# Patient Record
Sex: MALE | Race: WHITE | NOT HISPANIC OR LATINO | Employment: OTHER | URBAN - METROPOLITAN AREA
[De-identification: names, ages, dates, MRNs, and addresses within clinical notes are randomized per-mention and may not be internally consistent; named-entity substitution may affect disease eponyms.]

---

## 2017-05-31 DIAGNOSIS — I10 HYPERTENSION GOAL BP (BLOOD PRESSURE) < 140/90: ICD-10-CM

## 2017-06-01 RX ORDER — LISINOPRIL 20 MG/1
TABLET ORAL
Qty: 90 TABLET | Refills: 0 | Status: SHIPPED | OUTPATIENT
Start: 2017-06-01 | End: 2017-09-05

## 2017-06-01 NOTE — TELEPHONE ENCOUNTER
Lisinopril      Last Written Prescription Date: 11/10/16  Last Fill Quantity: 90, # refills: 1  Last Office Visit with G, P or [facility] prescribing provider: 10/10/16       Potassium   Date Value Ref Range Status   08/29/2016 4.2 3.4 - 5.3 mmol/L Final     Creatinine   Date Value Ref Range Status   08/29/2016 0.83 0.66 - 1.25 mg/dL Final     BP Readings from Last 3 Encounters:   10/10/16 132/62   08/29/16 124/74   05/10/16 128/70     Radha Romero [facility]

## 2017-06-01 NOTE — TELEPHONE ENCOUNTER
Prescription approved per [facility], [facility] or [facility] refill protocol.  Loan Pinzon RN  [employer]

## 2017-08-22 ENCOUNTER — TRANSFERRED RECORDS (OUTPATIENT)
Dept: HEALTH INFORMATION MANAGEMENT | Facility: CLINIC | Age: 82
End: 2017-08-22

## 2017-09-05 ENCOUNTER — OFFICE VISIT (OUTPATIENT)
Dept: FAMILY MEDICINE | Facility: CLINIC | Age: 82
End: 2017-09-05
Payer: COMMERCIAL

## 2017-09-05 VITALS
DIASTOLIC BLOOD PRESSURE: 60 MMHG | BODY MASS INDEX: 24.85 KG/M2 | WEIGHT: 167.8 LBS | HEART RATE: 67 BPM | TEMPERATURE: 98.4 F | SYSTOLIC BLOOD PRESSURE: 126 MMHG | HEIGHT: 69 IN | OXYGEN SATURATION: 98 %

## 2017-09-05 DIAGNOSIS — E78.2 MIXED HYPERLIPIDEMIA: ICD-10-CM

## 2017-09-05 DIAGNOSIS — Z12.5 SCREENING FOR PROSTATE CANCER: ICD-10-CM

## 2017-09-05 DIAGNOSIS — H91.93 HEARING DIFFICULTY OF BOTH EARS: ICD-10-CM

## 2017-09-05 DIAGNOSIS — R32 URINARY INCONTINENCE, UNSPECIFIED TYPE: ICD-10-CM

## 2017-09-05 DIAGNOSIS — I10 HYPERTENSION GOAL BP (BLOOD PRESSURE) < 140/90: ICD-10-CM

## 2017-09-05 DIAGNOSIS — Z00.00 ROUTINE GENERAL MEDICAL EXAMINATION AT A HEALTH CARE FACILITY: Primary | ICD-10-CM

## 2017-09-05 DIAGNOSIS — R71.8 ELEVATED MCV: ICD-10-CM

## 2017-09-05 DIAGNOSIS — R26.2 DIFFICULTY WALKING: ICD-10-CM

## 2017-09-05 LAB
ALBUMIN UR-MCNC: NEGATIVE MG/DL
APPEARANCE UR: CLEAR
BILIRUB UR QL STRIP: NEGATIVE
COLOR UR AUTO: YELLOW
ERYTHROCYTE [DISTWIDTH] IN BLOOD BY AUTOMATED COUNT: 14 % (ref 10–15)
FOLATE SERPL-MCNC: 19.8 NG/ML
GLUCOSE UR STRIP-MCNC: NEGATIVE MG/DL
HCT VFR BLD AUTO: 44.1 % (ref 40–53)
HGB BLD-MCNC: 14.3 G/DL (ref 13.3–17.7)
HGB UR QL STRIP: NEGATIVE
KETONES UR STRIP-MCNC: NEGATIVE MG/DL
LEUKOCYTE ESTERASE UR QL STRIP: NEGATIVE
MCH RBC QN AUTO: 32.9 PG (ref 26.5–33)
MCHC RBC AUTO-ENTMCNC: 32.4 G/DL (ref 31.5–36.5)
MCV RBC AUTO: 101 FL (ref 78–100)
NITRATE UR QL: NEGATIVE
PH UR STRIP: 6 PH (ref 5–7)
PLATELET # BLD AUTO: 240 10E9/L (ref 150–450)
RBC # BLD AUTO: 4.35 10E12/L (ref 4.4–5.9)
SOURCE: NORMAL
SP GR UR STRIP: 1.01 (ref 1–1.03)
UROBILINOGEN UR STRIP-ACNC: 1 EU/DL (ref 0.2–1)
VIT B12 SERPL-MCNC: 426 PG/ML (ref 193–986)
WBC # BLD AUTO: 6.8 10E9/L (ref 4–11)

## 2017-09-05 PROCEDURE — 81003 URINALYSIS AUTO W/O SCOPE: CPT | Performed by: FAMILY MEDICINE

## 2017-09-05 PROCEDURE — 80061 LIPID PANEL: CPT | Performed by: FAMILY MEDICINE

## 2017-09-05 PROCEDURE — 99397 PER PM REEVAL EST PAT 65+ YR: CPT | Performed by: FAMILY MEDICINE

## 2017-09-05 PROCEDURE — 36415 COLL VENOUS BLD VENIPUNCTURE: CPT | Performed by: FAMILY MEDICINE

## 2017-09-05 PROCEDURE — 80053 COMPREHEN METABOLIC PANEL: CPT | Performed by: FAMILY MEDICINE

## 2017-09-05 PROCEDURE — 85027 COMPLETE CBC AUTOMATED: CPT | Performed by: FAMILY MEDICINE

## 2017-09-05 PROCEDURE — 82607 VITAMIN B-12: CPT | Performed by: FAMILY MEDICINE

## 2017-09-05 PROCEDURE — 82746 ASSAY OF FOLIC ACID SERUM: CPT | Performed by: FAMILY MEDICINE

## 2017-09-05 PROCEDURE — G0103 PSA SCREENING: HCPCS | Performed by: FAMILY MEDICINE

## 2017-09-05 RX ORDER — ATORVASTATIN CALCIUM 10 MG/1
10 TABLET, FILM COATED ORAL DAILY
Qty: 90 TABLET | Refills: 3 | Status: SHIPPED | OUTPATIENT
Start: 2017-09-05 | End: 2018-09-11

## 2017-09-05 RX ORDER — LISINOPRIL 20 MG/1
20 TABLET ORAL DAILY
Qty: 90 TABLET | Refills: 3 | Status: SHIPPED | OUTPATIENT
Start: 2017-09-05 | End: 2018-09-11

## 2017-09-05 NOTE — LETTER
September 6, 2017      Jose E Capps  [address] [address]        Dear ,    I have reviewed your recent labs. Here are the results:    -Liver and gallbladder tests are normal. (ALT,AST, Alk phos, bilirubin), kidney function is normal (Cr, GFR), Sodium is normal, Potassium is normal, Calcium is normal, Glucose is normal (diabetes screening test).   -Cholesterol levels (LDL,HDL, Triglycerides) are normal.  ADVISE: rechecking in 1 year.  -PSA (prostate specific antigen) test is normal.  This indicates a low likelihood of prostate cancer.  ADVISE: yearly recheck.   -Normal red blood cell (hgb) levels, normal white blood cell count and normal platelet levels.  - Urine test is normal  -Vitamin B12 and folic acid levels were normal as well      Please follow up with the urologist for management of urinary symptoms.        Resulted Orders   Lipid Profile   Result Value Ref Range    Cholesterol 191 <200 mg/dL    Triglycerides 96 <150 mg/dL    HDL Cholesterol 71 >39 mg/dL    LDL Cholesterol Calculated 101 (H) <100 mg/dL      Comment:      Above desirable:  100-129 mg/dl  Borderline High:  130-159 mg/dL  High:             160-189 mg/dL  Very high:       >189 mg/dl      Non HDL Cholesterol 120 <130 mg/dL   Prostate spec antigen screen   Result Value Ref Range    PSA 3.46 0 - 4 ug/L      Comment:      Assay Method:  Chemiluminescence using Siemens Vista analyzer   *UA reflex to Microscopic   Result Value Ref Range    Color Urine Yellow     Appearance Urine Clear     Glucose Urine Negative NEG^Negative mg/dL    Bilirubin Urine Negative NEG^Negative    Ketones Urine Negative NEG^Negative mg/dL    Specific Gravity Urine 1.010 1.003 - 1.035    Blood Urine Negative NEG^Negative    pH Urine 6.0 5.0 - 7.0 pH    Protein Albumin Urine Negative NEG^Negative mg/dL    Urobilinogen Urine 1.0 0.2 - 1.0 EU/dL    Nitrite Urine Negative NEG^Negative    Leukocyte Esterase Urine Negative NEG^Negative    Source  Midstream Urine    Comprehensive metabolic panel   Result Value Ref Range    Sodium 141 133 - 144 mmol/L    Potassium 3.8 3.4 - 5.3 mmol/L    Chloride 107 94 - 109 mmol/L    Carbon Dioxide 25 20 - 32 mmol/L    Anion Gap 9 3 - 14 mmol/L    Glucose 91 70 - 99 mg/dL    Urea Nitrogen 15 7 - 30 mg/dL    Creatinine 0.75 0.66 - 1.25 mg/dL    GFR Estimate >90 >60 mL/min/1.7m2      Comment:      Non  GFR Calc    GFR Estimate If Black >90 >60 mL/min/1.7m2      Comment:       GFR Calc    Calcium 8.8 8.5 - 10.1 mg/dL    Bilirubin Total 0.9 0.2 - 1.3 mg/dL    Albumin 3.3 (L) 3.4 - 5.0 g/dL    Protein Total 6.3 (L) 6.8 - 8.8 g/dL    Alkaline Phosphatase 64 40 - 150 U/L    ALT 19 0 - 70 U/L    AST 19 0 - 45 U/L   Vitamin B12   Result Value Ref Range    Vitamin B12 426 193 - 986 pg/mL   Folate   Result Value Ref Range    Folate 19.8 >5.4 ng/mL   CBC with platelets   Result Value Ref Range    WBC 6.8 4.0 - 11.0 10e9/L    RBC Count 4.35 (L) 4.4 - 5.9 10e12/L    Hemoglobin 14.3 13.3 - 17.7 g/dL    Hematocrit 44.1 40.0 - 53.0 %     (H) 78 - 100 fl    MCH 32.9 26.5 - 33.0 pg    MCHC 32.4 31.5 - 36.5 g/dL    RDW 14.0 10.0 - 15.0 %    Platelet Count 240 150 - 450 10e9/L       If you have any questions or concerns, please call the clinic at the number listed above.       Sincerely,        Nehemias Granados MD

## 2017-09-05 NOTE — MR AVS SNAPSHOT
After Visit Summary   9/5/2017    Jose E Capps    MRN: [id number]           Patient Information     Date Of Birth          9/28/1931        Visit Information        Provider Department      9/5/2017 10:40 AM Nehemias Granados MD [facility]        Today's Diagnoses     Routine general medical examination at a health care facility    -  1    Mixed hyperlipidemia        Hypertension goal BP (blood pressure) < 140/90        Urinary incontinence, unspecified type        Elevated MCV        Screening for prostate cancer        Difficulty walking          Care Instructions      Preventive Health Recommendations:       Male Ages 65 and over    Yearly exam:             See your health care provider every year in order to  o   Review health changes.   o   Discuss preventive care.    o   Review your medicines if your doctor has prescribed any.    Talk with your health care provider about whether you should have a test to screen for prostate cancer (PSA).    Every 3 years, have a diabetes test (fasting glucose). If you are at risk for diabetes, you should have this test more often.    Every 5 years, have a cholesterol test. Have this test more often if you are at risk for high cholesterol or heart disease.     Every 10 years, have a colonoscopy. Or, have a yearly FIT test (stool test). These exams will check for colon cancer.    Talk to with your health care provider about screening for Abdominal Aortic Aneurysm if you have a family history of AAA or have a history of smoking.  Shots:     Get a flu shot each year.     Get a tetanus shot every 10 years.     Talk to your doctor about your pneumonia vaccines. There are now two you should receive - Pneumovax (PPSV 23) and Prevnar (PCV 13).    Talk to your doctor about a shingles vaccine.     Talk to your doctor about the hepatitis B vaccine.  Nutrition:     Eat at least 5 servings of fruits and vegetables each day.     Eat whole-grain bread,  "whole-wheat pasta and brown rice instead of white grains and rice.     Talk to your doctor about Calcium and Vitamin D.   Lifestyle    Exercise for at least 150 minutes a week (30 minutes a day, 5 days a week). This will help you control your weight and prevent disease.     Limit alcohol to one drink per day.     No smoking.     Wear sunscreen to prevent skin cancer.     See your dentist every six months for an exam and cleaning.     See your eye doctor every 1 to 2 years to screen for conditions such as glaucoma, macular degeneration and cataracts.          Follow-ups after your visit        Additional Services     PHYSICAL THERAPY REFERRAL       Walking instability.    Treatment: Evaluation & Treatment  Special Instructions/Modalities:  May need OT  Special Programs:   Please be aware that coverage of these services is subject to the terms and limitations of your health insurance plan.  Call member services at your health plan with any benefit or coverage questions.      **Note to Provider:  If you are referring outside of [place] for the therapy appointment, please list the name of the location in the \"special instructions\" above, print the referral and give to the patient to schedule the appointment.            SKIN CARE REFERRAL       Your provider [place] [facility] [place] [facility] [place] [phone]   [email or web address]     Please be aware that coverage of these services is subject to the terms and limitations of your health insurance plan.  Please check with your insurance prior to the appointment to ensure appropriate coverage for any services considered cosmetic in nature or not medically necessary.    Please bring the following with you to your appointment:    (1) Any X-Rays, CTs or MRIs which have been performed.  Contact the facility where they were done to arrange for  prior to your scheduled appointment.  Any new CT, MRI or " other procedures ordered by your specialist must be performed at a [place] facility or coordinated by your clinic's referral office.  (2) List of current medications  (3) This referral request   (4) Any documents/labs given to you for this referral            UROLOGY ADULT REFERRAL       Your provider has referred you to: [facility]: [facility] [phone]   [email or web address]    Please be aware that coverage of these services is subject to the terms and limitations of your health insurance plan.  Call member services at your health plan with any benefit or coverage questions.      Please bring the following with you to your appointment:    (1) Any X-Rays, CTs or MRIs which have been performed.  Contact the facility where they were done to arrange for  prior to your scheduled appointment.    (2) List of current medications  (3) This referral request   (4) Any documents/labs given to you for this referral                  Follow-up notes from your care team     Return in about 1 year (around 9/5/2018) for Physical Exam.      Who to contact     If you have questions or need follow up information about today's clinic visit or your schedule please contact [facility] [place] directly at [phone].  Normal or non-critical lab and imaging results will be communicated to you by [employer], letter or phone within 4 business days after the clinic has received the results. If you do not hear from us within 7 days, please contact the clinic through [employer] or phone. If you have a critical or abnormal lab result, we will notify you by phone as soon as possible.  Submit refill requests through [employer] or call your pharmacy and they will forward the refill request to us. Please allow 3 business days for your refill to be completed.          Additional Information About Your Visit        Scytl Information     S[employer]tl lets you send messages to your doctor, view your test  "results, renew your prescriptions, schedule appointments and more. To sign up, go to [email or web address] . Click on \"Log in\" on the left side of the screen, which will take you to the Welcome page. Then click on \"Sign up Now\" on the right side of the page.     You will be asked to enter the access code listed below, as well as some personal information. Please follow the directions to create your username and password.     Your access code is: [email or web address]  Expires: 2017 11:34 AM     Your access code will  in 90 days. If you need help or a new code, please call your [place] clinic or [phone].        Care EveryWhere ID     This is your Care EveryWhere ID. This could be used by other organizations to access your [place] medical records  [id number]        Your Vitals Were     Pulse Temperature Height Pulse Oximetry BMI (Body Mass Index)       67 98.4  F (36.9  C) (Tympanic) 5' 9\" (1.753 m) 98% 24.78 kg/m2        Blood Pressure from Last 3 Encounters:   17 126/60   10/10/16 132/62   16 124/74    Weight from Last 3 Encounters:   17 167 lb 12.8 oz (76.1 kg)   10/10/16 167 lb (75.8 kg)   16 172 lb (78 kg)              We Performed the Following     *UA reflex to Microscopic     CBC with platelets     Comprehensive metabolic panel     Folate     Lipid Profile     PHYSICAL THERAPY REFERRAL     Prostate spec antigen screen     SKIN CARE REFERRAL     UROLOGY ADULT REFERRAL     Vitamin B12          Today's Medication Changes          These changes are accurate as of: 17 11:34 AM.  If you have any questions, ask your nurse or doctor.               These medicines have changed or have updated prescriptions.        Dose/Directions    lisinopril 20 MG tablet   Commonly known as:  PRINIVIL/ZESTRIL   This may have changed:  See the new instructions.   Used for:  Hypertension goal BP (blood pressure) < 140/90   Changed by:  Nehemias Granados MD        Dose:  20 mg   Take 1 tablet (20 mg) " by mouth daily   Quantity:  90 tablet   Refills:  3         Stop taking these medicines if you haven't already. Please contact your care team if you have questions.     predniSONE 10 MG tablet   Commonly known as:  DELTASONE   Stopped by:  Nehemias Granados MD                Where to get your medicines      These medications were sent to [id number] - [place], [place] - [address] [facility]  [address] [facility] [address]     Phone:  [phone]     atorvastatin 10 MG tablet    lisinopril 20 MG tablet                Primary Care Provider Office Phone # Fax #    Nehemias Granados [phone] [address]  [place] [address]        Equal Access to Services     [facility]: Hadii cecille enamorado hadasho Sosylviaali, waaxda luqadaha, qaybta kaalmada adeegyada, ian mary . So [facility] [phone].    ATENCIÓN: Si habla español, tiene a bradley disposición servicios gratuitos de asistencia lingüística. [facility] [phone].    We comply with applicable federal civil rights laws and [place] laws. We do not discriminate on the basis of race, color, national origin, age, disability sex, sexual orientation or gender identity.            Thank you!     Thank you for choosing [facility] [place]  for your care. Our goal is always to provide you with excellent care. Hearing back from our patients is one way we can continue to improve our services. Please take a few minutes to complete the written survey that you may receive in the mail after your visit with us. Thank you!             Your Updated Medication List - Protect others around you: Learn how to safely use, store and throw away your medicines at www.disposemymeds.org.          This list is accurate as of: 9/5/17 11:34 AM.  Always use your most recent med list.                   Brand Name Dispense Instructions for use Diagnosis    aspirin 81 MG tablet      Take 1 tablet by mouth daily.         atorvastatin 10 MG tablet    LIPITOR    90 tablet    Take 1 tablet (10 mg) by mouth daily    Mixed hyperlipidemia       calcium + D 600-200 MG-UNIT Tabs   Generic drug:  calcium carbonate-vitamin D      Take 1 tablet by mouth daily.        fish oil-omega-3 fatty acids 1000 MG capsule      Take 1 capsule by mouth daily.        leflunomide 10 MG tablet    ARAVA     Take 1 tablet by mouth daily.    Seronegative rheumatoid arthritis (H)       lisinopril 20 MG tablet    PRINIVIL/ZESTRIL    90 tablet    Take 1 tablet (20 mg) by mouth daily    Hypertension goal BP (blood pressure) < 140/90       MULTI COMPLETE PO      Take  by mouth.

## 2017-09-05 NOTE — PROGRESS NOTES
SUBJECTIVE:   Jose E Capps is a 85 year old male who presents for Preventive Visit.      Are you in the first 12 months of your Medicare Part B coverage?  No    Healthy Habits:    Do you get at least three servings of calcium containing foods daily (dairy, green leafy vegetables, etc.)? yes    Amount of exercise or daily activities, outside of work: 0 day(s) per week    Problems taking medications regularly No    Medication side effects: No    Have you had an eye exam in the past two years? no    Do you see a dentist twice per year? ONCE    Do you have sleep apnea, excessive snoring or daytime drowsiness?no    COGNITIVE SCREEN  1) Repeat 3 items (Banana, Sunrise, Chair)    2) Clock draw: ABNORMAL   3) 3 item recall: Recalls 1 object   Results: ABNORMAL clock, 1-2 items recalled: PROBABLE COGNITIVE IMPAIRMENT, **INFORM PROVIDER**    Mini-CogTM Copyright S Kiara. Licensed by the author for use in [facility]; reprinted with permission (isma@[facility]). All rights reserved.            Hyperlipidemia Follow-Up      Rate your low fat/cholesterol diet?: good    Taking statin?  Yes    Other lipid medications/supplements?:  none    Hypertension Follow-up      Outpatient blood pressures are not being checked.    Low Salt Diet: no added salt            Reviewed and updated as needed this visit by clinical staffTobacco  Allergies  Meds  Soc Hx        Reviewed and updated as needed this visit by Provider  Allergies        Social History   Substance Use Topics     Smoking status: Former Smoker     Packs/day: 2.00     Years: 30.00     Types: Cigarettes     Smokeless tobacco: Never Used     Alcohol use 2.4 oz/week     4 Standard drinks or equivalent per week       The patient does not drink >3 drinks per day nor >7 drinks per week.    Today's PHQ-2 Score:   PHQ-2 ( 1999 Pfizer) 9/5/2017 10/10/2016   Q1: Little interest or pleasure in doing things 0 0   Q2: Feeling down, depressed or hopeless 0 0   PHQ-2 Score  0 0       Do you feel safe in your environment - Yes    Do you have a Health Care Directive?: Yes: Advance Directive has been received and scanned.    Current providers sharing in care for this patient include:   Patient Care Team:  Nehemias Granados MD as PCP - General (Family Practice)      Hearing impairment: Yes, Difficulty following a conversation in a noisy restaurant or crowded room.    Ability to successfully perform activities of daily living: c/o difficulty walking.      Fall risk:  Fallen 2 or more times in the past year?: No  Any fall with injury in the past year?: No      Home safety:  none identified    C/o urinary incontinence. C/o weak urine stream. Feel incomplete bladder emptying. C/o urgency. No dysuria or hematuria.     The following health maintenance items are reviewed in Epic and correct as of today:Health Maintenance   Topic Date Due     PNEUMOCOCCAL (2 of 2 - PCV13) 09/01/2012     INFLUENZA VACCINE (SYSTEM ASSIGNED)  09/01/2017     FALL RISK ASSESSMENT  09/05/2018     WELLNESS VISIT Q1 YR  09/05/2018     ADVANCE DIRECTIVE PLANNING Q5 YRS  11/12/2018     TETANUS IMMUNIZATION (SYSTEM ASSIGNED)  04/17/2022     Labs reviewed in EPIC  BP Readings from Last 3 Encounters:   09/05/17 126/60   10/10/16 132/62   08/29/16 124/74    Wt Readings from Last 3 Encounters:   09/05/17 167 lb 12.8 oz (76.1 kg)   10/10/16 167 lb (75.8 kg)   08/29/16 172 lb (78 kg)                  Patient Active Problem List   Diagnosis     ACP (advance care planning)     CVA (cerebral vascular accident) (H)     Seronegative rheumatoid arthritis (H)     Hyperlipidemia LDL goal <100     Colon polyp     Hypertension goal BP (blood pressure) < 140/90     Hip pain, left     History of malignant melanoma of skin     History of basal cell carcinoma     Past Surgical History:   Procedure Laterality Date     JOINT REPLACEMENT, HIP RT/LT  2007    R     JOINT REPLACEMTN, KNEE RT/LT  2004     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     excised       Social History   Substance Use Topics     Smoking status: Former Smoker     Packs/day: 2.00     Years: 30.00     Types: Cigarettes     Smokeless tobacco: Never Used     Alcohol use 2.4 oz/week     4 Standard drinks or equivalent per week     Family History   Problem Relation Age of Onset     DIABETES Mother      Alzheimer Disease Mother      Obesity Mother      Cardiovascular Father      Obesity Father      CANCER Sister      CANCER Sister          Current Outpatient Prescriptions   Medication Sig Dispense Refill     atorvastatin (LIPITOR) 10 MG tablet Take 1 tablet (10 mg) by mouth daily 90 tablet 3     lisinopril (PRINIVIL/ZESTRIL) 20 MG tablet Take 1 tablet (20 mg) by mouth daily 90 tablet 3     Multiple Vitamins-Minerals (MULTI COMPLETE PO) Take  by mouth.       aspirin 81 MG tablet Take 1 tablet by mouth daily.       fish oil-omega-3 fatty acids (OMEGA 3) 1000 MG capsule Take 1 capsule by mouth daily.       calcium carbonate-vitamin D (CALCIUM + D) 600-200 MG-UNIT TABS Take 1 tablet by mouth daily.       leflunomide (ARAVA) 10 MG tablet Take 1 tablet by mouth daily.       Allergies   Allergen Reactions     No Known Drug Allergy              ROS:  C: NEGATIVE for fever, chills, change in weight  I: NEGATIVE for worrisome rashes, moles or lesions  E: NEGATIVE for vision changes or irritation  E/M: NEGATIVE for ear, mouth and throat problems  R: NEGATIVE for significant cough or SOB  B: NEGATIVE for masses, tenderness or discharge  CV: NEGATIVE for chest pain, palpitations or peripheral edema  GI: NEGATIVE for nausea, abdominal pain, heartburn, or change in bowel habits  : NEGATIVE for frequency, dysuria, or hematuria  M: NEGATIVE for significant arthralgias or myalgia  N: NEGATIVE for weakness, dizziness or paresthesias  E: NEGATIVE for temperature intolerance, skin/hair changes  H: NEGATIVE for bleeding problems  P: NEGATIVE for changes in mood or affect    OBJECTIVE:   /60  Pulse 67   "Temp 98.4  F (36.9  C) (Tympanic)  Ht 5' 9\" (1.753 m)  Wt 167 lb 12.8 oz (76.1 kg)  SpO2 98%  BMI 24.78 kg/m2 Estimated body mass index is 24.78 kg/(m^2) as calculated from the following:    Height as of this encounter: 5' 9\" (1.753 m).    Weight as of this encounter: 167 lb 12.8 oz (76.1 kg).  EXAM:   GENERAL: healthy, alert and no distress  EYES: Eyes grossly normal to inspection, PERRL and conjunctivae and sclerae normal  HENT: ear canals and TM's normal, nose and mouth without ulcers or lesions  NECK: no adenopathy, no asymmetry, masses, or scars and thyroid normal to palpation  RESP: lungs clear to auscultation - no rales, rhonchi or wheezes  CV: regular rate and rhythm, normal S1 S2, no S3 or S4, no murmur, click or rub, no peripheral edema and peripheral pulses strong  ABDOMEN: soft, nontender, no hepatosplenomegaly, no masses and bowel sounds normal  MS: no gross musculoskeletal defects noted, no edema  SKIN: no suspicious lesions or rashes  NEURO: Normal strength and tone, mentation intact and speech normal  PSYCH: mentation appears normal, affect normal/bright  Results for orders placed or performed in visit on 09/05/17   Lipid Profile   Result Value Ref Range    Cholesterol 191 <200 mg/dL    Triglycerides 96 <150 mg/dL    HDL Cholesterol 71 >39 mg/dL    LDL Cholesterol Calculated 101 (H) <100 mg/dL    Non HDL Cholesterol 120 <130 mg/dL   Prostate spec antigen screen   Result Value Ref Range    PSA 3.46 0 - 4 ug/L   *UA reflex to Microscopic   Result Value Ref Range    Color Urine Yellow     Appearance Urine Clear     Glucose Urine Negative NEG^Negative mg/dL    Bilirubin Urine Negative NEG^Negative    Ketones Urine Negative NEG^Negative mg/dL    Specific Gravity Urine 1.010 1.003 - 1.035    Blood Urine Negative NEG^Negative    pH Urine 6.0 5.0 - 7.0 pH    Protein Albumin Urine Negative NEG^Negative mg/dL    Urobilinogen Urine 1.0 0.2 - 1.0 EU/dL    Nitrite Urine Negative NEG^Negative    Leukocyte " Esterase Urine Negative NEG^Negative    Source Midstream Urine    Comprehensive metabolic panel   Result Value Ref Range    Sodium 141 133 - 144 mmol/L    Potassium 3.8 3.4 - 5.3 mmol/L    Chloride 107 94 - 109 mmol/L    Carbon Dioxide 25 20 - 32 mmol/L    Anion Gap 9 3 - 14 mmol/L    Glucose 91 70 - 99 mg/dL    Urea Nitrogen 15 7 - 30 mg/dL    Creatinine 0.75 0.66 - 1.25 mg/dL    GFR Estimate >90 >60 mL/min/1.7m2    GFR Estimate If Black >90 >60 mL/min/1.7m2    Calcium 8.8 8.5 - 10.1 mg/dL    Bilirubin Total 0.9 0.2 - 1.3 mg/dL    Albumin 3.3 (L) 3.4 - 5.0 g/dL    Protein Total 6.3 (L) 6.8 - 8.8 g/dL    Alkaline Phosphatase 64 40 - 150 U/L    ALT 19 0 - 70 U/L    AST 19 0 - 45 U/L   Vitamin B12   Result Value Ref Range    Vitamin B12 426 193 - 986 pg/mL   Folate   Result Value Ref Range    Folate 19.8 >5.4 ng/mL   CBC with platelets   Result Value Ref Range    WBC 6.8 4.0 - 11.0 10e9/L    RBC Count 4.35 (L) 4.4 - 5.9 10e12/L    Hemoglobin 14.3 13.3 - 17.7 g/dL    Hematocrit 44.1 40.0 - 53.0 %     (H) 78 - 100 fl    MCH 32.9 26.5 - 33.0 pg    MCHC 32.4 31.5 - 36.5 g/dL    RDW 14.0 10.0 - 15.0 %    Platelet Count 240 150 - 450 10e9/L       ASSESSMENT / PLAN:   1. Routine general medical examination at a health care facility    - SKIN CARE REFERRAL- recommended to see them annually.     Labs reviewed:  -Liver and gallbladder tests are normal. (ALT,AST, Alk phos, bilirubin), kidney function is normal (Cr, GFR), Sodium is normal, Potassium is normal, Calcium is normal, Glucose is normal (diabetes screening test).   -Cholesterol levels (LDL,HDL, Triglycerides) are normal.  ADVISE: rechecking in 1 year.  -PSA (prostate specific antigen) test is normal.  This indicates a low likelihood of prostate cancer.  ADVISE: yearly recheck.   -Normal red blood cell (hgb) levels, normal white blood cell count and normal platelet levels.  - Urine test is normal  -Vitamin B12 and folic acid levels were normal as well    2. Mixed  "hyperlipidemia  Well controlled.   - atorvastatin (LIPITOR) 10 MG tablet; Take 1 tablet (10 mg) by mouth daily  Dispense: 90 tablet; Refill: 3  - Lipid Profile  - Comprehensive metabolic panel    3. Hypertension goal BP (blood pressure) < 140/90  Well controlled.   - lisinopril (PRINIVIL/ZESTRIL) 20 MG tablet; Take 1 tablet (20 mg) by mouth daily  Dispense: 90 tablet; Refill: 3  - Comprehensive metabolic panel    4. Urinary incontinence, unspecified type    - *UA reflex to Microscopic  - UROLOGY ADULT REFERRAL- recommended to see them for management of urinary symptoms.     5. Elevated MCV - unsure etiology.     - Vitamin B12  - Folate  - CBC with platelets    6. Screening for prostate cancer    - Prostate spec antigen screen- negative.     7. Difficulty walking  Recommended PT evaluation and management.   - PHYSICAL THERAPY REFERRAL    8. Hearing difficulty of both ears  Hearing screening done by nurse today was abnormal. Referred to ENT.  - OTOLARYNGOLOGY REFERRAL    End of Life Planning:  Patient currently has an advanced directive: Yes.  Practitioner is supportive of decision.    COUNSELING:  Reviewed preventive health counseling, as reflected in patient instructions       Regular exercise       Healthy diet/nutrition      Estimated body mass index is 24.78 kg/(m^2) as calculated from the following:    Height as of this encounter: 5' 9\" (1.753 m).    Weight as of this encounter: 167 lb 12.8 oz (76.1 kg).     reports that he has quit smoking. His smoking use included Cigarettes. He has a 60.00 pack-year smoking history. He has never used smokeless tobacco.        Appropriate preventive services were discussed with this patient, including applicable screening as appropriate for cardiovascular disease, diabetes, osteopenia/osteoporosis, and glaucoma.  As appropriate for age/gender, discussed screening for colorectal cancer, prostate cancer, breast cancer, and cervical cancer. Checklist reviewing preventive services " available has been given to the patient.    Reviewed patients plan of care and provided an AVS. The Complex Care Plan (for patients with higher acuity and needing more deliberate coordination of services) for Jose E meets the Care Plan requirement. This Care Plan has been established and reviewed with the Patient.    Counseling Resources:  ATP IV Guidelines  Pooled Cohorts Equation Calculator  Breast Cancer Risk Calculator  FRAX Risk Assessment  ICSI Preventive Guidelines  Dietary Guidelines for Americans, 2010  USDA's MyPlate  ASA Prophylaxis  Lung CA Screening    Nehemias Granados MD  [facility]

## 2017-09-05 NOTE — NURSING NOTE
"Chief Complaint   Patient presents with     Medicare Visit     fasting        Initial /60  Pulse 67  Temp 98.4  F (36.9  C) (Tympanic)  Ht 5' 9\" (1.753 m)  Wt 167 lb 12.8 oz (76.1 kg)  SpO2 98%  BMI 24.78 kg/m2 Estimated body mass index is 24.78 kg/(m^2) as calculated from the following:    Height as of this encounter: 5' 9\" (1.753 m).    Weight as of this encounter: 167 lb 12.8 oz (76.1 kg).  Medication Reconciliation: complete  "

## 2017-09-06 ENCOUNTER — OFFICE VISIT (OUTPATIENT)
Dept: FAMILY MEDICINE | Facility: CLINIC | Age: 82
End: 2017-09-06
Attending: FAMILY MEDICINE
Payer: COMMERCIAL

## 2017-09-06 DIAGNOSIS — Q82.5 CONGENITAL NEVUS OF LEFT LOWER LEG: ICD-10-CM

## 2017-09-06 DIAGNOSIS — L82.1 SEBORRHEIC KERATOSES: ICD-10-CM

## 2017-09-06 DIAGNOSIS — L81.4 SOLAR LENTIGO: ICD-10-CM

## 2017-09-06 DIAGNOSIS — D22.9 MULTIPLE BENIGN MELANOCYTIC NEVI: ICD-10-CM

## 2017-09-06 DIAGNOSIS — Z85.828 HISTORY OF BASAL CELL CARCINOMA: ICD-10-CM

## 2017-09-06 DIAGNOSIS — Z85.820 HISTORY OF MALIGNANT MELANOMA OF SKIN: Primary | ICD-10-CM

## 2017-09-06 LAB
ALBUMIN SERPL-MCNC: 3.3 G/DL (ref 3.4–5)
ALP SERPL-CCNC: 64 U/L (ref 40–150)
ALT SERPL W P-5'-P-CCNC: 19 U/L (ref 0–70)
ANION GAP SERPL CALCULATED.3IONS-SCNC: 9 MMOL/L (ref 3–14)
AST SERPL W P-5'-P-CCNC: 19 U/L (ref 0–45)
BILIRUB SERPL-MCNC: 0.9 MG/DL (ref 0.2–1.3)
BUN SERPL-MCNC: 15 MG/DL (ref 7–30)
CALCIUM SERPL-MCNC: 8.8 MG/DL (ref 8.5–10.1)
CHLORIDE SERPL-SCNC: 107 MMOL/L (ref 94–109)
CHOLEST SERPL-MCNC: 191 MG/DL
CO2 SERPL-SCNC: 25 MMOL/L (ref 20–32)
CREAT SERPL-MCNC: 0.75 MG/DL (ref 0.66–1.25)
GFR SERPL CREATININE-BSD FRML MDRD: >90 ML/MIN/1.7M2
GLUCOSE SERPL-MCNC: 91 MG/DL (ref 70–99)
HDLC SERPL-MCNC: 71 MG/DL
LDLC SERPL CALC-MCNC: 101 MG/DL
NONHDLC SERPL-MCNC: 120 MG/DL
POTASSIUM SERPL-SCNC: 3.8 MMOL/L (ref 3.4–5.3)
PROT SERPL-MCNC: 6.3 G/DL (ref 6.8–8.8)
PSA SERPL-ACNC: 3.46 UG/L (ref 0–4)
SODIUM SERPL-SCNC: 141 MMOL/L (ref 133–144)
TRIGL SERPL-MCNC: 96 MG/DL

## 2017-09-06 PROCEDURE — 99213 OFFICE O/P EST LOW 20 MIN: CPT | Performed by: FAMILY MEDICINE

## 2017-09-06 NOTE — MR AVS SNAPSHOT
"              After Visit Summary   9/6/2017    Jose E Capps    MRN: [id number]           Patient Information     Date Of Birth          9/28/1931        Visit Information        Provider Department      9/6/2017 11:40 AM Marita Neville MD [facility] - Primary Care Skin        Today's Diagnoses     History of malignant melanoma of skin    -  1    History of basal cell carcinoma        Solar lentigo        Multiple benign melanocytic nevi        Seborrheic keratoses        Congenital nevus of left lower leg          Care Instructions    FUTURE APPOINTMENTS  Follow up every 1 year(s) for a full-body skin cancer screening.    SUN PROTECTION INSTRUCTIONS  Sun damage can lead to skin cancer and premature aging of the skin.      The best way to protect from sun damage to your skin is to avoid the sun during peak hours (10 am - 2 pm) even on overcast days.      Use UPF sun-protective clothing, which while more expensive initially provides longer lasting coverage without having to worry about remembering to re-apply.  1. Wear a wide-brimmed hat and sunglasses.   2. Wear sun-protective clothing.  K[employer]uz and other [employer] make sun protective clothing that are stylish, comfortable and cool. VOICE[employer]ATE.[employer] and other [employer] make UV arm sleeves suitable for golfing, gardening and other activities.      Sunscreen instructions:  1. Use sunscreens with Zinc Oxide, Titanium Dioxide or Avobenzone to protect from UVA rays.  2. Use SPF 30-50+ to protect from UVB rays.  3. Re-apply every 2 hours even if water resistant.  4. Apply on your face every day even when cloudy and even in the winter. UVA \"aging rays\" penetrate window glass and are just as strong in the winter as in the summer.    Product Recommendations:    Good examples include: Blue Lizard, EltaMD, Solbar    Good daily moisturizers with SPF: Vanicream, CeraVe.    For sensitive skin, consider : SkinMedica Essential Defense Mineral Shield " "Broad Spectrum SPF 35      Never use tanning beds. Tanning beds are associated with much higher risks of skin cancer.    All tanning damages the skin. Aim for ivory skin year round and you will have less trouble with your skin in years to come. There is no merit in getting \"a base tan\" before a warm weather vacation, as any tanning indicates your body's response to sun damage.    Stop smoking. Smokers have higher rates of skin cancer and also have premature skin wrinkling.    FYI  You should use about 3 tablespoons of sunscreen to protect your whole body. Thus a typical eight ounce bottle of sunscreen should last 4 applications. Remember, that the SPF rating is compromised if you don t apply enough. Most people only apply 1/2 - 1/3 of the amount they need. Also don t forget areas such as your ears, feet, upper back and harder to reach places. Keep in mind that these amounts should be increased for larger body sizes.    Sunscreens with titanium dioxide and/or zinc oxide in the active ingredients are physical blockers as opposed to chemical blockers. Chemical-free sunscreens should not irritate the skin.    Spray-on sunscreens may be used for touch-up application only, not as a base layer. Also, use with caution around small children due to inhalation risk.    Avoid retinyl palmitate products.    Avoid combination products that include both sunscreen and insect repellant, as sunscreen should be applied every 2 hours, but insect repellant should not be applied as frequently.    SPF means sun protection factor, which is just the degree to which the sunscreen can protect against UVB rays. There is no rating system for UVA rays. SPF is calculated as the time skin will burn when sunscreen is applied vs. skin without sunscreen.    Water resistant sunscreens should be re-applied every 1-2 hours.    For more information:  http://www.skincancer.org/prevention/sun-protection/sunscreen/sunscreens-safe-and-effective    SKIN " CANCER SELF-EXAM INSTRUCTIONS  Check every month in the mirror or with a household member. Be aware of any changes, especially bleeding or tenderness. Also, make sure to check your nails for color changes after removal of nail polish.    For melanoma, check for:  A - Asymmetry. One half unlike the other half.  B - Border. Irregular, scalloped, ragged, notched, blurred or poorly defined borders.  C - Color. Color variations from one area to another, with shades of tan, brown and/or black present. Sometimes white, red or blue.  D - Diameter. Greater than 6 mm (about the size of a pencil eraser). Any new growth of a mole should be concerning and be evaluated.  E - Evolving. A mole or skin lesion that looks different from the rest or is changing in size, shape or color.    For basal cell carcinoma and squamous cell carcinoma, check for:    Sores, shiny bumps, nodules, scaly lesions, or wart-like growths that are itchy, tender, crusting, scabbing, eroding, oozing or bleeding.    Open sores/wounds or reddish/irritated areas that do not heal within 2-3 weeks.    Scar-like areas that are white, yellow or waxy in color.    Also, check your lymph nodes for tenderness or swelling every month. Check front of neck, back of neck, over the collarbone, and in the armpits. (Note that if you have a cold or other illness, they may be swollen)          Follow-ups after your visit        Your next 10 appointments already scheduled     Oct 02, 2017 11:00 AM CDT   New Patient Visit with Srinivasa Whipple MD   [facility] ([facility])    [address]  [address]  [facility] [id number] [phone]              Who to contact     If you have questions or need follow up information about today's clinic visit or your schedule please contact [facility] - PRIMARY CARE SKIN directly at [phone].  Normal or non-critical lab and imaging results will be communicated to you by  "MyChart, letter or phone within 4 business days after the clinic has received the results. If you do not hear from us within 7 days, please contact the clinic through [employer] or phone. If you have a critical or abnormal lab result, we will notify you by phone as soon as possible.  Submit refill requests through Ev[employer] or call your pharmacy and they will forward the refill request to us. Please allow 3 business days for your refill to be completed.          Additional Information About Your Visit        [employer] Information     Evino lets you send messages to your doctor, view your test results, renew your prescriptions, schedule appointments and more. To sign up, go to [email or web address] . Click on \"Log in\" on the left side of the screen, which will take you to the Welcome page. Then click on \"Sign up Now\" on the right side of the page.     You will be asked to enter the access code listed below, as well as some personal information. Please follow the directions to create your username and password.     Your access code is: [email or web address]  Expires: 2017 11:34 AM     Your access code will  in 90 days. If you need help or a new code, please call your [facility] clinic or [phone].        Care EveryWhere ID     This is your Care EveryWhere ID. This could be used by other organizations to access your [facility] medical records  [id number]         Blood Pressure from Last 3 Encounters:   17 126/60   10/10/16 132/62   16 124/74    Weight from Last 3 Encounters:   17 167 lb 12.8 oz (76.1 kg)   10/10/16 167 lb (75.8 kg)   16 172 lb (78 kg)              Today, you had the following     No orders found for display       Primary Care Provider Office Phone # Fax #    Nehemias Granados [phone]       [address]  [place] [address]        Equal Access to Services     [facility] CenterLOYDA AH: Hadii cecille Perales, faye san, [place] cárdenas, " ian cheung jordan carrera'aan ah. So [facility] [phone].    ATENCIÓN: Si rogerla bailey, tiene a bradley disposición servicios gratuitos de asistencia lingüística. Joselin garcia [phone].    We comply with applicable federal civil rights laws and [place] laws. We do not discriminate on the basis of race, color, national origin, age, disability sex, sexual orientation or gender identity.            Thank you!     Thank you for choosing [facility] - PRIMARY CARE [facility]  for your care. Our goal is always to provide you with excellent care. Hearing back from our patients is one way we can continue to improve our services. Please take a few minutes to complete the written survey that you may receive in the mail after your visit with us. Thank you!             Your Updated Medication List - Protect others around you: Learn how to safely use, store and throw away your medicines at www.disposemymeds.org.          This list is accurate as of: 9/6/17 11:49 AM.  Always use your most recent med list.                   Brand Name Dispense Instructions for use Diagnosis    aspirin 81 MG tablet      Take 1 tablet by mouth daily.        atorvastatin 10 MG tablet    LIPITOR    90 tablet    Take 1 tablet (10 mg) by mouth daily    Mixed hyperlipidemia       calcium + D 600-200 MG-UNIT Tabs   Generic drug:  calcium carbonate-vitamin D      Take 1 tablet by mouth daily.        fish oil-omega-3 fatty acids 1000 MG capsule      Take 1 capsule by mouth daily.        leflunomide 10 MG tablet    ARAVA     Take 1 tablet by mouth daily.    Seronegative rheumatoid arthritis (H)       lisinopril 20 MG tablet    PRINIVIL/ZESTRIL    90 tablet    Take 1 tablet (20 mg) by mouth daily    Hypertension goal BP (blood pressure) < 140/90       MULTI COMPLETE PO      Take  by mouth.

## 2017-09-06 NOTE — PROGRESS NOTES
ENT referral faxed to [facility] [place]   [phone]  Vianca YEE

## 2017-09-06 NOTE — PATIENT INSTRUCTIONS
"FUTURE APPOINTMENTS  Follow up every 1 year(s) for a full-body skin cancer screening.    SUN PROTECTION INSTRUCTIONS  Sun damage can lead to skin cancer and premature aging of the skin.      The best way to protect from sun damage to your skin is to avoid the sun during peak hours (10 am - 2 pm) even on overcast days.      Use UPF sun-protective clothing, which while more expensive initially provides longer lasting coverage without having to worry about remembering to re-apply.  1. Wear a wide-brimmed hat and sunglasses.   2. Wear sun-protective clothing.  [employer] and other [employer] make sun protective clothing that are stylish, comfortable and cool. [employer] and other [employer] make UV arm sleeves suitable for golfing, gardening and other activities.      Sunscreen instructions:  1. Use sunscreens with Zinc Oxide, Titanium Dioxide or Avobenzone to protect from UVA rays.  2. Use SPF 30-50+ to protect from UVB rays.  3. Re-apply every 2 hours even if water resistant.  4. Apply on your face every day even when cloudy and even in the winter. UVA \"aging rays\" penetrate window glass and are just as strong in the winter as in the summer.    Product Recommendations:    Good examples include: Blue Lizard, EltaMD, Solbar    Good daily moisturizers with SPF: Vanicream, CeraVe.    For sensitive skin, consider : SkinMedica Essential Defense Mineral Shield Broad Spectrum SPF 35      Never use tanning beds. Tanning beds are associated with much higher risks of skin cancer.    All tanning damages the skin. Aim for ivory skin year round and you will have less trouble with your skin in years to come. There is no merit in getting \"a base tan\" before a warm weather vacation, as any tanning indicates your body's response to sun damage.    Stop smoking. Smokers have higher rates of skin cancer and also have premature skin wrinkling.    FYI  You should use about 3 tablespoons of sunscreen to protect your whole body. " Thus a typical eight ounce bottle of sunscreen should last 4 applications. Remember, that the SPF rating is compromised if you don t apply enough. Most people only apply 1/2 - 1/3 of the amount they need. Also don t forget areas such as your ears, feet, upper back and harder to reach places. Keep in mind that these amounts should be increased for larger body sizes.    Sunscreens with titanium dioxide and/or zinc oxide in the active ingredients are physical blockers as opposed to chemical blockers. Chemical-free sunscreens should not irritate the skin.    Spray-on sunscreens may be used for touch-up application only, not as a base layer. Also, use with caution around small children due to inhalation risk.    Avoid retinyl palmitate products.    Avoid combination products that include both sunscreen and insect repellant, as sunscreen should be applied every 2 hours, but insect repellant should not be applied as frequently.    SPF means sun protection factor, which is just the degree to which the sunscreen can protect against UVB rays. There is no rating system for UVA rays. SPF is calculated as the time skin will burn when sunscreen is applied vs. skin without sunscreen.    Water resistant sunscreens should be re-applied every 1-2 hours.    For more information:  http://www.skincancer.org/prevention/sun-protection/sunscreen/sunscreens-safe-and-effective    SKIN CANCER SELF-EXAM INSTRUCTIONS  Check every month in the mirror or with a household member. Be aware of any changes, especially bleeding or tenderness. Also, make sure to check your nails for color changes after removal of nail polish.    For melanoma, check for:  A - Asymmetry. One half unlike the other half.  B - Border. Irregular, scalloped, ragged, notched, blurred or poorly defined borders.  C - Color. Color variations from one area to another, with shades of tan, brown and/or black present. Sometimes white, red or blue.  D - Diameter. Greater than 6 mm  (about the size of a pencil eraser). Any new growth of a mole should be concerning and be evaluated.  E - Evolving. A mole or skin lesion that looks different from the rest or is changing in size, shape or color.    For basal cell carcinoma and squamous cell carcinoma, check for:    Sores, shiny bumps, nodules, scaly lesions, or wart-like growths that are itchy, tender, crusting, scabbing, eroding, oozing or bleeding.    Open sores/wounds or reddish/irritated areas that do not heal within 2-3 weeks.    Scar-like areas that are white, yellow or waxy in color.    Also, check your lymph nodes for tenderness or swelling every month. Check front of neck, back of neck, over the collarbone, and in the armpits. (Note that if you have a cold or other illness, they may be swollen)

## 2017-09-06 NOTE — PROGRESS NOTES
[facility] - PRIMARY CARE SKIN    CC : skin cancer screening (full-body)  SUBJECTIVE:                                                    Jose E Capps is a 85 year old male who presents to clinic today with wife for a full-body skin exam because of his history of melanoma. No bothersome lesions noticed by the patient or other skin concerns, but he reports numerous moles.    History of malignant melanoma  Type : unknown type.  Location : left upper arm.  Date of diagnosis : approximately 45 years ago.    Personal history of other skin cancer : YES - basal cell carcinoma on right lower leg (s/p punch excision 12/2012).  Personal history of abnormal (dysplastic) moles : NO.    Family history of melanoma : NO.  Family history of skin cancer : NO.  Family history of abnormal (dysplastic) moles : NO.    Sun Exposure History  Previous history of significant sun exposure: YES  Blistering sunburns : NO  Tanning beds : NO.  Sunscreen Use : YES, SPF : 30.  UV-protective clothing use : NO  Wide-brimmed hats : NO  UV-protective sunglasses : YES  Avoids mid-day sun : NO    Occupation : retired.    Refer to electronic medical record (EMR) for past medical history and medications.    INTEGUMENTARY/SKIN: NEGATIVE for worrisome rashes, moles or lesions  ROS : 14 point review of systems was negative except the symptoms listed above in the HPI.    This document serves as a record of the services and decisions personally performed and made by Skylar Neville MD. It was created on her behalf by Giovanny Gilliam, a trained medical scribe.  The creation of this document is based on the scribe's personal observations and the provider's statements to the medical scribe.  Giovanny Gilliam, September 6, 2017 11:41 AM      OBJECTIVE:                                                    GENERAL: alert, no distress, frail and elderly  ABDOMEN : soft, non-tender, no masses or organomegaly  LYMPH : Cervical, supraclavicular and axillary nodes are  "normal  SKIN: Villela Skin Type - III.  This patient was examined from the top of the head to the bottom of the feet  including scalp, face, neck, back, chest, breasts, buttocks, both arms, both legs, both hands, both feet, all 10 fingers and all 10 toes. The dermatoscope was used to help evaluate pigmented lesions.  Skin Pertinent Findings:  Arms : Scattered, brown, macule(s) most consistent with benign solar lentigo. Scattered, brown macules most consistent with benign nevi (melanocytic nevi).    Chest, Abdomen, Back : Multiple, scattered, \"stuck on\" appearing papules, raised, brown, coarse-textured, round lesion(s) most consistent with seborrheic keratoses..    Significant Findings:  Left upper arm : Healed skin graft.    Left lower leg : 5 cm x 3 cm in size congenital nevus.    Diagnostic Test Results:  none           ASSESSMENT:                                                      Encounter Diagnoses   Name Primary?     History of malignant melanoma of skin Yes     History of basal cell carcinoma      Solar lentigo      Multiple benign melanocytic nevi      Seborrheic keratoses      Congenital nevus of left lower leg          PLAN:                                                    Patient Instructions   FUTURE APPOINTMENTS  Follow up every 1 year(s) for a full-body skin cancer screening.    SUN PROTECTION INSTRUCTIONS  Sun damage can lead to skin cancer and premature aging of the skin.      The best way to protect from sun damage to your skin is to avoid the sun during peak hours (10 am - 2 pm) even on overcast days.      Use UPF sun-protective clothing, which while more expensive initially provides longer lasting coverage without having to worry about remembering to re-apply.  1. Wear a wide-brimmed hat and sunglasses.   2. Wear sun-protective clothing.  [employer] and other [employer] make sun protective clothing that are stylish, comfortable and cool. [employer] and other [employer] make UV " "arm sleeves suitable for golfing, gardening and other activities.      Sunscreen instructions:  1. Use sunscreens with Zinc Oxide, Titanium Dioxide or Avobenzone to protect from UVA rays.  2. Use SPF 30-50+ to protect from UVB rays.  3. Re-apply every 2 hours even if water resistant.  4. Apply on your face every day even when cloudy and even in the winter. UVA \"aging rays\" penetrate window glass and are just as strong in the winter as in the summer.    Product Recommendations:    Good examples include: Blue Lizard, EltaMD, Solbar    Good daily moisturizers with SPF: Vanicream, CeraVe.    For sensitive skin, consider : SkinMedica Essential Defense Mineral Shield Broad Spectrum SPF 35      Never use tanning beds. Tanning beds are associated with much higher risks of skin cancer.    All tanning damages the skin. Aim for ivory skin year round and you will have less trouble with your skin in years to come. There is no merit in getting \"a base tan\" before a warm weather vacation, as any tanning indicates your body's response to sun damage.    Stop smoking. Smokers have higher rates of skin cancer and also have premature skin wrinkling.    FYI  You should use about 3 tablespoons of sunscreen to protect your whole body. Thus a typical eight ounce bottle of sunscreen should last 4 applications. Remember, that the SPF rating is compromised if you don t apply enough. Most people only apply 1/2 - 1/3 of the amount they need. Also don t forget areas such as your ears, feet, upper back and harder to reach places. Keep in mind that these amounts should be increased for larger body sizes.    Sunscreens with titanium dioxide and/or zinc oxide in the active ingredients are physical blockers as opposed to chemical blockers. Chemical-free sunscreens should not irritate the skin.    Spray-on sunscreens may be used for touch-up application only, not as a base layer. Also, use with caution around small children due to inhalation " risk.    Avoid retinyl palmitate products.    Avoid combination products that include both sunscreen and insect repellant, as sunscreen should be applied every 2 hours, but insect repellant should not be applied as frequently.    SPF means sun protection factor, which is just the degree to which the sunscreen can protect against UVB rays. There is no rating system for UVA rays. SPF is calculated as the time skin will burn when sunscreen is applied vs. skin without sunscreen.    Water resistant sunscreens should be re-applied every 1-2 hours.    For more information:  http://www.skincancer.org/prevention/sun-protection/sunscreen/sunscreens-safe-and-effective    SKIN CANCER SELF-EXAM INSTRUCTIONS  Check every month in the mirror or with a household member. Be aware of any changes, especially bleeding or tenderness. Also, make sure to check your nails for color changes after removal of nail polish.    For melanoma, check for:  A - Asymmetry. One half unlike the other half.  B - Border. Irregular, scalloped, ragged, notched, blurred or poorly defined borders.  C - Color. Color variations from one area to another, with shades of tan, brown and/or black present. Sometimes white, red or blue.  D - Diameter. Greater than 6 mm (about the size of a pencil eraser). Any new growth of a mole should be concerning and be evaluated.  E - Evolving. A mole or skin lesion that looks different from the rest or is changing in size, shape or color.    For basal cell carcinoma and squamous cell carcinoma, check for:    Sores, shiny bumps, nodules, scaly lesions, or wart-like growths that are itchy, tender, crusting, scabbing, eroding, oozing or bleeding.    Open sores/wounds or reddish/irritated areas that do not heal within 2-3 weeks.    Scar-like areas that are white, yellow or waxy in color.    Also, check your lymph nodes for tenderness or swelling every month. Check front of neck, back of neck, over the collarbone, and in the  armpits. (Note that if you have a cold or other illness, they may be swollen)    The patient was counseled about sunscreens and sun avoidance. The patient was counseled to check the skin regularly and report any lesion that is new, changing, itching, scabbing, bleeding or otherwise bothersome. The patient was discharged ambulatory and in stable condition.    Discussed ABCDE's of melanomas and the importance of sun protection and monthly self skin and lymph node examinations. Discussed use of sunscreen with titanium oxide and/or zinc oxide.    Recommendations : q1 year skin exams.        PROCEDURES:                                                    None.    TT : 25 minutes.  CT : 15 minutes.      The information in this document, created by the medical scribe for me, accurately reflects the services I personally performed and the decisions made by me. I have reviewed and approved this document for accuracy prior to leaving the patient care area.  Skylar Neville MD September 6, 2017 11:41 AM  [facility] - PRIMARY CARE SKIN

## 2017-10-02 ENCOUNTER — OFFICE VISIT (OUTPATIENT)
Dept: UROLOGY | Facility: CLINIC | Age: 82
End: 2017-10-02
Payer: COMMERCIAL

## 2017-10-02 VITALS
HEIGHT: 70 IN | HEART RATE: 70 BPM | DIASTOLIC BLOOD PRESSURE: 62 MMHG | SYSTOLIC BLOOD PRESSURE: 134 MMHG | WEIGHT: 155 LBS | BODY MASS INDEX: 22.19 KG/M2

## 2017-10-02 DIAGNOSIS — N39.41 URGE INCONTINENCE OF URINE: Primary | ICD-10-CM

## 2017-10-02 LAB
ALBUMIN UR-MCNC: NEGATIVE MG/DL
APPEARANCE UR: CLEAR
BILIRUB UR QL STRIP: NEGATIVE
COLOR UR AUTO: YELLOW
GLUCOSE UR STRIP-MCNC: NEGATIVE MG/DL
HGB UR QL STRIP: NEGATIVE
KETONES UR STRIP-MCNC: NEGATIVE MG/DL
LEUKOCYTE ESTERASE UR QL STRIP: NEGATIVE
NITRATE UR QL: NEGATIVE
PH UR STRIP: 6.5 PH (ref 5–7)
RESIDUAL VOLUME (RV) (EXTERNAL): 102
SOURCE: NORMAL
SP GR UR STRIP: 1.02 (ref 1–1.03)
UROBILINOGEN UR STRIP-ACNC: 0.2 EU/DL (ref 0.2–1)

## 2017-10-02 PROCEDURE — 99203 OFFICE O/P NEW LOW 30 MIN: CPT | Mod: 25 | Performed by: UROLOGY

## 2017-10-02 PROCEDURE — 51798 US URINE CAPACITY MEASURE: CPT | Performed by: UROLOGY

## 2017-10-02 PROCEDURE — 81003 URINALYSIS AUTO W/O SCOPE: CPT | Performed by: UROLOGY

## 2017-10-02 ASSESSMENT — PAIN SCALES - GENERAL: PAINLEVEL: EXTREME PAIN (8)

## 2017-10-02 NOTE — NURSING NOTE
Chief Complaint   Patient presents with     Incontinence     Patient is here for incontinence.     Elevated PSA     Patient is here to discuss elevated PSA done through [facility].     Polo Coronel LPN 11:33 AM October 2, 2017

## 2017-10-02 NOTE — PROGRESS NOTES
History: The patient is a pleasure to see this very pleasant 86-year-old gentleman in initial consultation today.  He has been troubled by frequency and nocturia with occasional episodes of urge incontinence although is not a history of gross hematuria and there is no history of urinary tract infection.  No history of present prosthetic surgery.  There is a history of a previous cerebrovascular episode.        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    excised       Family History   Problem Relation Age of Onset     DIABETES Mother      Alzheimer Disease Mother      Obesity Mother      Cardiovascular Father      Obesity Father      CANCER Sister      CANCER Sister        Social History     Social History     Marital status:      Spouse name: wife - 2nd Silva     Number of children: 5     Years of education: N/A     Occupational History     retired      Social History Main Topics     Smoking status: Former Smoker     Packs/day: 2.00     Years: 30.00     Types: Cigarettes     Smokeless tobacco: Never Used     Alcohol use 2.4 oz/week     4 Standard drinks or equivalent per week     Drug use: No     Sexual activity: Yes     Other Topics Concern     Exercise Yes     Social History Narrative       Current Outpatient Prescriptions   Medication Sig Dispense Refill     atorvastatin (LIPITOR) 10 MG tablet Take 1 tablet (10 mg) by mouth daily 90 tablet 3     lisinopril (PRINIVIL/ZESTRIL) 20 MG tablet Take 1 tablet (20 mg) by mouth daily 90 tablet 3      "Multiple Vitamins-Minerals (MULTI COMPLETE PO) Take  by mouth.       aspirin 81 MG tablet Take 1 tablet by mouth daily.       fish oil-omega-3 fatty acids (OMEGA 3) 1000 MG capsule Take 1 capsule by mouth daily.       calcium carbonate-vitamin D (CALCIUM + D) 600-200 MG-UNIT TABS Take 1 tablet by mouth daily.       leflunomide (ARAVA) 10 MG tablet Take 1 tablet by mouth daily.         Review Of Systems:  Skin: negative  Eyes: negative  Ears/Nose/Throat: negative  Respiratory: No shortness of breath, dyspnea on exertion, cough, or hemoptysis  Cardiovascular: Hypertension.  History of CVA  Gastrointestinal: negative  Genitourinary: nocturia, frequency and urgency  Musculoskeletal: negative  Neurologic: stroke  Psychiatric: negative  Hematologic/Lymphatic/Immunologic: negative  Endocrine: negative    Exam:  /62  Pulse 70  Ht 1.778 m (5' 10\")  Wt 70.3 kg (155 lb)  BMI 22.24 kg/m2    General Impression: Very pleasant gentleman in no acute distress, well-oriented in time place and person  He does walk with the aid of a cane.    Mental status.  Normal    HEENT: There is no evidence of jaundice.  Mucous membranes are normal.  There is no apparent facial asymmetry    Skin: Skin is otherwise normal to examination    Lymph Nodes: Not examined    Respiratory System: The respiratory cycle is normal    Cardiovascular: Not examined    Abdominal: Not examined    Extremities: There is no significant peripheral edema    Back and Flank: There is no flank tenderness    Genital: Not examined    Rectal: Good sphincter tone, normal perianal sensation.  Smooth rectal mucosa without hemorrhoids or fissures.  Smooth soft mildly enlarged prostate but without evidence of tenderness, bogginess or nodules.  Seminal vesicles.  Not palpable    Neurologic:  I can identify no acute abnormal clinical neurological findings and central, or peripheral nervous systems    Impression: The patient has had frequency, some nocturia and some " "occasional episodes of urge incontinence.  Urinalysis is negative.  Postvoid residual is 101 cc.  Clinical examination of the prostate is unremarkable.  There is however a history of some type of cerebrovascular episode although there are few residual sequelae at this time.  However a cerebrovascular attack can result in overactive bladder as a result of damage to the inhibitory fibers to the bladder.  I believe however that simple measures may address his symptoms.  He is drinking 2-3 cups of regular coffee every day.  I would recommend therefore we proceeded as follows.  1.  I'd like him to eliminate all caffeine.  2.  He should avoid cranberry juice.  3.  He should drink water only when thirsty.  4.  He should reduce fluids 6 PM.  5.  I would like, if possible for him to take a warm on a regular basis to relax the pelvic floor muscles.    I do not think we should embark upon treatment with anticholinergic medication at the present time as the measures identified above may be perfectly adequate in addressing his symptoms.  If this does not successfully address the problem then we will need to consider urodynamic studies and a cystoscopy for further evaluation.  I discussed the entire situation with the patient in detail.  Both he and his family will advise me should the simple measures were recommending not be successful in which case they will return to see me.  I answered all questions    Plan: I will see him on a p.r.n. basis as outlined above    \"This dictation was performed with voice recognition software and may contain errors,  omissions and inadvertent word substitution.\"    "

## 2017-10-02 NOTE — MR AVS SNAPSHOT
"              After Visit Summary   10/2/2017    Jose E Capps    MRN: [id number]           Patient Information     Date Of Birth          9/28/1931        Visit Information        Provider Department      10/2/2017 11:00 AM Srinivasa Whipple MD [facility]        Today's Diagnoses     Urge incontinence of urine    -  1       Follow-ups after your visit        Who to contact     If you have questions or need follow up information about today's clinic visit or your schedule please contact [facility] directly at [phone].  Normal or non-critical lab and imaging results will be communicated to you by [employer], letter or phone within 4 business days after the clinic has received the results. If you do not hear from us within 7 days, please contact the clinic through [employer] or phone. If you have a critical or abnormal lab result, we will notify you by phone as soon as possible.  Submit refill requests through [employer] or call your pharmacy and they will forward the refill request to us. Please allow 3 business days for your refill to be completed.          Additional Information About Your Visit        MyChart Information     [employer] gives you secure access to your electronic health record. If you see a primary care provider, you can also send messages to your care team and make appointments. If you have questions, please call your primary care clinic.  If you do not have a primary care provider, please call [phone] and they will assist you.        Care EveryWhere ID     This is your Care EveryWhere ID. This could be used by other organizations to access your [facility] medical records  [id number]        Your Vitals Were     Pulse Height BMI (Body Mass Index)             70 1.778 m (5' 10\") 22.24 kg/m2          Blood Pressure from Last 3 Encounters:   10/02/17 134/62   09/05/17 126/60   10/10/16 132/62    Weight from Last 3 " Encounters:   10/02/17 70.3 kg (155 lb)   09/05/17 76.1 kg (167 lb 12.8 oz)   10/10/16 75.8 kg (167 lb)              We Performed the Following     MEASURE POST-VOID RESIDUAL URINE/BLADDER CAPACITY, US NON-IMAGING     UA without Microscopic        Primary Care Provider Office Phone # Fax #    Nehemias Granados [phone]       [address]  [place] [address]        Equal Access to Services     MARYANNE BREWER : Hadii aad ku hadasho Soomaali, waaxda luqadaha, qaybta kaalmada adeegyada, waxay idiin hayaan adeeg kharaching lagurdeep . So [facility] [phone].    ATENCIÓN: Si habla español, tiene a bradley disposición servicios gratuitos de asistencia lingüística. Llame al [phone].    We comply with applicable federal civil rights laws and [place] laws. We do not discriminate on the basis of race, color, national origin, age, disability, sex, sexual orientation, or gender identity.            Thank you!     Thank you for choosing [facility] [place]  for your care. Our goal is always to provide you with excellent care. Hearing back from our patients is one way we can continue to improve our services. Please take a few minutes to complete the written survey that you may receive in the mail after your visit with us. Thank you!             Your Updated Medication List - Protect others around you: Learn how to safely use, store and throw away your medicines at www.disposemymeds.org.          This list is accurate as of: 10/2/17 12:04 PM.  Always use your most recent med list.                   Brand Name Dispense Instructions for use Diagnosis    aspirin 81 MG tablet      Take 1 tablet by mouth daily.        atorvastatin 10 MG tablet    LIPITOR    90 tablet    Take 1 tablet (10 mg) by mouth daily    Mixed hyperlipidemia       calcium + D 600-200 MG-UNIT Tabs   Generic drug:  calcium carbonate-vitamin D      Take 1 tablet by mouth daily.        fish oil-omega-3 fatty acids  1000 MG capsule      Take 1 capsule by mouth daily.        leflunomide 10 MG tablet    ARAVA     Take 1 tablet by mouth daily.    Seronegative rheumatoid arthritis (H)       lisinopril 20 MG tablet    PRINIVIL/ZESTRIL    90 tablet    Take 1 tablet (20 mg) by mouth daily    Hypertension goal BP (blood pressure) < 140/90       MULTI COMPLETE PO      Take  by mouth.

## 2017-10-02 NOTE — LETTER
10/2/2017       RE: Jose E Capps  [address] [facility] [address]     Dear Colleague,    Thank you for referring your patient, Jose E Capps, to the [facility] UROLOGY [facility] [place] [facility]. Please see a copy of my visit note below.    History: The patient is a pleasure to see this very pleasant 86-year-old gentleman in initial consultation today.  He has been troubled by frequency and nocturia with occasional episodes of urge incontinence although is not a history of gross hematuria and there is no history of urinary tract infection.  No history of present prosthetic surgery.  There is a history of a previous cerebrovascular episode.        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    excised       Family History   Problem Relation Age of Onset     DIABETES Mother      Alzheimer Disease Mother      Obesity Mother      Cardiovascular Father      Obesity Father      CANCER Sister      CANCER Sister        Social History     Social History     Marital status:      Spouse name: wife - 2nd Silva     Number of children: 5     Years of education: N/A     Occupational History     retired      Social History Main Topics     Smoking status: Former Smoker     Packs/day: 2.00     Years: 30.00     Types: Cigarettes     Smokeless tobacco: Never Used     Alcohol use 2.4 oz/week     4 Standard drinks or equivalent per week     Drug use: No     Sexual activity: Yes     Other  "Topics Concern     Exercise Yes     Social History Narrative       Current Outpatient Prescriptions   Medication Sig Dispense Refill     atorvastatin (LIPITOR) 10 MG tablet Take 1 tablet (10 mg) by mouth daily 90 tablet 3     lisinopril (PRINIVIL/ZESTRIL) 20 MG tablet Take 1 tablet (20 mg) by mouth daily 90 tablet 3     Multiple Vitamins-Minerals (MULTI COMPLETE PO) Take  by mouth.       aspirin 81 MG tablet Take 1 tablet by mouth daily.       fish oil-omega-3 fatty acids (OMEGA 3) 1000 MG capsule Take 1 capsule by mouth daily.       calcium carbonate-vitamin D (CALCIUM + D) 600-200 MG-UNIT TABS Take 1 tablet by mouth daily.       leflunomide (ARAVA) 10 MG tablet Take 1 tablet by mouth daily.         Review Of Systems:  Skin: negative  Eyes: negative  Ears/Nose/Throat: negative  Respiratory: No shortness of breath, dyspnea on exertion, cough, or hemoptysis  Cardiovascular: Hypertension.  History of CVA  Gastrointestinal: negative  Genitourinary: nocturia, frequency and urgency  Musculoskeletal: negative  Neurologic: stroke  Psychiatric: negative  Hematologic/Lymphatic/Immunologic: negative  Endocrine: negative    Exam:  /62  Pulse 70  Ht 1.778 m (5' 10\")  Wt 70.3 kg (155 lb)  BMI 22.24 kg/m2    General Impression: Very pleasant gentleman in no acute distress, well-oriented in time place and person  He does walk with the aid of a cane.    Mental status.  Normal    HEENT: There is no evidence of jaundice.  Mucous membranes are normal.  There is no apparent facial asymmetry    Skin: Skin is otherwise normal to examination    Lymph Nodes: Not examined    Respiratory System: The respiratory cycle is normal    Cardiovascular: Not examined    Abdominal: Not examined    Extremities: There is no significant peripheral edema    Back and Flank: There is no flank tenderness    Genital: Not examined    Rectal: Good sphincter tone, normal perianal sensation.  Smooth rectal mucosa without hemorrhoids or fissures.  Smooth " "soft mildly enlarged prostate but without evidence of tenderness, bogginess or nodules.  Seminal vesicles.  Not palpable    Neurologic:  I can identify no acute abnormal clinical neurological findings and central, or peripheral nervous systems    Impression: The patient has had frequency, some nocturia and some occasional episodes of urge incontinence.  Urinalysis is negative.  Postvoid residual is 101 cc.  Clinical examination of the prostate is unremarkable.  There is however a history of some type of cerebrovascular episode although there are few residual sequelae at this time.  However a cerebrovascular attack can result in overactive bladder as a result of damage to the inhibitory fibers to the bladder.  I believe however that simple measures may address his symptoms.  He is drinking 2-3 cups of regular coffee every day.  I would recommend therefore we proceeded as follows.  1.  I'd like him to eliminate all caffeine.  2.  He should avoid cranberry juice.  3.  He should drink water only when thirsty.  4.  He should reduce fluids 6 PM.  5.  I would like, if possible for him to take a warm on a regular basis to relax the pelvic floor muscles.    I do not think we should embark upon treatment with anticholinergic medication at the present time as the measures identified above may be perfectly adequate in addressing his symptoms.  If this does not successfully address the problem then we will need to consider urodynamic studies and a cystoscopy for further evaluation.  I discussed the entire situation with the patient in detail.  Both he and his family will advise me should the simple measures were recommending not be successful in which case they will return to see me.  I answered all questions    Plan: I will see him on a p.r.n. basis as outlined above    \"This dictation was performed with voice recognition software and may contain errors,  omissions and inadvertent word substitution.\"      Again, thank you for " allowing me to participate in the care of your patient.      Sincerely,    Srinivasa Whipple MD

## 2017-10-16 ENCOUNTER — TRANSFERRED RECORDS (OUTPATIENT)
Dept: HEALTH INFORMATION MANAGEMENT | Facility: CLINIC | Age: 82
End: 2017-10-16

## 2017-11-06 ENCOUNTER — TRANSFERRED RECORDS (OUTPATIENT)
Dept: HEALTH INFORMATION MANAGEMENT | Facility: CLINIC | Age: 82
End: 2017-11-06

## 2017-11-22 ENCOUNTER — TRANSFERRED RECORDS (OUTPATIENT)
Dept: HEALTH INFORMATION MANAGEMENT | Facility: CLINIC | Age: 82
End: 2017-11-22

## 2017-11-27 ENCOUNTER — TELEPHONE (OUTPATIENT)
Dept: FAMILY MEDICINE | Facility: CLINIC | Age: 82
End: 2017-11-27

## 2017-11-27 NOTE — TELEPHONE ENCOUNTER
Please see message below and advise.   Sharda Reynaga RN   [facility] - Triage

## 2017-11-27 NOTE — TELEPHONE ENCOUNTER
Reason for Call:  Other prescription    Detailed comments: The patient's daughter says Jose E's surgeon is asking if Dr. Granados can prescribe him some gabapentin to help with his nerve pain before his surgery. His surgery won't be until January as they are booked out that far. They haven't scheduled it yet.    Phone Number Patient can be reached at: Call Marita at [phone]. There is a consent to communicate on file.    Best Time: Anytime    Can we leave a detailed message on this number? YES    Call taken on 11/27/2017 at 12:54 PM by Miguelina Pool

## 2017-11-28 NOTE — TELEPHONE ENCOUNTER
Patient will need to follow up to discuss this further.     Nehemias Granados MD  [facility], [facility] [place]

## 2017-11-28 NOTE — TELEPHONE ENCOUNTER
Detailed message left with information noted below from provider along with contact number to clinic for additional questions.  Loan Pinzon RN - Triage  [facility]

## 2018-01-09 ENCOUNTER — TELEPHONE (OUTPATIENT)
Dept: FAMILY MEDICINE | Facility: CLINIC | Age: 83
End: 2018-01-09

## 2018-01-09 ENCOUNTER — OFFICE VISIT (OUTPATIENT)
Dept: FAMILY MEDICINE | Facility: CLINIC | Age: 83
End: 2018-01-09
Payer: COMMERCIAL

## 2018-01-09 VITALS
DIASTOLIC BLOOD PRESSURE: 60 MMHG | TEMPERATURE: 97.6 F | BODY MASS INDEX: 23.62 KG/M2 | WEIGHT: 165 LBS | SYSTOLIC BLOOD PRESSURE: 140 MMHG | HEIGHT: 70 IN | OXYGEN SATURATION: 97 % | HEART RATE: 74 BPM

## 2018-01-09 DIAGNOSIS — Z23 NEED FOR VACCINATION: ICD-10-CM

## 2018-01-09 DIAGNOSIS — Z01.818 PREOP GENERAL PHYSICAL EXAM: Primary | ICD-10-CM

## 2018-01-09 DIAGNOSIS — E78.5 HYPERLIPIDEMIA LDL GOAL <100: ICD-10-CM

## 2018-01-09 DIAGNOSIS — I45.10 RBBB (RIGHT BUNDLE BRANCH BLOCK): ICD-10-CM

## 2018-01-09 DIAGNOSIS — Z86.73 HISTORY OF TIA (TRANSIENT ISCHEMIC ATTACK) AND STROKE: ICD-10-CM

## 2018-01-09 DIAGNOSIS — M54.42 MIDLINE LOW BACK PAIN WITH LEFT-SIDED SCIATICA, UNSPECIFIED CHRONICITY: ICD-10-CM

## 2018-01-09 DIAGNOSIS — I10 HYPERTENSION GOAL BP (BLOOD PRESSURE) < 140/90: ICD-10-CM

## 2018-01-09 DIAGNOSIS — Z23 NEED FOR PROPHYLACTIC VACCINATION AND INOCULATION AGAINST INFLUENZA: ICD-10-CM

## 2018-01-09 DIAGNOSIS — M06.00 SERONEGATIVE RHEUMATOID ARTHRITIS (H): ICD-10-CM

## 2018-01-09 LAB
ALBUMIN UR-MCNC: ABNORMAL MG/DL
APPEARANCE UR: CLEAR
BACTERIA #/AREA URNS HPF: ABNORMAL /HPF
BILIRUB UR QL STRIP: NEGATIVE
COLOR UR AUTO: YELLOW
GLUCOSE UR STRIP-MCNC: NEGATIVE MG/DL
HGB BLD-MCNC: 15.2 G/DL (ref 13.3–17.7)
HGB UR QL STRIP: ABNORMAL
KETONES UR STRIP-MCNC: NEGATIVE MG/DL
LEUKOCYTE ESTERASE UR QL STRIP: NEGATIVE
MUCOUS THREADS #/AREA URNS LPF: PRESENT /LPF
NITRATE UR QL: NEGATIVE
NON-SQ EPI CELLS #/AREA URNS LPF: ABNORMAL /LPF
PH UR STRIP: 6 PH (ref 5–7)
RBC #/AREA URNS AUTO: ABNORMAL /HPF
SOURCE: ABNORMAL
SP GR UR STRIP: 1.02 (ref 1–1.03)
UROBILINOGEN UR STRIP-ACNC: 1 EU/DL (ref 0.2–1)
WBC #/AREA URNS AUTO: ABNORMAL /HPF

## 2018-01-09 PROCEDURE — 90471 IMMUNIZATION ADMIN: CPT | Performed by: FAMILY MEDICINE

## 2018-01-09 PROCEDURE — 85018 HEMOGLOBIN: CPT | Performed by: FAMILY MEDICINE

## 2018-01-09 PROCEDURE — 90670 PCV13 VACCINE IM: CPT | Performed by: FAMILY MEDICINE

## 2018-01-09 PROCEDURE — 93000 ELECTROCARDIOGRAM COMPLETE: CPT | Performed by: FAMILY MEDICINE

## 2018-01-09 PROCEDURE — 99215 OFFICE O/P EST HI 40 MIN: CPT | Mod: 25 | Performed by: FAMILY MEDICINE

## 2018-01-09 PROCEDURE — G0008 ADMIN INFLUENZA VIRUS VAC: HCPCS | Mod: 59 | Performed by: FAMILY MEDICINE

## 2018-01-09 PROCEDURE — 36415 COLL VENOUS BLD VENIPUNCTURE: CPT | Performed by: FAMILY MEDICINE

## 2018-01-09 PROCEDURE — 90715 TDAP VACCINE 7 YRS/> IM: CPT | Performed by: FAMILY MEDICINE

## 2018-01-09 PROCEDURE — G0009 ADMIN PNEUMOCOCCAL VACCINE: HCPCS | Mod: 59 | Performed by: FAMILY MEDICINE

## 2018-01-09 PROCEDURE — 81001 URINALYSIS AUTO W/SCOPE: CPT | Performed by: FAMILY MEDICINE

## 2018-01-09 PROCEDURE — 80048 BASIC METABOLIC PNL TOTAL CA: CPT | Performed by: FAMILY MEDICINE

## 2018-01-09 PROCEDURE — 90662 IIV NO PRSV INCREASED AG IM: CPT | Performed by: FAMILY MEDICINE

## 2018-01-09 NOTE — PROGRESS NOTES

## 2018-01-09 NOTE — MR AVS SNAPSHOT
After Visit Summary   1/9/2018    Jose E Capps    MRN: [id number]           Patient Information     Date Of Birth          9/28/1931        Visit Information        Provider Department      1/9/2018 11:20 AM Nehemias Granados MD [facility]        Today's Diagnoses     Preop general physical exam    -  1    Midline low back pain with left-sided sciatica, unspecified chronicity        Seronegative rheumatoid arthritis (H)        Hyperlipidemia LDL goal <100        Hypertension goal BP (blood pressure) < 140/90        History of TIA (transient ischemic attack) and stroke        Need for vaccination        Need for prophylactic vaccination and inoculation against influenza        RBBB (right bundle branch block)          Care Instructions      Before Your Surgery      Call your surgeon if there is any change in your health. This includes signs of a cold or flu (such as a sore throat, runny nose, cough, rash or fever).    Do not smoke, drink alcohol or take over the counter medicine (unless your surgeon or primary care doctor tells you to) for the 24 hours before and after surgery.    If you take prescribed drugs: Follow your doctor s orders about which medicines to take and which to stop until after surgery.    Eating and drinking prior to surgery: follow the instructions from your surgeon    Take a shower or bath the night before surgery. Use the soap your surgeon gave you to gently clean your skin. If you do not have soap from your surgeon, use your regular soap. Do not shave or scrub the surgery site.  Wear clean pajamas and have clean sheets on your bed.           Follow-ups after your visit        Additional Services     CARDIOLOGY EVAL ADULT REFERRAL       Your provider has referred you to:  [facility]: [facility] [place] [phone]   [email or web address]    Please be aware that coverage of these services is subject  to the terms and limitations of your health insurance plan.  Call member services at your health plan with any benefit or coverage questions.      Type of Referral:  New Cardiology Consult for preop clearance    Timeframe requested:  1-3 Days    Please bring the following to your appointment:  >>   Any x-rays, CTs or MRIs which have been performed.  Contact the facility where they were done to arrange for  prior to your scheduled appointment.    >>   List of current medications  >>   This referral request   >>   Any documents/labs given to you for this referral                  Follow-up notes from your care team     Return in about 5 days (around 1/14/2018), or if symptoms worsen or fail to improve.      Your next 10 appointments already scheduled     Jan 11, 2018  8:30 AM CST   NM SH CV MPI WITH ULIS ENRIQUE 1 DAY with SCINM1   [facility] CV Nuclear Medicine (Cardiovascular Imaging at [facility])    [address] [place] [address]  [address] [id number] [phone]           For a ONE day exam: Allow 3-4 hours for test. For a TWO day exam: Allow 2 hours PER day for test.  You may need to stop some medicines before the test. Follow your doctor s orders. - If you take a beta blocker: Follow your doctor s specific instructions on taking it prior to and on the day of your exam. - If you take Aggrenox or dipyridamole (Persantine, Permole), stop taking it 48 hours before your test. - If you take Viagra, Cialis or Levitra, stop taking it 48 hours before your test. - If you take theophylline or aminophylline, stop taking it 12 hours before your test.  For patients with diabetes: - If you take insulin, call your diabetes care team. Ask if you should take a 1/2 dose the morning of your test. - If you take diabetes medicine by mouth, don t take it on the morning of your test. Bring it with you to take after the test. (If you have questions, call your diabetes care team.)  Do not take nitrates on the  day of your test. Do not wear your Nitro-Patch.  Stop all caffeine 12 hours before the test. This includes coffee, tea, soda pop, chocolate and certain medicines (such as Anacin, Excedrin and NoDoz). Also avoid decaf coffee and tea, as these contain small amounts of caffeine.  No alcohol, smoking or other tobacco for 12 hours before the test.  Stop eating 3 hours before the test. You may drink water.  Please wear a loose two-piece outfit. If you will have an exercise test, bring rubber-soled walking shoes.  When you arrive, please tell us if you: - Have diabetes - Are breastfeeding - May be pregnant - Have a pacemaker of ICD (implantable defibrillator).  Please call your Imaging Department at your exam site with any questions.            Jan 11, 2018  1:00 PM CST   Ech Complete with [id number]   [facility] CV Echocardiography (Cardiovascular Imaging at [facility])    [address] [place] [id number] [phone]           1. Please bring or wear a comfortable two-piece outfit. 2. You may eat, drink and take your normal medicines. 3. For any questions that cannot be answered, please contact the ordering physician            Manuelito 15, 2018  9:00 AM CST   New Visit with Reid Crowe MD   [facility] ([facility])    [address] [place] [address] [address]  [facility] [id number] [phone]              Future tests that were ordered for you today     Open Future Orders        Priority Expected Expires Ordered    Echocardiogram Complete ASAP  1/9/2019 1/9/2018    NM Lexiscan stress test STAT  1/9/2019 1/9/2018            Who to contact     If you have questions or need follow up information about today's clinic visit or your schedule please contact [facility] [place] directly at [phone].  Normal or non-critical lab and imaging results will be communicated to you by MyChart, letter or phone within 4 business days after  "the clinic has received the results. If you do not hear from us within 7 days, please contact the clinic through [employer] or phone. If you have a critical or abnormal lab result, we will notify you by phone as soon as possible.  Submit refill requests through [employer] or call your pharmacy and they will forward the refill request to us. Please allow 3 business days for your refill to be completed.          Additional Information About Your Visit        [address] Information     [employer] gives you secure access to your electronic health record. If you see a primary care provider, you can also send messages to your care team and make appointments. If you have questions, please call your primary care clinic.  If you do not have a primary care provider, please call [phone] and they will assist you.        Care EveryWhere ID     This is your Care EveryWhere ID. This could be used by other organizations to access your [place] medical records  [id number]        Your Vitals Were     Pulse Temperature Height Pulse Oximetry BMI (Body Mass Index)       74 97.6  F (36.4  C) (Tympanic) 5' 10\" (1.778 m) 97% 23.68 kg/m2        Blood Pressure from Last 3 Encounters:   01/09/18 140/60   10/02/17 134/62   09/05/17 126/60    Weight from Last 3 Encounters:   01/09/18 165 lb (74.8 kg)   10/02/17 155 lb (70.3 kg)   09/05/17 167 lb 12.8 oz (76.1 kg)              We Performed the Following     1st  Administration  [39143]     Basic metabolic panel     CARDIOLOGY EVAL ADULT REFERRAL     EKG 12-lead complete w/read - Clinics     FLU VACCINE, INCREASED ANTIGEN, PRESV FREE, AGE 65+ [47000]     Hemoglobin     Pneumococcal vaccine 13 valent PCV13 IM (Prevnar) [71648]     TDAP VACCINE (ADACEL) [58221.002]     UA reflex to Microscopic     Urine Microscopic     Vaccine Administration, Each Additional [72266]          Today's Medication Changes          These changes are accurate as of: 1/9/18  3:00 PM.  If you have any questions, ask your nurse " or doctor.               Stop taking these medicines if you haven't already. Please contact your care team if you have questions.     aspirin 81 MG tablet   Stopped by:  Nehemias Granados MD                    Primary Care Provider Office Phone # Fax #    Nehemias Granados [phone] [address]  [place] [address]        Equal Access to Services     [facility]: Hadii aad ku hadasho Soomaali, waaxda luqadaha, qaybta kaalmada adeegyada, waxay idiin hayaan adeeg kharash la'aan . So [facility] [phone].    ATENCIÓN: Si habla español, tiene a bradley disposición servicios gratuitos de asistencia lingüística. Llame al [phone].    We comply with applicable federal civil rights laws and [place] laws. We do not discriminate on the basis of race, color, national origin, age, disability, sex, sexual orientation, or gender identity.            Thank you!     Thank you for choosing [facility] [place]  for your care. Our goal is always to provide you with excellent care. Hearing back from our patients is one way we can continue to improve our services. Please take a few minutes to complete the written survey that you may receive in the mail after your visit with us. Thank you!             Your Updated Medication List - Protect others around you: Learn how to safely use, store and throw away your medicines at www.disposemymeds.org.          This list is accurate as of: 1/9/18  3:00 PM.  Always use your most recent med list.                   Brand Name Dispense Instructions for use Diagnosis    atorvastatin 10 MG tablet    LIPITOR    90 tablet    Take 1 tablet (10 mg) by mouth daily    Mixed hyperlipidemia       calcium + D 600-200 MG-UNIT Tabs   Generic drug:  calcium carbonate-vitamin D      Take 1 tablet by mouth daily.        fish oil-omega-3 fatty acids 1000 MG capsule      Take 1 capsule by mouth daily.        leflunomide 10 MG tablet    ARAVA     Take 1 tablet by mouth daily.     Seronegative rheumatoid arthritis (H)       lisinopril 20 MG tablet    PRINIVIL/ZESTRIL    90 tablet    Take 1 tablet (20 mg) by mouth daily    Hypertension goal BP (blood pressure) < 140/90       MULTI COMPLETE PO      Take  by mouth.

## 2018-01-09 NOTE — NURSING NOTE
"Chief Complaint   Patient presents with     Pre-Op Exam       Initial /68  Pulse 74  Temp 97.6  F (36.4  C) (Tympanic)  Ht 5' 10\" (1.778 m)  Wt 165 lb (74.8 kg)  SpO2 97%  BMI 23.68 kg/m2 Estimated body mass index is 23.68 kg/(m^2) as calculated from the following:    Height as of this encounter: 5' 10\" (1.778 m).    Weight as of this encounter: 165 lb (74.8 kg).  Medication Reconciliation: complete  "

## 2018-01-09 NOTE — TELEPHONE ENCOUNTER
Patient had appointment today  Needs Cardiac Clearance  TC scheduled patient for NM Lexiscan stress test   and an Echo plus visit with Cardiologist    The NM Lexiscan stress test we are having trouble getting the CPT code to be covered for the patient's insurances (he is double covered)  NAKIA is working with Marta at [facility] on this  Had Provider enter a new order, called and left Marta a message to see if that worked  If not then Dr Granados said to cancel the NM Lexiscan stress test  And let the Cardiologist determine if it is needed. No sooner appointments avail for Cardiology and patient's surgery is scheduled for 1/16/17  TC will call Marta tomorrow in the AM if she doesn't call back [place] [phone]    Vianca YEE

## 2018-01-09 NOTE — TELEPHONE ENCOUNTER
ALBINI: Marta called TC back and that still didn't work  She is going to speak with her Supervisor re: the pop up tomorrow in the AM and call me back.  We are going to keep the patient scheduled and order as is until we can find out more  Vianca YEE

## 2018-01-09 NOTE — PROGRESS NOTES
[facility] [place]  [address] [address]  [phone]  Dept: [phone]    PRE-OP EVALUATION:  Today's date: 2018    Jose E Capps (: 1931) presents for pre-operative evaluation assessment as requested by Dr. OLIVIA Montelongo.  He requires evaluation and anesthesia risk assessment prior to undergoing surgery/procedure for treatment of disc herniation and nerve compression.  Proposed procedure: Lumbar spine fusion    Date of Surgery/ Procedure: 18  Time of Surgery/ Procedure: [facility]/Surgical Facility: [facility]   Fax number for surgical facility: [phone] & [phone]  Primary Physician: Nehemias Granados  Type of Anesthesia Anticipated: General    Patient has a Health Care Directive or Living Will:  NO    1. YES - Do you have a history of heart attack, stroke, stent, bypass or surgery on an artery in the head, neck, heart or legs? - TIA   2. NO - Do you ever have any pain or discomfort in your chest?  3. NO - Do you have a history of  Heart Failure?  4. NO - Are you troubled by shortness of breath when: walking on the level, up a slight hill or at night?  5. NO - Do you currently have a cold, bronchitis or other respiratory infection?  6. NO - Do you have a cough, shortness of breath or wheezing?  7. YES= compressed nerves - Do you sometimes get pains in the calves of your legs when you walk?  8. NO - Do you or anyone in your family have previous history of blood clots?  9. NO - Do you or does anyone in your family have a serious bleeding problem such as prolonged bleeding following surgeries or cuts?  10. NO - Have you ever had problems with anemia or been told to take iron pills?  11. NO - Have you had any abnormal blood loss such as black, tarry or bloody stools, or abnormal vaginal bleeding?  12. NO - Have you ever had a blood transfusion?  13. NO - Have you or any of your relatives ever had problems with anesthesia?  14. NO - Do you have  sleep apnea, excessive snoring or daytime drowsiness?  15. NO - Do you have any prosthetic heart valves?  16. YES left knee & right hip - Do you have prosthetic joints?  17. NO - Is there any chance that you may be pregnant?        HPI:                                                      Patient has a history of one episode of TIA in the past.  Tells that he has not been taking aspirin for the past several months now.    Patient has history of hypertension, which is well controlled.. Patient takes lisinopril 20 mg daily.    Patient is taking Lipitor for dyslipidemia, she is well controlled.    Patient sees a rheumatologist for management of rheumatoid arthritis.  Currently he is taking Leflunomide 10 mg daily for that.  Symptoms are well managed.  No recent flareups.    Patient uses a walker to ambulate as needed.     MEDICAL HISTORY:                                                    Patient Active Problem List    Diagnosis Date Noted     RBBB (right bundle branch block) 01/09/2018     Priority: Medium     History of TIA (transient ischemic attack)       Priority: Medium     History of malignant melanoma of skin 09/06/2017     Priority: Medium     History of basal cell carcinoma 09/06/2017     Priority: Medium     Hip pain, left 09/01/2016     Priority: Medium     Hypertension goal BP (blood pressure) < 140/90 05/07/2012     Priority: Medium     ACP (advance care planning) 04/03/2012     Priority: Medium     Advance Care Planning:   Receipt of ACP document:  Received: Health Care Directive which was witnessed or notarized on 2/22/2011..  Document previously scanned on 4/17/2012..  Validation form completed and sent to be scanned.  Document validated by [employer] Volunteer Facilitator.  Code Status reflects choices in most recent ACP document.  Confirmed/documented designated decision maker(s). See permanent comments section of demographics in clinical tab. View document(s) and details by clicking on code status.    Added by Simona Messina on 11/12/2013.    Copy sent to medical records to be scanned  4/17/12 patient brought in copy of health care directive       Seronegative rheumatoid arthritis (H)      Priority: Medium     hands, neck,, shoulders       Hyperlipidemia LDL goal <100      Priority: Medium     reports being intolerant to        Colon polyp      Priority: Medium     recurrent  (Problem list name updated by automated process. 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       calcium carbonate-vitamin D (CALCIUM + D) 600-200 MG-UNIT TABS Take 1 tablet by mouth daily.       leflunomide (ARAVA) 10 MG tablet Take 1 tablet by mouth daily.       fish oil-omega-3 fatty acids (OMEGA 3) 1000 MG capsule Take 1 capsule by mouth daily.       OTC products: None, except as noted above    Allergies   Allergen Reactions     No Known Drug Allergy       Latex Allergy: NO    Social History   Substance Use Topics     Smoking status: Former Smoker     Packs/day: 2.00     Years: 30.00     Types: Cigarettes     Smokeless tobacco: Never Used     Alcohol use 2.4 oz/week     4 Standard drinks or equivalent per  "week     History   Drug Use No       REVIEW OF SYSTEMS:                                                    C: NEGATIVE for fever, chills, change in weight  I: NEGATIVE for worrisome rashes, moles or lesions  E: NEGATIVE for vision changes or irritation  E/M: NEGATIVE for ear, mouth and throat problems  R: NEGATIVE for significant cough or SOB  B: NEGATIVE for masses, tenderness or discharge  CV: NEGATIVE for chest pain, palpitations or peripheral edema  GI: NEGATIVE for nausea, abdominal pain, heartburn, or change in bowel habits  : NEGATIVE for frequency, dysuria, or hematuria  M: NEGATIVE for significant arthralgias or myalgia  N: NEGATIVE for weakness, dizziness or paresthesias  E: NEGATIVE for temperature intolerance, skin/hair changes  H: NEGATIVE for bleeding problems  P: NEGATIVE for changes in mood or affect    EXAM:                                                    /60  Pulse 74  Temp 97.6  F (36.4  C) (Tympanic)  Ht 5' 10\" (1.778 m)  Wt 165 lb (74.8 kg)  SpO2 97%  BMI 23.68 kg/m2    GENERAL APPEARANCE: healthy, alert and no distress     EYES: EOMI,  PERRL     HENT: ear canals and TM's normal and nose and mouth without ulcers or lesions     NECK: no adenopathy, no asymmetry, masses, or scars and thyroid normal to palpation     RESP: lungs clear to auscultation - no rales, rhonchi or wheezes     CV: regular rates and rhythm, normal S1 S2, no S3 or S4 and no murmur, click or rub     ABDOMEN:  soft, nontender, no HSM or masses and bowel sounds normal     MS: extremities normal- no gross deformities noted, no evidence of inflammation in joints, FROM in all extremities.     SKIN: no suspicious lesions or rashes     NEURO: Normal strength and tone, sensory exam grossly normal, mentation intact and speech normal     PSYCH: mentation appears normal. and affect normal/bright     LYMPHATICS: No axillary, cervical, or supraclavicular nodes    DIAGNOSTICS:                                                  "   EKG done today and reviewed by myself with the patient and his daughter.  EKG done shows normal sinus rhythm with a new right bundle branch block.  Previous EKGs from 2012 which does not show the right bundle branch block.  No other EKG available for comparison.      Results for orders placed or performed in visit on 01/09/18   Hemoglobin   Result Value Ref Range    Hemoglobin 15.2 13.3 - 17.7 g/dL   UA reflex to Microscopic   Result Value Ref Range    Color Urine Yellow     Appearance Urine Clear     Glucose Urine Negative NEG^Negative mg/dL    Bilirubin Urine Negative NEG^Negative    Ketones Urine Negative NEG^Negative mg/dL    Specific Gravity Urine 1.020 1.003 - 1.035    Blood Urine Trace (A) NEG^Negative    pH Urine 6.0 5.0 - 7.0 pH    Protein Albumin Urine Trace (A) NEG^Negative mg/dL    Urobilinogen Urine 1.0 0.2 - 1.0 EU/dL    Nitrite Urine Negative NEG^Negative    Leukocyte Esterase Urine Negative NEG^Negative    Source Midstream Urine    Urine Microscopic   Result Value Ref Range    WBC Urine O - 2 OTO2^O - 2 /HPF    RBC Urine O - 2 OTO2^O - 2 /HPF    Squamous Epithelial /LPF Urine Few FEW^Few /LPF    Bacteria Urine Few (A) NEG^Negative /HPF    Mucous Urine Present (A) NEG^Negative /LPF       Recent Labs   Lab Test  09/05/17   1140  08/29/16   1054  07/01/15   0710   10/31/12   0754   HGB  14.3  14.6  15.3   < >   --    PLT  240  302  309   < >   --    NA  141  141  142   < >   --    POTASSIUM  3.8  4.2  3.9   < >   --    CR  0.75  0.83  0.84   < >   --    A1C   --    --   6.0   --   6.1*    < > = values in this interval not displayed.        IMPRESSION:                                                    Reason for surgery/procedure: Lumbar spine surgery  Diagnosis/reason for consult: Preop exam    The proposed surgical procedure is considered INTERMEDIATE risk.  Right bundle branch block is new for the patient.  I would recommend cardiac clearance before the surgery.  Before seeing the cardiologist on  1/15/18, patient will undergo echocardiogram and Lexiscan stress test on 1/11/18.         ICD-10-CM    1. Preop general physical exam Z01.818 Hemoglobin     Basic metabolic panel     EKG 12-lead complete w/read - Clinics     Urine Microscopic     CARDIOLOGY EVAL ADULT REFERRAL     NM Lexiscan stress test     Echocardiogram Complete     NM Lexiscan stress test   2. Midline low back pain with left-sided sciatica, unspecified chronicity M54.42 UA reflex to Microscopic   3. Seronegative rheumatoid arthritis (H) M06.00    4. Hyperlipidemia LDL goal <100 E78.5    5. Hypertension goal BP (blood pressure) < 140/90 I10    6. History of TIA (transient ischemic attack) and stroke Z86.73    7. Need for vaccination Z23 TDAP VACCINE (ADACEL) [60771.002]     1st  Administration  [33624]   8. Need for prophylactic vaccination and inoculation against influenza Z23 FLU VACCINE, INCREASED ANTIGEN, PRESV FREE, AGE 65+ [39019]     Pneumococcal vaccine 13 valent PCV13 IM (Prevnar) [00809]     Vaccine Administration, Each Additional [63169]   9. RBBB (right bundle branch block) I45.10 CARDIOLOGY EVAL ADULT REFERRAL     NM Lexiscan stress test     Echocardiogram Complete       RECOMMENDATIONS:                                                      Patient may continue antirheumatic disease modifying agent as the studies do not show a significant difference in chance of getting infection postoperatively with stopping the medication versus continuing the medication.  This was discussed with the patient and his daughter.  Discussed the importance of being on aspirin.  He has history of TIA in the past.  Patient plans to start that.  Vaccination updated.    Await the cardiology clearance to proceed with the surgery.  BMP -pending.    Signed Electronically by: Nehemias Granados MD    Copy of this evaluation report is provided to requesting physician.    [facility] Preop Guidelines

## 2018-01-10 ENCOUNTER — TELEPHONE (OUTPATIENT)
Dept: FAMILY MEDICINE | Facility: CLINIC | Age: 83
End: 2018-01-10

## 2018-01-10 LAB
ANION GAP SERPL CALCULATED.3IONS-SCNC: 10 MMOL/L (ref 3–14)
BUN SERPL-MCNC: 21 MG/DL (ref 7–30)
CALCIUM SERPL-MCNC: 8.9 MG/DL (ref 8.5–10.1)
CHLORIDE SERPL-SCNC: 104 MMOL/L (ref 94–109)
CO2 SERPL-SCNC: 24 MMOL/L (ref 20–32)
CREAT SERPL-MCNC: 0.83 MG/DL (ref 0.66–1.25)
GFR SERPL CREATININE-BSD FRML MDRD: 87 ML/MIN/1.7M2
GLUCOSE SERPL-MCNC: 132 MG/DL (ref 70–99)
POTASSIUM SERPL-SCNC: 4.2 MMOL/L (ref 3.4–5.3)
SODIUM SERPL-SCNC: 138 MMOL/L (ref 133–144)

## 2018-01-10 NOTE — TELEPHONE ENCOUNTER
Notes are done.  Patient needs a cardiac clearance before proceeding with the surgery.  Surgery is on 1/16/18.  Patient will get echocardiogram and stress test on 1/11/15 and see the cardiologist on 1/15/18 to get cardiac clearance.  Please notify them.    Nehemias Granados MD  [facility], [place]

## 2018-01-10 NOTE — TELEPHONE ENCOUNTER
FYI: Marta called back and asked if PCP was getting the ABN pop up when she puts the order in  TC explained that nothing popped up for her when she entered the order every time  Marta said her Supervisor said it's probably a glitch in the system since they are getting it on their end  The Supervisor will take care of issue; we will leave everything as is and patient is scheduled as planned  No changes made  Vianca YEE

## 2018-01-10 NOTE — PROGRESS NOTES
Faxed preop report & EKG to [facility]  Fax number for surgical facility: [phone] & [phone] on January 10, 2018.  Vianca YEE

## 2018-01-10 NOTE — TELEPHONE ENCOUNTER
Received call from [facility] stating that they need signed preop today for this patient. Please fax preop visit documentation to Fax: [phone].   Jing Jeronimo RN

## 2018-01-10 NOTE — TELEPHONE ENCOUNTER
Left detailed message from Dr. Granados on VM for Júnior,at [facility] pre-anesthesia screening. Advised to call back if further questions. Jing Jeronimo RN

## 2018-01-11 ENCOUNTER — HOSPITAL ENCOUNTER (OUTPATIENT)
Dept: CARDIOLOGY | Facility: CLINIC | Age: 83
Discharge: HOME | End: 2018-01-11
Attending: FAMILY MEDICINE | Admitting: FAMILY MEDICINE
Payer: MEDICARE

## 2018-01-11 DIAGNOSIS — Z01.818 PREOP GENERAL PHYSICAL EXAM: ICD-10-CM

## 2018-01-11 DIAGNOSIS — I45.10 RBBB (RIGHT BUNDLE BRANCH BLOCK): ICD-10-CM

## 2018-01-11 PROCEDURE — 78452 HT MUSCLE IMAGE SPECT MULT: CPT | Mod: 26 | Performed by: INTERNAL MEDICINE

## 2018-01-11 PROCEDURE — 93016 CV STRESS TEST SUPVJ ONLY: CPT | Performed by: INTERNAL MEDICINE

## 2018-01-11 PROCEDURE — 93306 TTE W/DOPPLER COMPLETE: CPT | Mod: 26 | Performed by: INTERNAL MEDICINE

## 2018-01-11 PROCEDURE — 25500064 ZZH RX 255 OP 636: Performed by: FAMILY MEDICINE

## 2018-01-11 PROCEDURE — 93018 CV STRESS TEST I&R ONLY: CPT | Performed by: INTERNAL MEDICINE

## 2018-01-11 PROCEDURE — 40000264 ECHO COMPLETE WITH OPTISON

## 2018-01-11 RX ORDER — AMINOPHYLLINE 25 MG/ML
50-100 INJECTION, SOLUTION INTRAVENOUS
Status: DISCONTINUED | OUTPATIENT
Start: 2018-01-11 | End: 2018-01-12 | Stop reason: HOSPADM

## 2018-01-11 RX ORDER — ACYCLOVIR 200 MG/1
0-1 CAPSULE ORAL
Status: DISCONTINUED | OUTPATIENT
Start: 2018-01-11 | End: 2018-01-12 | Stop reason: HOSPADM

## 2018-01-11 RX ORDER — REGADENOSON 0.08 MG/ML
0.4 INJECTION, SOLUTION INTRAVENOUS ONCE
Status: COMPLETED | OUTPATIENT
Start: 2018-01-11 | End: 2018-01-11

## 2018-01-11 RX ORDER — ALBUTEROL SULFATE 90 UG/1
2 AEROSOL, METERED RESPIRATORY (INHALATION) EVERY 5 MIN PRN
Status: DISCONTINUED | OUTPATIENT
Start: 2018-01-11 | End: 2018-01-12 | Stop reason: HOSPADM

## 2018-01-11 RX ADMIN — TETROFOSMIN 9.61 MCI.: 1.38 INJECTION, POWDER, LYOPHILIZED, FOR SOLUTION INTRAVENOUS at 10:05

## 2018-01-11 RX ADMIN — TETROFOSMIN 3.4 MCI.: 1.38 INJECTION, POWDER, LYOPHILIZED, FOR SOLUTION INTRAVENOUS at 08:58

## 2018-01-11 RX ADMIN — REGADENOSON 0.4 MG: 0.08 INJECTION, SOLUTION INTRAVENOUS at 10:12

## 2018-01-11 RX ADMIN — HUMAN ALBUMIN MICROSPHERES AND PERFLUTREN 9 ML: 10; .22 INJECTION, SOLUTION INTRAVENOUS at 13:45

## 2018-01-11 NOTE — PROGRESS NOTES
Patient has abnormal Stress test and is going to see cardiology on 1/15/18 for reviewing this and for preop clearance.    Nehemias Granados MD

## 2018-01-12 NOTE — PROGRESS NOTES
Patient to review Echo on his upcoming cardiology appointment for preop clearance.    Nehemias Granados MD

## 2018-01-15 ENCOUNTER — OFFICE VISIT (OUTPATIENT)
Dept: CARDIOLOGY | Facility: CLINIC | Age: 83
End: 2018-01-15
Attending: FAMILY MEDICINE
Payer: COMMERCIAL

## 2018-01-15 VITALS
HEIGHT: 70 IN | HEART RATE: 76 BPM | DIASTOLIC BLOOD PRESSURE: 72 MMHG | SYSTOLIC BLOOD PRESSURE: 138 MMHG | BODY MASS INDEX: 23.48 KG/M2 | WEIGHT: 164 LBS

## 2018-01-15 DIAGNOSIS — I25.10 ATHEROSCLEROSIS OF NATIVE CORONARY ARTERY OF NATIVE HEART WITHOUT ANGINA PECTORIS: Primary | ICD-10-CM

## 2018-01-15 PROCEDURE — 99204 OFFICE O/P NEW MOD 45 MIN: CPT | Performed by: INTERNAL MEDICINE

## 2018-01-15 RX ORDER — METOPROLOL TARTRATE 25 MG/1
25 TABLET, FILM COATED ORAL 2 TIMES DAILY
Qty: 60 TABLET | Refills: 11 | Status: SHIPPED | OUTPATIENT
Start: 2018-01-15 | End: 2018-09-11

## 2018-01-15 NOTE — PROGRESS NOTES
HISTORY OF PRESENT ILLNESS:  I met Jose E Capps today because of concerns that tomorrow he will be having rather extensive low back surgery for severe degenerative disease, virtually L1 to L5.  Decompression and stabilization is anticipated with at least 4 hours of surgery.      Mr. Capps is 86.  I have to admit he actually looks younger than his years.  He gets around slowly, but his speed is mainly related to rather severe and chronic low back pain.      He has never had known heart disease.  He denies any history of angina, myocardial infarction or heart failure.  He has had mild hypertension.      Because of his anticipated surgery, a resting EKG was done dated 01/09/2018.  This study showed sinus rhythm, NY interval of 214 milliseconds--first-degree AV block, a  milliseconds with classic right bundle branch block pattern and no significant ST-T changes otherwise, other than is typical for a right bundle.      With this, a very reasonable cardiac evaluation was done including a stress nuclear study showing an EF that was called 49% with a small area of inferior scar and trivial yobany-infarction ischemia suggesting coronary arteries disease.  The inferior wall hypokinetic according to the nuclear study.      A resting echo was done.  This study showed an EF of 55%-60%.  No obvious wall motion abnormalities.  Right ventricle was normal.  There was mild aortic insufficiency, trivial aortic root dilation at 3.8 cm.  Mitral, aortic and tricuspid valves were normal.  Right heart pressures could not be approximated.      With this a review of systems was done.      REVIEW OF SYSTEMS:  Review of systems was negative for a 10-point review except for his low back pain.  Cardiopulmonary was negative for chest pain, palpitations, shortness of breath, orthopnea or lower leg edema.      He has been a very healthy man despite his years.      CURRENT MEDICATIONS:   1.  Atorvastatin 10 mg at bedtime.   2.  Lisinopril 20 mg a  day.     3.  Arava 10 mg a day.   4.  Fish oil omega 3 fatty acids.   5.  Calcium and vitamin D.   6.  Vitamins.      SOCIAL HISTORY:  He is .  His wife is not with him.  He comes with his daughter.  He was born and raised and worked in [place].  He lives in [place] for 7-8 years in intermediate and now has returned to [place], living at [facility].  He stopped smoking 40 years ago.  He has 2 or 3 alcoholic beverages at night.      FAMILY HISTORY:  Noncontributory for premature coronary disease.      PHYSICAL EXAMINATION:   GENERAL:  Well-developed, well-nourished male.  I thought he looked younger than his years.  Despite his back pain, he was able to get up on the exam table without help, he did a little slowly carefully, however.   VITAL SIGNS:  Blood pressure was 138/70, heart rate 76 beats a minute.  He weighed 164 pounds with his clothes on.   HEAD AND NECK:  Normal.  Carotids were equal, no bruits heard.  No neck vein distention noted.  No goiter felt.  No adenopathy appreciated.   HEART:  Regular without gallop, murmur, or rub.   LUNGS:  Clear to auscultation.   ABDOMEN:  Soft without organomegaly, mass, pain or guarding.   EXTREMITIES:  Show +2 dorsalis pedis pulses, no edema.      PAST MEDICAL HISTORY:   1.  Hypertension.   2.  Treated hyperlipidemia.   3.  Basal cell skin cancer and malignant melanoma of the skin in the past.   4.  Previous TIA.      DISCUSSION:  This very nice gentleman anticipates needing extensive surgery to his low back.  He has signs by nuclear study of asymptomatic coronary artery disease with a small inferior scar and trace yobany-infarction ischemia suggesting distal vessel RCA disease.  In addition, he has no anginal symptoms.  No signs of heart failure.  His heart is structurally and functionally normal otherwise.  EF of 49% would be considered borderline by a nuclear study and normal by echo at 55%-60%.      I think it is reasonable for him to proceed with  surgery and general anesthesia as needed to provide an excellent and safe surgical results.  I reviewed all these issues including the fact that occasionally problems can occur with surgery and anesthesia interventions, but I think in view of the profound limitation of his low back pain, he can proceed with surgery with reasonable safety.        With the idea that he might have coronary artery disease, asymptomatic and not appreciated, I added metoprolol tartrate 25 mg b.i.d. to his ongoing medication program as listed above including lisinopril 20 mg a day and atorvastatin 10 mg at bedtime.  His beta blocker can be adjusted before and after surgery as needed, but I believe it is reasonable during the operative and perioperative phase to have a little bit of beta blocker on board.      IMPRESSION:   1.  I feel he can be cleared for surgery and anesthesia as noted above.   2.  Possible asymptomatic coronary artery disease with a small inferior scar.  LVEF essentially normal.   3.  No significant valvular disease.   4.  Healthy adult otherwise.   5.  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     6.  Metoprolol 25 mg b.i.d. added to be adjusted before, during and after surgery as need be to control blood pressure and discourage any possible ischemic cardiac events.        I spent over an hour doing this consult, reviewing multiple records, medications, medication side effects, indications and medication adjustments.        I reviewed at length, right bundle branch block and its relatively benign nature, although it is clear now only more in retrospect that he does not have evidence of significant heart disease overall.  He may have a distal vessel coronary artery disease, but it is asymptomatic associated with the possibility of a small inferior scar.  Long-term of hypertension, hyperlipidemia and possible coronary disease is indicated, which could done through Dr. Granados.        cc:      Janet  MD Darwin    [address] [address]       Stepan Kwong MD   Orthopedic Surgery            ANIKA BETTENCOURT MD, [facility]             D: 01/15/2018 10:09   T: 01/15/2018 11:55   MT: ANNA      Name:     GAURI WU   MRN:      [id number]        Account:      [id number]   :      [id number]           Service Date: 01/15/2018      Document: [id number]

## 2018-01-15 NOTE — PROGRESS NOTES
HPI and Plan:   See dictation            No orders of the defined types were placed in this encounter.    No orders of the defined types were placed in this encounter.    There are no discontinued medications.      No diagnosis found.    CURRENT MEDICATIONS:  Current Outpatient Prescriptions   Medication Sig Dispense Refill     atorvastatin (LIPITOR) 10 MG tablet Take 1 tablet (10 mg) by mouth daily 90 tablet 3     lisinopril (PRINIVIL/ZESTRIL) 20 MG tablet Take 1 tablet (20 mg) by mouth daily 90 tablet 3     Multiple Vitamins-Minerals (MULTI COMPLETE PO) Take  by mouth.       calcium carbonate-vitamin D (CALCIUM + D) 600-200 MG-UNIT TABS Take 1 tablet by mouth daily.       leflunomide (ARAVA) 10 MG tablet Take 1 tablet by mouth daily.       fish oil-omega-3 fatty acids (OMEGA 3) 1000 MG capsule Take 1 capsule by mouth daily.         ALLERGIES     Allergies   Allergen Reactions     No Known Drug Allergy        PAST MEDICAL HISTORY:  Past Medical History:   Diagnosis Date     BPH (benign prostatic hyperplasia)      Colonic polyps     recurrent     CVA (cerebral vascular accident) (H) 2009    posterior circulation     Hyperlipidemia LDL goal <100     reports being intolerant to      Seronegative rheumatoid arthritis (H) 2011    hands, neck,, shoulders       PAST SURGICAL HISTORY:  Past Surgical History:   Procedure Laterality Date     JOINT REPLACEMENT, HIP RT/LT  2007    R     JOINT REPLACEMTN, KNEE RT/LT  2004     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    excised       FAMILY HISTORY:  Family History   Problem Relation Age of Onset     DIABETES Mother      Alzheimer Disease Mother      Obesity Mother      Cardiovascular Father      Obesity Father      CANCER Sister      CANCER Sister        SOCIAL HISTORY:  Social History     Social History     Marital status:      Spouse name: wife - 2nd Shira     Number of children: 5     Years of education: N/A     Occupational History     retired      Social  "History Main Topics     Smoking status: Former Smoker     Packs/day: 2.00     Years: 30.00     Types: Cigarettes     Smokeless tobacco: Never Used     Alcohol use 2.4 oz/week     4 Standard drinks or equivalent per week     Drug use: No     Sexual activity: Yes     Other Topics Concern     Exercise Yes     Social History Narrative         Review of Systems:  Skin:          Eyes:         ENT:         Respiratory:  Negative       Cardiovascular:         Gastroenterology: Negative      Genitourinary:  Negative      Musculoskeletal:    back pain;neck pain;joint pain    Neurologic:  Negative      Psychiatric:  Negative      Heme/Lymph/Imm:  Negative      Endocrine:  Negative        Physical Exam:  Vitals: /72  Pulse 76  Ht 1.778 m (5' 10\")  Wt 74.4 kg (164 lb)  BMI 23.53 kg/m2    Constitutional:  cooperative, alert and oriented, well developed, well nourished, in no acute distress appears younger than stated age      Skin:             Head:  normocephalic        Eyes:  pupils equal and round;sclera white        Lymph:No Cervical lymphadenopathy present     ENT:           Neck:  carotid pulses are full and equal bilaterally;JVP normal;no carotid bruit        Respiratory:  clear to auscultation         Cardiac: regular rhythm;no murmurs, gallops or rubs detected                              2+;right dorsalis pedis artery             2+;left dorsalis pedis artery            GI:  abdomen soft;no masses;non-tender        Extremities and Muscular Skeletal:  no edema              Neurological:  affect appropriate        Psych:  oriented to time, person and place        Recent Lab Results:  LIPID RESULTS:  Lab Results   Component Value Date    CHOL 191 09/05/2017    HDL 71 09/05/2017     (H) 09/05/2017    TRIG 96 09/05/2017    CHOLHDLRATIO 2.6 07/01/2015       LIVER ENZYME RESULTS:  Lab Results   Component Value Date    AST 19 09/05/2017    ALT 19 09/05/2017       CBC RESULTS:  Lab Results   Component Value Date "    WBC 6.8 09/05/2017    RBC 4.35 (L) 09/05/2017    HGB 15.2 01/09/2018    HCT 44.1 09/05/2017     (H) 09/05/2017    MCH 32.9 09/05/2017    MCHC 32.4 09/05/2017    RDW 14.0 09/05/2017     09/05/2017       BMP RESULTS:  Lab Results   Component Value Date     01/09/2018    POTASSIUM 4.2 01/09/2018    CHLORIDE 104 01/09/2018    CO2 24 01/09/2018    ANIONGAP 10 01/09/2018     (H) 01/09/2018    BUN 21 01/09/2018    CR 0.83 01/09/2018    GFRESTIMATED 87 01/09/2018    GFRESTBLACK >90 01/09/2018    GISELA 8.9 01/09/2018        A1C RESULTS:  Lab Results   Component Value Date    A1C 6.0 07/01/2015       INR RESULTS:  No results found for: INR        CC  Nehemias Granados MD  [address] [place] [address]

## 2018-01-15 NOTE — MR AVS SNAPSHOT
"              After Visit Summary   1/15/2018    Jose E Capps    MRN: [id number]           Patient Information     Date Of Birth          9/28/1931        Visit Information        Provider Department      1/15/2018 9:00 AM Reid Crowe MD [facility]        Today's Diagnoses     Atherosclerosis of native coronary artery of native heart without angina pectoris    -  1       Follow-ups after your visit        Who to contact     If you have questions or need follow up information about today's clinic visit or your schedule please contact [facility] directly at [phone].  Normal or non-critical lab and imaging results will be communicated to you by MyChart, letter or phone within 4 business days after the clinic has received the results. If you do not hear from us within 7 days, please contact the clinic through [employer] or phone. If you have a critical or abnormal lab result, we will notify you by phone as soon as possible.  Submit refill requests through [employer] or call your pharmacy and they will forward the refill request to us. Please allow 3 business days for your refill to be completed.          Additional Information About Your Visit        MyChart Information     [employer] gives you secure access to your electronic health record. If you see a primary care provider, you can also send messages to your care team and make appointments. If you have questions, please call your primary care clinic.  If you do not have a primary care provider, please call [phone] and they will assist you.        Care EveryWhere ID     This is your Care EveryWhere ID. This could be used by other organizations to access your [facility] medical records  [id number]        Your Vitals Were     Pulse Height BMI (Body Mass Index)             76 1.778 m (5' 10\") 23.53 kg/m2          Blood Pressure from Last 3 Encounters:   01/15/18 138/72   01/09/18 " 140/60   10/02/17 134/62    Weight from Last 3 Encounters:   01/15/18 74.4 kg (164 lb)   01/09/18 74.8 kg (165 lb)   10/02/17 70.3 kg (155 lb)              Today, you had the following     No orders found for display         Today's Medication Changes          These changes are accurate as of: 1/15/18 10:09 AM.  If you have any questions, ask your nurse or doctor.               Start taking these medicines.        Dose/Directions    metoprolol tartrate 25 MG tablet   Commonly known as:  LOPRESSOR   Used for:  Atherosclerosis of native coronary artery of native heart without angina pectoris   Started by:  Reid Crowe MD        Dose:  25 mg   Take 1 tablet (25 mg) by mouth 2 times daily   Quantity:  60 tablet   Refills:  11            Where to get your medicines      These medications were sent to [id number] - [place], [place] - [address] [facility]  [address] [facility] [address]     Phone:  [phone]     metoprolol tartrate 25 MG tablet                Primary Care Provider Office Phone # Fax #    Nehemias Granados [phone]       [address]  [place] [address]        Equal Access to Services     JORGE L BREWER AH: Hadii cecille enamorado hadasho Soomaali, waaxda luqadaha, qaybta kaalmada adeegyada, ian tolliver. So [facility] [phone].    ATENCIÓN: Si habla español, tiene a bradlye disposición servicios gratuitos de asistencia lingüística. Llame al [phone].    We comply with applicable federal civil rights laws and [place] laws. We do not discriminate on the basis of race, color, national origin, age, disability, sex, sexual orientation, or gender identity.            Thank you!     Thank you for choosing [facility]  for your care. Our goal is always to provide you with excellent care. Hearing back from our patients is one way we can continue to improve our services. Please take a few minutes to complete  the written survey that you may receive in the mail after your visit with us. Thank you!             Your Updated Medication List - Protect others around you: Learn how to safely use, store and throw away your medicines at www.disposemymeds.org.          This list is accurate as of: 1/15/18 10:09 AM.  Always use your most recent med list.                   Brand Name Dispense Instructions for use Diagnosis    atorvastatin 10 MG tablet    LIPITOR    90 tablet    Take 1 tablet (10 mg) by mouth daily    Mixed hyperlipidemia       calcium + D 600-200 MG-UNIT Tabs   Generic drug:  calcium carbonate-vitamin D      Take 1 tablet by mouth daily.        fish oil-omega-3 fatty acids 1000 MG capsule      Take 1 capsule by mouth daily.        leflunomide 10 MG tablet    ARAVA     Take 1 tablet by mouth daily.    Seronegative rheumatoid arthritis (H)       lisinopril 20 MG tablet    PRINIVIL/ZESTRIL    90 tablet    Take 1 tablet (20 mg) by mouth daily    Hypertension goal BP (blood pressure) < 140/90       metoprolol tartrate 25 MG tablet    LOPRESSOR    60 tablet    Take 1 tablet (25 mg) by mouth 2 times daily    Atherosclerosis of native coronary artery of native heart without angina pectoris       MULTI COMPLETE PO      Take  by mouth.

## 2018-01-16 ENCOUNTER — TRANSFERRED RECORDS (OUTPATIENT)
Dept: HEALTH INFORMATION MANAGEMENT | Facility: CLINIC | Age: 83
End: 2018-01-16

## 2018-01-21 VITALS
DIASTOLIC BLOOD PRESSURE: 53 MMHG | OXYGEN SATURATION: 96 % | HEART RATE: 60 BPM | WEIGHT: 165 LBS | TEMPERATURE: 97.6 F | RESPIRATION RATE: 18 BRPM | BODY MASS INDEX: 23.68 KG/M2 | SYSTOLIC BLOOD PRESSURE: 123 MMHG

## 2018-01-21 RX ORDER — SENNOSIDES 8.6 MG
4 TABLET ORAL 2 TIMES DAILY PRN
COMMUNITY
End: 2019-06-26

## 2018-01-22 ENCOUNTER — NURSING HOME VISIT (OUTPATIENT)
Dept: GERIATRICS | Facility: CLINIC | Age: 83
End: 2018-01-22
Payer: COMMERCIAL

## 2018-01-22 DIAGNOSIS — K59.03 DRUG-INDUCED CONSTIPATION: ICD-10-CM

## 2018-01-22 DIAGNOSIS — I10 HYPERTENSION GOAL BP (BLOOD PRESSURE) < 140/90: ICD-10-CM

## 2018-01-22 DIAGNOSIS — R53.81 PHYSICAL DECONDITIONING: ICD-10-CM

## 2018-01-22 DIAGNOSIS — Z86.73 HISTORY OF TIA (TRANSIENT ISCHEMIC ATTACK) AND STROKE: ICD-10-CM

## 2018-01-22 DIAGNOSIS — M48.061 SPINAL STENOSIS OF LUMBAR REGION WITHOUT NEUROGENIC CLAUDICATION: Primary | ICD-10-CM

## 2018-01-22 PROCEDURE — 99309 SBSQ NF CARE MODERATE MDM 30: CPT | Performed by: NURSE PRACTITIONER

## 2018-01-22 NOTE — PROGRESS NOTES
"[facility]  PRIMARY CARE PROVIDER AND CLINIC:  Nehemias Granados [address]  / [place] [address]  Chief Complaint   Patient presents with     Hospital F/U       HPI:    Jose E Capps is a 86 year old  (9/28/1931), PMH of TIA (has not taken ASA for several months), HTN, HLD, presented for elective for low back surgery admitted to the [facility] from [facility] .  Hospital stay 1/16/18 through 1/19/18.    Lumbar spinal stenosis s/p L1-2 and 2-3 decompression with complete laminectomy and L4-5 hemilaminectomy. Dr Montelongo  There were no post op complications noted from hospital progress notes that were available to me  Admitted to this facility for  rehab, medical management and nursing care.  HPI information obtained from: facility chart records, facility staff, patient report and [facility] chart review.  Current issues are: On exam today patient is alert, sitting up in WC, pleasant, states pain in low back is rated 3-4/10, states he has some pain down left leg when ambulating and weight bearing, states the radicular pain was there prior to surgery and he feels the radicular pain is \"a little better\" now post op. Patient states his right leg is stronger than his left leg, denies fever, chills, cough, congestion, SOB, N/V/D states he did have a BM yesterday, passing urine fine, slept fair last night and appetite seems to be fine.      Last 3 BPs: 123/53, 136/62, 146/54  HR Ranges: 57-68 bpm  Admission Weight: 165 lbs    CODE STATUS/ADVANCE DIRECTIVES DISCUSSION:   CPR/Full code   Patient's living condition: lives with spouse    ALLERGIES:No known drug allergy  PAST MEDICAL HISTORY:  has a past medical history of BPH (benign prostatic hyperplasia); Colonic polyps; CVA (cerebral vascular accident) (H) (2009); Hyperlipidemia LDL goal <100; and Seronegative rheumatoid arthritis (H) (2011). He also has no past medical history of Asymptomatic human " immunodeficiency virus (HIV) infection status (H); Blood in semen; Cerebral palsy (H); Complication of anesthesia; Congenital renal agenesis and dysgenesis; Epididymitis, bilateral; Epididymitis, left; Epididymitis, right; Goiter; Gout; Hernia, abdominal; History of spinal cord injury; History of thrombophlebitis; Horseshoe kidney; Hydrocephalus; Malignant hyperthermia; Mumps; Orchitis, epididymitis, and epididymo-orchitis, with abscess; Palpitations; Parkinsons disease (H); Penile discharge; Prostate infection; Spider veins; Spina bifida (H); STD (sexually transmitted disease); Swelling of testicle; Tethered cord (H); or Tuberculosis.  PAST SURGICAL HISTORY:  has a past surgical history that includes joint replacement, hip rt/lt (2007); joint replacemtn, knee rt/lt (2004); and melanoma greater than ajcc stage 0  or 1a (1978).  FAMILY HISTORY: family history includes Alzheimer Disease in his mother; CANCER in his sister and sister; Cardiovascular in his father; DIABETES in his mother; Obesity in his father and mother.  SOCIAL HISTORY:  reports that he has quit smoking. His smoking use included Cigarettes. He has a 60.00 pack-year smoking history. He has never used smokeless tobacco. He reports that he drinks about 2.4 oz of alcohol per week  He reports that he does not use illicit drugs.    Post Discharge Medication Reconciliation Status: discharge medications reconciled, continue medications without change.  Current Outpatient Prescriptions   Medication Sig Dispense Refill     OXYCODONE HCL PO Take 2.5 mg by mouth every 4 hours as needed       ASPIRIN EC PO Take 81 mg by mouth daily       Acetaminophen (TYLENOL PO) Take 650 mg by mouth every 4 hours While Awake       sennosides (SENOKOT) 8.6 MG tablet Take 4 tablets by mouth 2 times daily       metoprolol tartrate (LOPRESSOR) 25 MG tablet Take 1 tablet (25 mg) by mouth 2 times daily 60 tablet 11     atorvastatin (LIPITOR) 10 MG tablet Take 1 tablet (10 mg) by mouth  daily 90 tablet 3     lisinopril (PRINIVIL/ZESTRIL) 20 MG tablet Take 1 tablet (20 mg) by mouth daily 90 tablet 3     Multiple Vitamins-Minerals (MULTI COMPLETE PO) Take  by mouth.       fish oil-omega-3 fatty acids (OMEGA 3) 1000 MG capsule Take 1 capsule by mouth daily.       calcium carbonate-vitamin D (CALCIUM + D) 600-200 MG-UNIT TABS Take 1 tablet by mouth daily.       leflunomide (ARAVA) 10 MG tablet Take 1 tablet by mouth daily.         ROS:  10 point ROS of systems including Constitutional, Eyes, Respiratory, Cardiovascular, Gastroenterology, Genitourinary, Integumentary, Muscularskeletal, Psychiatric were all negative except for pertinent positives noted in my HPI.    Exam:  /53  Pulse 60  Temp 97.6  F (36.4  C)  Resp 18  Wt 165 lb (74.8 kg)  SpO2 96%  BMI 23.68 kg/m2  GENERAL APPEARANCE:  Alert, in no distress  ENT:  Mouth and posterior oropharynx normal, moist mucous membranes, [place]  EYES:  EOM, conjunctivae, lids, pupils and irises normal, PERRL  RESP:  respiratory effort and palpation of chest normal, lungs clear to auscultation , no respiratory distress  CV:  Palpation and auscultation of heart done , regular rate and rhythm, no murmur, rub, or gallop, no edema  ABDOMEN:  normal bowel sounds, soft, nontender, no hepatosplenomegaly or other masses  M/S:   Examination of:   right upper extremity, left upper extremity, right lower extremity and left lower extremity  Inspection, ROM, stability and muscle strength normal  SKIN:  Inspection of skin and subcutaneous tissue baseline, did not visualize low back incision  NEURO:   Cranial nerves 2-12 are normal tested and grossly at patient's baseline, speech WNL  PSYCH:  affect and mood normal    Lab/Diagnostic data:  CBC RESULTS:   Recent Labs   Lab Test  01/09/18   1319  09/05/17   1140  08/29/16   1054   WBC   --   6.8  8.5   RBC   --   4.35*  4.30*   HGB  15.2  14.3  14.6   HCT   --   44.1  44.2   MCV   --   101*  103*   MCH   --   32.9  34.0*    MCHC   --   32.4  33.0   RDW   --   14.0  13.5   PLT   --   240  302       Last Basic Metabolic Panel:  Recent Labs   Lab Test  01/09/18   1319  09/05/17   1140   NA  138  141   POTASSIUM  4.2  3.8   CHLORIDE  104  107   GISELA  8.9  8.8   CO2  24  25   BUN  21  15   CR  0.83  0.75   GLC  132*  91       Liver Function Studies -   Recent Labs   Lab Test  09/05/17   1140  08/29/16   1054   PROTTOTAL  6.3*  6.5*   ALBUMIN  3.3*  3.7   BILITOTAL  0.9  0.6   ALKPHOS  64  52   AST  19  22   ALT  19  38         Lab Results   Component Value Date    A1C 6.0 07/01/2015    A1C 6.1 10/31/2012       ASSESSMENT/PLAN:  Spinal stenosis of lumbar region without neurogenic claudication  Physical deconditioning  Acute/ongoing s/p L1-2 and 2-3 spinal fusion and laminectomy Dr. Montelongo  PT and OT for strengthening, WBAT with spinal precautions  Tylenol 650mg q 4 hours prn, oxycodone 2.5mg q 4 hours prn for pain,   F/u with ortho as directed     Hypertension goal BP (blood pressure) < 140/90  Acute/ongiong: vitals daily and prn, BMP in AM, lisinopril 20mg QD, metoprolol 25mg BID, did have cardiology workup pre op for RBBB and cleared by cards    History of TIA (transient ischemic attack)   Ongoing: per patient was not on ASA for several months prior to surgery  continue ASA 81mg QD    Drug-induced constipation  Acute: senna s 4 tabs BID scheduled, miralax 17gm QD prn       Orders:  BMP and Hgb in AM  miralax 17gm QD prn      Electronically signed by:  Tonya Lynn Haase, SHIRA CNP

## 2018-01-23 ENCOUNTER — TRANSFERRED RECORDS (OUTPATIENT)
Dept: HEALTH INFORMATION MANAGEMENT | Facility: CLINIC | Age: 83
End: 2018-01-23

## 2018-01-23 LAB
BUN SERPL-MCNC: 19 MG/DL (ref 9–26)
CALCIUM SERPL-MCNC: 8.8 MG/DL (ref 8.4–10.2)
CHLORIDE SERPLBLD-SCNC: 106 MMOL/L (ref 98–109)
CO2 SERPL-SCNC: 27 MMOL/L (ref 22–31)
CREAT SERPL-MCNC: 0.77 MG/DL (ref 0.73–1.18)
GFR SERPL CREATININE-BSD FRML MDRD: >60 ML/MIN/1.73M2
GLUCOSE SERPL-MCNC: 96 MG/DL (ref 70–100)
HEMOGLOBIN: 11.4 G/DL (ref 13.4–17.5)
POTASSIUM SERPL-SCNC: 4 MMOL/L (ref 3.5–5.2)
SODIUM SERPL-SCNC: 139 MMOL/L (ref 136–145)

## 2018-01-28 ENCOUNTER — NURSING HOME VISIT (OUTPATIENT)
Dept: GERIATRICS | Facility: CLINIC | Age: 83
End: 2018-01-28
Payer: COMMERCIAL

## 2018-01-28 DIAGNOSIS — Z86.73 HISTORY OF TIA (TRANSIENT ISCHEMIC ATTACK) AND STROKE: ICD-10-CM

## 2018-01-28 DIAGNOSIS — I10 HYPERTENSION GOAL BP (BLOOD PRESSURE) < 140/90: ICD-10-CM

## 2018-01-28 DIAGNOSIS — M48.061 SPINAL STENOSIS OF LUMBAR REGION WITHOUT NEUROGENIC CLAUDICATION: Primary | ICD-10-CM

## 2018-01-28 PROCEDURE — 99308 SBSQ NF CARE LOW MDM 20: CPT | Performed by: INTERNAL MEDICINE

## 2018-01-30 NOTE — PROGRESS NOTES
[facility]  PRIMARY CARE PROVIDER AND CLINIC:  Nehemias Granados [address] [facility] [address] [facility]  / [place] [address]      Pt was seen by Dr Gastelum on 1/28/18    HPI:    Jose E Capps is a 86 year old  (9/28/1931) who underwent L spine decompression by Dr Montelongo on 1/16/18    Hospital course was medically uncomplicated    Pt has done well since admission to the TCU, anticipates d.c to home in a few days  He notes moderate LBP  He has mild L LE pain, which was also present pre op  He has been having regular stools, has been voiding without difficulties, has been eating well.      CODE STATUS/ADVANCE DIRECTIVES DISCUSSION:   CPR/Full code   Patient's living condition: lives with spouse    ALLERGIES:No known drug allergy  PAST MEDICAL HISTORY:  has a past medical history of BPH (benign prostatic hyperplasia); Colonic polyps; CVA (cerebral vascular accident) (H) (2009); Hyperlipidemia LDL goal <100; and Seronegative rheumatoid arthritis (H) (2011). He also has no past medical history of Asymptomatic human immunodeficiency virus (HIV) infection status (H); Blood in semen; Cerebral palsy (H); Complication of anesthesia; Congenital renal agenesis and dysgenesis; Epididymitis, bilateral; Epididymitis, left; Epididymitis, right; Goiter; Gout; Hernia, abdominal; History of spinal cord injury; History of thrombophlebitis; Horseshoe kidney; Hydrocephalus; Malignant hyperthermia; Mumps; Orchitis, epididymitis, and epididymo-orchitis, with abscess; Palpitations; Parkinsons disease (H); Penile discharge; Prostate infection; Spider veins; Spina bifida (H); STD (sexually transmitted disease); Swelling of testicle; Tethered cord (H); or Tuberculosis.  PAST SURGICAL HISTORY:  has a past surgical history that includes joint replacement, hip rt/lt (2007); joint replacemtn, knee rt/lt (2004); and melanoma greater than ajcc stage 0  or 1a (1978).  FAMILY HISTORY: family history includes Alzheimer Disease in his mother; CANCER in  his sister and sister; Cardiovascular in his father; DIABETES in his mother; Obesity in his father and mother.  SOCIAL HISTORY:  reports that he has quit smoking. His smoking use included Cigarettes. He has a 60.00 pack-year smoking history. He has never used smokeless tobacco. He reports that he drinks about 2.4 oz of alcohol per week  He reports that he does not use illicit drugs.    Post Discharge Medication Reconciliation Status: .  Current Outpatient Prescriptions   Medication Sig Dispense Refill     OXYCODONE HCL PO Take 2.5 mg by mouth every 4 hours as needed       ASPIRIN EC PO Take 81 mg by mouth daily       Acetaminophen (TYLENOL PO) Take 650 mg by mouth every 4 hours While Awake       sennosides (SENOKOT) 8.6 MG tablet Take 4 tablets by mouth 2 times daily       metoprolol tartrate (LOPRESSOR) 25 MG tablet Take 1 tablet (25 mg) by mouth 2 times daily 60 tablet 11     atorvastatin (LIPITOR) 10 MG tablet Take 1 tablet (10 mg) by mouth daily 90 tablet 3     lisinopril (PRINIVIL/ZESTRIL) 20 MG tablet Take 1 tablet (20 mg) by mouth daily 90 tablet 3     Multiple Vitamins-Minerals (MULTI COMPLETE PO) Take  by mouth.       fish oil-omega-3 fatty acids (OMEGA 3) 1000 MG capsule Take 1 capsule by mouth daily.       calcium carbonate-vitamin D (CALCIUM + D) 600-200 MG-UNIT TABS Take 1 tablet by mouth daily.       leflunomide (ARAVA) 10 MG tablet Take 1 tablet by mouth daily.         ROS:  10 point ROS neg except as noted above    Exam:    Thin, well appearing male, sitting in chair, appears comfortable  HEENT no oral lesions  Neck supple  Lungs clear   CV rrr  Abd soft  L spine incision was not examined  No gross LE weakness  Gait was not assessed            ASSESSMENT/PLAN:  Spinal stenosis of lumbar region without neurogenic claudication  Physical deconditioning  Acute/ongoing s/p L1-2 and 2-3 spinal fusion and laminectomy Dr. Montelongo  PT and OT for strengthening, WBAT with spinal precautions  Tylenol 650mg q 4  hours prn, oxycodone 2.5mg q 4 hours prn for pain,   F/u with ortho as directed   Bowel regimen    Hypertension goal BP (blood pressure) < 140/90  Stable  Plan continue current medications, monitor BP, BMP    History of TIA (transient ischemic attack)   Plan continue ASA 81mg QD      Jose Gastelum MD

## 2018-01-31 VITALS
BODY MASS INDEX: 23.53 KG/M2 | WEIGHT: 164 LBS | TEMPERATURE: 98.5 F | RESPIRATION RATE: 16 BRPM | OXYGEN SATURATION: 98 % | HEART RATE: 70 BPM | SYSTOLIC BLOOD PRESSURE: 125 MMHG | DIASTOLIC BLOOD PRESSURE: 57 MMHG

## 2018-02-01 ENCOUNTER — NURSING HOME VISIT (OUTPATIENT)
Dept: GERIATRICS | Facility: CLINIC | Age: 83
End: 2018-02-01
Payer: COMMERCIAL

## 2018-02-01 DIAGNOSIS — R53.81 PHYSICAL DECONDITIONING: ICD-10-CM

## 2018-02-01 DIAGNOSIS — I10 HYPERTENSION GOAL BP (BLOOD PRESSURE) < 140/90: ICD-10-CM

## 2018-02-01 DIAGNOSIS — M48.061 SPINAL STENOSIS OF LUMBAR REGION WITHOUT NEUROGENIC CLAUDICATION: Primary | ICD-10-CM

## 2018-02-01 DIAGNOSIS — K59.03 DRUG-INDUCED CONSTIPATION: ICD-10-CM

## 2018-02-01 DIAGNOSIS — Z86.73 HISTORY OF TIA (TRANSIENT ISCHEMIC ATTACK) AND STROKE: ICD-10-CM

## 2018-02-01 PROCEDURE — 99309 SBSQ NF CARE MODERATE MDM 30: CPT | Performed by: NURSE PRACTITIONER

## 2018-02-01 RX ORDER — POLYETHYLENE GLYCOL 3350 17 G/17G
17 POWDER, FOR SOLUTION ORAL DAILY PRN
COMMUNITY
End: 2019-08-20

## 2018-02-01 NOTE — PROGRESS NOTES
[facility]    Chief Complaint   Patient presents with     RECHECK       HPI:    Jose E Capps is a 86 year old  (9/28/1931), who is being seen today for an episodic care visit at [facility].  HPI information obtained from: facility chart records, facility staff, patient report and [facility] chart review.Today's concern is:  Spinal stenosis: s/p laminectomy L4-5: on exam today patient is alert, sitting up in WC, states pain in low back is rated 1/10 and when he is doing activity or walking he is rating pain 8/10, nursing states patient is confused with use of oxycodone, patient states he feels he is confused with use of pain meds.   Physical deconditioning: patient is walking up to 150 feet using a RW with CTG assist, SBT of 12/12 and CPT of 4.6/5.6  HTN: Last 3 BPs: 125/57, 107/50, 122/71 denies CP, palpitations, SOB  Constipation: states he is having a BM daily, denies N/V/D   HR Ranges: 61-70 bpm  Admission Weight: 165 lbs  Current Weight: 164 lbs     ALLERGIES: No known drug allergy  Past Medical, Surgical, Family and Social History reviewed and updated in [facility].    Current Outpatient Prescriptions   Medication Sig Dispense Refill     polyethylene glycol (MIRALAX/GLYCOLAX) Packet Take 1 packet by mouth daily as needed for constipation       OXYCODONE HCL PO Take 2.5 mg by mouth every 4 hours as needed       ASPIRIN EC PO Take 81 mg by mouth daily       Acetaminophen (TYLENOL PO) Take 650 mg by mouth every 4 hours While Awake       sennosides (SENOKOT) 8.6 MG tablet Take 4 tablets by mouth 2 times daily       metoprolol tartrate (LOPRESSOR) 25 MG tablet Take 1 tablet (25 mg) by mouth 2 times daily 60 tablet 11     atorvastatin (LIPITOR) 10 MG tablet Take 1 tablet (10 mg) by mouth daily 90 tablet 3     lisinopril (PRINIVIL/ZESTRIL) 20 MG tablet Take 1 tablet (20 mg) by mouth daily 90 tablet 3     Multiple Vitamins-Minerals (MULTI COMPLETE PO) Take by mouth daily        fish oil-omega-3  fatty acids (OMEGA 3) 1000 MG capsule Take 1 capsule by mouth daily.       calcium carbonate-vitamin D (CALCIUM + D) 600-200 MG-UNIT TABS Take 1 tablet by mouth daily.       leflunomide (ARAVA) 10 MG tablet Take 1 tablet by mouth daily.       Medications reviewed:  Medications reconciled to facility chart and changes were made to reflect current medications as identified as above med list. Below are the changes that were made:   Medications stopped since last EPIC medication reconciliation:   There are no discontinued medications.    Medications started since last [facility] medication reconciliation:  Orders Placed This Encounter   Medications     polyethylene glycol (MIRALAX/GLYCOLAX) Packet     Sig: Take 1 packet by mouth daily as needed for constipation         REVIEW OF SYSTEMS:  10 point ROS of systems including Constitutional, Eyes, Respiratory, Cardiovascular, Gastroenterology, Genitourinary, Integumentary, Muscularskeletal, Psychiatric were all negative except for pertinent positives noted in my HPI.    Physical Exam:  /57  Pulse 70  Temp 98.5  F (36.9  C)  Resp 16  Wt 164 lb (74.4 kg)  SpO2 98%  BMI 23.53 kg/m2  GENERAL APPEARANCE:  Alert, in no distress  ENT:  Mouth and posterior oropharynx normal, moist mucous membranes, [place]  EYES:  EOM, conjunctivae, lids, pupils and irises normal, PERRL  RESP:  respiratory effort and palpation of chest normal, lungs clear to auscultation , no respiratory distress  CV:  Palpation and auscultation of heart done , regular rate and rhythm, no murmur, rub, or gallop, no edema  ABDOMEN:  normal bowel sounds, soft, nontender, no hepatosplenomegaly or other masses  M/S:   Examination of:   right upper extremity, left upper extremity, right lower extremity and left lower extremity  Inspection, ROM, stability and muscle strength normal  SKIN:  Inspection of skin and subcutaneous tissue baseline, did not visualize low back incision  NEURO:   Cranial nerves 2-12 are normal  tested and grossly at patient's baseline, speech WNL  PSYCH:  affect and mood normal       Recent Labs:  CBC RESULTS:   Recent Labs   Lab Test 01/23/18 01/09/18   1319  09/05/17   1140  08/29/16   1054   WBC   --    --   6.8  8.5   RBC   --    --   4.35*  4.30*   HGB  11.4*  15.2  14.3  14.6   HCT   --    --   44.1  44.2   MCV   --    --   101*  103*   MCH   --    --   32.9  34.0*   MCHC   --    --   32.4  33.0   RDW   --    --   14.0  13.5   PLT   --    --   240  302       Last Basic Metabolic Panel:  Recent Labs   Lab Test 01/23/18 01/09/18   1319   NA  139  138   POTASSIUM  4.0  4.2   CHLORIDE  106  104   GISELA  8.8  8.9   CO2  27  24   BUN  19  21   CR  0.77  0.83   GLC  96  132*       Liver Function Studies -   Recent Labs   Lab Test  09/05/17   1140  08/29/16   1054   PROTTOTAL  6.3*  6.5*   ALBUMIN  3.3*  3.7   BILITOTAL  0.9  0.6   ALKPHOS  64  52   AST  19  22   ALT  19  38       Lab Results   Component Value Date    A1C 6.0 07/01/2015    A1C 6.1 10/31/2012         Assessment/Plan:  Spinal stenosis of lumbar region without neurogenic claudication  Physical deconditioning  Acute/ongoing s/p L1-2 and 2-3 spinal fusion and laminectomy Dr. Montelongo  PT and OT for strengthening, WBAT with spinal precautions  DC oxycodone, Norco 5/325mg q 4 hours prn for pain   Tylenol 500mg TID scheduled   F/u with ortho as directed      Hypertension goal BP (blood pressure) < 140/90  Acute/ongiong: vitals daily and prn, BMP in AM, lisinopril 20mg QD, metoprolol 25mg BID, did have cardiology workup pre op for RBBB and cleared by cards     History of TIA (transient ischemic attack)   Ongoing: per patient was not on ASA for several months prior to surgery  continue ASA 81mg QD     Drug-induced constipation  Acute: senna s 4 tabs BID scheduled, miralax 17gm QD prn         Orders:  DC oxycodone  Tylenol 500mg TID scheduled  Norco 5/325mg q 4 hours prn for pain  Do not exceed 4gm acetaminophen in 24 hours        Electronically signed  by  Tonya Lynn Haase, APRN CNP

## 2018-02-07 VITALS
OXYGEN SATURATION: 99 % | HEART RATE: 70 BPM | BODY MASS INDEX: 23.53 KG/M2 | DIASTOLIC BLOOD PRESSURE: 86 MMHG | WEIGHT: 164 LBS | TEMPERATURE: 97.8 F | RESPIRATION RATE: 16 BRPM | SYSTOLIC BLOOD PRESSURE: 144 MMHG

## 2018-02-07 RX ORDER — HYDROCODONE BITARTRATE AND ACETAMINOPHEN 5; 325 MG/1; MG/1
1 TABLET ORAL EVERY 4 HOURS PRN
COMMUNITY
End: 2018-08-23

## 2018-02-08 ENCOUNTER — DISCHARGE SUMMARY NURSING HOME (OUTPATIENT)
Dept: GERIATRICS | Facility: CLINIC | Age: 83
End: 2018-02-08
Payer: COMMERCIAL

## 2018-02-08 DIAGNOSIS — Z86.73 HISTORY OF TIA (TRANSIENT ISCHEMIC ATTACK) AND STROKE: ICD-10-CM

## 2018-02-08 DIAGNOSIS — K59.03 DRUG-INDUCED CONSTIPATION: ICD-10-CM

## 2018-02-08 DIAGNOSIS — I10 HYPERTENSION GOAL BP (BLOOD PRESSURE) < 140/90: ICD-10-CM

## 2018-02-08 DIAGNOSIS — R53.81 PHYSICAL DECONDITIONING: ICD-10-CM

## 2018-02-08 DIAGNOSIS — M48.061 SPINAL STENOSIS OF LUMBAR REGION WITHOUT NEUROGENIC CLAUDICATION: Primary | ICD-10-CM

## 2018-02-08 PROCEDURE — 99316 NF DSCHRG MGMT 30 MIN+: CPT | Performed by: NURSE PRACTITIONER

## 2018-02-08 NOTE — PROGRESS NOTES
[facility] DISCHARGE SUMMARY    PATIENT'S NAME: Jose E Capps  YOB: 1931  MEDICAL RECORD NUMBER:  [id number]    PRIMARY CARE PROVIDER AND CLINIC RESPONSIBLE AFTER TRANSFER: Nehemias Granados [address] [place] [address]  / SHIRLEY MANDUJANO [id number]     CODE STATUS/ADVANCE DIRECTIVES DISCUSSION:   CPR/Full code        Allergies   Allergen Reactions     No Known Drug Allergy        TRANSFERRING PROVIDERS: Tonya Lynn Haase, APRN CNP, Dr. Oriana MD  DATE OF SNF ADMISSION:  January / 19 / 2018  DATE OF SNF (anticipated) DISCHARGE: February / 11 / 2018  DISCHARGE DISPOSITION: [facility] Provider   Nursing Facility: [facility]  stay 1/16/18 to 1/19/18.     Condition on Discharge:  Stable.  Function:  Walking up to 250 feet using a RW, indep with ADL's  Cognitive Scores: BIMS: 12/15, SBT: 12/28, CPT: 4.6/5.6    Equipment: walker    DISCHARGE DIAGNOSIS:   1. Spinal stenosis of lumbar region without neurogenic claudication    2. Physical deconditioning    3. Hypertension goal BP (blood pressure) < 140/90    4. History of TIA (transient ischemic attack)     5. Drug-induced constipation            PAST MEDICAL HISTORY:  has a past medical history of BPH (benign prostatic hyperplasia); Colonic polyps; CVA (cerebral vascular accident) (H) (2009); Hyperlipidemia LDL goal <100; and Seronegative rheumatoid arthritis (H) (2011). He also has no past medical history of Asymptomatic human immunodeficiency virus (HIV) infection status (H); Blood in semen; Cerebral palsy (H); Complication of anesthesia; Congenital renal agenesis and dysgenesis; Epididymitis, bilateral; Epididymitis, left; Epididymitis, right; Goiter; Gout; Hernia, abdominal; History of spinal cord injury; History of thrombophlebitis; Horseshoe kidney; Hydrocephalus; Malignant hyperthermia; Mumps; Orchitis, epididymitis, and epididymo-orchitis, with abscess; Palpitations; Parkinsons disease (H); Penile discharge;  Prostate infection; Spider veins; Spina bifida (H); STD (sexually transmitted disease); Swelling of testicle; Tethered cord (H); or Tuberculosis.    DISCHARGE MEDICATIONS:  Current Outpatient Prescriptions   Medication Sig Dispense Refill     HYDROcodone-acetaminophen (NORCO) 5-325 MG per tablet Take 1 tablet by mouth every 4 hours as needed for moderate to severe pain       polyethylene glycol (MIRALAX/GLYCOLAX) Packet Take 1 packet by mouth daily as needed for constipation       ASPIRIN EC PO Take 81 mg by mouth daily       Acetaminophen (TYLENOL PO) Take 500 mg by mouth 3 times daily While Awake        sennosides (SENOKOT) 8.6 MG tablet Take 4 tablets by mouth 2 times daily as needed        metoprolol tartrate (LOPRESSOR) 25 MG tablet Take 1 tablet (25 mg) by mouth 2 times daily 60 tablet 11     atorvastatin (LIPITOR) 10 MG tablet Take 1 tablet (10 mg) by mouth daily 90 tablet 3     lisinopril (PRINIVIL/ZESTRIL) 20 MG tablet Take 1 tablet (20 mg) by mouth daily 90 tablet 3     Multiple Vitamins-Minerals (MULTI COMPLETE PO) Take by mouth daily        fish oil-omega-3 fatty acids (OMEGA 3) 1000 MG capsule Take 1 capsule by mouth daily.       calcium carbonate-vitamin D (CALCIUM + D) 600-200 MG-UNIT TABS Take 1 tablet by mouth daily.       leflunomide (ARAVA) 10 MG tablet Take 1 tablet by mouth daily.         MEDICATION CHANGES/RATIONALE:   DC oxycodone due to confusion and lethargy  Started on norco with improvement of the above.   Last 3 BPs: 144/86, 119/58, 137/60 mmHg  HR Ranges: 64-81 bpm  Admission Weight: 165 lbs  Current Weight: 164 lbs  Controlled medications sent with patient: Script for norco 5/325mg 1 PO q 6 hours prn medication for 20 tabs and 0 refills given to patient at dischage to have them fill at their out patient pharmacy     ROS:    10 point ROS of systems including Constitutional, Eyes, Respiratory, Cardiovascular, Gastroenterology, Genitourinary, Integumentary, Muscularskeletal, Psychiatric  were all negative except for pertinent positives noted in my HPI.    Physical Exam:   Vitals: /86  Pulse 70  Temp 97.8  F (36.6  C)  Resp 16  Wt 164 lb (74.4 kg)  SpO2 99%  BMI 23.53 kg/m2  BMI= Body mass index is 23.53 kg/(m^2).  GENERAL APPEARANCE:  Alert, in no distress  ENT:  Mouth and posterior oropharynx normal, moist mucous membranes, [place]  EYES:  EOM, conjunctivae, lids, pupils and irises normal, PERRL  RESP:  respiratory effort and palpation of chest normal, lungs clear to auscultation , no respiratory distress  CV:  Palpation and auscultation of heart done , regular rate and rhythm, no murmur, rub, or gallop, no edema  ABDOMEN:  normal bowel sounds, soft, nontender, no hepatosplenomegaly or other masses  M/S:   Examination of:   right upper extremity, left upper extremity, right lower extremity and left lower extremity  Inspection, ROM, stability and muscle strength normal  SKIN:  Inspection of skin and subcutaneous tissue baseline, did not visualize low back incision  NEURO:   Cranial nerves 2-12 are normal tested and grossly at patient's baseline, speech WNL  PSYCH:  affect and mood normal    HPI Nursing Facility Course: Once pain controlled patient progressed in therapy to walking up to 250 feet using a RW indep, INdep with ADL's, cognitive scores BIMS 12/15 nd SBT of 12/28 indicating moderate cognitive impairement, patient lives at home with wife, will DC home with home PT and HHA   HPI information obtained from: facility chart records, facility staff, patient report and [facility] chart review.      Spinal stenosis of lumbar region without neurogenic claudication  Physical deconditioning  Acute/ongoing s/p L1-2 and 2-3 spinal fusion and laminectomy Dr. Montelogno  DC home with home pT and HHA, WBAT with spinal precautions  DC oxycodone, Norco 5/325mg q 6 hours prn for pain   Tylenol 500mg TID scheduled   F/u with ortho as directed , appt is on 2/28/18      Hypertension goal BP (blood  pressure) < 140/90  Acute/ongiong: continue lisinopril 20mg QD, metoprolol 25mg BID, did have cardiology workup pre op for RBBB and cleared by cards      History of TIA (transient ischemic attack)   Ongoing: per patient was not on ASA for several months prior to surgery  continue ASA 81mg QD      Drug-induced constipation  Acute: senna s 4 tabs BID scheduled, miralax 17gm QD prn       DISCHARGE PLAN:  Physical Therapy, Home Health Aide and From:  Home Care  Patient instructed to follow-up with:  PCP in 5 days       [facility] scheduled appointments:  Future Appointments  Date Time Provider Department Center   2/20/2018 11:40 AM Nehemias Granados MD [facility]       MTM referral needed and placed by this provider: No    Pending labs: none  SNF labs   CBC RESULTS:   Recent Labs   Lab Test 01/23/18 01/09/18   1319  09/05/17   1140  08/29/16   1054   WBC   --    --   6.8  8.5   RBC   --    --   4.35*  4.30*   HGB  11.4*  15.2  14.3  14.6   HCT   --    --   44.1  44.2   MCV   --    --   101*  103*   MCH   --    --   32.9  34.0*   MCHC   --    --   32.4  33.0   RDW   --    --   14.0  13.5   PLT   --    --   240  302       Last Basic Metabolic Panel:  Recent Labs   Lab Test 01/23/18 01/09/18   1319   NA  139  138   POTASSIUM  4.0  4.2   CHLORIDE  106  104   GISELA  8.8  8.9   CO2  27  24   BUN  19  21   CR  0.77  0.83   GLC  96  132*       Liver Function Studies -   Recent Labs   Lab Test  09/05/17   1140  08/29/16   1054   PROTTOTAL  6.3*  6.5*   ALBUMIN  3.3*  3.7   BILITOTAL  0.9  0.6   ALKPHOS  64  52   AST  19  22   ALT  19  38       Lab Results   Component Value Date    A1C 6.0 07/01/2015    A1C 6.1 10/31/2012           Discharge Treatments:none    TOTAL DISCHARGE TIME:   Greater than 30 minutes  Electronically signed by:  Tonya Lynn Haase, APRN CNP

## 2018-02-12 ENCOUNTER — TELEPHONE (OUTPATIENT)
Dept: FAMILY MEDICINE | Facility: CLINIC | Age: 83
End: 2018-02-12

## 2018-02-12 NOTE — TELEPHONE ENCOUNTER
"  ED for acute condition Discharge Protocol    \"Hi, my name is Loan MEKHI Way, a registered nurse, and I am calling from [facility].  I am calling to follow up and see how things are going for you after your recent emergency visit.\"    Tell me how you are doing now that you are home?\" Patient states he is doing the same at home.  Denies fevers or increased pain, numbness, tingling in extremities, incision concerns.      Discharge Instructions    \"Let's review your discharge instructions.  What is/are the follow-up recommendations?  Pt. Response: Did not know of appointment scheduled and was given informaiton    \"Has an appointment with your primary care provider been scheduled?\"  Yes. (confirm and remind to bring meds)    Medications    \"Tell me what changed about your medicines when you discharged?\"    Pain and constipation    \"What questions do you have about your medications?\"   None        Call Summary    \"What questions or concerns do you have about your recent visit and your follow-up care?\"     none    \"If you have questions or things don't continue to improve, we encourage you contact us through the main clinic number (give number).  Even if the clinic is not open, triage nurses are available 24/7 to help you.     We would like you to know that our clinic has extended hours (provide information).  We also have urgent care (provide details on closest location and hours/contact info)\"    \"Thank you for your time and take care!\"                "

## 2018-02-12 NOTE — TELEPHONE ENCOUNTER
Pt was seen at [facility] on Sun 2/11/18.  Reason for visit: Spinal Stenosis of lumbar region w/out Neurogenic Claudication  Appointment 2/20/18      Thank you!    Vianca Almendarez TC

## 2018-02-14 DIAGNOSIS — I10 HYPERTENSION GOAL BP (BLOOD PRESSURE) < 140/90: ICD-10-CM

## 2018-02-14 DIAGNOSIS — I25.10 ATHEROSCLEROSIS OF NATIVE CORONARY ARTERY OF NATIVE HEART WITHOUT ANGINA PECTORIS: ICD-10-CM

## 2018-02-14 DIAGNOSIS — M06.00 SERONEGATIVE RHEUMATOID ARTHRITIS (H): ICD-10-CM

## 2018-02-14 DIAGNOSIS — E78.2 MIXED HYPERLIPIDEMIA: ICD-10-CM

## 2018-02-14 RX ORDER — LISINOPRIL 20 MG/1
20 TABLET ORAL DAILY
Qty: 90 TABLET | Refills: 3 | Status: CANCELLED | OUTPATIENT
Start: 2018-02-14

## 2018-02-14 RX ORDER — METOPROLOL TARTRATE 25 MG/1
25 TABLET, FILM COATED ORAL 2 TIMES DAILY
Qty: 60 TABLET | Refills: 11 | Status: CANCELLED | OUTPATIENT
Start: 2018-02-14

## 2018-02-14 RX ORDER — ATORVASTATIN CALCIUM 10 MG/1
10 TABLET, FILM COATED ORAL DAILY
Qty: 90 TABLET | Refills: 3 | Status: CANCELLED | OUTPATIENT
Start: 2018-02-14

## 2018-02-14 NOTE — TELEPHONE ENCOUNTER
Reason for Call:  Medication or medication refill:    Do you use a [facility]?  Name of the pharmacy and phone number for the current request:  [facility] in [place]     Name of the medication requested: metoprolol tartrate (LOPRESSOR) 25 MG tablet, lisinopril (PRINIVIL/ZESTRIL) 20 MG tablet, atorvastatin (LIPITOR) 10 MG tablet, leflunomide (ARAVA) 10 MG tablet  Other request: Patients daughter wants to be able to  his medication. Patient had back surgery and not able to drive.    Can we leave a detailed message on this number? YES    Phone number patient can be reached at: [phone] - Daughters number     Best Time: Anytime     Call taken on 2/14/2018 at 11:02 AM by Mary Granados

## 2018-02-15 NOTE — TELEPHONE ENCOUNTER
"Requested Prescriptions   Pending Prescriptions Disp Refills     atorvastatin (LIPITOR) 10 MG tablet  Last Written Prescription Date:  9/5/17  Last Fill Quantity: 90,  # refills: 3   Last office visit: 1/9/2018 with prescribing provider:  Darwin   Future Office Visit:     90 tablet 3     Sig: Take 1 tablet (10 mg) by mouth daily    Statins Protocol Passed    2/14/2018 11:11 AM       Passed - LDL on file in past 12 months    Recent Labs   Lab Test  09/05/17   1140   LDL  101*            Passed - No abnormal creatine kinase in past 12 months    No lab results found.         Passed - Recent or future visit with authorizing provider    Patient had office visit in the last year or has a visit in the next 30 days with authorizing provider.  See \"Patient Info\" tab in inbasket, or \"Choose Columns\" in Meds & Orders section of the refill encounter.            Passed - Patient is age 18 or older        leflunomide (ARAVA) 10 MG tablet  Last Written Prescription Date:  4/3/12 historical  Last Fill Quantity: ,  # refills:    Last office visit: 1/9/2018 with prescribing provider:  aDrwin   Future Office Visit:           Sig: Take 1 tablet (10 mg) by mouth daily    There is no refill protocol information for this order        lisinopril (PRINIVIL/ZESTRIL) 20 MG tablet  Last Written Prescription Date:  9/5/17  Last Fill Quantity: 90,  # refills: 3   Last office visit: 1/9/2018 with prescribing provider:  Darwin     Future Office Visit:     90 tablet 3     Sig: Take 1 tablet (20 mg) by mouth daily    ACE Inhibitors (Including Combos) Protocol Failed    2/14/2018 11:11 AM       Failed - Blood pressure under 140/90 in past 12 months    BP Readings from Last 3 Encounters:   02/07/18 144/86   01/31/18 125/57   01/21/18 123/53                Passed - Recent or future visit with authorizing provider's specialty    Patient had office visit in the last year or has a visit in the next 30 days with authorizing provider.  See \"Patient Info\" tab in " "inbasket, or \"Choose Columns\" in Meds & Orders section of the refill encounter.            Passed - Patient is age 18 or older       Passed - Normal serum creatinine on file in past 12 months    Recent Labs   Lab Test 01/23/18   CR  0.77            Passed - Normal serum potassium on file in past 12 months    Recent Labs   Lab Test 01/23/18   POTASSIUM  4.0             metoprolol tartrate (LOPRESSOR) 25 MG tablet  Last Written Prescription Date:  1/15/18  Last Fill Quantity: 60,  # refills: 11   Last office visit: 1/9/2018 with prescribing provider:  Darwin   Future Office Visit:     60 tablet 11     Sig: Take 1 tablet (25 mg) by mouth 2 times daily    Beta-Blockers Protocol Failed    2/14/2018 11:11 AM       Failed - Blood pressure under 140/90 in past 12 months    BP Readings from Last 3 Encounters:   02/07/18 144/86   01/31/18 125/57   01/21/18 123/53                Passed - Patient is age 6 or older       Passed - Recent or future visit with authorizing provider's specialty    Patient had office visit in the last year or has a visit in the next 30 days with authorizing provider.  See \"Patient Info\" tab in inbasket, or \"Choose Columns\" in Meds & Orders section of the refill encounter.               "

## 2018-02-16 NOTE — TELEPHONE ENCOUNTER
Atorvastatin, metoprolol and lisinopril removed from request. Patient still has refills remaining at [facility].    Routing refill request to provider for review/approval because:  leflunomide   Drug not on the [facility] refill protocol. Not ordered since 2012.  Jing Jeronimo RN          .

## 2018-02-17 RX ORDER — LEFLUNOMIDE 10 MG/1
10 TABLET ORAL DAILY
OUTPATIENT
Start: 2018-02-17

## 2018-02-19 NOTE — TELEPHONE ENCOUNTER
Patient notified with providers response below and agrees with plan.  Radha Ulrich RN  [facility]

## 2018-08-13 ENCOUNTER — MEDICAL CORRESPONDENCE (OUTPATIENT)
Dept: HEALTH INFORMATION MANAGEMENT | Facility: CLINIC | Age: 83
End: 2018-08-13

## 2018-08-14 ENCOUNTER — TELEPHONE (OUTPATIENT)
Dept: FAMILY MEDICINE | Facility: CLINIC | Age: 83
End: 2018-08-14

## 2018-08-14 NOTE — TELEPHONE ENCOUNTER
Left non detailed message for patient to return call.  Sharda Reynaga RN   [facility] - Triage

## 2018-08-14 NOTE — TELEPHONE ENCOUNTER
Left message for Judith to return call. Need more information on what is needed. Do they need verbal orders and med list?   Sharda Reynaga RN   [facility] - Triage

## 2018-08-14 NOTE — TELEPHONE ENCOUNTER
Reason for Call: Request for an order or referral:    Order or referral being requested: Judith with [facility] called requesting orders to authorize [facility] staff to administer medications to ptn. Per Judith, ptn's daughter is uncertain if the ptn can do this himself.    Date needed: as soon as possible    Has the patient been seen by the PCP for this problem? YES    Additional comments: Requests same orders for his wife Shira Capps    Phone number [facility] can be reached at:  Judith with [facility] [phone]    Best Time:  any    Can we leave a detailed message on this number?  NO    Call taken on 8/14/2018 at 8:18 AM by Olivia Rowe

## 2018-08-14 NOTE — TELEPHONE ENCOUNTER
Form received, printed diagnosis codes, med list (for Provider to sign) and last office note (H & P) not done within [facility]  Placed in Provider bin  Vianca YEE

## 2018-08-14 NOTE — TELEPHONE ENCOUNTER
Spoke with Judith regarding below. She states the family is requesting the facility administer medications. She needs a med list signed by the MD for this. She also states she faxed admit orders yesterday, she is requesting those be filled out and faxed back as well. Routing to  to review and send. Any questions, Judith can be reached at [phone].     Fax to [phone] attn: Judith.     Sharda Reynaga RN   [facility] - Triage

## 2018-08-22 ENCOUNTER — TELEPHONE (OUTPATIENT)
Dept: FAMILY MEDICINE | Facility: CLINIC | Age: 83
End: 2018-08-22

## 2018-08-22 NOTE — TELEPHONE ENCOUNTER
Erica from [facility] calling as when we sent patient's med list, Hydrocodone was on the list as reported by patient.  Even tho we do not prescribe this, sinec the med list was signed it appears we do give this to the patient.  Per Erica at [facility], pt took this for a brief time S/P back surgery about 9 months ago.  He is no longer taking this so they will need a DC order for this medication.  They asked that we remove this from the med list in [facility], print that list and have MD sign off on it.  They also need a letter DCing that medication.  Letter was pended for provider review.  Letter and signed med list can be faxed to Erica at [facility] on [address] at fax number [phone].

## 2018-08-22 NOTE — LETTER
[facility]  [address] [address]  [phone]        August 22, 2018  Jose E Capps  [address] [address]    To Whom It May Concern:    The above named patient was taking Hydrocodone as prescribed by an outside provider for pain status post back surgery about 9 months ago.  He is no longer taking this medication so please discontinue this medication in his chart at [facility] [address].  Should you have any questions or concerns please feel free to contact my office at [phone].        Thank you for choosing [facility],             Nehemias Granados MD

## 2018-08-23 ENCOUNTER — TELEPHONE (OUTPATIENT)
Dept: FAMILY MEDICINE | Facility: CLINIC | Age: 83
End: 2018-08-23

## 2018-08-23 NOTE — TELEPHONE ENCOUNTER
POLST received from [facility]. Patient wishes to be DNR. POLST placed in PCP box to sign and we can fax back. DNR order pended for PCP to sign.   Sharda Reynaga RN   [facility] - Triage

## 2018-09-11 ENCOUNTER — OFFICE VISIT (OUTPATIENT)
Dept: FAMILY MEDICINE | Facility: CLINIC | Age: 83
End: 2018-09-11
Payer: COMMERCIAL

## 2018-09-11 VITALS
HEART RATE: 54 BPM | TEMPERATURE: 98.2 F | BODY MASS INDEX: 24.34 KG/M2 | OXYGEN SATURATION: 96 % | HEIGHT: 70 IN | DIASTOLIC BLOOD PRESSURE: 62 MMHG | WEIGHT: 170 LBS | SYSTOLIC BLOOD PRESSURE: 120 MMHG

## 2018-09-11 DIAGNOSIS — E78.2 MIXED HYPERLIPIDEMIA: ICD-10-CM

## 2018-09-11 DIAGNOSIS — Z02.9 ADMINISTRATIVE ENCOUNTER: ICD-10-CM

## 2018-09-11 DIAGNOSIS — I25.10 ATHEROSCLEROSIS OF NATIVE CORONARY ARTERY OF NATIVE HEART WITHOUT ANGINA PECTORIS: ICD-10-CM

## 2018-09-11 DIAGNOSIS — Z00.00 ROUTINE GENERAL MEDICAL EXAMINATION AT A HEALTH CARE FACILITY: Primary | ICD-10-CM

## 2018-09-11 DIAGNOSIS — I10 HYPERTENSION GOAL BP (BLOOD PRESSURE) < 140/90: ICD-10-CM

## 2018-09-11 DIAGNOSIS — M06.00 SERONEGATIVE RHEUMATOID ARTHRITIS (H): ICD-10-CM

## 2018-09-11 DIAGNOSIS — Z12.5 SCREENING FOR PROSTATE CANCER: ICD-10-CM

## 2018-09-11 DIAGNOSIS — Z23 NEED FOR PROPHYLACTIC VACCINATION AND INOCULATION AGAINST INFLUENZA: ICD-10-CM

## 2018-09-11 LAB
ERYTHROCYTE [DISTWIDTH] IN BLOOD BY AUTOMATED COUNT: 13.5 % (ref 10–15)
HCT VFR BLD AUTO: 44.2 % (ref 40–53)
HGB BLD-MCNC: 14.2 G/DL (ref 13.3–17.7)
MCH RBC QN AUTO: 32.3 PG (ref 26.5–33)
MCHC RBC AUTO-ENTMCNC: 32.1 G/DL (ref 31.5–36.5)
MCV RBC AUTO: 101 FL (ref 78–100)
PLATELET # BLD AUTO: 258 10E9/L (ref 150–450)
RBC # BLD AUTO: 4.39 10E12/L (ref 4.4–5.9)
WBC # BLD AUTO: 6.9 10E9/L (ref 4–11)

## 2018-09-11 PROCEDURE — G0008 ADMIN INFLUENZA VIRUS VAC: HCPCS | Performed by: FAMILY MEDICINE

## 2018-09-11 PROCEDURE — 80053 COMPREHEN METABOLIC PANEL: CPT | Performed by: FAMILY MEDICINE

## 2018-09-11 PROCEDURE — 90662 IIV NO PRSV INCREASED AG IM: CPT | Performed by: FAMILY MEDICINE

## 2018-09-11 PROCEDURE — 99214 OFFICE O/P EST MOD 30 MIN: CPT | Mod: 25 | Performed by: FAMILY MEDICINE

## 2018-09-11 PROCEDURE — 84443 ASSAY THYROID STIM HORMONE: CPT | Performed by: FAMILY MEDICINE

## 2018-09-11 PROCEDURE — 85027 COMPLETE CBC AUTOMATED: CPT | Performed by: FAMILY MEDICINE

## 2018-09-11 PROCEDURE — 80061 LIPID PANEL: CPT | Performed by: FAMILY MEDICINE

## 2018-09-11 PROCEDURE — 99397 PER PM REEVAL EST PAT 65+ YR: CPT | Mod: 25 | Performed by: FAMILY MEDICINE

## 2018-09-11 PROCEDURE — G0103 PSA SCREENING: HCPCS | Performed by: FAMILY MEDICINE

## 2018-09-11 PROCEDURE — 36415 COLL VENOUS BLD VENIPUNCTURE: CPT | Performed by: FAMILY MEDICINE

## 2018-09-11 RX ORDER — ATORVASTATIN CALCIUM 10 MG/1
10 TABLET, FILM COATED ORAL DAILY
Qty: 90 TABLET | Refills: 3 | Status: SHIPPED | OUTPATIENT
Start: 2018-09-11 | End: 2018-09-27

## 2018-09-11 RX ORDER — METOPROLOL TARTRATE 25 MG/1
25 TABLET, FILM COATED ORAL 2 TIMES DAILY
Qty: 180 TABLET | Refills: 3 | Status: SHIPPED | OUTPATIENT
Start: 2018-09-11 | End: 2018-09-27

## 2018-09-11 RX ORDER — LISINOPRIL 20 MG/1
20 TABLET ORAL DAILY
Qty: 90 TABLET | Refills: 3 | Status: SHIPPED | OUTPATIENT
Start: 2018-09-11 | End: 2018-09-27

## 2018-09-11 NOTE — PROGRESS NOTES
SUBJECTIVE:   Jsoe E Capps is a 86 year old male who presents for Preventive Visit.      Are you in the first 12 months of your Medicare Part B coverage?  No    Healthy Habits:    Do you get at least three servings of calcium containing foods daily (dairy, green leafy vegetables, etc.)? yes    Amount of exercise or daily activities, outside of work: none    Problems taking medications regularly No    Medication side effects: No    Have you had an eye exam in the past two years? no    Do you see a dentist twice per year? no    Do you have sleep apnea, excessive snoring or daytime drowsiness?no      Ability to successfully perform activities of daily living: No, needs assistance with: preparing meals and medications    Home safety:  none identified     Hearing impairment: No    Fall risk:         COGNITIVE SCREEN  1) Repeat 3 items (Leader, Season, Table)    2) Clock draw: NORMAL  3) 3 item recall: Recalls 1 object   Results: NORMAL clock, 1-2 items recalled: COGNITIVE IMPAIRMENT LESS LIKELY    Mini-CogTM Copyright S Kiara. Licensed by the author for use in [facility]; reprinted with permission (isma@[facility]). All rights reserved.      Patient comes in with his daughter today.  Patient has had a memory declined in the recent past.  He is now living in an assisted care facility which is [facility].  It is located in [place] on [address] [facility] [address] next to the hospital.  He needs help with medications dispensing twice a day and meals twice a day.  He has some forms with him that needs to be filled out.  A few months ago he  agreed to having of power of  which is his daughter.    Also here to follow-up on his chronic medical conditions which includes hypertension, dyslipidemia, history of coronary artery disease which is asymptomatic and history of TIAs.  Patient also has rheumatoid arthritis for which he sees a rheumatologist.  The last visit note from rheumatology is 1 year ago   He also needs an annual  skin check up as he has history of malignant melanoma and basal cell carcinoma.  Last visit is from 1 year ago.  History of colon polyp noted on the active problem list.  Patient reports of no bowel symptoms.  No previous records of the colonoscopy noted.  Daughter has records available at home.  She would look into the recommendations for repeat colonoscopy.      Reviewed and updated as needed this visit by clinical staff  Tobacco  Allergies  Meds  Med Hx  Surg Hx  Fam Hx  Soc Hx        Reviewed and updated as needed this visit by Provider  Allergies  Surg Hx        Social History   Substance Use Topics     Smoking status: Former Smoker     Packs/day: 2.00     Years: 30.00     Types: Cigarettes     Smokeless tobacco: Never Used     Alcohol use 2.4 oz/week     4 Standard drinks or equivalent per week       If you drink alcohol do you typically have >3 drinks per day or >7 drinks per week? No                        Today's PHQ-2 Score:   PHQ-2 ( 1999 Pfizer) 1/9/2018 9/5/2017   Q1: Little interest or pleasure in doing things 0 0   Q2: Feeling down, depressed or hopeless 0 0   PHQ-2 Score 0 0       Do you feel safe in your environment - No    Do you have a Health Care Directive?: Yes: Advance Directive has been received and scanned.    Current providers sharing in care for this patient include:   Patient Care Team:  Nehemias Granados MD as PCP - General (Family Practice)    The following health maintenance items are reviewed in Epic and correct as of today:  Health Maintenance   Topic Date Due     INFLUENZA VACCINE (1) 09/01/2018     WELLNESS VISIT Q1 YR  09/05/2018     ADVANCE DIRECTIVE PLANNING Q5 YRS  11/12/2018     FALL RISK ASSESSMENT  01/09/2019     PHQ-2 Q1 YR  01/09/2019     TETANUS IMMUNIZATION (SYSTEM ASSIGNED)  01/09/2028     PNEUMOCOCCAL  Completed     Labs reviewed in EPIC  BP Readings from Last 3 Encounters:   09/11/18 120/62   02/07/18 144/86   01/31/18 125/57    Wt Readings from Last 3 Encounters:    09/11/18 170 lb (77.1 kg)   02/07/18 164 lb (74.4 kg)   01/31/18 164 lb (74.4 kg)                  Patient Active Problem List   Diagnosis     ACP (advance care planning)     Seronegative rheumatoid arthritis (H)     Hyperlipidemia LDL goal <100     Colon polyp     Hypertension goal BP (blood pressure) < 140/90     Hip pain, left     History of malignant melanoma of skin     History of basal cell carcinoma     History of TIA (transient ischemic attack)      RBBB (right bundle branch block)     Atherosclerosis of native coronary artery of native heart without angina pectoris     Past Surgical History:   Procedure Laterality Date     C LAT LUMBAR SPINE FUSION       JOINT REPLACEMENT, HIP RT/LT  2007    R     JOINT REPLACEMTN, KNEE RT/LT  2004     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    excised       Social History   Substance Use Topics     Smoking status: Former Smoker     Packs/day: 2.00     Years: 30.00     Types: Cigarettes     Smokeless tobacco: Never Used     Alcohol use 2.4 oz/week     4 Standard drinks or equivalent per week     Family History   Problem Relation Age of Onset     Diabetes Mother      Alzheimer Disease Mother      Obesity Mother      Cardiovascular Father      Obesity Father      Cancer Sister      Cancer Sister          Current Outpatient Prescriptions   Medication Sig Dispense Refill     Acetaminophen (TYLENOL PO) Take 500 mg by mouth 3 times daily While Awake        ASPIRIN EC PO Take 81 mg by mouth daily       atorvastatin (LIPITOR) 10 MG tablet Take 1 tablet (10 mg) by mouth daily 90 tablet 3     calcium carbonate-vitamin D (CALCIUM + D) 600-200 MG-UNIT TABS Take 1 tablet by mouth daily.       fish oil-omega-3 fatty acids (OMEGA 3) 1000 MG capsule Take 1 capsule by mouth daily.       leflunomide (ARAVA) 10 MG tablet Take 1 tablet by mouth daily.       lisinopril (PRINIVIL/ZESTRIL) 20 MG tablet Take 1 tablet (20 mg) by mouth daily 90 tablet 3     metoprolol tartrate (LOPRESSOR)  "25 MG tablet Take 1 tablet (25 mg) by mouth 2 times daily 180 tablet 3     Multiple Vitamins-Minerals (MULTI COMPLETE PO) Take by mouth daily        polyethylene glycol (MIRALAX/GLYCOLAX) Packet Take 1 packet by mouth daily as needed for constipation       sennosides (SENOKOT) 8.6 MG tablet Take 4 tablets by mouth 2 times daily as needed        [DISCONTINUED] atorvastatin (LIPITOR) 10 MG tablet Take 1 tablet (10 mg) by mouth daily 90 tablet 3     [DISCONTINUED] lisinopril (PRINIVIL/ZESTRIL) 20 MG tablet Take 1 tablet (20 mg) by mouth daily 90 tablet 3     [DISCONTINUED] metoprolol tartrate (LOPRESSOR) 25 MG tablet Take 1 tablet (25 mg) by mouth 2 times daily 60 tablet 11     Allergies   Allergen Reactions     No Known Drug Allergy            ROS:  Constitutional, HEENT, cardiovascular, pulmonary, GI, , musculoskeletal, neuro, skin, endocrine and psych systems are negative, except as otherwise noted.    OBJECTIVE:   /62  Pulse 54  Temp 98.2  F (36.8  C) (Tympanic)  Ht 5' 10\" (1.778 m)  Wt 170 lb (77.1 kg)  SpO2 96%  BMI 24.39 kg/m2 Estimated body mass index is 24.39 kg/(m^2) as calculated from the following:    Height as of this encounter: 5' 10\" (1.778 m).    Weight as of this encounter: 170 lb (77.1 kg).  EXAM:   GENERAL: healthy, alert and no distress  EYES: Eyes grossly normal to inspection, PERRL and conjunctivae and sclerae normal  HENT: ear canals and TM's normal, nose and mouth without ulcers or lesions  NECK: no adenopathy, no asymmetry, masses, or scars and thyroid normal to palpation  RESP: lungs clear to auscultation - no rales, rhonchi or wheezes  CV: regular rate and rhythm, normal S1 S2, no S3 or S4, no murmur, click or rub, no peripheral edema and peripheral pulses strong  ABDOMEN: soft, nontender, no hepatosplenomegaly, no masses and bowel sounds normal  MS: no gross musculoskeletal defects noted, no edema  SKIN: no suspicious lesions or rashes  NEURO: Normal strength and tone, " mentation intact and speech normal  PSYCH: mentation appears normal, affect normal/bright        ASSESSMENT / PLAN:   1. Routine general medical examination at a health care facility  Screening labs ordered.  Patient is not fasting today  - TSH with free T4 reflex  - CBC with platelets  - Comprehensive metabolic panel    2. Hypertension goal BP (blood pressure) < 140/90  Hypertension is well managed.  - lisinopril (PRINIVIL/ZESTRIL) 20 MG tablet; Take 1 tablet (20 mg) by mouth daily  Dispense: 90 tablet; Refill: 3    3. Atherosclerosis of native coronary artery of native heart without angina pectoris  Patient is asymptomatic.  Resume aspirin  - metoprolol tartrate (LOPRESSOR) 25 MG tablet; Take 1 tablet (25 mg) by mouth 2 times daily  Dispense: 180 tablet; Refill: 3    4. Mixed hyperlipidemia  Blood work ordered today.  Resume Lipitor for now  - atorvastatin (LIPITOR) 10 MG tablet; Take 1 tablet (10 mg) by mouth daily  Dispense: 90 tablet; Refill: 3  - Lipid panel reflex to direct LDL Non-fasting    5. Screening for prostate cancer  Screening for prostate cancer order.  - Prostate spec antigen screen    6. Need for prophylactic vaccination and inoculation against influenza    - FLU VACCINE, INCREASED ANTIGEN, PRESV FREE, AGE 65+ [09975]  - Vaccine Administration, Initial [94816]    7. Administrative encounter      Forms brought in by the patient for assisted care facility filled out.  Mini-Mental screening form filled out as well.  See scanned documents.  He needs nursing care twice a day for dispensing medications to him and also meals prepared twice a day for him.  Other than that he is pretty independent with his daily needs.    End of Life Planning:  Patient currently has an advanced directive: Yes.  Practitioner is supportive of decision.    COUNSELING:  Reviewed preventive health counseling, as reflected in patient instructions       Regular exercise       Healthy diet/nutrition    BP Readings from Last 1  "Encounters:   09/11/18 120/62     Estimated body mass index is 24.39 kg/(m^2) as calculated from the following:    Height as of this encounter: 5' 10\" (1.778 m).    Weight as of this encounter: 170 lb (77.1 kg).           reports that he has quit smoking. His smoking use included Cigarettes. He has a 60.00 pack-year smoking history. He has never used smokeless tobacco.      Appropriate preventive services were discussed with this patient, including applicable screening as appropriate for cardiovascular disease, diabetes, osteopenia/osteoporosis, and glaucoma.  As appropriate for age/gender, discussed screening for colorectal cancer, prostate cancer, breast cancer, and cervical cancer. Checklist reviewing preventive services available has been given to the patient.    Reviewed patients plan of care and provided an AVS. The Intermediate Care Plan ( asthma action plan, low back pain action plan, and migraine action plan) for Jose E meets the Care Plan requirement. This Care Plan has been established and reviewed with the Patient.    Counseling Resources:  ATP IV Guidelines  Pooled Cohorts Equation Calculator  Breast Cancer Risk Calculator  FRAX Risk Assessment  ICSI Preventive Guidelines  Dietary Guidelines for Americans, 2010  [employer]'s MyPlate  ASA Prophylaxis  Lung CA Screening    Nehemias Granados MD  [facility]    Injectable Influenza Immunization Documentation    1.  Is the person to be vaccinated sick today?   No    2. Does the person to be vaccinated have an allergy to a component   of the vaccine?   No  Egg Allergy Algorithm Link    3. Has the person to be vaccinated ever had a serious reaction   to influenza vaccine in the past?   No    4. Has the person to be vaccinated ever had Guillain-Barré syndrome?   No    Form completed by Radha Romero CMA           "

## 2018-09-11 NOTE — MR AVS SNAPSHOT
After Visit Summary   9/11/2018    Jose E Capps    MRN: [id number]           Patient Information     Date Of Birth          9/28/1931        Visit Information        Provider Department      9/11/2018 4:40 PM Nehemias Granados MD [facility]        Today's Diagnoses     Routine general medical examination at a health care facility    -  1    Hypertension goal BP (blood pressure) < 140/90        Atherosclerosis of native coronary artery of native heart without angina pectoris        Mixed hyperlipidemia        Screening for prostate cancer          Care Instructions      Preventive Health Recommendations:   Male Ages 65 and over    Yearly exam:             See your health care provider every year in order to  o   Review health changes.   o   Discuss preventive care.    o   Review your medicines if your doctor has prescribed any.    Talk with your health care provider about whether you should have a test to screen for prostate cancer (PSA).    Every 3 years, have a diabetes test (fasting glucose). If you are at risk for diabetes, you should have this test more often.    Every 5 years, have a cholesterol test. Have this test more often if you are at risk for high cholesterol or heart disease.     Every 10 years, have a colonoscopy. Or, have a yearly FIT test (stool test). These exams will check for colon cancer.    Talk to with your health care provider about screening for Abdominal Aortic Aneurysm if you have a family history of AAA or have a history of smoking.    Shots:     Get a flu shot each year.     Get a tetanus shot every 10 years.     Talk to your doctor about your pneumonia vaccines. There are now two you should receive - Pneumovax (PPSV 23) and Prevnar (PCV 13).     Talk to your pharmacist about a shingles vaccine.     Talk to your doctor about the hepatitis B vaccine.  Nutrition:     Eat at least 5 servings of fruits and vegetables each day.     Eat whole-grain bread,  whole-wheat pasta and brown rice instead of white grains and rice.     Get adequate Calcium and Vitamin D.   Lifestyle    Exercise for at least 150 minutes a week (30 minutes a day, 5 days a week). This will help you control your weight and prevent disease.     Limit alcohol to one drink per day.     No smoking.     Wear sunscreen to prevent skin cancer.     See your dentist every six months for an exam and cleaning.     See your eye doctor every 1 to 2 years to screen for conditions such as glaucoma, macular degeneration, cataracts, etc           Follow-ups after your visit        Follow-up notes from your care team     Return in about 6 months (around 3/11/2019) for recheck on medications.      Who to contact     If you have questions or need follow up information about today's clinic visit or your schedule please contact [facility] [place] directly at [phone].  Normal or non-critical lab and imaging results will be communicated to you by Alt[employer], letter or phone within 4 business days after the clinic has received the results. If you do not hear from us within 7 days, please contact the clinic through [employer] or phone. If you have a critical or abnormal lab result, we will notify you by phone as soon as possible.  Submit refill requests through [employer] or call your pharmacy and they will forward the refill request to us. Please allow 3 business days for your refill to be completed.          Additional Information About Your Visit        MyChart Information     [employer] gives you secure access to your electronic health record. If you see a primary care provider, you can also send messages to your care team and make appointments. If you have questions, please call your primary care clinic.  If you do not have a primary care provider, please call [phone] and they will assist you.        Care EveryWhere ID     This is your Care EveryWhere ID. This could be used by other organizations to access  "your [place] medical records  [id number]        Your Vitals Were     Pulse Temperature Height Pulse Oximetry BMI (Body Mass Index)       54 98.2  F (36.8  C) (Tympanic) 5' 10\" (1.778 m) 96% 24.39 kg/m2        Blood Pressure from Last 3 Encounters:   09/11/18 120/62   02/07/18 144/86   01/31/18 125/57    Weight from Last 3 Encounters:   09/11/18 170 lb (77.1 kg)   02/07/18 164 lb (74.4 kg)   01/31/18 164 lb (74.4 kg)              We Performed the Following     CBC with platelets     Comprehensive metabolic panel     Lipid panel reflex to direct LDL Non-fasting     Prostate spec antigen screen     TSH with free T4 reflex          Where to get your medicines      These medications were sent to [id number] - [place], [place] [facility] [address] [address]  [address] [facility] [address]     Phone:  [phone]     atorvastatin 10 MG tablet    lisinopril 20 MG tablet    metoprolol tartrate 25 MG tablet          Primary Care Provider Office Phone # Fax #    Nehemias Granados [phone]       [address]  [place] [address]        Equal Access to Services     JORGE L BREWER AH: Hadii aad ku hadasho Soomaali, waaxda luqadaha, qaybta kaalmada adeegyada, waxay idiin hayaan mary fallon lagurdeep tolliver. So [facility] [phone].    ATENCIÓN: Si habla español, tiene a bradley disposición servicios gratuitos de asistencia lingüística. Llame al [phone].    We comply with applicable federal civil rights laws and [place] laws. We do not discriminate on the basis of race, color, national origin, age, disability, sex, sexual orientation, or gender identity.            Thank you!     Thank you for choosing [facility] [place]  for your care. Our goal is always to provide you with excellent care. Hearing back from our patients is one way we can continue to improve our services. Please take a few minutes to complete the written survey that you may receive in the mail after your visit with us. " Thank you!             Your Updated Medication List - Protect others around you: Learn how to safely use, store and throw away your medicines at www.disposemymeds.org.          This list is accurate as of 9/11/18  5:05 PM.  Always use your most recent med list.                   Brand Name Dispense Instructions for use Diagnosis    ASPIRIN EC PO      Take 81 mg by mouth daily        atorvastatin 10 MG tablet    LIPITOR    90 tablet    Take 1 tablet (10 mg) by mouth daily    Mixed hyperlipidemia       calcium + D 600-200 MG-UNIT Tabs   Generic drug:  calcium carbonate-vitamin D      Take 1 tablet by mouth daily.        fish oil-omega-3 fatty acids 1000 MG capsule      Take 1 capsule by mouth daily.        leflunomide 10 MG tablet    ARAVA     Take 1 tablet by mouth daily.    Seronegative rheumatoid arthritis (H)       lisinopril 20 MG tablet    PRINIVIL/ZESTRIL    90 tablet    Take 1 tablet (20 mg) by mouth daily    Hypertension goal BP (blood pressure) < 140/90       metoprolol tartrate 25 MG tablet    LOPRESSOR    180 tablet    Take 1 tablet (25 mg) by mouth 2 times daily    Atherosclerosis of native coronary artery of native heart without angina pectoris       MULTI COMPLETE PO      Take by mouth daily        polyethylene glycol Packet    MIRALAX/GLYCOLAX     Take 1 packet by mouth daily as needed for constipation        sennosides 8.6 MG tablet    SENOKOT     Take 4 tablets by mouth 2 times daily as needed        TYLENOL PO      Take 500 mg by mouth 3 times daily While Awake

## 2018-09-12 LAB
ALBUMIN SERPL-MCNC: 3.8 G/DL (ref 3.4–5)
ALP SERPL-CCNC: 94 U/L (ref 40–150)
ALT SERPL W P-5'-P-CCNC: 33 U/L (ref 0–70)
ANION GAP SERPL CALCULATED.3IONS-SCNC: 8 MMOL/L (ref 3–14)
AST SERPL W P-5'-P-CCNC: 20 U/L (ref 0–45)
BILIRUB SERPL-MCNC: 0.6 MG/DL (ref 0.2–1.3)
BUN SERPL-MCNC: 24 MG/DL (ref 7–30)
CALCIUM SERPL-MCNC: 8.4 MG/DL (ref 8.5–10.1)
CHLORIDE SERPL-SCNC: 108 MMOL/L (ref 94–109)
CHOLEST SERPL-MCNC: 145 MG/DL
CO2 SERPL-SCNC: 29 MMOL/L (ref 20–32)
CREAT SERPL-MCNC: 1.12 MG/DL (ref 0.66–1.25)
GFR SERPL CREATININE-BSD FRML MDRD: 62 ML/MIN/1.7M2
GLUCOSE SERPL-MCNC: 95 MG/DL (ref 70–99)
HDLC SERPL-MCNC: 54 MG/DL
LDLC SERPL CALC-MCNC: 58 MG/DL
NONHDLC SERPL-MCNC: 91 MG/DL
POTASSIUM SERPL-SCNC: 5.1 MMOL/L (ref 3.4–5.3)
PROT SERPL-MCNC: 7 G/DL (ref 6.8–8.8)
PSA SERPL-ACNC: 3 UG/L (ref 0–4)
SODIUM SERPL-SCNC: 145 MMOL/L (ref 133–144)
TRIGL SERPL-MCNC: 167 MG/DL
TSH SERPL DL<=0.005 MIU/L-ACNC: 1.81 MU/L (ref 0.4–4)

## 2018-09-12 RX ORDER — LISINOPRIL 20 MG/1
TABLET ORAL
Refills: 11 | OUTPATIENT
Start: 2018-09-12

## 2018-09-12 RX ORDER — CHLORAL HYDRATE 500 MG
CAPSULE ORAL
Qty: 31 CAPSULE | Refills: 12 | Status: SHIPPED | OUTPATIENT
Start: 2018-09-12 | End: 2018-11-13

## 2018-09-12 RX ORDER — MULTIVIT-MIN/FA/LYCOPEN/LUTEIN .4-300-25
TABLET ORAL
Qty: 93 TABLET | Refills: 3 | Status: SHIPPED | OUTPATIENT
Start: 2018-09-12 | End: 2018-11-13

## 2018-09-12 RX ORDER — METOPROLOL TARTRATE 25 MG/1
TABLET, FILM COATED ORAL
Refills: 11 | OUTPATIENT
Start: 2018-09-12

## 2018-09-12 RX ORDER — LEFLUNOMIDE 10 MG/1
TABLET ORAL
Refills: 11 | OUTPATIENT
Start: 2018-09-12

## 2018-09-12 RX ORDER — ACETAMINOPHEN 500 MG
500 TABLET ORAL EVERY 8 HOURS PRN
Qty: 93 TABLET | Refills: 12 | Status: SHIPPED | OUTPATIENT
Start: 2018-09-12 | End: 2019-06-26

## 2018-09-12 RX ORDER — ATORVASTATIN CALCIUM 10 MG/1
TABLET, FILM COATED ORAL
Refills: 11 | OUTPATIENT
Start: 2018-09-12

## 2018-09-12 RX ORDER — ASPIRIN 81 MG/1
TABLET, DELAYED RELEASE ORAL
Qty: 31 TABLET | Refills: 12 | Status: ON HOLD | OUTPATIENT
Start: 2018-09-12 | End: 2018-10-01

## 2018-09-12 NOTE — TELEPHONE ENCOUNTER
"Requested Prescriptions   Pending Prescriptions Disp Refills     atorvastatin (LIPITOR) 10 MG tablet [Pharmacy Med Name: ATORVASTATIN TAB 10MG]  11     Sig: TAKE 1 TABLET BY MOUTH ONCE DAILY  Last Written Prescription Date:  9/11/18  Last Fill Quantity: 90,  # refills: 3   Last office visit: 9/11/2018 with prescribing provider:  Darwin   Future Office Visit:        Statins Protocol Failed    9/11/2018  7:52 PM       Failed - LDL on file in past 12 months    Recent Labs   Lab Test  09/05/17   1140   LDL  101*            Passed - No abnormal creatine kinase in past 12 months    No lab results found.            Passed - Recent (12 mo) or future (30 days) visit within the authorizing provider's specialty    Patient had office visit in the last 12 months or has a visit in the next 30 days with authorizing provider or within the authorizing provider's specialty.  See \"Patient Info\" tab in inbasket, or \"Choose Columns\" in Meds & Orders section of the refill encounter.           Passed - Patient is age 18 or older        Multiple Vitamins-Minerals (CEROVITE SENIOR) TABS [Pharmacy Med Name: CEROVITE TAB SENIOR]  11     Sig: TAKE 1 TABLET BY MOUTH ONCE DAILY  Last Written Prescription Date:  na  Last Fill Quantity: na,  # refills: na   Last office visit: 9/11/2018 with prescribing provider:  Darwin   Future Office Visit:        Vitamin Supplements (Adult) Protocol Passed    9/11/2018  7:52 PM       Passed - High dose Vitamin D not ordered       Passed - Recent (12 mo) or future (30 days) visit within the authorizing provider's specialty    Patient had office visit in the last 12 months or has a visit in the next 30 days with authorizing provider or within the authorizing provider's specialty.  See \"Patient Info\" tab in inbasket, or \"Choose Columns\" in Meds & Orders section of the refill encounter.            OMEGA-3 FISH OIL 1000 MG capsule [Pharmacy Med Name: FISH OIL CAP 1000MG]  11     Sig: TAKE 1 CAPSULE BY MOUTH ONCE " "DAILY  Last Written Prescription Date:  4/3/12  Last Fill Quantity: na,  # refills: na   Last office visit: 9/11/2018 with prescribing provider:  Darwin   Future Office Visit:        Vitamin Supplements (Adult) Protocol Passed    9/11/2018  7:52 PM       Passed - High dose Vitamin D not ordered       Passed - Recent (12 mo) or future (30 days) visit within the authorizing provider's specialty    Patient had office visit in the last 12 months or has a visit in the next 30 days with authorizing provider or within the authorizing provider's specialty.  See \"Patient Info\" tab in inbasket, or \"Choose Columns\" in Meds & Orders section of the refill encounter.            leflunomide (ARAVA) 10 MG tablet [Pharmacy Med Name: LEFLUNOMIDE TAB 10MG]  11     Sig: TAKE 1 TABLET BY MOUTH ONCE DAILY  Last Written Prescription Date:  4/2/2012  Last Fill Quantity: na,  # refills: na   Last office visit: 9/11/2018 with prescribing provider:  Darwin   Future Office Visit:        There is no refill protocol information for this order        lisinopril (PRINIVIL/ZESTRIL) 20 MG tablet [Pharmacy Med Name: LISINOPRIL TAB 20MG]  11     Sig: TAKE 1 TABLET BY MOUTH ONCE DAILY  Last Written Prescription Date:  9/11/18  Last Fill Quantity: 90,  # refills: 3   Last office visit: 9/11/2018 with prescribing provider:  Darwin   Future Office Visit:        ACE Inhibitors (Including Combos) Protocol Passed    9/11/2018  7:52 PM       Passed - Blood pressure under 140/90 in past 12 months    BP Readings from Last 3 Encounters:   09/11/18 120/62   02/07/18 144/86   01/31/18 125/57                Passed - Recent (12 mo) or future (30 days) visit within the authorizing provider's specialty    Patient had office visit in the last 12 months or has a visit in the next 30 days with authorizing provider or within the authorizing provider's specialty.  See \"Patient Info\" tab in inbasket, or \"Choose Columns\" in Meds & Orders section of the refill encounter.           " "Passed - Patient is age 18 or older       Passed - Normal serum creatinine on file in past 12 months    Recent Labs   Lab Test 01/23/18   CR  0.77            Passed - Normal serum potassium on file in past 12 months    Recent Labs   Lab Test 01/23/18   POTASSIUM  4.0             MAPAP 500 MG tablet [Pharmacy Med Name: MAPAP TAB 500MG]  11     Sig: TAKE 1 TABLET BY MOUTH THREE TIMES DAILY WHILE AWAKE    Analgesics (Non-Narcotic Tylenol and ASA Only) Passed    9/11/2018  7:52 PM       Passed - Recent (12 mo) or future (30 days) visit within the authorizing provider's specialty    Patient had office visit in the last 12 months or has a visit in the next 30 days with authorizing provider or within the authorizing provider's specialty.  See \"Patient Info\" tab in inbasket, or \"Choose Columns\" in Meds & Orders section of the refill encounter.           Passed - Patient is 7 months old or older    If patient is a peds patient of the age 7 mos -12 years, ok to refill using weight-based dosing.     If >3g daily and/or sig is not \"prn\", check for liver enzymes. If normal in the last year, ok to refill.  If not, refer to the provider.         Passed - Patient is age 20 years or older    If ASA is flagged for ages under 20 years old. Forward to provider for confirmation Ryes Syndrome is not a concern.              metoprolol tartrate (LOPRESSOR) 25 MG tablet [Pharmacy Med Name: METOPROL TAR TAB 25MG]  11     Sig: TAKE 1 TABLET BY MOUTH TWICE DAILY  Last Written Prescription Date:  9/11/18  Last Fill Quantity: 180,  # refills: 3   Last office visit: 9/11/2018 with prescribing provider:  Darwin   Future Office Visit:        Beta-Blockers Protocol Passed    9/11/2018  7:52 PM       Passed - Blood pressure under 140/90 in past 12 months    BP Readings from Last 3 Encounters:   09/11/18 120/62   02/07/18 144/86   01/31/18 125/57                Passed - Patient is age 6 or older       Passed - Recent (12 mo) or future (30 days) visit " "within the authorizing provider's specialty    Patient had office visit in the last 12 months or has a visit in the next 30 days with authorizing provider or within the authorizing provider's specialty.  See \"Patient Info\" tab in inbasket, or \"Choose Columns\" in Meds & Orders section of the refill encounter.            SM ASPIRIN ADULT LOW STRENGTH 81 MG EC tablet [Pharmacy Med Name: SM ASPIRIN TAB 81MG EC]  11     Sig: TAKE 1 TABLET BY MOUTH ONCE DAILY  Last Written Prescription Date:  na  Last Fill Quantity: na,  # refills: na   Last office visit: 9/11/2018 with prescribing provider:  Darwin   Future Office Visit:        Analgesics (Non-Narcotic Tylenol and ASA Only) Passed    9/11/2018  7:52 PM       Passed - Recent (12 mo) or future (30 days) visit within the authorizing provider's specialty    Patient had office visit in the last 12 months or has a visit in the next 30 days with authorizing provider or within the authorizing provider's specialty.  See \"Patient Info\" tab in inbasket, or \"Choose Columns\" in Meds & Orders section of the refill encounter.           Passed - Patient is 7 months old or older    If patient is a peds patient of the age 7 mos -12 years, ok to refill using weight-based dosing.     If >3g daily and/or sig is not \"prn\", check for liver enzymes. If normal in the last year, ok to refill.  If not, refer to the provider.         Passed - Patient is age 20 years or older    If ASA is flagged for ages under 20 years old. Forward to provider for confirmation Ryes Syndrome is not a concern.              Sending to Dr. Granados for ASA, multivitamin, MAPAP, and fish oil.  Listed as historical.  Rxs for atorvastin, lisinopril, and metoprolol denied since sent to pharmacy on 9/11/18 by md. Denise Flaherty RN  EP Triage      "

## 2018-09-14 DIAGNOSIS — E63.8 IMBALANCED NUTRITION: Primary | ICD-10-CM

## 2018-09-26 ENCOUNTER — MEDICAL CORRESPONDENCE (OUTPATIENT)
Dept: HEALTH INFORMATION MANAGEMENT | Facility: CLINIC | Age: 83
End: 2018-09-26

## 2018-09-27 DIAGNOSIS — E78.2 MIXED HYPERLIPIDEMIA: ICD-10-CM

## 2018-09-27 DIAGNOSIS — M06.00 SERONEGATIVE RHEUMATOID ARTHRITIS (H): ICD-10-CM

## 2018-09-27 DIAGNOSIS — I25.10 ATHEROSCLEROSIS OF NATIVE CORONARY ARTERY OF NATIVE HEART WITHOUT ANGINA PECTORIS: ICD-10-CM

## 2018-09-27 DIAGNOSIS — I10 HYPERTENSION GOAL BP (BLOOD PRESSURE) < 140/90: ICD-10-CM

## 2018-09-27 RX ORDER — METOPROLOL TARTRATE 25 MG/1
TABLET, FILM COATED ORAL
Refills: 11 | OUTPATIENT
Start: 2018-09-27

## 2018-09-27 RX ORDER — LISINOPRIL 20 MG/1
20 TABLET ORAL DAILY
Qty: 90 TABLET | Refills: 3 | Status: SHIPPED | OUTPATIENT
Start: 2018-09-27 | End: 2018-11-13

## 2018-09-27 RX ORDER — ATORVASTATIN CALCIUM 10 MG/1
TABLET, FILM COATED ORAL
Refills: 11 | OUTPATIENT
Start: 2018-09-27

## 2018-09-27 RX ORDER — LISINOPRIL 20 MG/1
TABLET ORAL
Refills: 11 | OUTPATIENT
Start: 2018-09-27

## 2018-09-27 RX ORDER — ATORVASTATIN CALCIUM 10 MG/1
10 TABLET, FILM COATED ORAL DAILY
Qty: 90 TABLET | Refills: 3 | Status: SHIPPED | OUTPATIENT
Start: 2018-09-27 | End: 2018-11-13

## 2018-09-27 RX ORDER — METOPROLOL TARTRATE 25 MG/1
25 TABLET, FILM COATED ORAL 2 TIMES DAILY
Qty: 180 TABLET | Refills: 3 | Status: ON HOLD | OUTPATIENT
Start: 2018-09-27 | End: 2018-10-01

## 2018-09-27 NOTE — TELEPHONE ENCOUNTER
Rxs for lisinopril, metoprolol, and atorvastatin resent to  [facility].    The fish oil, multivitamin, mapap, and aspirin were sent on 9/12/18 to the  long term pharmacy.    Denise Flaherty RN  EP Triage

## 2018-09-27 NOTE — TELEPHONE ENCOUNTER
[facility] called and never received the refills for the meds below. They were sent to [facility]. Pt requests  [facility] pharmacy because pt is in a facility and his meds need to be specially packed. Please send rx's to  [facility] pharmacy. Any questions, [facility] can be reached [phone]. Thank you.  Blanca Kearney,

## 2018-09-27 NOTE — TELEPHONE ENCOUNTER
"Requested Prescriptions   Pending Prescriptions Disp Refills     atorvastatin (LIPITOR) 10 MG tablet [Pharmacy Med Name: ATORVASTATIN TAB 10MG]  11     Sig: TAKE 1 TABLET BY MOUTH ONCE DAILY  Last Written Prescription Date:  9/11/8  Last Fill Quantity: 9,  # refills: 3   Last office visit: 9/11/2018 with prescribing provider:  Darwin   Future Office Visit:        Statins Protocol Passed    9/27/2018 11:34 AM       Passed - LDL on file in past 12 months    Recent Labs   Lab Test  09/11/18   1711   LDL  58            Passed - No abnormal creatine kinase in past 12 months    No lab results found.            Passed - Recent (12 mo) or future (30 days) visit within the authorizing provider's specialty    Patient had office visit in the last 12 months or has a visit in the next 30 days with authorizing provider or within the authorizing provider's specialty.  See \"Patient Info\" tab in inbasket, or \"Choose Columns\" in Meds & Orders section of the refill encounter.           Passed - Patient is age 18 or older        leflunomide (ARAVA) 10 MG tablet [Pharmacy Med Name: LEFLUNOMIDE TAB 10MG]  11     Sig: TAKE 1 TABLET BY MOUTH ONCE DAILY  Last Written Prescription Date:  4/3/12  Last Fill Quantity: na,  # refills: na   Last office visit: 9/11/2018 with prescribing provider:  Darwin   Future Office Visit:        There is no refill protocol information for this order        lisinopril (PRINIVIL/ZESTRIL) 20 MG tablet [Pharmacy Med Name: LISINOPRIL TAB 20MG]  11     Sig: TAKE 1 TABLET BY MOUTH ONCE DAILY  Last Written Prescription Date:  9/11/18  Last Fill Quantity: 90,  # refills: 3   Last office visit: 9/11/2018 with prescribing provider:  Darwin   Future Office Visit:        ACE Inhibitors (Including Combos) Protocol Passed    9/27/2018 11:34 AM       Passed - Blood pressure under 140/90 in past 12 months    BP Readings from Last 3 Encounters:   09/11/18 120/62   02/07/18 144/86   01/31/18 125/57                Passed - Recent (12 " "mo) or future (30 days) visit within the authorizing provider's specialty    Patient had office visit in the last 12 months or has a visit in the next 30 days with authorizing provider or within the authorizing provider's specialty.  See \"Patient Info\" tab in inbasket, or \"Choose Columns\" in Meds & Orders section of the refill encounter.           Passed - Patient is age 18 or older       Passed - Normal serum creatinine on file in past 12 months    Recent Labs   Lab Test  09/11/18   1711   CR  1.12            Passed - Normal serum potassium on file in past 12 months    Recent Labs   Lab Test  09/11/18   1711   POTASSIUM  5.1             MAPAP 500 MG tablet [Pharmacy Med Name: MAPAP TAB 500MG]  11     Sig: TAKE 1 TABLET BY MOUTH THREE TIMES DAILY WHILE AWAKE  Last Written Prescription Date:  9/12/18  Last Fill Quantity: 93,  # refills: 12   Last office visit: 9/11/2018 with prescribing provider:  Darwin   Future Office Visit:        Analgesics (Non-Narcotic Tylenol and ASA Only) Passed    9/27/2018 11:34 AM       Passed - Recent (12 mo) or future (30 days) visit within the authorizing provider's specialty    Patient had office visit in the last 12 months or has a visit in the next 30 days with authorizing provider or within the authorizing provider's specialty.  See \"Patient Info\" tab in inbasket, or \"Choose Columns\" in Meds & Orders section of the refill encounter.           Passed - Patient is 7 months old or older    If patient is a peds patient of the age 7 mos -12 years, ok to refill using weight-based dosing.     If >3g daily and/or sig is not \"prn\", check for liver enzymes. If normal in the last year, ok to refill.  If not, refer to the provider.          metoprolol tartrate (LOPRESSOR) 25 MG tablet [Pharmacy Med Name: METOPROL TAR TAB 25MG]  11     Sig: TAKE 1 TABLET BY MOUTH TWICE DAILY  Last Written Prescription Date:  9/11/18  Last Fill Quantity: 180,  # refills: 3   Last office visit: 9/11/2018 with " "prescribing provider:  Darwin   Future Office Visit:        Beta-Blockers Protocol Passed    9/27/2018 11:34 AM       Passed - Blood pressure under 140/90 in past 12 months    BP Readings from Last 3 Encounters:   09/11/18 120/62   02/07/18 144/86   01/31/18 125/57                Passed - Patient is age 6 or older       Passed - Recent (12 mo) or future (30 days) visit within the authorizing provider's specialty    Patient had office visit in the last 12 months or has a visit in the next 30 days with authorizing provider or within the authorizing provider's specialty.  See \"Patient Info\" tab in inbasket, or \"Choose Columns\" in Meds & Orders section of the refill encounter.            Rxs for lisinopril, metoprolol, mapap, and lipitor denied to pharmacy due to refills on 9/11/18 and 9/12/18.    Routing refill request to provider for review/approval because:  Medication is reported/historical for Louise Flaherty RN  EP Triage          "

## 2018-09-28 ENCOUNTER — APPOINTMENT (OUTPATIENT)
Dept: GENERAL RADIOLOGY | Facility: CLINIC | Age: 83
DRG: 064 | End: 2018-09-28
Attending: NURSE PRACTITIONER
Payer: MEDICARE

## 2018-09-28 ENCOUNTER — APPOINTMENT (OUTPATIENT)
Dept: GENERAL RADIOLOGY | Facility: CLINIC | Age: 83
DRG: 064 | End: 2018-09-28
Attending: HOSPITALIST
Payer: MEDICARE

## 2018-09-28 ENCOUNTER — APPOINTMENT (OUTPATIENT)
Dept: CT IMAGING | Facility: CLINIC | Age: 83
DRG: 064 | End: 2018-09-28
Attending: NURSE PRACTITIONER
Payer: MEDICARE

## 2018-09-28 ENCOUNTER — TELEPHONE (OUTPATIENT)
Dept: FAMILY MEDICINE | Facility: CLINIC | Age: 83
End: 2018-09-28

## 2018-09-28 ENCOUNTER — HOSPITAL ENCOUNTER (INPATIENT)
Facility: CLINIC | Age: 83
LOS: 3 days | Discharge: NURSING, REHAB OR LONG-TERM CARE | DRG: 064 | End: 2018-10-01
Attending: NURSE PRACTITIONER | Admitting: HOSPITALIST
Payer: MEDICARE

## 2018-09-28 ENCOUNTER — MEDICAL CORRESPONDENCE (OUTPATIENT)
Dept: HEALTH INFORMATION MANAGEMENT | Facility: CLINIC | Age: 83
End: 2018-09-28

## 2018-09-28 DIAGNOSIS — S00.03XA CONTUSION OF SCALP, INITIAL ENCOUNTER: ICD-10-CM
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
## 2018-09-28 DIAGNOSIS — I48.91 ATRIAL FIBRILLATION, UNSPECIFIED TYPE (H): ICD-10-CM

## 2018-09-28 DIAGNOSIS — M06.00 SERONEGATIVE RHEUMATOID ARTHRITIS (H): ICD-10-CM

## 2018-09-28 DIAGNOSIS — R79.89 ELEVATED TROPONIN: ICD-10-CM

## 2018-09-28 LAB
ABO + RH BLD: NORMAL
ABO + RH BLD: NORMAL
ALBUMIN SERPL-MCNC: 3.4 G/DL (ref 3.4–5)
ALBUMIN UR-MCNC: NEGATIVE MG/DL
ALP SERPL-CCNC: 97 U/L (ref 40–150)
ALT SERPL W P-5'-P-CCNC: 27 U/L (ref 0–70)
ANION GAP SERPL CALCULATED.3IONS-SCNC: 7 MMOL/L (ref 3–14)
APPEARANCE UR: CLEAR
AST SERPL W P-5'-P-CCNC: 26 U/L (ref 0–45)
BACTERIA #/AREA URNS HPF: ABNORMAL /HPF
BASOPHILS # BLD AUTO: 0 10E9/L (ref 0–0.2)
BASOPHILS NFR BLD AUTO: 0.4 %
BILIRUB SERPL-MCNC: 1 MG/DL (ref 0.2–1.3)
BILIRUB UR QL STRIP: NEGATIVE
BLD GP AB SCN SERPL QL: NORMAL
BLOOD BANK CMNT PATIENT-IMP: NORMAL
BUN SERPL-MCNC: 21 MG/DL (ref 7–30)
CALCIUM SERPL-MCNC: 8.7 MG/DL (ref 8.5–10.1)
CHLORIDE SERPL-SCNC: 106 MMOL/L (ref 94–109)
CK SERPL-CCNC: 322 U/L (ref 30–300)
CO2 SERPL-SCNC: 27 MMOL/L (ref 20–32)
COLOR UR AUTO: ABNORMAL
CREAT SERPL-MCNC: 0.74 MG/DL (ref 0.66–1.25)
DIFFERENTIAL METHOD BLD: NORMAL
EOSINOPHIL # BLD AUTO: 0.1 10E9/L (ref 0–0.7)
EOSINOPHIL NFR BLD AUTO: 1.6 %
ERYTHROCYTE [DISTWIDTH] IN BLOOD BY AUTOMATED COUNT: 13.4 % (ref 10–15)
GFR SERPL CREATININE-BSD FRML MDRD: >90 ML/MIN/1.7M2
GLUCOSE BLDC GLUCOMTR-MCNC: 145 MG/DL (ref 70–99)
GLUCOSE SERPL-MCNC: 101 MG/DL (ref 70–99)
GLUCOSE UR STRIP-MCNC: NEGATIVE MG/DL
HBA1C MFR BLD: 5.6 % (ref 0–5.6)
HCT VFR BLD AUTO: 44.9 % (ref 40–53)
HGB BLD-MCNC: 15.1 G/DL (ref 13.3–17.7)
HGB UR QL STRIP: NEGATIVE
IMM GRANULOCYTES # BLD: 0 10E9/L (ref 0–0.4)
IMM GRANULOCYTES NFR BLD: 0.1 %
INR PPP: 1.19 (ref 0.86–1.14)
INTERPRETATION ECG - MUSE: NORMAL
KETONES UR STRIP-MCNC: 10 MG/DL
LEUKOCYTE ESTERASE UR QL STRIP: NEGATIVE
LIPASE SERPL-CCNC: 103 U/L (ref 73–393)
LYMPHOCYTES # BLD AUTO: 0.9 10E9/L (ref 0.8–5.3)
LYMPHOCYTES NFR BLD AUTO: 13 %
MAGNESIUM SERPL-MCNC: 2.1 MG/DL (ref 1.6–2.3)
MAGNESIUM SERPL-MCNC: 2.1 MG/DL (ref 1.6–2.3)
MCH RBC QN AUTO: 32.4 PG (ref 26.5–33)
MCHC RBC AUTO-ENTMCNC: 33.6 G/DL (ref 31.5–36.5)
MCV RBC AUTO: 96 FL (ref 78–100)
MONOCYTES # BLD AUTO: 0.9 10E9/L (ref 0–1.3)
MONOCYTES NFR BLD AUTO: 13.3 %
NEUTROPHILS # BLD AUTO: 5 10E9/L (ref 1.6–8.3)
NEUTROPHILS NFR BLD AUTO: 71.6 %
NITRATE UR QL: NEGATIVE
NRBC # BLD AUTO: 0 10*3/UL
NRBC BLD AUTO-RTO: 0 /100
PH UR STRIP: 6 PH (ref 5–7)
PLATELET # BLD AUTO: 216 10E9/L (ref 150–450)
POTASSIUM SERPL-SCNC: 4.5 MMOL/L (ref 3.4–5.3)
PROT SERPL-MCNC: 6.8 G/DL (ref 6.8–8.8)
RBC # BLD AUTO: 4.66 10E12/L (ref 4.4–5.9)
RBC #/AREA URNS AUTO: <1 /HPF (ref 0–2)
SODIUM SERPL-SCNC: 140 MMOL/L (ref 133–144)
SOURCE: ABNORMAL
SP GR UR STRIP: 1.01 (ref 1–1.03)
SPECIMEN EXP DATE BLD: NORMAL
TROPONIN I SERPL-MCNC: 0.05 UG/L (ref 0–0.04)
TROPONIN I SERPL-MCNC: 0.05 UG/L (ref 0–0.04)
TROPONIN I SERPL-MCNC: 0.06 UG/L (ref 0–0.04)
UROBILINOGEN UR STRIP-MCNC: NORMAL MG/DL (ref 0–2)
VIT B12 SERPL-MCNC: 358 PG/ML (ref 193–986)
WBC # BLD AUTO: 7 10E9/L (ref 4–11)
WBC #/AREA URNS AUTO: <1 /HPF (ref 0–5)

## 2018-09-28 PROCEDURE — 93005 ELECTROCARDIOGRAM TRACING: CPT

## 2018-09-28 PROCEDURE — 99285 EMERGENCY DEPT VISIT HI MDM: CPT | Mod: 25

## 2018-09-28 PROCEDURE — 85025 COMPLETE CBC W/AUTO DIFF WBC: CPT | Performed by: NURSE PRACTITIONER

## 2018-09-28 PROCEDURE — 86850 RBC ANTIBODY SCREEN: CPT | Performed by: NURSE PRACTITIONER

## 2018-09-28 PROCEDURE — 99207 ZZC CDG-CODE CATEGORY CHANGED: CPT | Performed by: HOSPITALIST

## 2018-09-28 PROCEDURE — 83735 ASSAY OF MAGNESIUM: CPT | Performed by: NURSE PRACTITIONER

## 2018-09-28 PROCEDURE — 73130 X-RAY EXAM OF HAND: CPT | Mod: RT

## 2018-09-28 PROCEDURE — 25000128 H RX IP 250 OP 636: Performed by: NURSE PRACTITIONER

## 2018-09-28 PROCEDURE — 73502 X-RAY EXAM HIP UNI 2-3 VIEWS: CPT

## 2018-09-28 PROCEDURE — 96360 HYDRATION IV INFUSION INIT: CPT

## 2018-09-28 PROCEDURE — 82550 ASSAY OF CK (CPK): CPT | Performed by: HOSPITALIST

## 2018-09-28 PROCEDURE — 25000132 ZZH RX MED GY IP 250 OP 250 PS 637: Mod: GY | Performed by: HOSPITALIST

## 2018-09-28 PROCEDURE — 83735 ASSAY OF MAGNESIUM: CPT | Performed by: HOSPITALIST

## 2018-09-28 PROCEDURE — 25000132 ZZH RX MED GY IP 250 OP 250 PS 637: Mod: GY | Performed by: NURSE PRACTITIONER

## 2018-09-28 PROCEDURE — 73562 X-RAY EXAM OF KNEE 3: CPT | Mod: RT

## 2018-09-28 PROCEDURE — 99207 ZZC NON-BILLABLE SERV PER CHARTING: CPT | Performed by: NURSE PRACTITIONER

## 2018-09-28 PROCEDURE — 80053 COMPREHEN METABOLIC PANEL: CPT | Performed by: NURSE PRACTITIONER

## 2018-09-28 PROCEDURE — 12000000 ZZH R&B MED SURG/OB

## 2018-09-28 PROCEDURE — 25000132 ZZH RX MED GY IP 250 OP 250 PS 637: Mod: GY | Performed by: PSYCHIATRY & NEUROLOGY

## 2018-09-28 PROCEDURE — 82607 VITAMIN B-12: CPT | Performed by: HOSPITALIST

## 2018-09-28 PROCEDURE — 84484 ASSAY OF TROPONIN QUANT: CPT | Performed by: HOSPITALIST

## 2018-09-28 PROCEDURE — A9270 NON-COVERED ITEM OR SERVICE: HCPCS | Mod: GY | Performed by: PSYCHIATRY & NEUROLOGY

## 2018-09-28 PROCEDURE — 85610 PROTHROMBIN TIME: CPT | Performed by: NURSE PRACTITIONER

## 2018-09-28 PROCEDURE — 25000125 ZZHC RX 250: Performed by: NURSE PRACTITIONER

## 2018-09-28 PROCEDURE — 70498 CT ANGIOGRAPHY NECK: CPT

## 2018-09-28 PROCEDURE — 70496 CT ANGIOGRAPHY HEAD: CPT | Mod: XS

## 2018-09-28 PROCEDURE — 36415 COLL VENOUS BLD VENIPUNCTURE: CPT | Performed by: HOSPITALIST

## 2018-09-28 PROCEDURE — A9270 NON-COVERED ITEM OR SERVICE: HCPCS | Mod: GY | Performed by: HOSPITALIST

## 2018-09-28 PROCEDURE — 81001 URINALYSIS AUTO W/SCOPE: CPT | Performed by: NURSE PRACTITIONER

## 2018-09-28 PROCEDURE — A9270 NON-COVERED ITEM OR SERVICE: HCPCS | Mod: GY | Performed by: NURSE PRACTITIONER

## 2018-09-28 PROCEDURE — 86900 BLOOD TYPING SEROLOGIC ABO: CPT | Performed by: NURSE PRACTITIONER

## 2018-09-28 PROCEDURE — 99223 1ST HOSP IP/OBS HIGH 75: CPT | Mod: AI | Performed by: HOSPITALIST

## 2018-09-28 PROCEDURE — 70450 CT HEAD/BRAIN W/O DYE: CPT | Mod: 77

## 2018-09-28 PROCEDURE — 83036 HEMOGLOBIN GLYCOSYLATED A1C: CPT | Performed by: HOSPITALIST

## 2018-09-28 PROCEDURE — 25000128 H RX IP 250 OP 636: Performed by: PSYCHIATRY & NEUROLOGY

## 2018-09-28 PROCEDURE — 00000146 ZZHCL STATISTIC GLUCOSE BY METER IP

## 2018-09-28 PROCEDURE — 96361 HYDRATE IV INFUSION ADD-ON: CPT

## 2018-09-28 PROCEDURE — 84484 ASSAY OF TROPONIN QUANT: CPT | Performed by: NURSE PRACTITIONER

## 2018-09-28 PROCEDURE — 86901 BLOOD TYPING SEROLOGIC RH(D): CPT | Performed by: NURSE PRACTITIONER

## 2018-09-28 PROCEDURE — 83690 ASSAY OF LIPASE: CPT | Performed by: NURSE PRACTITIONER

## 2018-09-28 PROCEDURE — 70450 CT HEAD/BRAIN W/O DYE: CPT

## 2018-09-28 RX ORDER — IOPAMIDOL 755 MG/ML
120 INJECTION, SOLUTION INTRAVASCULAR ONCE
Status: COMPLETED | OUTPATIENT
Start: 2018-09-28 | End: 2018-09-28

## 2018-09-28 RX ORDER — MAGNESIUM SULFATE HEPTAHYDRATE 40 MG/ML
4 INJECTION, SOLUTION INTRAVENOUS EVERY 4 HOURS PRN
Status: DISCONTINUED | OUTPATIENT
Start: 2018-09-28 | End: 2018-10-01 | Stop reason: HOSPADM

## 2018-09-28 RX ORDER — NALOXONE HYDROCHLORIDE 0.4 MG/ML
.1-.4 INJECTION, SOLUTION INTRAMUSCULAR; INTRAVENOUS; SUBCUTANEOUS
Status: DISCONTINUED | OUTPATIENT
Start: 2018-09-28 | End: 2018-10-01 | Stop reason: HOSPADM

## 2018-09-28 RX ORDER — LIDOCAINE 40 MG/G
CREAM TOPICAL
Status: DISCONTINUED | OUTPATIENT
Start: 2018-09-28 | End: 2018-10-01 | Stop reason: HOSPADM

## 2018-09-28 RX ORDER — ACETAMINOPHEN 500 MG
1000 TABLET ORAL ONCE
Status: COMPLETED | OUTPATIENT
Start: 2018-09-28 | End: 2018-09-28

## 2018-09-28 RX ORDER — AMOXICILLIN 250 MG
1 CAPSULE ORAL 2 TIMES DAILY PRN
Status: DISCONTINUED | OUTPATIENT
Start: 2018-09-28 | End: 2018-10-01 | Stop reason: HOSPADM

## 2018-09-28 RX ORDER — POTASSIUM CHLORIDE 1500 MG/1
20-40 TABLET, EXTENDED RELEASE ORAL
Status: DISCONTINUED | OUTPATIENT
Start: 2018-09-28 | End: 2018-10-01 | Stop reason: HOSPADM

## 2018-09-28 RX ORDER — SODIUM CHLORIDE 9 MG/ML
1000 INJECTION, SOLUTION INTRAVENOUS CONTINUOUS
Status: DISCONTINUED | OUTPATIENT
Start: 2018-09-28 | End: 2018-09-28

## 2018-09-28 RX ORDER — CLOPIDOGREL BISULFATE 75 MG/1
300 TABLET ORAL ONCE
Status: COMPLETED | OUTPATIENT
Start: 2018-09-28 | End: 2018-09-28

## 2018-09-28 RX ORDER — AMOXICILLIN 250 MG
2 CAPSULE ORAL 2 TIMES DAILY PRN
Status: DISCONTINUED | OUTPATIENT
Start: 2018-09-28 | End: 2018-10-01 | Stop reason: HOSPADM

## 2018-09-28 RX ORDER — PROCHLORPERAZINE 25 MG
12.5 SUPPOSITORY, RECTAL RECTAL EVERY 12 HOURS PRN
Status: DISCONTINUED | OUTPATIENT
Start: 2018-09-28 | End: 2018-10-01 | Stop reason: HOSPADM

## 2018-09-28 RX ORDER — LEFLUNOMIDE 10 MG/1
TABLET ORAL
Refills: 11 | OUTPATIENT
Start: 2018-09-28

## 2018-09-28 RX ORDER — POLYETHYLENE GLYCOL 3350 17 G/17G
17 POWDER, FOR SOLUTION ORAL DAILY PRN
Status: DISCONTINUED | OUTPATIENT
Start: 2018-09-28 | End: 2018-10-01 | Stop reason: HOSPADM

## 2018-09-28 RX ORDER — ASPIRIN 81 MG/1
81 TABLET ORAL DAILY
Status: DISCONTINUED | OUTPATIENT
Start: 2018-09-29 | End: 2018-09-28

## 2018-09-28 RX ORDER — ACETAMINOPHEN 325 MG/1
650 TABLET ORAL EVERY 4 HOURS PRN
Status: DISCONTINUED | OUTPATIENT
Start: 2018-09-28 | End: 2018-10-01 | Stop reason: HOSPADM

## 2018-09-28 RX ORDER — ASPIRIN 325 MG
325 TABLET ORAL ONCE
Status: COMPLETED | OUTPATIENT
Start: 2018-09-28 | End: 2018-09-28

## 2018-09-28 RX ORDER — CLOPIDOGREL BISULFATE 75 MG/1
75 TABLET ORAL DAILY
Status: DISCONTINUED | OUTPATIENT
Start: 2018-09-29 | End: 2018-09-30

## 2018-09-28 RX ORDER — LISINOPRIL 20 MG/1
20 TABLET ORAL DAILY
Status: DISCONTINUED | OUTPATIENT
Start: 2018-09-29 | End: 2018-09-29

## 2018-09-28 RX ORDER — POTASSIUM CHLORIDE 29.8 MG/ML
20 INJECTION INTRAVENOUS
Status: DISCONTINUED | OUTPATIENT
Start: 2018-09-28 | End: 2018-10-01 | Stop reason: HOSPADM

## 2018-09-28 RX ORDER — SODIUM CHLORIDE 9 MG/ML
INJECTION, SOLUTION INTRAVENOUS CONTINUOUS
Status: DISCONTINUED | OUTPATIENT
Start: 2018-09-28 | End: 2018-10-01

## 2018-09-28 RX ORDER — ONDANSETRON 2 MG/ML
4 INJECTION INTRAMUSCULAR; INTRAVENOUS EVERY 6 HOURS PRN
Status: DISCONTINUED | OUTPATIENT
Start: 2018-09-28 | End: 2018-10-01 | Stop reason: HOSPADM

## 2018-09-28 RX ORDER — MAGNESIUM SULFATE HEPTAHYDRATE 40 MG/ML
2 INJECTION, SOLUTION INTRAVENOUS DAILY PRN
Status: DISCONTINUED | OUTPATIENT
Start: 2018-09-28 | End: 2018-10-01 | Stop reason: HOSPADM

## 2018-09-28 RX ORDER — POTASSIUM CHLORIDE 7.45 MG/ML
10 INJECTION INTRAVENOUS
Status: DISCONTINUED | OUTPATIENT
Start: 2018-09-28 | End: 2018-10-01 | Stop reason: HOSPADM

## 2018-09-28 RX ORDER — POTASSIUM CL/LIDO/0.9 % NACL 10MEQ/0.1L
10 INTRAVENOUS SOLUTION, PIGGYBACK (ML) INTRAVENOUS
Status: DISCONTINUED | OUTPATIENT
Start: 2018-09-28 | End: 2018-10-01 | Stop reason: HOSPADM

## 2018-09-28 RX ORDER — PROCHLORPERAZINE MALEATE 5 MG
5 TABLET ORAL EVERY 6 HOURS PRN
Status: DISCONTINUED | OUTPATIENT
Start: 2018-09-28 | End: 2018-10-01 | Stop reason: HOSPADM

## 2018-09-28 RX ORDER — ACETAMINOPHEN 650 MG/1
650 SUPPOSITORY RECTAL EVERY 4 HOURS PRN
Status: DISCONTINUED | OUTPATIENT
Start: 2018-09-28 | End: 2018-10-01 | Stop reason: HOSPADM

## 2018-09-28 RX ORDER — POTASSIUM CHLORIDE 1.5 G/1.58G
20-40 POWDER, FOR SOLUTION ORAL
Status: DISCONTINUED | OUTPATIENT
Start: 2018-09-28 | End: 2018-10-01 | Stop reason: HOSPADM

## 2018-09-28 RX ORDER — ONDANSETRON 4 MG/1
4 TABLET, ORALLY DISINTEGRATING ORAL EVERY 6 HOURS PRN
Status: DISCONTINUED | OUTPATIENT
Start: 2018-09-28 | End: 2018-10-01 | Stop reason: HOSPADM

## 2018-09-28 RX ADMIN — IOPAMIDOL 120 ML: 755 INJECTION, SOLUTION INTRAVENOUS at 20:33

## 2018-09-28 RX ADMIN — ASPIRIN 325 MG ORAL TABLET 325 MG: 325 PILL ORAL at 15:22

## 2018-09-28 RX ADMIN — CLOPIDOGREL 300 MG: 75 TABLET, FILM COATED ORAL at 22:11

## 2018-09-28 RX ADMIN — ACETAMINOPHEN 1000 MG: 500 TABLET ORAL at 13:50

## 2018-09-28 RX ADMIN — SODIUM CHLORIDE 1000 ML: 9 INJECTION, SOLUTION INTRAVENOUS at 13:49

## 2018-09-28 RX ADMIN — SODIUM CHLORIDE, PRESERVATIVE FREE 120 ML: 5 INJECTION INTRAVENOUS at 20:33

## 2018-09-28 RX ADMIN — ACETAMINOPHEN 650 MG: 325 TABLET, FILM COATED ORAL at 22:34

## 2018-09-28 RX ADMIN — SODIUM CHLORIDE: 9 INJECTION, SOLUTION INTRAVENOUS at 22:11

## 2018-09-28 ASSESSMENT — ENCOUNTER SYMPTOMS
NAUSEA: 0
WOUND: 1
APPETITE CHANGE: 0
HEADACHES: 1
ARTHRALGIAS: 1
DIARRHEA: 0
CONSTIPATION: 0

## 2018-09-28 ASSESSMENT — ACTIVITIES OF DAILY LIVING (ADL): ADLS_ACUITY_SCORE: 9

## 2018-09-28 NOTE — TELEPHONE ENCOUNTER
S/w a nurse who states pt fell this morning and dtr reported the fall to the .  Pt soiled pants with urine and feces, told staff was not strong enough, and had a laceration on the back of head with pt stating he thinks he hit his head.  Ambulance was called and taken to ER for evaluation.  Pt has not returned from er yet.      Asked what orders are for and the nurse states there are no orders.  Advised to have Srinivasa RUSH call clinic back if has additional information to add.      Routing to Dr. Darwin Flaherty RN  EP Triage

## 2018-09-28 NOTE — IP AVS SNAPSHOT
` `     [facility]: [phone]                 INTERAGENCY TRANSFER FORM - NOTES (H&P, Discharge Summary, Consults, Procedures, Therapies)   2018                    Hospital Admission Date: 2018  GAURI WU   : 1931  Sex: Male        Patient PCP Information     Provider PCP Type    Nehemias Granados MD General         History & Physicals      H&P by Balta Alcantar MD at 2018  4:15 PM     Author:  Balta Alcantar MD Service:  Hospitalist Author Type:  Physician    Filed:  2018  8:29 PM Date of Service:  2018  4:15 PM Creation Time:  2018  4:15 PM    Status:  Signed :  Balta Alcantar MD (Physician)         [facility]    History and Physical  Hospitalist       Date of Admission:  2018    Assessment & Plan   Gauri Wu is a 87 year old male with past history TIA, HTN, RA, hyperlipidemia and CAD who presents with 2 falls in past 2 days, found to be in A-fib with controlled rate.    Recurrent falls:  Patient has no recollection of events, both falls unwitnessed.  Question if due to A-fib with slow ventricular response as pulse currently in 50's.  May also consider orthostasis.  CT head negative for acute pathology, however, he has difficulty with coordinated movements on exam with an otherwise non-focal neuro exam.  In setting of prior TIA and new-onset A-fib, feel CVA is worth ruling out.  - MRI brain  - telemetry  - orthostatic blood pressure  - neuro checks q4h  - PT  - continue daily aspirin  - check Vit B12[PB1.1]  - would recommend complete abstinence from alcohol[PB1.2]    New onset A-fib with controlled rate/slow rate: No known history per review of prior EKG.  Trop minimally elevated at 0.049 with no complaints of cardiorespiratory symptoms.  - continue ASA  - serial trop (very low suspicion for ACS)  - obtain TTE  - did not discuss anticoagulation with daughter, but with 2 falls in past 2 days, risk likely  outweighs benefit  - decrease metoprolol to 12.5 mg bid    HTN:  Decrease metoprolol as above.  Continue lisinopril with hold parameters.    CAD:  Based on prior abnormal stress test.  Medically managed.  - aspirin, BB and ACE-I as aobve, continue PTA statin    RA:  He is not on immunosuppressives.    Cognitive impairment:  Daughter reports significant impairment in short term memory in past 3 months,[PB1.1] see H&P for details[PB1.2].  - monitor for delirium      DVT Prophylaxis: Pneumatic Compression Devices  Code Status: DNR / DNI    Disposition: Expected discharge in 1-2 days pending negative work-up.    Balta Alcantar    Primary Care Physician   Nehemias Granados    Chief Complaint   falls    History is obtained from the patient, discussion with daughter, ED provider and chart review.  Patient cannot provide any history due to his underlying forgetfullness.    History of Present Illness   Jose E Capps is a 87 year old male who presents with[PB1.1] 2 falls in the past 2 days.  He resides independently in assisted living at [place].  He had unwitnessed falls yesterday and one again this morning.  The patient has no recollection of the falls or any surrounding circumstances.  At approximately 11 AM today, his daughter called him on the phone.  He answered, but stated that he was on the floor and in pain.  She immediately hung up and called the  who found him on the floor.  It is unclear how long he was there.  Currently, he complains of some right shoulder pain due to his positioning in bed.  He reportedly had a single loose stool today.  He currently denies any chest pain or pressure, palpitations, shortness of breath, infectious symptoms or neurologic symptoms including dysarthria, dysphasia, word finding difficulty, headache, visual disturbance, focal paresthesias or weakness.  His daughter reports he is in his baseline state of confusion.  Supposedly he has had significant worsening of his memory over  the past 3 months, though his daughter reports that he has experienced significant changes in his life.  He moved in to [place] in August with his wife, but she was subsequently hospitalized and discharged to TCU and subsequently went to memory care as opposed to returning to the assisted living.  Patient reports he has had good appetite and has been eating well.  There is no known weight loss.  His remainder of review of systems is otherwise negative.[PB1.2]    Past Medical History[PB1.1]    Cognitive impairment  History of TIA  Hypertension  Rheumatoid arthritis  Hyperlipidemia  Coronary artery disease  History of melanoma and basal cell carcinoma[PB1.2]    Past Surgical History[PB1.1]   Resection of melanoma and basal cell carcinomas  Right total hip arthroplasty  Left total knee arthroplasty  Fusion of L4-L5[PB1.2]    Prior to Admission Medications   Prior to Admission Medications   Prescriptions Last Dose Informant Patient Reported? Taking?   Acetaminophen (TYLENOL PO) 9/28/2018 at 0800  Yes Yes   Sig: Take 500 mg by mouth 3 times daily While Awake    Calcium Carb-Cholecalciferol (CALCIUM + D3) 600-200 MG-UNIT TABS 9/28/2018 at 0800  No Yes   Sig: TAKE 1 TABLET BY MOUTH ONCE DAILY   Multiple Vitamins-Minerals (CEROVITE SENIOR) TABS 9/28/2018 at 0800  No Yes   Sig: TAKE 1 TABLET BY MOUTH ONCE DAILY   OMEGA-3 FISH OIL 1000 MG capsule 9/28/2018 at 0800  No Yes   Sig: TAKE 1 CAPSULE BY MOUTH ONCE DAILY   SM ASPIRIN ADULT LOW STRENGTH 81 MG EC tablet 9/28/2018 at 0800  No Yes   Sig: TAKE 1 TABLET BY MOUTH ONCE DAILY   acetaminophen (MAPAP) 500 MG tablet prn  No Yes   Sig: Take 1 tablet (500 mg) by mouth every 8 hours as needed for fever or pain   atorvastatin (LIPITOR) 10 MG tablet 9/27/2018 at 2000  No Yes   Sig: Take 1 tablet (10 mg) by mouth daily   leflunomide (ARAVA) 10 MG tablet 9/28/2018 at 0800  Yes Yes   Sig: Take 1 tablet by mouth daily.   lisinopril (PRINIVIL/ZESTRIL) 20 MG tablet 9/28/2018 at 0800  No Yes    Sig: Take 1 tablet (20 mg) by mouth daily   metoprolol tartrate (LOPRESSOR) 25 MG tablet 2018 at 0800  No Yes   Sig: Take 1 tablet (25 mg) by mouth 2 times daily   polyethylene glycol (MIRALAX/GLYCOLAX) Packet prn  Yes Yes   Sig: Take 1 packet by mouth daily as needed for constipation   sennosides (SENOKOT) 8.6 MG tablet prn  Yes Yes   Sig: Take 4 tablets by mouth 2 times daily as needed       Facility-Administered Medications: None     Allergies   Allergies   Allergen Reactions     No Known Drug Allergy        Social History   I have personally reviewed the social history with the patient showing[PB1.1] 60-pack-year history of tobacco abuse, having quit 30 years ago.  He consumes 2-3 alcoholic beverages per night.  He resides in assisted living at [place].[PB1.2]      Family History[PB1.1]   Mother with Alzheimer's.  Father  of a PE.[PB1.2]    Review of Systems   The 10 point Review of Systems is negative other than noted in the HPI or here.     Physical Exam   Temp: 97.9  F (36.6  C)   BP: 153/71 Pulse: 72 Heart Rate: 53 Resp: 11 SpO2: 97 % O2 Device: None (Room air)    Vital Signs with Ranges  Temp:  [97.9  F (36.6  C)] 97.9  F (36.6  C)  Pulse:  [72] 72  Heart Rate:  [53] 53  Resp:  [11-18] 11  BP: (153-165)/(66-95) 153/71  SpO2:  [95 %-97 %] 97 %  0 lbs 0 oz    Constitutional: well developed, well nourished male in no acute distress  Eyes: pupils equal, round and reactive to light, no icterus  HEENT: head normocephalic, atraumatic, mucous membranes moist, no cervical lymphadenopathy  Respiratory: lungs clear to auscultation bilaterally, no crackles or wheezes, no tachypnea  Cardiovascular: irregular rhythm, bradycardic, normal S1/S2, no murmur, rubs or gallops  GI: abdomen soft, non-tender, non-distended, normal bowel sounds, no hepatosplenomegaly  Lymph/Hematologic: No peripheral edema  Skin: No rash or bruising  Musculoskeletal: Extremities warm and well perfused  Neurologic: alert, oriented to  person and hospital only, cranial nerves II-XII intact, 5/5 strength intact, difficulty with heel-shin testing and finger-nose testing, sensation to light touch intact  Psychiatric: normal affect    Data   Data reviewed today:  I personally reviewed the EKG tracing showing A-fib with controlled ventricular rate.    Recent Labs  Lab 09/28/18  1305   WBC 7.0   HGB 15.1   MCV 96         POTASSIUM 4.5   CHLORIDE 106   CO2 27   BUN 21   CR 0.74   ANIONGAP 7   GISELA 8.7   *   ALBUMIN 3.4   PROTTOTAL 6.8   BILITOTAL 1.0   ALKPHOS 97   ALT 27   AST 26   LIPASE 103   TROPI 0.049*       Recent Results (from the past 24 hour(s))   CT Head w/o Contrast    Narrative    CT OF THE HEAD WITHOUT CONTRAST  9/28/2018 1:23 PM     COMPARISON: None.    HISTORY:  Fall with frontal hematoma and right parietal hematoma.     TECHNIQUE: 5 mm thick axial CT images of the head were acquired  without IV contrast material.    FINDINGS:  There is moderate diffuse cerebral volume loss. There are  subtle patchy areas of decreased density in the cerebral white matter  bilaterally that are consistent with sequela of chronic small vessel  ischemic disease.     The ventricles and basal cisterns are within normal limits in  configuration given the degree of cerebral volume loss.  There is no  midline shift. There are no extra-axial fluid collections.     No intracranial hemorrhage, mass or recent infarct.    The visualized paranasal sinuses are well aerated. There is no  mastoiditis. There are no fractures of the visualized bones.       Impression    IMPRESSION:  Diffuse cerebral volume loss and cerebral white matter  changes consistent with chronic small vessel ischemic disease. No  evidence for acute intracranial pathology.     Radiation dose for this scan was reduced using automated exposure  control, adjustment of the mA and/or kV according to patient size, or  iterative reconstruction technique.    HOLGER PADILLA MD   XR Pelvis w  Hip Left 1 View    Narrative    PELVIS AND HIP LEFT ONE VIEW   9/28/2018 1:35 PM     HISTORY: Pain.     COMPARISON: None.       Impression    IMPRESSION: No acute fracture or dislocation.    АЛЕКСАНДР HUBER MD   XR Knee Right 3 Views    Narrative    RIGHT KNEE THREE VIEWS   9/28/2018 1:36 PM     HISTORY: Right knee pain after fall.     COMPARISON: None.       Impression    IMPRESSION: No acute fracture or dislocation. Severe osteoarthritis.  Joint effusion.    АЛЕКСАНДР HUBER MD[PB1.1]          Revision History        User Key Date/Time User Provider Type Action    > PB1.2 9/28/2018  8:29 PM Balta Alcantar MD Physician Sign     PB1.1 9/28/2018  4:15 PM Balta Alcantar MD Physician                   Discharge Summaries     No notes of this type exist for this encounter.         Consult Notes      Consults by Yuli Garcia MD at 9/28/2018 10:04 PM     Author:  Yuli Garcia MD Service:  Neurology Author Type:  Physician    Filed:  9/28/2018 10:04 PM Date of Service:  9/28/2018 10:04 PM Creation Time:  9/28/2018  9:00 PM    Status:  Signed :  Yuli Garcia MD (Physician)         Brief note.  Inpatient Code stroke phone discussion with RRT:  Stroke code called: 20:12  Stroke code response: 20:17  LKW: unknown, probably 4pm  CT head read:  20:26    88 yo m PMHX of HTN, RA, CAD and short term memory difficulties in last 3 months presented to [facility] after 2 falls without recollection of the events, hit his head twice front and back with skin bruising. CT head didn't show intracranial bleed. He was found to have a new atrial fibrillation. In the ED his neurological exam was normal except for bilateral dysmetria in both arms and legs. When he arrived to the floor at around 5pm he had Rt hemifield cut. Later this evening he tried to get out of bed and had difficulty due to t sided weakness. LKW was not exactly known, It was more than 4.5 hours from last chart documentation of normal  exam. Patient was confused and disoriented so he is not the best historian. Had NIHSS of around 11, word finding difficulty, cass field cut, Rt hemiparesis (arm>leg), no antigravity in arm, numbness and dysmetria.    CT head no bleed  CTA: Left P1-P2 junction clot, left vertebral occlusion/dissection from origin to distal V3/V4.  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 Left vert dissection is probably the culprit because he had 2 head traumas yesterday and some left side neck pain. He was not a tPA candidate due to LKW more than 4.5hr and head 2 significant head trauma in the last 24hours.     Plan:  - Continue ASA 325mg po daily  - Add Plavix 300mg x 1, then 75mg po daily  - Swtich neuro-checks to q2h until AM. If clinical worsening will consider switching to heparin drip. We might consider repeating CT head if significant worsening.  - MRI brain, MRA neck with FAT SAT tomorrow to see if left vertebral artery occlusion is an acute dissection.  - He will probably need anticoagulation in the next few days due to the diagnosis of afib.  - Permissive hypertension  - TTE tomorrow (probably low yield)  - I discussed my impression and plan with the patient and family though the speaker phone.    Yuli Garcia MD  Stroke Neurology[MA1.1]              Revision History        User Key Date/Time User Provider Type Action    > MA1.1 9/28/2018 10:04 PM Yuli Garcia MD Physician Sign                     Progress Notes - Physician (Notes from 09/28/18 through 10/01/18)      Progress Notes by Kalee Olivo RN at 10/1/2018  1:04 PM     Author:  Kalee Olivo RN Service:  (none) Author Type:      Filed:  10/1/2018  1:04 PM Date of Service:  10/1/2018  1:04 PM Creation Time:  10/1/2018  1:04 PM    Status:  Signed :  Geovani  Kalee SCALES RN ()         Neurology appointment scheduled:    Appointment scheduled with Dr. Delgado at [facility] on November 1st, 11:00 am check in for 11:15 am appointment[MA1.1]      Revision History        User Key Date/Time User Provider Type Action    > MA1.1 10/1/2018  1:04 PM Kalee Olivo RN Case Manager Sign            Progress Notes by Reyna Harrison LSW at 10/1/2018 11:58 AM     Author:  Reyna Harrison LSW Service:  Social Work Author Type:      Filed:  10/1/2018 12:45 PM Date of Service:  10/1/2018 11:58 AM Creation Time:  10/1/2018 11:58 AM    Status:  Addendum :  Reyna Harrison LSW ()         Care Transition Initial Assessment -   Reason For Consult: discharge planning  Met with: Patient and spoke with pt's daughterMarita   Active Problems:    Falls, initial encounter       DATA  Lives With: facility resident  Living Arrangements: assisted living at [facility]  Description of Support System: Supportive, Involved  Who is your support system?: Wife, Children. DaughterMarita, assisting with discharge planning     Identified issues/concerns regarding health management: Pt admitted for falls at home and after evaluation, found to have a stroke and A-fib. Pt does have some memory issues and the daughterMarita indicates that they [facility]  Referral sent via JULIEN.  [facility] has accepted for admission today.     Transportation Available: family or friend will provide      ASSESSMENT  Cognitive Status:  Alert with some confusion  Concerns to be addressed: TCU plaecment at [facility] where wife is currently.   PLAN  Financial costs for the patient includes N/A  Patient anticipates discharging to:  [facility].[SC1.1]    Addendum: [facility] from [facility] has accepted pt and arranged to a transport at 15:30 today to TCU.  CTRN,RN,HUC,BB, hospitalist all updated with plan.  STEFANO contacted pt's daughterMarita and updated with  discharge plan for this afternoon.[SC1.2]     FER Ibrahim  [facility] Care Transitions  Phone: [phone][SC1.1]           Revision History        User Key Date/Time User Provider Type Action    > SC1.2 10/1/2018 12:45 PM Reyna Harrison LSW  Addend     SC1.1 10/1/2018 12:05 PM Reyna Harrison LSW  Sign            Progress Notes by Emily Maldonado MD at 10/1/2018 10:33 AM     Author:  Emily Maldonado MD Service:  Hospitalist Author Type:  Physician    Filed:  10/1/2018 10:38 AM Date of Service:  10/1/2018 10:33 AM Creation Time:  10/1/2018 10:33 AM    Status:  Signed :  Emily Maldonado MD (Physician)         [facility]    Hospitalist Progress Note      Assessment & Plan   Jose E Capps is a 87 year old male with past history TIA, HTN, RA, hyperlipidemia and CAD who presents with 2 falls in past 2 days, found to be in A-fib with controlled rate; then last night- had new onset RUE/RLE weakness, numbness, R visual field cut, word finding difficulty, ataxia; STAT CTA head and neck showed-  Left posterior cerebral artery occlusion at the P1-2 junction; Left vertebral artery dissection     1. 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; Left vertebral artery dissection   - MRI brain w and w/o contrast- Scattered recent ischemic infarcts in the posterior cerebral artery distributions bilaterally involving  posterior medial temporal lobes bilaterally and left thalamus  - Neuro consult appreciated  - although he was in new Afib, it is felt that the culprit for acute stroke is left vertebral artery dissection/occlusion after fall with head trauma  - now he is back in NSR on Tele  - initially started on ASA and Plavix as per Neurology; although it was felt that his stroke was cause by left vertebral artery, paroxysmal A fib increases risk of stroke also- so will switch to Eliquis- see below  - lipid profile LDL 61, total cholest 123; continue PTA Lipitor 10 mg po daily for now  - permissive HTN  - Echo - EF 55-60%, grade I diastolic dysfunction   - PT/OT- rec ARF, family prefers [facility]  - [facility]- mechanical soft diet with thin liquids    2. Paroxysmal Afib  - EKG in ER showed Afib with controlled HR  - now back in NSR  [PTA on Metoprolol 25 mg po BID]  - here started on Metoprolol 12.5 mg po BID  - noted with some bradycardia in 40's 2 nights ago while sleeping  - decreased Metoprolol to 6.25 mg po BID with holding parameters  - although it is felt that his new stroke is likely related to vertebral artery dissection/occlusion, may need to start anticoagulation  - echo as above; CHADSVasc score is 5  - discussed with Neurology, Dr Dickson; because the strokes are small- the risk of hemorrhagic transformation is small  - started Eliquis 5 mg po BID on 9/30 and stopped ASA    3. CAD      Elevated troponins      HTN  - no h/o MI but had an abnormal stress test in 1/2018 (as part of preop clearance)- which showed -small area of nontransmural myocardial infarction affecting the basal and mid inferior wall with minimal yobany-infarct ischemia within the mid inferolateral wall.; he was asymptomatic and was cleared for surgery without further workup such as cardiac cath  - he has mildly elevated troponins but plateau-ed 0.049--0.047--0.057  - denies chest pain, no SOB; EKG with no ischemic changes  - Tele  - continue statin and  Metoprolol 6.25 mg po BID; ASA was stopped- he was started on Eliquis  - hold PTA Lisinopril to allow permissive HTN- resume as indicated    4. RA  - hold PTA Leflunomide  for now    5. Cognitive impairment:  Daughter reports significant impairment in short term memory in past 3 months, see H&P for details.  - monitor for delirium  - OT eval    DVT Prophylaxis: Pneumatic Compression Devices  Code Status: DNR/DNI  Disposition Plan   Expected discharge: possible discharge to TCU today         Entered: Emily Maldonado MD 10/01/2018, 10:33 AM   Information in the above section will display in the discharge planner report.      Emily Maldonado MD    Interval History   Doing better, confusion improved a little, right arm dysmetria improved; denies chest pain, no SOB, no N/V; discussed with RN    -Data reviewed today: I reviewed all new labs and imaging results over the last 24 hours. I personally reviewed the Tele image(s) showing NSR.    Physical Exam   Temp: 98.7  F (37.1  C) Temp src: Tympanic BP: 127/64   Heart Rate: 56 Resp: 16 SpO2: 94 % O2 Device: None (Room air)    Vitals:    09/29/18 0604 09/30/18 0500   Weight: 74 kg (163 lb 3.2 oz) 75.2 kg (165 lb 12.6 oz)     Vital Signs with Ranges  Temp:  [97.8  F (36.6  C)-98.9  F (37.2  C)] 98.7  F (37.1  C)  Heart Rate:  [56-80] 56  Resp:  [16-18] 16  BP: (127-161)/(64-95) 127/64  SpO2:  [92 %-97 %] 94 %  I/O last 3 completed shifts:  In: 2642.5 [P.O.:1180; I.V.:1462.5]  Out: 150 [Urine:150]    Constitutional: Awake, alert, NAD  Respiratory: Bilateral air entry, no wheezing, no rales, no crackles  Cardiovascular: S1S2, RRR, no murmurs, no rubs  GI: abd- soft, nonT, nonD, BS present  Skin/Integumen: no rashes, no cyanosis  Neuro- mildly confused, RUE ataxia/dysmetria on finger-to-nose testing- improved, no slurred speech    Medications     - MEDICATION INSTRUCTIONS -         apixaban ANTICOAGULANT  5 mg Oral BID     atorvastatin  10 mg Oral QPM     metoprolol  tartrate  6.25 mg Oral BID     sodium chloride (PF)  10 mL Intracatheter Q8H     sodium chloride (PF)  3 mL Intracatheter Q8H       Data     Recent Labs  Lab 09/30/18  0950 09/29/18  0135 09/28/18 2020 09/28/18  1732 09/28/18  1305   WBC 6.8  --   --   --  7.0   HGB 13.1*  --   --   --  15.1   MCV 96  --   --   --  96     --   --   --  216   INR  --   --  1.19*  --   --      --   --   --  140   POTASSIUM 3.7  --   --   --  4.5   CHLORIDE 110*  --   --   --  106   CO2 25  --   --   --  27   BUN 11  --   --   --  21   CR 0.77  --   --   --  0.74   ANIONGAP 9  --   --   --  7   GISELA 8.2*  --   --   --  8.7   GLC 88  --   --   --  101*   ALBUMIN  --   --   --   --  3.4   PROTTOTAL  --   --   --   --  6.8   BILITOTAL  --   --   --   --  1.0   ALKPHOS  --   --   --   --  97   ALT  --   --   --   --  27   AST  --   --   --   --  26   LIPASE  --   --   --   --  103   TROPI  --  0.057* 0.047* 0.055* 0.049*       No results found for this or any previous visit (from the past 24 hour(s)).[DN1.1]     Revision History        User Key Date/Time User Provider Type Action    > DN1.1 10/1/2018 10:38 AM Emily Maldonado MD Physician Sign            Progress Notes by Emily Maldonado MD at 9/30/2018 10:49 AM     Author:  Emily Maldonado MD Service:  Hospitalist Author Type:  Physician    Filed:  9/30/2018 11:25 AM Date of Service:  9/30/2018 10:49 AM Creation Time:  9/30/2018 10:50 AM    Status:  Signed :  Emily Maldonado MD (Physician)         [facility]    Hospitalist Progress Note[DN1.1]      Assessment & Plan[DN1.2]   Jose E Capps is a 87 year old male with past history TIA, HTN, RA, hyperlipidemia and CAD who presents with 2 falls in past 2 days, found to be in A-fib with controlled rate; then last night- had new onset RUE/RLE weakness, numbness, R visual field cut, word finding difficulty, ataxia; STAT CTA head and neck showed-  Left posterior cerebral artery  occlusion at the P1-2 junction; Left vertebral artery dissection     1. 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; Left vertebral artery dissection   - MRI brain w and w/o contrast- Scattered recent ischemic infarcts in the posterior cerebral artery distributions bilaterally involving posterior medial temporal lobes bilaterally and left thalamus  - Neuro consult appreciated  - although he was in new Afib, it is felt that the culprit for acute stroke is left vertebral artery dissection/occlusion after fall with head trauma  - now he is back in NSR on Tele  - initially started on ASA and Plavix as per Neurology; although it was felt that his stroke was cause by left vertebral artery, paroxysmal A fib increases risk of stroke also- so will switch to Eliquis- see below  - lipid profile LDL 61, total cholest 123; continue PTA Lipitor 10 mg po daily for now  - permissive HTN  - Echo - EF 55-60%, grade I diastolic dysfunction   - PT/OT- rec ARF  - SLP- mechanical soft diet with thin liquids    2. Paroxysmal Afib  - EKG in ER showed Afib with controlled HR  - now back in NSR  [PTA on Metoprolol 25 mg po BID]  - here started on Metoprolol 12.5 mg po BID  - noted with some bradycardia in 40's last night while sleeping  - will decrease Metoprolol to 6.25 mg po BID with holding parameters  - although it is felt that his new stroke is likely related to vertebral artery  dissection/occlusion, may need to start anticoagulation  - echo as above; CHADSVasc score is 5  - discussed with Neurology, Dr Dickson; because the strokes are small- the risk of hemorrhagic transformation is small  - start Eliquis 5 mg po BID now and stop ASA    3. CAD      Elevated troponins      HTN  - no h/o MI but had an abnormal stress test in 1/2018 (as part of preop clearance)- which showed -small area of nontransmural myocardial infarction affecting the basal and mid inferior wall with minimal yobany-infarct ischemia within the mid inferolateral wall.; he was asymptomatic and was cleared for surgery without further workup such as cardiac cath  - he has mildly elevated troponins but plateau-ed 0.049--0.047--0.057  - denies chest pain, no SOB; EKG with no ischemic changes  - Tele  - continue statin and Metoprolol 6.25 mg po BID; ASA was stopped- he was started on Eliquis  - hold PTA Lisinopril to allow permissive HTN- resume as indicated    4. RA  - hold PTA Leflunomide  for now    5. Cognitive impairment:  Daughter reports significant impairment in short term memory in past 3 months, see H&P for details.  - monitor for delirium  - OT eval    DVT Prophylaxis: Pneumatic Compression Devices  Code Status:[DN1.1] DNR/DNI  Disposition Plan[DN1.2]   Expected discharge: 1-2 days, recommended to ARU vs TCU     I have spent 35 minutes taking care of Mr Capps with >50% of time spent coordinating the care, discussing with team and counseling the family.        Entered:[DN1.1] Emily Maldonado MD 09/30/2018[DN1.2],[DN1.1] 10:50 AM[DN1.2]   Information in the above section will display in the discharge planner report.[DN1.1]      Emily Maldonado MD    Interval History[DN1.2]   Doing fine, still with right arm ataxia; confused, pleasant; denies chest pain, no SOB, no N/V; discussed with daughter at bedside- informed her about the plan to switch ASA/Plavix to Eliquis; she agreed with the plan and will inform her sister;  discussed with Dr Dickson and RN    -Data reviewed today: I reviewed all new labs and imaging results over the last 24 hours. I personally reviewed the Tele image(s) showing NSR.[DN1.1]    Physical Exam   Temp: 98.2  F (36.8  C) Temp src: Oral BP: 164/76   Heart Rate: 72 Resp: 16 SpO2: 97 % O2 Device: None (Room air)    Vitals:    09/29/18 0604 09/30/18 0500   Weight: 74 kg (163 lb 3.2 oz) 75.2 kg (165 lb 12.6 oz)[DN1.2]     Vital Signs with Ranges[DN1.1]  Temp:  [97.4  F (36.3  C)-98.7  F (37.1  C)] 98.2  F (36.8  C)  Heart Rate:  [54-80] 72  Resp:  [16-18] 16  BP: (147-180)/(73-88) 164/76  SpO2:  [94 %-98 %] 97 %  I/O last 3 completed shifts:  In: 1792.5 [P.O.:900; I.V.:892.5]  Out: -[DN1.2]     Constitutional: Awake, alert, NAD  Respiratory: Bilateral air entry, no wheezing, no rales, no crackles  Cardiovascular: S1S2, RRR, no murmurs, no rubs  GI: abd- soft, nonT, nonD, BS present  Skin/Integumen: no rashes, no cyanosis  Neuro- mildly confused, RUE ataxia/dysmetria on finger-to-nose testing, no slurred speech[DN1.1]    Medications     - MEDICATION INSTRUCTIONS -       sodium chloride 75 mL/hr at 09/30/18 0843       aspirin  325 mg Oral Daily     atorvastatin  10 mg Oral QPM     clopidogrel  75 mg Oral Daily     metoprolol tartrate  6.25 mg Oral BID     sodium chloride (PF)  10 mL Intracatheter Q8H     sodium chloride (PF)  3 mL Intracatheter Q8H       Data     Recent Labs  Lab 09/30/18  0950 09/29/18  0135 09/28/18  2020 09/28/18  1732 09/28/18  1305   WBC 6.8  --   --   --  7.0   HGB 13.1*  --   --   --  15.1   MCV 96  --   --   --  96     --   --   --  216   INR  --   --  1.19*  --   --      --   --   --  140   POTASSIUM 3.7  --   --   --  4.5   CHLORIDE 110*  --   --   --  106   CO2 25  --   --   --  27   BUN 11  --   --   --  21   CR 0.77  --   --   --  0.74   ANIONGAP 9  --   --   --  7   GISELA 8.2*  --   --   --  8.7   GLC 88  --   --   --  101*   ALBUMIN  --   --   --   --  3.4   PROTTOTAL  --    --   --   --  6.8   BILITOTAL  --   --   --   --  1.0   ALKPHOS  --   --   --   --  97   ALT  --   --   --   --  27   AST  --   --   --   --  26   LIPASE  --   --   --   --  103   TROPI  --  0.057* 0.047* 0.055* 0.049*       Recent Results (from the past 24 hour(s))   MR Brain w/o & w Contrast    Narrative    MRI OF THE BRAIN WITHOUT AND WITH CONTRAST 9/29/2018 1:48 PM     COMPARISON: Head CT, head CTA and head CT perfusion study 9/28/2018.    HISTORY:  Frequent falls, discoordination, rule out stroke.     TECHNIQUE: Axial diffusion-weighted with ADC map, axial T2-weighted  with fat saturation, axial T1-weighted, axial turboFLAIR and coronal  T1-weighted images of the brain were acquired without intravenous  contrast.  Following intravenous administration of gadolinium (7 mL  Gadavist), axial T1-weighted images of the brain were acquired.     FINDINGS: There are recent tiny infarcts in the left thalamus and  posteromedial aspect of the right temporal lobe as well as a  contiguous infarct in the left parahippocampal formation. All of these  infarcts are in the posterior circulation conforming to bilateral  posterior cerebral artery territory infarcts. This also corresponds  with the perfusion defect noted in the left posterior cerebral artery  distribution on the comparison CT perfusion study. No other recent  infarcts noted.    There is mild diffuse cerebral volume loss. There are mild patchy  periventricular areas of abnormal T2 signal hyperintensity in the  cerebral white matter bilaterally that are consistent with sequela of  chronic small vessel ischemic disease. The ventricles and basal  cisterns are within normal limits in configuration given the degree of  cerebral volume loss.  There is no midline shift.  There are no  extra-axial fluid collections.  There is no evidence for acute  intracranial hemorrhage.  There is no abnormal contrast enhancement in  the brain or its coverings.    There is no sinusitis or  mastoiditis.      Impression    IMPRESSION:   1. Scattered recent ischemic infarcts in the posterior cerebral artery  distributions bilaterally involving posterior medial temporal lobes  bilaterally and left thalamus.  2. Diffuse cerebral volume loss and cerebral white matter changes  consistent with chronic small vessel ischemic disease.     HOLGER PADILLA MD[DN1.2]        Revision History        User Key Date/Time User Provider Type Action    > DN1.2 2018 11:25 AM Emily Maldonado MD Physician Sign     DN1.1 2018 10:49 AM Emily Maldonado MD Physician             Progress Notes by Daniel Dickson MD at 2018 10:02 AM     Author:  Daniel Dickson MD Service:  Neurology Author Type:  Physician    Filed:  2018 10:10 AM Date of Service:  2018 10:02 AM Creation Time:  2018 10:02 AM    Status:  Signed :  Daniel Dickson MD (Physician)         Neurology Progress Note    Subjective: Pt feels that his R arm is improved, but still not back to baseline.  He thinks he might be a little confused this morning.    Vitals:[NK1.1] Temp: 98.2  F (36.8  C) Temp  Min: 97.4  F (36.3  C)  Max: 98.7  F (37.1  C)  Resp: 16 Resp  Min: 16  Max: 18  SpO2: 97 % SpO2  Min: 94 %  Max: 98 %    No Data Recorded  Heart Rate: 72 Heart Rate  Min: 54  Max: 80  BP: 164/76 Systolic (24hrs), Av , Min:147 , Max:180   Diastolic (24hrs), Av, Min:70, Max:88[NK1.2]    Physical Examination:  General Exam: Awake, alert, NAD.  Neck: supple without meningismus.  Neuro:                        HCF's:  Normal language and concentration.  Pt was slow to answer some questions and appeared to have some confusion vs mild cognitive impairment.  He kept calling his daughter by his wife's name, but then would correct it.                        CN's:  VF's grossly full to confrontation - no evidence of an obvious field cut.  Pupils were 3 mm, reactive with no RAPD.  Fundoscopic examination  limited by small pupils and pt compliance.  EOMI without nystagmus.  Facial sensation was normal.  Face was symmetrical without weakness.  Hearing was diminished to conversation.  Tongue protruded midline, palate corina symmetrically.  SCM's and traps were normal.                        Motor:  Pt gave less effort in the entire RUE, but it was difficult to tell if pt was truly weak or if he was ataxic and uncoordinated.  At least 4/5 strength, but there was drift in RUE.  O/w normal bulk, tone, and strength throughout.  MSR's 1+ and symmetrical.  Toes down-going to plantar stim bilaterally.                        Sensory: intact to LT in all extremities.                        Cerebellar: F to N mildly ataxic on the R (improved from yesterday) and normal on the L.  H to S normal bilaterally.                        Gait: deferred due to pt condition.     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  Fortunately, he has only suffered small infarcts and he is better symptomatically.  Based on the fact that there are scattered infarcts only in the posterior circulation, it is more likely that the emboli came from the L vert than the heart.  However, pt does have apparently new-onset a fib.  I suspect the falls could have been related to the new a fib, then he got a dissection from the falls, then had arterio-embolic infarcts.     Recommendations: From a purely neuro standpoint, anticoagulation would be acceptable to begin today (infarcts were not large), if hospitalist service believes the a fib will continue and, therefore, there is an indication for anticoagulation.  Our preference would be Eliquis.  However, as noted yesterday, pt's daughter should ok the start of the anticoagulation - we discussed yesterday that the longer we wait to initiate anticoagulation, the lesser the risk of having hemorrhagic transformation of the small infarcts, but also the  greater the risk of having strokes from the a fib.  No further neurological w/u or tx warranted at this time and ok to discharge from a neuro standpoint.  Please call with any further questions.[NK1.1]       Revision History        User Key Date/Time User Provider Type Action    > NK1.2 9/30/2018 10:10 AM Daniel Dickson MD Physician Sign     NK1.1 9/30/2018 10:02 AM Daniel Dickson MD Physician             Progress Notes by Srinivasa Connolly RN at 9/30/2018  2:18 AM     Author:  Srinivasa Connolly RN Service:  (none) Author Type:  Registered Nurse    Filed:  9/30/2018  5:35 AM Date of Service:  9/30/2018  2:18 AM Creation Time:  9/30/2018  2:18 AM    Status:  Addendum :  Srinivasa Connolly RN (Registered Nurse)         Patient had brief period of bradycardia, low heart rate of 38, patient asymptomatic and sleeping. Tele strip in chart.[JM1.1]     Patient has had 3 more episodes of bradycardia, but asymptomatic and sleeping.[JM1.2]     Revision History        User Key Date/Time User Provider Type Action    > JM1.2 9/30/2018  5:35 AM Srinivasa Connolly RN Registered Nurse Addend     JM1.1 9/30/2018  2:22 AM Srinivasa Connolly RN Registered Nurse Sign            Progress Notes by Emily Maldonado MD at 9/29/2018  6:03 PM     Author:  Emily Maldonado MD Service:  Hospitalist Author Type:  Physician    Filed:  9/29/2018  7:26 PM Date of Service:  9/29/2018  6:03 PM Creation Time:  9/29/2018  6:03 PM    Status:  Signed :  Emily Maldonado MD (Physician)         [facility]    Hospitalist Progress Note[DN1.1]      Assessment & Plan[DN1.2]   Jose E Capps is a 87 year old male with past history TIA, HTN, RA, hyperlipidemia and CAD who presents with 2 falls in past 2 days, found to be in A-fib with controlled rate; then last night- had new onset RUE/RLE weakness, numbness, R visual field cut, word finding difficulty, ataxia[DN1.1]; STAT CTA head and neck showed-  Left  posterior cerebral artery occlusion at the P1-2 junction; Left vertebral artery dissection     1. 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; Left vertebral artery dissection   - MRI brain w and w/o contrast- Scattered recent ischemic infarcts in the posterior cerebral artery distributions bilaterally involving posterior medial temporal lobes bilaterally and left thalamus  - Neuro consult appreciated  - although he was in new Afib, it is felt that the culprit for acute stroke is left vertebral artery dissection/occlusion after fall with head trauma  - now he is back in NSR on Tele  - started on ASA and Plavix as per Neurology; Neurology considers to possible switch him to anticoagulation   - check lipid profile; continue PTA Lipitor 10 mg po daily for now  - permissive HTN  - Echo - EF 55-60%, grade I diastolic dysfunction   - PT/OT- rec ARF  - SLP- mechanical soft diet with thin liquids    2. Paroxysmal Afib  - EKG in ER showed Afib with controlled HR  - now back in NSR  - continue PTA Metoprolol at decreased dose 12.5 mg po BID  - curently on  mg po daily  - although it is felt that his new stroke is likely related to vertebral artery dissection/occlusion, may need to start anticoagulation  - echo as above    3. CAD      Elevated troponins      HTN  - no h/o MI but had an abnormal stress test in 1/2018 (as part of preop clearance)- which showed -small area of nontransmural  myocardial infarction affecting the basal and mid inferior wall with minimal yobany-infarct ischemia within the mid inferolateral wall.; he was asymptomatic and was cleared for surgery without further workup such as cardiac cath  - he has mildly elevated troponins but plateau-ed 0.049--0.047--0.057  - denies chest pain, no SOB; EKG with no ischemic changes  - Tele  - continue ASA, statin and Metoprolol 12.5 mg po BID  - hold PTA Lisinopril to allow permissive HTN- resume as indicated    4. RA  - hold PTA Leflunomide  for now    5. Cognitive impairment:  Daughter reports significant impairment in short term memory in past 3 months, see H&P for details.  - monitor for delirium  - OT eval[DN1.3]    DVT Prophylaxis: Pneumatic Compression Devices  Code Status:[DN1.1] DNR/DNI  Disposition Plan[DN1.2]   Expected discharge: 2 - 3 days, recommended to[DN1.1] ARU[DN1.3] once[DN1.1] cleared by Neurology[DN1.3].     Entered:[DN1.1] Emily Maldonado MD 09/29/2018[DN1.2],[DN1.1] 6:04 PM[DN1.2]   Information in the above section will display in the discharge planner report.[DN1.1]      Emily Maldonado MD    Interval History[DN1.2]   Doing better, still with right arm ataxia; confused, pleasant; denies chest pain, no SOB, no N/V; discussed with RN[DN1.3]    -Data reviewed today: I reviewed all new labs and imaging results over the last 24 hours. I personally reviewed[DN1.1] the head CT image(s) showing as above and the brain MRI image(s) showing as above[DN1.3].[DN1.1]    Physical Exam   Temp: 98  F (36.7  C) Temp src: Axillary BP: 168/84 Pulse: 57 Heart Rate: 60 Resp: 16 SpO2: 98 % O2 Device: None (Room air)    Vitals:    09/29/18 0604   Weight: 74 kg (163 lb 3.2 oz)[DN1.2]     Vital Signs with Ranges[DN1.1]  Temp:  [98  F (36.7  C)-98.9  F (37.2  C)] 98  F (36.7  C)  Pulse:  [47-75] 57  Heart Rate:  [57-76] 60  Resp:  [14-20] 16  BP: (147-172)/(66-86) 168/84  SpO2:  [94 %-98 %] 98 %  I/O last 3 completed shifts:  In: 2240  [P.O.:1140; I.V.:1100]  Out: -[DN1.2]     Constitutional:[DN1.1] Awake, alert, NAD[DN1.3]  Respiratory:[DN1.1] Bilateral air entry, no wheezing, no rales, no crackles[DN1.3]  Cardiovascular:[DN1.1] S1S2, RRR, no murmurs, no rubs[DN1.3]  GI:[DN1.1] abd- soft, nonT, nonD, BS present[DN1.3]  Skin/Integumen:[DN1.1] no rashes, no cyanosis  Neuro- confused, RUE ataxia/dysmetria on finger-to-nose testing, no slurred speech[DN1.3]    Medications     - MEDICATION INSTRUCTIONS -       sodium chloride 100 mL/hr at 09/29/18 1633       aspirin  325 mg Oral Daily     atorvastatin  10 mg Oral QPM     clopidogrel  75 mg Oral Daily     metoprolol tartrate  12.5 mg Oral BID     sodium chloride (PF)  10 mL Intracatheter Q8H     sodium chloride (PF)  3 mL Intracatheter Q8H       Data     Recent Labs  Lab 09/29/18  0135 09/28/18  2020 09/28/18  1732 09/28/18  1305   WBC  --   --   --  7.0   HGB  --   --   --  15.1   MCV  --   --   --  96   PLT  --   --   --  216   INR  --  1.19*  --   --    NA  --   --   --  140   POTASSIUM  --   --   --  4.5   CHLORIDE  --   --   --  106   CO2  --   --   --  27   BUN  --   --   --  21   CR  --   --   --  0.74   ANIONGAP  --   --   --  7   GISELA  --   --   --  8.7   GLC  --   --   --  101*   ALBUMIN  --   --   --  3.4   PROTTOTAL  --   --   --  6.8   BILITOTAL  --   --   --  1.0   ALKPHOS  --   --   --  97   ALT  --   --   --  27   AST  --   --   --  26   LIPASE  --   --   --  103   TROPI 0.057* 0.047* 0.055* 0.049*       Recent Results (from the past 24 hour(s))   XR Hand Right G/E 3 Views    Narrative    RIGHT HAND THREE OR MORE VIEWS   9/28/2018 7:19 PM     HISTORY: Thumb pain and swelling following fall. Rule out fracture.    COMPARISON: None.      Impression    IMPRESSION: Three views of the right hand are performed. Degenerative  joint changes involving the metacarpophalangeal and interphalangeal  joints of the thumb are noted. Additional DIP and PIP joint  degenerative changes are noted within the  fingers. No evidence of  acute fracture or dislocation. Carpal bones appear intact. Arterial  vascular calcification is noted in the interosseous space between the  distal radius and ulna where imaged. Tiny ossicles are noted along the  radial aspect of the metacarpophalangeal joints of the index and  middle fingers on the oblique view. No radiopaque foreign body is  identified. Mild soft tissue swelling is present involving the thumb.    EDNA RODRIGUEZ MD   CT Head w/o Contrast    Narrative    CT SCAN OF THE HEAD WITHOUT CONTRAST   9/28/2018 8:30 PM     HISTORY: Code Stroke.    TECHNIQUE: Axial images of the head and coronal reformations without  IV contrast material. Radiation dose for this scan was reduced using  automated exposure control, adjustment of the mA and/or kV according  to patient size, or iterative reconstruction technique.    COMPARISON: None.    FINDINGS: Mild to moderate volume loss is present. Old left  periventricular infarct is present. Background of white matter  hypoattenuation likely represents chronic small vessel ischemic  change. No evidence of acute ischemia, hemorrhage, mass, mass effect,  or hydrocephalus. The visualized calvarium, skull base, paranasal  sinuses, and extracranial soft tissues are unremarkable.      Impression    IMPRESSION: Known left posterior cerebral artery occlusion and  perfusion deficit. No evidence of ischemia/edema or hemorrhage.    Findings were discussed by phone between Dr. Luu and Neil Ward on  9/28/2018 8:59 PM.    CESAR LUU MD   CTA Head Neck with Contrast    Narrative    CT ANGIOGRAM OF THE HEAD AND NECK WITH CONTRAST  9/28/2018 8:35 PM     HISTORY: Code Stroke.    TECHNIQUE: CT angiography with an injection of 70 mL Isovue-370 IV  with scans through the head and neck. Images were transferred to a  separate 3-D workstation where multiplanar reformations and 3-D images  were created. Estimates of carotid stenoses are made relative to the  distal  internal carotid artery diameters except as noted. Radiation  dose for this scan was reduced using automated exposure control,  adjustment of the mA and/or kV according to patient size, or iterative  reconstruction technique.    COMPARISON: None.     CT HEAD FINDINGS: No contrast enhancing lesions are identified.    CT ANGIOGRAM HEAD FINDINGS: Left posterior cerebral artery is occluded  at the P1-2 junction. The intracranial vertebral arteries, basilar  artery, and right posterior cerebral arteries are patent. The internal  carotid arteries, anterior cerebral arteries, and middle cerebral  arteries are patent.     CT ANGIOGRAM NECK FINDINGS: The left vertebral artery is occluded at  the origin with reconstitution at the V3 and V4 segments. Right  vertebral artery is patent. The bilateral common carotid, internal  carotid, and external carotid arteries are patent. There is moderate  atherosclerotic plaquing at the bilateral carotid bifurcations without  evidence of flow-limiting stenosis. A two-vessel aortic arch is  present.     Emphysema noted at the lung apices. A right-sided thyroid nodule is  present measuring 2.4 cm. Severe degenerative changes are present  throughout the cervical spine.      Impression    IMPRESSION:   1. 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   2. 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  3. Right thyroid nodule measuring up to 2.4 cm.    Findings were discussed by phone between Dr. Luu and the stroke  neurologist at 8:58 PM on 9/28/2018.    CESAR LUU MD   CT Head Perfusion w Contrast    Narrative    CT BRAIN PERFUSION   9/28/2018 8:43 PM    HISTORY: Code Stroke.    TECHNIQUE: Time sequential axial CT images of the head were acquired  during the administration of 50 mL Isovue-370 IV. Color perfusion maps  of the brain were created from this time sequential axial source data.      Radiation dose  for this scan was reduced using automated exposure  control, adjustment of the mA and/or kV according to patient size, or  iterative reconstruction technique.    COMPARISON: None.    FINDINGS: There is prolongation of transit times and flow to the left  posterior cerebral artery distribution. Cerebral blood volume is  compensated. No other focal perfusion deficits are identified.      Impression    IMPRESSION: Left posterior cerebral artery perfusion defect.    CESAR MOLINA MD   MR Brain w/o & w Contrast    Narrative    MRI OF THE BRAIN WITHOUT AND WITH CONTRAST 9/29/2018 1:48 PM     COMPARISON: Head CT, head CTA and head CT perfusion study 9/28/2018.    HISTORY:  Frequent falls, discoordination, rule out stroke.     TECHNIQUE: Axial diffusion-weighted with ADC map, axial T2-weighted  with fat saturation, axial T1-weighted, axial turboFLAIR and coronal  T1-weighted images of the brain were acquired without intravenous  contrast.  Following intravenous administration of gadolinium (7 mL  Gadavist), axial T1-weighted images of the brain were acquired.     FINDINGS: There are recent tiny infarcts in the left thalamus and  posteromedial aspect of the right temporal lobe as well as a  contiguous infarct in the left parahippocampal formation. All of these  infarcts are in the posterior circulation conforming to bilateral  posterior cerebral artery territory infarcts. This also corresponds  with the perfusion defect noted in the left posterior cerebral artery  distribution on the comparison CT perfusion study. No other recent  infarcts noted.    There is mild diffuse cerebral volume loss. There are mild patchy  periventricular areas of abnormal T2 signal hyperintensity in the  cerebral white matter bilaterally that are consistent with sequela of  chronic small vessel ischemic disease. The ventricles and basal  cisterns are within normal limits in configuration given the degree of  cerebral volume loss.  There is no  midline shift.  There are no  extra-axial fluid collections.  There is no evidence for acute  intracranial hemorrhage.  There is no abnormal contrast enhancement in  the brain or its coverings.    There is no sinusitis or mastoiditis.      Impression    IMPRESSION:   1. Scattered recent ischemic infarcts in the posterior cerebral artery  distributions bilaterally involving posterior medial temporal lobes  bilaterally and left thalamus.  2. Diffuse cerebral volume loss and cerebral white matter changes  consistent with chronic small vessel ischemic disease.     HOLGER PADILLA MD[DN1.2]        Revision History        User Key Date/Time User Provider Type Action    > DN1.3 2018  7:26 PM Emily Maldonado MD Physician Sign     DN1.2 2018  6:04 PM Emily Maldonado MD Physician      DN1.1 2018  6:03 PM Emily Maldonado MD Physician             Progress Notes by Daniel Dickson MD at 2018 11:36 AM     Author:  Daniel Dickson MD Service:  Neurology Author Type:  Physician    Filed:  2018  3:00 PM Date of Service:  2018 11:36 AM Creation Time:  2018  2:36 PM    Status:  Signed :  Daniel Dickson MD (Physician)         Neurology Progress Note    Subjective: Pt reports he is definitely better than he was yesterday.  Pt's daughter agrees, but says he is still not to his previous baseline.      Vitals:[NK1.1] Temp: 98.7  F (37.1  C) Temp  Min: 97.9  F (36.6  C)  Max: 98.9  F (37.2  C)  Resp: 16 Resp  Min: 11  Max: 20  SpO2: 98 % SpO2  Min: 94 %  Max: 98 %  Pulse: 57 Pulse  Min: 47  Max: 75  Heart Rate: 59 Heart Rate  Min: 53  Max: 76  BP: 147/77 Systolic (24hrs), Av , Min:137 , Max:172   Diastolic (24hrs), Av, Min:66, Max:119[NK1.2]        Physical Examination:  General Exam: Awake, alert, NAD.  Neck: supple without meningismus.  Neuro:   HCF's:  Normal language and concentration.  Pt was slow to answer some questions and appeared to have  some confusion vs mild cognitive impairment.  He kept calling his daughter by his wife's name, but then would correct it.   CN's:  VF's grossly full to confrontation - no evidence of an obvious field cut.  Pupils were 3 mm, reactive with no RAPD.  Fundoscopic examination limited by small pupils and pt compliance.  EOMI without nystagmus.  Facial sensation was normal.  Face was symmetrical without weakness.  Hearing was diminished to conversation.  Tongue protruded midline, palate corina symmetrically.  SCM's and traps were normal.   Motor:  Pt gave less effort in the entire RUE, but it was difficult to tell if pt was truly weak or if he was ataxic and uncoordinated.  At least 4/5 strength, but there was drift in RUE.  O/w normal bulk, tone, and strength throughout.  MSR's 1+ and symmetrical.  Toes down-going to plantar stim bilaterally.   Sensory: intact to LT in all extremities.   Cerebellar: F to N ataxic on the R and normal on the L.  H to S normal bilaterally.   Gait: deferred due to pt condition.    Imaging:  I reviewed the images and agree with the report.    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  Fortunately, he has only suffered small infarcts and he is better symptomatically.  Based on the fact that there are scattered infarcts only in the posterior circulation, it is more likely that the emboli came from the L vert than the heart.  However, pt does have apparently new-onset a fib.  I suspect the falls could have been related to the new a fib, then he got a dissection from the falls, then had arterio-embolic infarcts.    Recommendations: At this point, it appears pt would be a candidate for anticoagulation due to the a fib, but I will defer that to hospitalist.  He either needs antiplatelet therapy or anticoagulation.  Since the infarcts are relatively small, I think anticoagulation can be started relatively soon if that is indicated.  I  have discussed with pt and daughter the risks of ICH the sooner we start anticoagulation, but also discussed the risks of more strokes if we wait.  The available data suggest that there is no significant difference in stroke prevention using antiplatelets or anticoagulation specifically for extracranial dissection.  Therefore, antiplatelets alone for dissection are reasonable, so the anticoagulation would only be indicated specifically for the a fib.  Given the fact that the strokes are less likely due to the a fib, it would be reasonable to wait a couple of days before initiating anticoagulation.  Will discuss the timing with hospitalist and pt and daughter.[NK1.1]     Revision History        User Key Date/Time User Provider Type Action    > NK1.2 9/29/2018  3:00 PM Daniel Dickson MD Physician Sign     NK1.1 9/29/2018  2:36 PM Daniel Dickson MD Physician             Progress Notes by Jean Cosme PT at 9/29/2018 11:59 AM     Author:  Jean Cosme PT Service:  Acute IP Rehab Author Type:  Physical Therapist    Filed:  9/29/2018 11:59 AM Date of Service:  9/29/2018 11:59 AM Creation Time:  9/29/2018 11:59 AM    Status:  Signed :  Jean Cosme PT (Physical Therapist)          09/29/18 1100   Quick Adds   Type of Visit Initial PT Evaluation   Living Environment   Lives With facility resident   Living Arrangements assisted living   Home Accessibility no concerns;bed and bath are not on the first floor   Number of Stairs to Enter Home 0   Number of Stairs Within Home 0   Stair Railings at Home none   Transportation Available family or friend will provide   Living Environment Comment Pt lives with spouse in an jail on the 4th floor with elevator access.    Self-Care   Dominant Hand right   Usual Activity Tolerance good   Current Activity Tolerance fair   Regular Exercise no   Equipment Currently Used at Home none   Activity/Exercise/Self-Care Comment Pt owns a WC and std cane. Pt was ind  with all ADL's prior to admission per daughter who was present at bs.    Functional Level Prior   Ambulation 0-->independent   Transferring 0-->independent   Toileting 0-->independent   Bathing 0-->independent   Dressing 0-->independent   Eating 0-->independent   Communication 0-->understands/communicates without difficulty   Swallowing 0-->swallows foods/liquids without difficulty   Cognition 1 - attention or memory deficits   Number of times patient has fallen within last six months 2   Which of the above functional risks had a recent onset or change? ambulation;transferring   Prior Functional Level Comment Pt was ind with ambualtion without the use of any AD's    General Information   Onset of Illness/Injury or Date of Surgery - Date 09/28/18   Referring Physician Balta Alcantar MD   Patient/Family Goals Statement Be independent like before   Pertinent History of Current Problem (include personal factors and/or comorbidities that impact the POC) Pt admitted with 2 falls and Afib from the assisted living facility pt resides in.. 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 PMH includes: TIA, HTN, RA, hyperlipidemia and CAD   Precautions/Limitations fall precautions   Weight-Bearing Status - LLE full weight-bearing   Weight-Bearing Status - RLE full weight-bearing   General Observations Pleasant and cooperative   General Info Comments Activity: up with assist    Cognitive Status Examination   Orientation person;place   Level of Consciousness alert   Follows Commands and Answers Questions 75% of the time   Personal Safety and Judgment impaired   Memory impaired   Pain Assessment   Patient Currently in Pain (Pt c/o R hand pain using RW. R hand swelling 2/2 falls)   Integumentary/Edema   Integumentary/Edema Comments Pt noted with R hand and R knee swelling   Range of Motion (ROM)   ROM Comment BLE: WFL   Strength   Strength Comments  "RLE: 4-/5, LLE: 4+/5   Bed Mobility   Bed Mobility Comments Supine>sit: CGA and verbal cues using bed rail, Sit>supine: SBA   Transfer Skills   Transfer Comments STS x 2 with min assist and verbal cues on hand placement.    Gait   Gait Comments 20 ft with RW and mod assist for safety and verbal cues   Balance   Balance Comments Impaired static and dynamic standing balance.    Sensory Examination   Sensory Perception no deficits were identified   Coordination   Coordination Comments RLE: impaired    Muscle Tone   Muscle Tone no deficits were identified   General Therapy Interventions   Planned Therapy Interventions balance training;bed mobility training;gait training;motor coordination training;neuromuscular re-education;strengthening;stretching;transfer training;risk factor education;home program guidelines;progressive activity/exercise   Clinical Impression   Criteria for Skilled Therapeutic Intervention yes, treatment indicated   PT Diagnosis Imapired gait   Influenced by the following impairments Decreased balance, strength, and coordiantion in RLE   Functional limitations due to impairments Assistance needed with transfers and gait   Clinical Presentation Stable/Uncomplicated   Clinical Presentation Rationale Pt admitted with acute CVA and falls.    Clinical Decision Making (Complexity) Low complexity   Therapy Frequency` 2 times/day   Predicted Duration of Therapy Intervention (days/wks) 3   Anticipated Discharge Disposition Transitional Care Facility   Risk & Benefits of therapy have been explained Yes   Patient, Family & other staff in agreement with plan of care Yes   Clinical Impression Comments Pt presents with decreased strength, coordination and balance limiting independence with fucntional mobility and transfers. Pt will benefit from continued skilled PT interventions to acheive PLOF and independence.    [facility] AM-PAC  \"6 Clicks\" V.2 Basic Mobility Inpatient Short Form   1. Turning from your " back to your side while in a flat bed without using bedrails? 3 - A Little   2. Moving from lying on your back to sitting on the side of a flat bed without using bedrails? 3 - A Little   3. Moving to and from a bed to a chair (including a wheelchair)? 3 - A Little   4. Standing up from a chair using your arms (e.g., wheelchair, or bedside chair)? 3 - A Little   5. To walk in hospital room? 2 - A Lot   6. Climbing 3-5 steps with a railing? 2 - A Lot   Basic Mobility Raw Score (Score out of 24.Lower scores equate to lower levels of function) 16   Total Evaluation Time   Total Evaluation Time (Minutes) 15[AT1.1]        Revision History        User Key Date/Time User Provider Type Action    > AT1.1 9/29/2018 11:59 AM Jean Cosme PT Physical Therapist Sign            Progress Notes by Mike Flowers OT at 9/29/2018  9:12 AM     Author:  Mike Flowers OT Service:  (none) Author Type:  Occupational Therapist    Filed:  9/29/2018  9:12 AM Date of Service:  9/29/2018  9:12 AM Creation Time:  9/29/2018  9:12 AM    Status:  Signed :  Mike Flowers OT (Occupational Therapist)          09/29/18 0822   Quick Adds   Type of Visit Initial Occupational Therapy Evaluation   Living Environment   Lives With facility resident  (Lives at an [facility])   Living Arrangements assisted living   Home Accessibility no concerns   Number of Stairs to Enter Home 0   Number of Stairs Within Home 0   Stair Railings at Home none   Living Environment Comment Per chart he lives in an assisted living   Self-Care   Dominant Hand right   Functional Level Prior   Ambulation 0-->independent   Transferring 0-->independent   Toileting 0-->independent   Bathing 0-->independent   Dressing 0-->independent   Eating 0-->independent   Communication 0-->understands/communicates without difficulty   Swallowing 0-->swallows foods/liquids without difficulty   Cognition 1 - attention or memory deficits   Fall history within last six months  yes   Number of times patient has fallen within last six months 2   Prior Functional Level Comment Pt stated he was independent, attempted to call daughter but could not get a hold of her   General Information   Onset of Illness/Injury or Date of Surgery - Date 09/28/18   Referring Physician Neil Ward APRN CNP   Patient/Family Goals Statement None stated   Additional Occupational Profile Info/Pertinent History of Current Problem Pt came in after recurrent falls at his assisted living. While in hopsital Pt expercienced R sided weakness/neglect. Code stroke called last night, CT showed Left posterior cerebral artery perfusion defect.Left posterior cerebral artery perfusion defect.   Precautions/Limitations fall precautions   General Observations Recieved in bed eating breakfast, Spoke with RN prior to seeing   Cognitive Status Examination   Orientation person;place   Level of Consciousness alert   Able to Follow Commands success, 2-step commands   Personal Safety (Cognitive) severe impairment   Memory impaired   Attention No deficits were identified   Organization/Problem Solving Reports problems with problem solving during evaluation   Executive Function Planning ability impaired   Cognitive Comment See flow sheet, Pt unaware why he was in hospital, could not give PLOF, SLUMS completed    Visual Perception   Visual Perception No deficits were identified   Visual Perception Comments Pt able to read, comprehend and track with both eyes. He was attending to R side throughout eval. Chart reported R sided visual cut, in eval, he was able to track to R, will complete further testing   Sensory Examination   Sensory Comments RUE with decreased sensation when eyes closed   Pain Assessment   Patient Currently in Pain No   Range of Motion (ROM)   ROM Comment Full range in shoulder flexion bilateraly, elbow flex/ext, decreased finger range on R hand   Strength   Strength Comments diminshed strength R shoulder flexion 4/5,  elbow flexion 4/5, elbow extension 4/5, and poor  strenght. LUE WFL 5/5 throughout   Hand Strength   Hand Strength Comments decreased  on RUE   Coordination   Coordination Comments ataxia noted on RUE with finger to nose test and gross motor grasp/realse with objects   Mobility   Bed Mobility Bed mobility skill: Supine to sit   Bed Mobility Skill: Supine to Sit   Level of [place]: Supine/Sit contact guard   Transfer Skill: Bed to Chair/Chair to Bed   Level of [place]: Bed to Chair minimum assist (75% patients effort)   Assistive Device - Transfer Skill Bed to Chair Chair to Bed Rehab Eval standard walker   Transfer Skill: Sit to Stand   Level of [place]: Sit/Stand minimum assist (75% patients effort)   Assistive Device for Transfer: Sit/Stand standard walker   Lower Body Dressing   Level of [place]: Dress Lower Body minimum assist (75% patients effort)  (to don/doff his socks in chair)   Grooming   Level of [place]: Grooming minimum assist (75% patients effort)  (while sitting in chair)   Eating/Self Feeding   Level of [place]: Eating independent   Instrumental Activities of Daily Living (IADL)   IADL Comments unsure of PLOF at this time   Activities of Daily Living Analysis   Impairments Contributing to Impaired Activities of Daily Living balance impaired;cognition impaired;strength decreased;sensory feedback impaired;sensation decreased   General Therapy Interventions   Planned Therapy Interventions ADL retraining;IADL retraining;balance training;cognition;neuromuscular re-education;strengthening   Clinical Impression   Criteria for Skilled Therapeutic Interventions Met yes, treatment indicated   OT Diagnosis Impairments in ADL performance   Influenced by the following impairments strength, cognition, safety, balance   Assessment of Occupational Performance 5 or more Performance Deficits   Identified Performance Deficits dressing, bathing, meds, grooming, toileting   Clinical  "Decision Making (Complexity) High complexity   Therapy Frequency 2 times/day   Predicted Duration of Therapy Intervention (days/wks) 3 days   Anticipated Discharge Disposition Acute Rehabilitation Facility   Risks and Benefits of Treatment have been explained. Yes   Patient, Family & other staff in agreement with plan of care Yes   [facility] TM \"6 Clicks\"   2016, Trustees of [facility], under license to [employer].  All rights reserved.   6 Clicks Short Forms Daily Activity Inpatient Short Form   [facility] AM-PAC  \"6 Clicks\" Daily Activity Inpatient Short Form   1. Putting on and taking off regular lower body clothing? 3 - A Little   2. Bathing (including washing, rinsing, drying)? 3 - A Little   3. Toileting, which includes using toilet, bedpan or urinal? 3 - A Little   4. Putting on and taking off regular upper body clothing? 3 - A Little   5. Taking care of personal grooming such as brushing teeth? 3 - A Little   6. Eating meals? 4 - None   Daily Activity Raw Score (Score out of 24.Lower scores equate to lower levels of function) 19   Total Evaluation Time   Total Evaluation Time (Minutes) 15[RS1.1]        Revision History        User Key Date/Time User Provider Type Action    > RS1.1 9/29/2018  9:12 AM Mike Flowers OT Occupational Therapist Sign            ED Provider Notes by Livia Wise CNP at 9/28/2018 11:47 AM     Author:  [place], Livia, CNP Service:  Emergency Medicine Author Type:  Nurse Practitioner    Filed:  9/28/2018  6:54 PM Date of Service:  9/28/2018 11:47 AM Creation Time:  9/28/2018 12:25 PM    Status:  Signed :  Livia Wise CNP (Nurse Practitioner)           History     Chief Complaint:  Fall    The history is provided by the patient. History limited by: the patient is a poor historian.      Jose E Capps is a 87 year old male with a history of hypertension and CVA who presents via EMS with his daughter for evaluation after two falls in the " last 24 hours. Per [facility] staff, the patient underwent a fall last night around 2130 during which time he sustained a 4 x 5 cm forehead abrasion and 1 x 1 cm nose abrasion. This was discovered this morning during the 0930 rounding. Around 1100, the patient's daughter called him and the patient told her that he had fallen onto the floor and needed to go to the emergency department. Staff then checked on him, found him on the floor, and called EMS who brought the patient in.    On presentation, the patient endorses[JB1.1] generalized[PH1.1] stiffness, forehead pain, and a headache. The patient is a very poor historian however does deny feeling dizzy or lightheaded or endorsing any chest pain prior to his fall[JB1.1], but does not know the reason he fell[PH1.1]. Patient also endorses right knee pain. Patient has yet to take medication following his fall. The patient's daughter reports that the patient has been exhibiting worsening short-term memory loss over the last 3 months which has become quite bad in recent days. The patient moved into [place] about 2 months ago on account of this. Patient denies diarrhea, constipation, nausea, changes in appetite, or other complaint.[JB1.1]      ECHO performed on 1/11/2018:  Interpretation Summary     The left ventricle is normal in size. There is mild concentric left ventricular hypertrophy. Left ventricular systolic function is normal. The visual ejection fraction is estimated at 55-60%. Grade I or early diastolic dysfunction. No regional wall motion abnormalities noted. There is no thrombus seen in the left ventricle The right ventricle is normal size. The right ventricular systolic function is normal.  Trace mitral and tricuspid regurgitation.  Mild aortic regurgitation.  Mild aortic root dilatation.  No pericardial effusion.  No previous study for comparison.[JB1.2]    Allergies:[JB1.1]  No Known Drug Allergy[JB1.3]      Medications:   "  Lipitor  Arava  Lisinopril  Miralax  Senokot  Aspirin 81 mg  Lopressor    Past Medical History:    Atherosclerosis  Right bundle branch block  TIA  Malignant melanoma of skin  Basal cell carcinoma  Hypertension  ACP  Seronegative rheumatoid arthritis  Colon polyp  CVA  Benign prostatic hyperplasia     Past Surgical History:    Lateral lumbar spinal fusion  Joint replacement of fright hip  Right knee replacement  Melanoma excision    Family History:    Diabetes  Alzheimer disease  Obesity  Cancer     Social History:  Presents via EMS (with daughter at bedside)  Tobacco use: Former smoker (2 ppd for 30 years)  Alcohol use: Yes (2-3 drinks per night)  PCP:[JB1.1] Nehemias Granados[JB1.3]    Marital Status:       Review of Systems   Constitutional: Negative for appetite change.   Gastrointestinal: Negative for constipation, diarrhea and nausea.   Musculoskeletal: Positive for arthralgias.   Skin: Positive for wound.   Neurological: Positive for headaches.   All other systems reviewed and are negative.    Physical Exam[JB1.1]     Patient Vitals for the past 24 hrs:   BP Temp Pulse Heart Rate Resp SpO2 Height   09/28/18 1530 153/71 - - 53 11 97 % -   09/28/18 1523 - - - 53 18 95 % -   09/28/18 1500 154/66 - - - - - -   09/28/18 1415 - - - - - 95 % -   09/28/18 1400 (!) 155/95 - - - - - -   09/28/18 1300 - - - - - 97 % -   09/28/18 1200 - - - - - 97 % -   09/28/18 1149 165/90 97.9  F (36.6  C) 72 - 18 96 % 1.778 m (5' 10\")[JB1.3]        Physical Exam  Nursing notes reviewed. Vitals reviewed.  General: Alert. Well kept.[JB1.1]   Head: right forehead contusion/abrasion.  Right parietal contusion/hematoma.[PH1.1]  Eyes:  Conjunctiva non-injected, non-icteric.  Ears:TM s normal.[JB1.1]  Nose: right sided nasal abrasion with no septal hematoma.[PH1.1]   Neck/Throat: Moist mucous membranes, oropharynx clear without erythema or exudate. No cervical lymphadenopathy.  Normal voice.[JB1.1] No bite maloclusion.[PH1.1] "   Cardiac:[JB1.1] irregular irr[PH1.1]egular rhythm. Normal heart sounds with no murmur/rubs/click.[JB1.1] No peripheral edema.[PH1.1]   Pulmonary: Clear and equal breath sounds bilaterally. No crackles/rales. No wheezing  Abdomen: Soft. Non-distended. Non-tender to palpation. No masses. No guarding or rebound.  Musculoskeletal: Normal gross range of motion of all 4 extremities.[JB1.1]  Pain to palpation right anterior knee, left lateral hip with normal ROM.[PH1.1]   Neurological: Alert and oriented x[JB1.1] to person, place, situation[PH1.1].[JB1.1] Unable to recall events surrounding fall or recent move.[PH1.1]    Skin: Warm and dry without rashes or petechiae.  Psych: Affect normal. Good eye contact.     Emergency Department Course[JB1.1]   ECG (12:59:42):  Rate 66 bpm. MA interval *. QRS duration 118. QT/QTc 482/505. P-R-T axes * 28 38. Atrial fibrillation with premature ventricular or aberrantly conducted complexes. Right bundle branch block. Abnormal ECG. Agree with computer interpretation. Interpreted at 1307 by Livia Wise, CNP.[JB1.4]     Imaging:  Radiographic findings were communicated with the patient and family who voiced understanding of the findings.    XR Pelvis with hip, left, 1 view:[JB1.1]  IMPRESSION: No acute fracture or dislocation.[JB1.2]    Knee XR, 3 views, right:[JB1.1]  IMPRESSION: No acute fracture or dislocation. Severe osteoarthritis. Joint effusion.[JB1.2]    CT Head without contrast:[JB1.1]  IMPRESSION:  Diffuse cerebral volume loss and cerebral white matter changes consistent with chronic small vessel ischemic disease. No evidence for acute intracranial pathology.[JB1.2]     Imaging independently reviewed and agree with radiologist interpretation.    Laboratory:  CBC: AWNL (WBC[JB1.1] 7.0[JB1.4], HGB[JB1.1] 15.1[JB1.4], PLT[JB1.1] 216[JB1.4])   CMP:[JB1.1]  (H) o/w[JB1.4] WNL (Creatinine[JB1.1] 0.74[JB1.4])[JB1.1]   1336[JB1.2]: Troponin:[JB1.1] 0.049  (H)[JB1.2]  Lipase:[JB1.1] 103  Magnesium: 2.1[JB1.2]     UA with micro:[JB1.1] Ketones 10, Bacteria few[JB1.2] o/w negative     Interventions:[JB1.1]  1349[JB1.2]: NS 1L IV Bolus[JB1.1]   1350[JB1.2]: Tylenol[JB1.1] 1000[JB1.2] mg PO      Emergency Department Course:  Past medical records, nursing notes, and vitals reviewed.  1235: I performed an exam of the patient and obtained history, as documented above.    IV inserted and blood drawn. Above interventions provided. Blood was sent to the lab for further testing, results above.   The patient provided a urine sample here in the emergency department. This was sent for laboratory testing, findings above.  The patient was sent for a CT, XR's while in the emergency department, findings above.     Findings and plan explained to the Patient and daughter who consents to admission.[JB1.1]     1505[JB1.2]: Discussed the patient with [JB1.1] Ortiz[JB1.2], who will admit the patient to a[JB1.1]n observation[JB1.2] bed for further monitoring, evaluation, and treatment.      Impression & Plan     Medical Decision Making:[JB1.1]  Jose E Capps is a 87 year old male who presents for evaluation after a fall. On examination, the patient has a nonfocal neurologic examination with the exception of short-term memory loss regarding incidents surrounding his fall and recent daily events. His daughter does not[JB1.3]e[PH1.1] that this has been increasing over the last 3 months and has been discussed with his primary care doctor. There is no acute change today. He has a hematoma to the right forehead and the right parietal scalp. Head CT today is negative for intracranial bleeding or fracture. His neck was cleared clinically using NEXUS criteria. He also has mild left hip tenderness without bruising noted. X-ray of the pelvis and left hip showed on acute fracture or dislocation. He has been ambulatory without assistance. He also has right knee bruising and tenderness. X-ray  today is negative for fracture but does show severe osteoarthritis and a mild joint effusion. CMP and CBC today are normal. Lipase and magnesium are also normal. EKG today shows new onset atrial fibrillation that is rate-controlled in the emergency department. He was also found to have an elevated troponin at 0.049. He denies chest pain or shortness of breath and was given aspirin (324 mg) in the emergency department.[JB1.3] No indication for heparin from the ED.[PH1.1]  His urine is without signs of infection. Secondary to new onset atrial fibrillation with an elevation in his troponin, I talked with Dr. Alcantar regarding admission for observation and further management and evaluation and he graciously accepts. Patient and his daughter are in agreement with this plan.[JB1.3]    Diagnosis:[JB1.1]    ICD-10-CM   1. Elevated troponin R74.8   2. Atrial fibrillation, unspecified type (H) I48.91   3. Fall, initial encounter W19.XXXA   4. Contusion of scalp, initial encounter S00.03XA[JB1.3]     Disposition:[JB1.1]  Admitted to hospitalist service.[JB1.3]  Scribe Disclosure:  I, Adriano Bailey, am serving as a scribe at 12:25 PM on 9/28/2018 to document services personally performed by Livia Wise CNP based on my observations and the provider's statements to me.   9/28/2018    EMERGENCY DEPARTMENT[JB1.1]     Livia Wise CNP  09/28/18 0906  [PH1.2]     Revision History        User Key Date/Time User Provider Type Action    > PH1.2 9/28/2018  6:54 PM Livia Wise CNP Nurse Practitioner Sign     PH1.1 9/28/2018  6:50 PM Livia Wise CNP Nurse Practitioner      JB1.3 9/28/2018  3:56 PM Adriano Bailey     JB1.2 9/28/2018  3:12 PM Adriano Bailey     JB1.4 9/28/2018  2:01 PM Adriano Bailey     JB1.1 9/28/2018 12:51 PM Adriano Bailey            ED Provider Notes by Olivia Rocha MD at 9/28/2018 11:57 AM     Author:  Olivia Rocha MD  Service:  Emergency Medicine Author Type:  Physician    Filed:  9/28/2018  5:51 PM Date of Service:  9/28/2018 11:57 AM Creation Time:  9/28/2018  4:48 PM    Status:  Signed :  Olivia Rocha MD (Physician)         Emergency Department Attending Supervision Note  9/28/2018  4:48 PM      I evaluated this patient in conjunction with Livia Wise CNP      Briefly, the patient presented with[ZJ1.1] falls.  Patient had 2 falls in the last several days.  He has hit his head twice once in the front once in the back.  He does not recall why he fell.  He has dementia and has some difficulty with short-term memory.  He denies any vomiting, nausea, diarrhea.  He denies any chest pain or shortness breath.  Denies any palpitations.  Very vague as to what happened but he reported to nursing staff that he felt somewhat lightheaded.  No other acute changes noted by patient or family.[JB1.1]      On my exam,[ZJ1.1]   General: Resting on the bed.  Head: No obvious trauma to head.  Abrasion to forehead and nasal bridge   Ears, Nose, Throat:  External ears normal.  Nose normal.  No pharyngeal erythema, swelling or exudate.  Midline uvula.  clear TM  Eyes:  Conjunctivae clear.  Pupils are equal, round, and reactive.   Neck: Normal range of motion.  Neck supple.   CV: Regular rate and rhythm.  No murmurs.      Respiratory: Effort normal and breath sounds normal.  No wheezing or crackles.   Gastrointestinal: Soft.  No distension. There is no tenderness.   Musculoskeletal: Normal range of motion.  Non tender extremities to palpations.    Neuro: Alert. Moving all extremities appropriately.  Normal speech.  GCS 15.    Skin: Skin is warm and dry.  No rash noted. Ecchymosis over left hand.[JB1.1]        My impression is[ZJ1.1] falls, new onset atrial fibrillation, elevated troponin.  Broad differential pursued including but not limited to intracranial hemorrhage, subarachnoid, infection, A. fib, arrhythmia, dehydration,  electrolyte metabolic or renal dysfunction, etc.  UA is negative no evidence of infection.  CBC shows no leukocytosis or anemia.  BMP shows no acute electrolyte metabolic or renal dysfunction.  Magnesium level is normal.  Lipase and LFTs are unremarkable not concerning for obstructive biliary etiology, hepatitis, pancreatitis.  EKG shows A. fib, rhythm controlled, no evidence of acute ischemia.  Troponin is mildly elevated of unclear clinical significance.  This may be demand in the setting of new onset A. fib versus demand secondary to his falls is unclear.  He denies any active chest pain.  Head CT negative without any acute intracranial hemorrhage or mass or tumor.  Pelvic x-ray is negative without fracture dislocation.  X-ray of the knee is negative without acute fracture dislocation.  No other acute abnormalities noted on examination.  Given his falls, new onset A. fib and elevated troponin felt that he would benefit from admission.[JB1.1]        Diagnosis    ICD-10-CM    1. Elevated troponin R74.8 Magnesium     Magnesium     CANCELED: Magnesium   2. Atrial fibrillation, unspecified type (H) I48.91    3. Fall, initial encounter W19.XXXA    4. Contusion of scalp, initial encounter S00.03XA          Dr. Olivia Rocha[ZJ1.1]     Olivia Rocha MD  09/28/18 1751  [JB1.2]     Revision History        User Key Date/Time User Provider Type Action    > JB1.2 9/28/2018  5:51 PM Olivia Rocha MD Physician Sign     JB1.1 9/28/2018  5:48 PM Olivia Rocha MD Physician      ZJ1.1 9/28/2018  4:48 PM Jase Veliz Share            Progress Notes by Olivier Whatley RN at 9/28/2018  4:43 PM     Author:  Olivier Whatley RN Service:  (none) Author Type:  Registered Nurse    Filed:  9/28/2018  4:44 PM Date of Service:  9/28/2018  4:43 PM Creation Time:  9/28/2018  4:43 PM    Status:  Signed :  Olivier Whatley RN (Registered Nurse)         RECEIVING UNIT ED HANDOFF REVIEW    ED Nurse  Handoff Report was reviewed by: Olivier Whatley on September 28, 2018 at 4:44 PM[RL1.1]          Revision History        User Key Date/Time User Provider Type Action    > RL1.1 9/28/2018  4:44 PM Olivier Whatley, RN Registered Nurse Sign            ED Notes by Heaven Luu RN at 9/28/2018  3:58 PM     Author:  Heaven Luu RN Service:  (none) Author Type:  Registered Nurse    Filed:  9/28/2018  4:02 PM Date of Service:  9/28/2018  3:58 PM Creation Time:  9/28/2018  4:02 PM    Status:  Signed :  Heaven Luu RN (Registered Nurse)         [facility]  ED Nurse Handoff Report    ED Chief complaint: Fall (pt has had two falls in past 24 hours. last night struck front of head. today struck back of head. no c/o neck pain. Pt with increased weakness in the past days)      ED Diagnosis:   Final diagnoses:   Elevated troponin   Atrial fibrillation, unspecified type (H)   Fall, initial encounter   Contusion of scalp, initial encounter       Code Status: Full Code    Allergies:   Allergies   Allergen Reactions     No Known Drug Allergy        Activity level - Baseline/Home:  Independent    Activity Level - Current:   Stand with Assist     Needed?: No    Isolation: No  Infection: Not Applicable  Bariatric?: No    Vital Signs:   Vitals:    09/28/18 1415 09/28/18 1500 09/28/18 1523 09/28/18 1530   BP:  154/66  153/71   Pulse:       Resp:   18 11   Temp:       SpO2: 95%  95% 97%   Height:           Cardiac Rhythm: ,        Pain level: 0-10 Pain Scale: 1    Is this patient confused?: Yes   [place] - Suicide Severity Rating Scale Completed?  Yes  If yes, what color did the patient score?  White    Patient Report: Initial Complaint: Pt. Fell at assisted living yesterday and today. Daughter brings him in for evaluation. Pt. Does not remember falling. Confused.  Focused Assessment: Alert and confused. Pt. States he is sore in the hands and has some eccymotic areas on hands and forehead. Pt.  Denies pain or shortness of breath. Cardiac monitor reveals afib.  Tests Performed: labs, xray  Abnormal Results:[SH1.1]   Results for orders placed or performed during the hospital encounter of 09/28/18   CT Head w/o Contrast    Narrative    CT OF THE HEAD WITHOUT CONTRAST  9/28/2018 1:23 PM     COMPARISON: None.    HISTORY:  Fall with frontal hematoma and right parietal hematoma.     TECHNIQUE: 5 mm thick axial CT images of the head were acquired  without IV contrast material.    FINDINGS:  There is moderate diffuse cerebral volume loss. There are  subtle patchy areas of decreased density in the cerebral white matter  bilaterally that are consistent with sequela of chronic small vessel  ischemic disease.     The ventricles and basal cisterns are within normal limits in  configuration given the degree of cerebral volume loss.  There is no  midline shift. There are no extra-axial fluid collections.     No intracranial hemorrhage, mass or recent infarct.    The visualized paranasal sinuses are well aerated. There is no  mastoiditis. There are no fractures of the visualized bones.       Impression    IMPRESSION:  Diffuse cerebral volume loss and cerebral white matter  changes consistent with chronic small vessel ischemic disease. No  evidence for acute intracranial pathology.     Radiation dose for this scan was reduced using automated exposure  control, adjustment of the mA and/or kV according to patient size, or  iterative reconstruction technique.    HOLGER PADILLA MD   XR Pelvis w Hip Left 1 View    Narrative    PELVIS AND HIP LEFT ONE VIEW   9/28/2018 1:35 PM     HISTORY: Pain.     COMPARISON: None.       Impression    IMPRESSION: No acute fracture or dislocation.    АЛЕКСАНДР HUBER MD   XR Knee Right 3 Views    Narrative    RIGHT KNEE THREE VIEWS   9/28/2018 1:36 PM     HISTORY: Right knee pain after fall.     COMPARISON: None.       Impression    IMPRESSION: No acute fracture or dislocation. Severe  osteoarthritis.  Joint effusion.    АЛЕКСАНДР HUBER MD   CBC with platelets differential   Result Value Ref Range    WBC 7.0 4.0 - 11.0 10e9/L    RBC Count 4.66 4.4 - 5.9 10e12/L    Hemoglobin 15.1 13.3 - 17.7 g/dL    Hematocrit 44.9 40.0 - 53.0 %    MCV 96 78 - 100 fl    MCH 32.4 26.5 - 33.0 pg    MCHC 33.6 31.5 - 36.5 g/dL    RDW 13.4 10.0 - 15.0 %    Platelet Count 216 150 - 450 10e9/L    Diff Method Automated Method     % Neutrophils 71.6 %    % Lymphocytes 13.0 %    % Monocytes 13.3 %    % Eosinophils 1.6 %    % Basophils 0.4 %    % Immature Granulocytes 0.1 %    Nucleated RBCs 0 0 /100    Absolute Neutrophil 5.0 1.6 - 8.3 10e9/L    Absolute Lymphocytes 0.9 0.8 - 5.3 10e9/L    Absolute Monocytes 0.9 0.0 - 1.3 10e9/L    Absolute Eosinophils 0.1 0.0 - 0.7 10e9/L    Absolute Basophils 0.0 0.0 - 0.2 10e9/L    Abs Immature Granulocytes 0.0 0 - 0.4 10e9/L    Absolute Nucleated RBC 0.0    Troponin I   Result Value Ref Range    Troponin I ES 0.049 (H) 0.000 - 0.045 ug/L   Comprehensive metabolic panel   Result Value Ref Range    Sodium 140 133 - 144 mmol/L    Potassium 4.5 3.4 - 5.3 mmol/L    Chloride 106 94 - 109 mmol/L    Carbon Dioxide 27 20 - 32 mmol/L    Anion Gap 7 3 - 14 mmol/L    Glucose 101 (H) 70 - 99 mg/dL    Urea Nitrogen 21 7 - 30 mg/dL    Creatinine 0.74 0.66 - 1.25 mg/dL    GFR Estimate >90 >60 mL/min/1.7m2    GFR Estimate If Black >90 >60 mL/min/1.7m2    Calcium 8.7 8.5 - 10.1 mg/dL    Bilirubin Total 1.0 0.2 - 1.3 mg/dL    Albumin 3.4 3.4 - 5.0 g/dL    Protein Total 6.8 6.8 - 8.8 g/dL    Alkaline Phosphatase 97 40 - 150 U/L    ALT 27 0 - 70 U/L    AST 26 0 - 45 U/L   UA with Microscopic reflex to Culture   Result Value Ref Range    Color Urine Light Yellow     Appearance Urine Clear     Glucose Urine Negative NEG^Negative mg/dL    Bilirubin Urine Negative NEG^Negative    Ketones Urine 10 (A) NEG^Negative mg/dL    Specific Gravity Urine 1.008 1.003 - 1.035    Blood Urine Negative NEG^Negative    pH  Urine 6.0 5.0 - 7.0 pH    Protein Albumin Urine Negative NEG^Negative mg/dL    Urobilinogen mg/dL Normal 0.0 - 2.0 mg/dL    Nitrite Urine Negative NEG^Negative    Leukocyte Esterase Urine Negative NEG^Negative    Source Midstream Urine     WBC Urine <1 0 - 5 /HPF    RBC Urine <1 0 - 2 /HPF    Bacteria Urine Few (A) NEG^Negative /HPF   Lipase   Result Value Ref Range    Lipase 103 73 - 393 U/L   Magnesium   Result Value Ref Range    Magnesium 2.1 1.6 - 2.3 mg/dL   EKG 12-lead, tracing only   Result Value Ref Range    Interpretation ECG Click View Image link to view waveform and result[SH1.2]      Treatments provided: NS bolus 1000 ml total, aspirin 325    Family Comments: Daughter at bedside    OBS brochure/video discussed/provided to patient/family: Yes              Name of person given brochure if not patient: Daughter              Relationship to patient: Daughter    ED Medications:   Medications   0.9% sodium chloride BOLUS (0 mLs Intravenous Stopped 9/28/18 1549)     Followed by   sodium chloride 0.9% infusion (not administered)   acetaminophen (TYLENOL) tablet 1,000 mg (1,000 mg Oral Given 9/28/18 1350)   aspirin tablet 325 mg (325 mg Oral Given 9/28/18 1522)       Drips infusing?:  No    For the majority of the shift this patient was Green.   Interventions performed were updated on plan of care, sandwhich, reposition, fluids.    Severe Sepsis OR Septic Shock Diagnosis Present: No    To be done/followed up on inpatient unit:  No    ED NURSE PHONE NUMBER: [phone][SH1.1]          Revision History        User Key Date/Time User Provider Type Action    > SH1.2 9/28/2018  4:02 PM Heaven Luu, RN Registered Nurse Sign     SH1.1 9/28/2018  3:58 PM Heaven Luu, RN Registered Nurse             ED Notes signed by Shalini Herrmann-Provider at 9/28/2018 12:17 PM      Author:  [place] Non-Provider Service:  (none) Author Type:  (none)    Filed:  9/28/2018 12:17 PM Date of Service:  9/28/2018 12:16 PM Creation Time:   9/28/2018 12:17 PM    Status:  Signed :  Scan, Non-Provider     Scan on 9/28/2018 12:17 PM by Scan Non-Provider : [facility] DEPART[facility]          Revision History        User Key Date/Time User Provider Type Action    > [N/A] 9/28/2018 12:17 PM Scan, Non-Provider (none) Sign            ED Notes by Olivier Landry, RN at 9/28/2018 11:57 AM     Author:  Olivier Landry, RN Service:  (none) Author Type:  Registered Nurse    Filed:  9/28/2018 11:57 AM Date of Service:  9/28/2018 11:57 AM Creation Time:  9/28/2018 11:57 AM    Status:  Signed :  Olivier Landry, RN (Registered Nurse)         Bed: ED25  Expected date:   Expected time:   Means of arrival:   Comments:  radha - 84  M fall head injury     Revision History        User Key Date/Time User Provider Type Action    > RH1.1 9/28/2018 11:57 AM Olivier Landry, RN Registered Nurse Sign                  Procedure Notes     No notes of this type exist for this encounter.         Progress Notes - Therapies (Notes from 09/28/18 through 10/01/18)      Progress Notes by Jean Cosme PT at 9/29/2018 11:59 AM     Author:  Jean Cosme PT Service:  Acute IP Rehab Author Type:  Physical Therapist    Filed:  9/29/2018 11:59 AM Date of Service:  9/29/2018 11:59 AM Creation Time:  9/29/2018 11:59 AM    Status:  Signed :  Jean Cosme PT (Physical Therapist)          09/29/18 1100   Quick Adds   Type of Visit Initial PT Evaluation   Living Environment   Lives With facility resident   Living Arrangements assisted living   Home Accessibility no concerns;bed and bath are not on the first floor   Number of Stairs to Enter Home 0   Number of Stairs Within Home 0   Stair Railings at Home none   Transportation Available family or friend will provide   Living Environment Comment Pt lives with spouse in an [facility] on the 4th floor with elevator access.    Self-Care   Dominant Hand right   Usual Activity Tolerance good   Current Activity Tolerance fair   Regular  Exercise no   Equipment Currently Used at Home none   Activity/Exercise/Self-Care Comment Pt owns a WC and std cane. Pt was ind with all ADL's prior to admission per daughter who was present at bs.    Functional Level Prior   Ambulation 0-->independent   Transferring 0-->independent   Toileting 0-->independent   Bathing 0-->independent   Dressing 0-->independent   Eating 0-->independent   Communication 0-->understands/communicates without difficulty   Swallowing 0-->swallows foods/liquids without difficulty   Cognition 1 - attention or memory deficits   Number of times patient has fallen within last six months 2   Which of the above functional risks had a recent onset or change? ambulation;transferring   Prior Functional Level Comment Pt was ind with ambualtion without the use of any AD's    General Information   Onset of Illness/Injury or Date of Surgery - Date 09/28/18   Referring Physician Balta Alcantar MD   Patient/Family Goals Statement Be independent like before   Pertinent History of Current Problem (include personal factors and/or comorbidities that impact the POC) Pt admitted with 2 falls and Afib from the assisted living facility pt resides in.. 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 PMH includes: TIA, HTN, RA, hyperlipidemia and CAD   Precautions/Limitations fall precautions   Weight-Bearing Status - LLE full weight-bearing   Weight-Bearing Status - RLE full weight-bearing   General Observations Pleasant and cooperative   General Info Comments Activity: up with assist    Cognitive Status Examination   Orientation person;place   Level of Consciousness alert   Follows Commands and Answers Questions 75% of the time   Personal Safety and Judgment impaired   Memory impaired   Pain Assessment   Patient Currently in Pain (Pt c/o R hand pain using RW. R hand swelling 2/2 falls)   Integumentary/Edema   Integumentary/Edema  Comments Pt noted with R hand and R knee swelling   Range of Motion (ROM)   ROM Comment BLE: WFL   Strength   Strength Comments RLE: 4-/5, LLE: 4+/5   Bed Mobility   Bed Mobility Comments Supine>sit: CGA and verbal cues using bed rail, Sit>supine: SBA   Transfer Skills   Transfer Comments STS x 2 with min assist and verbal cues on hand placement.    Gait   Gait Comments 20 ft with RW and mod assist for safety and verbal cues   Balance   Balance Comments Impaired static and dynamic standing balance.    Sensory Examination   Sensory Perception no deficits were identified   Coordination   Coordination Comments RLE: impaired    Muscle Tone   Muscle Tone no deficits were identified   General Therapy Interventions   Planned Therapy Interventions balance training;bed mobility training;gait training;motor coordination training;neuromuscular re-education;strengthening;stretching;transfer training;risk factor education;home program guidelines;progressive activity/exercise   Clinical Impression   Criteria for Skilled Therapeutic Intervention yes, treatment indicated   PT Diagnosis Imapired gait   Influenced by the following impairments Decreased balance, strength, and coordiantion in RLE   Functional limitations due to impairments Assistance needed with transfers and gait   Clinical Presentation Stable/Uncomplicated   Clinical Presentation Rationale Pt admitted with acute CVA and falls.    Clinical Decision Making (Complexity) Low complexity   Therapy Frequency` 2 times/day   Predicted Duration of Therapy Intervention (days/wks) 3   Anticipated Discharge Disposition Transitional Care Facility   Risk & Benefits of therapy have been explained Yes   Patient, Family & other staff in agreement with plan of care Yes   Clinical Impression Comments Pt presents with decreased strength, coordination and balance limiting independence with fucntional mobility and transfers. Pt will benefit from continued skilled PT interventions to  "acheive PLOF and independence.    [facility] AM-PAC  \"6 Clicks\" V.2 Basic Mobility Inpatient Short Form   1. Turning from your back to your side while in a flat bed without using bedrails? 3 - A Little   2. Moving from lying on your back to sitting on the side of a flat bed without using bedrails? 3 - A Little   3. Moving to and from a bed to a chair (including a wheelchair)? 3 - A Little   4. Standing up from a chair using your arms (e.g., wheelchair, or bedside chair)? 3 - A Little   5. To walk in hospital room? 2 - A Lot   6. Climbing 3-5 steps with a railing? 2 - A Lot   Basic Mobility Raw Score (Score out of 24.Lower scores equate to lower levels of function) 16   Total Evaluation Time   Total Evaluation Time (Minutes) 15[AT1.1]        Revision History        User Key Date/Time User Provider Type Action    > AT1.1 9/29/2018 11:59 AM Jean Cosme PT Physical Therapist Sign            Progress Notes by Mike Flowers OT at 9/29/2018  9:12 AM     Author:  Mike Flowers OT Service:  (none) Author Type:  Occupational Therapist    Filed:  9/29/2018  9:12 AM Date of Service:  9/29/2018  9:12 AM Creation Time:  9/29/2018  9:12 AM    Status:  Signed :  Mike Flowers OT (Occupational Therapist)          09/29/18 0822   Quick Adds   Type of Visit Initial Occupational Therapy Evaluation   Living Environment   Lives With facility resident  (Lives at an [facility])   Living Arrangements assisted living   Home Accessibility no concerns   Number of Stairs to Enter Home 0   Number of Stairs Within Home 0   Stair Railings at Home none   Living Environment Comment Per chart he lives in an assisted living   Self-Care   Dominant Hand right   Functional Level Prior   Ambulation 0-->independent   Transferring 0-->independent   Toileting 0-->independent   Bathing 0-->independent   Dressing 0-->independent   Eating 0-->independent   Communication 0-->understands/communicates without difficulty "   Swallowing 0-->swallows foods/liquids without difficulty   Cognition 1 - attention or memory deficits   Fall history within last six months yes   Number of times patient has fallen within last six months 2   Prior Functional Level Comment Pt stated he was independent, attempted to call daughter but could not get a hold of her   General Information   Onset of Illness/Injury or Date of Surgery - Date 09/28/18   Referring Physician Neil Ward, SHIRA CNP   Patient/Family Goals Statement None stated   Additional Occupational Profile Info/Pertinent History of Current Problem Pt came in after recurrent falls at his assisted living. While in hopsital Pt expercienced R sided weakness/neglect. Code stroke called last night, CT showed Left posterior cerebral artery perfusion defect.Left posterior cerebral artery perfusion defect.   Precautions/Limitations fall precautions   General Observations Recieved in bed eating breakfast, Spoke with RN prior to seeing   Cognitive Status Examination   Orientation person;place   Level of Consciousness alert   Able to Follow Commands success, 2-step commands   Personal Safety (Cognitive) severe impairment   Memory impaired   Attention No deficits were identified   Organization/Problem Solving Reports problems with problem solving during evaluation   Executive Function Planning ability impaired   Cognitive Comment See flow sheet, Pt unaware why he was in hospital, could not give PLOF, SLUMS completed    Visual Perception   Visual Perception No deficits were identified   Visual Perception Comments Pt able to read, comprehend and track with both eyes. He was attending to R side throughout eval. Chart reported R sided visual cut, in eval, he was able to track to R, will complete further testing   Sensory Examination   Sensory Comments RUE with decreased sensation when eyes closed   Pain Assessment   Patient Currently in Pain No   Range of Motion (ROM)   ROM Comment Full range in shoulder  flexion bilateraly, elbow flex/ext, decreased finger range on R hand   Strength   Strength Comments diminshed strength R shoulder flexion 4/5, elbow flexion 4/5, elbow extension 4/5, and poor  strenght. LUE WFL 5/5 throughout   Hand Strength   Hand Strength Comments decreased  on RUE   Coordination   Coordination Comments ataxia noted on RUE with finger to nose test and gross motor grasp/realse with objects   Mobility   Bed Mobility Bed mobility skill: Supine to sit   Bed Mobility Skill: Supine to Sit   Level of [place]: Supine/Sit contact guard   Transfer Skill: Bed to Chair/Chair to Bed   Level of [place]: Bed to Chair minimum assist (75% patients effort)   Assistive Device - Transfer Skill Bed to Chair Chair to Bed Rehab Eval standard walker   Transfer Skill: Sit to Stand   Level of [place]: Sit/Stand minimum assist (75% patients effort)   Assistive Device for Transfer: Sit/Stand standard walker   Lower Body Dressing   Level of [place]: Dress Lower Body minimum assist (75% patients effort)  (to don/doff his socks in chair)   Grooming   Level of [place]: Grooming minimum assist (75% patients effort)  (while sitting in chair)   Eating/Self Feeding   Level of [place]: Eating independent   Instrumental Activities of Daily Living (IADL)   IADL Comments unsure of PLOF at this time   Activities of Daily Living Analysis   Impairments Contributing to Impaired Activities of Daily Living balance impaired;cognition impaired;strength decreased;sensory feedback impaired;sensation decreased   General Therapy Interventions   Planned Therapy Interventions ADL retraining;IADL retraining;balance training;cognition;neuromuscular re-education;strengthening   Clinical Impression   Criteria for Skilled Therapeutic Interventions Met yes, treatment indicated   OT Diagnosis Impairments in ADL performance   Influenced by the following impairments strength, cognition, safety, balance   Assessment of  "Occupational Performance 5 or more Performance Deficits   Identified Performance Deficits dressing, bathing, meds, grooming, toileting   Clinical Decision Making (Complexity) High complexity   Therapy Frequency 2 times/day   Predicted Duration of Therapy Intervention (days/wks) 3 days   Anticipated Discharge Disposition Acute Rehabilitation Facility   Risks and Benefits of Treatment have been explained. Yes   Patient, Family & other staff in agreement with plan of care Yes   [facility] TM \"6 Clicks\"   2016, Trustees of [facility], under license to [employer].  All rights reserved.   6 Clicks Short Forms Daily Activity Inpatient Short Form   [facility]  \"6 Clicks\" Daily Activity Inpatient Short Form   1. Putting on and taking off regular lower body clothing? 3 - A Little   2. Bathing (including washing, rinsing, drying)? 3 - A Little   3. Toileting, which includes using toilet, bedpan or urinal? 3 - A Little   4. Putting on and taking off regular upper body clothing? 3 - A Little   5. Taking care of personal grooming such as brushing teeth? 3 - A Little   6. Eating meals? 4 - None   Daily Activity Raw Score (Score out of 24.Lower scores equate to lower levels of function) 19   Total Evaluation Time   Total Evaluation Time (Minutes) 15[RS1.1]        Revision History        User Key Date/Time User Provider Type Action    > RS1.1 9/29/2018  9:12 AM Mike Flowers OT Occupational Therapist Sign            "

## 2018-09-28 NOTE — IP AVS SNAPSHOT
"` `           [facility] [address] [address]: [phone]                                              INTERAGENCY TRANSFER FORM - NURSING   2018                    Hospital Admission Date: 2018  GAURI WU   : 1931  Sex: Male        Attending Provider: Balta Alcantar MD     Allergies:  No Known Drug Allergy    Infection:  None   Service:  HOSPITALIST    Ht:  1.778 m (5' 10\")   Wt:  75.2 kg (165 lb 12.6 oz)   Admission Wt:  74 kg (163 lb 3.2 oz)    BMI:  23.79 kg/m 2   BSA:  1.93 m 2            Patient PCP Information     Provider PCP Type    Nehemias Granados MD General      Current Code Status     Date Active Code Status Order ID Comments User Context       Prior      Code Status History     Date Active Date Inactive Code Status Order ID Comments User Context    10/1/2018 12:48 PM  DNR/DNI [id number]  Emily Maldonado MD Outpatient    2018  5:00 PM 10/1/2018 12:48 PM DNR/DNI [id number]  Balta Alcantar MD Inpatient    2013  9:20 AM 2018  5:00 PM Full Code [id number] SEE Health Care directive dated 2011. Simona Messina Outpatient      Advance Directives        Scanned docmt in ACP Activity?           Yes, scanned ACP docmt        Hospital Problems as of 10/1/2018              Priority Class Noted POA    Falls, initial encounter Medium  2018 Yes      Non-Hospital Problems as of 10/1/2018              Priority Class Noted    ACP (advance care planning) Medium  4/3/2012    Seronegative rheumatoid arthritis (H) Medium  Unknown    Hyperlipidemia LDL goal <100 Medium  Unknown    Colon polyp Medium  Unknown    Hypertension goal BP (blood pressure) < 140/90 Medium  2012    Hip pain, left Medium  2016    History of malignant melanoma of skin Medium  2017    History of basal cell carcinoma Medium  2017    History of TIA (transient ischemic attack)  Medium  Unknown    RBBB (right bundle branch block) Medium  2018    Atherosclerosis of native " "coronary artery of native heart without angina pectoris Medium  9/11/2018      Immunizations     Name Date      Influenza (High Dose) 3 valent vaccine 09/11/18     Influenza (High Dose) 3 valent vaccine 01/09/18     Influenza (High Dose) 3 valent vaccine 10/10/16     Influenza (High Dose) 3 valent vaccine 10/23/12     Influenza (IIV3) PF 11/25/13     Influenza (IIV3) PF 09/01/11     Pneumo Conj 13-V (2010&after) 01/09/18     Pneumococcal 23 valent 09/01/11     TDAP Vaccine (Adacel) 01/09/18          END      ASSESSMENT     Discharge Profile Flowsheet     EXPECTED DISCHARGE     COMMUNICATION ASSESSMENT      Expected Discharge Date  10/01/18 09/29/18 0817   Patient's communication style  spoken language (English or Bilingual) 09/28/18 1147    DISCHARGE NEEDS ASSESSMENT     SKIN      Equipment Currently Used at Home  none 09/29/18 1145   Inspection of bony prominences  Full 10/01/18 1043    Transportation Available  family or friend will provide 09/29/18 1145   Skin WDL  ex 10/01/18 1043    # of Referrals Placed by CTS  Scheduled Follow-up appointments 10/01/18 1305   Skin Color/Characteristics  bruised (ecchymotic) 10/01/18 1043    GASTROINTESTINAL (ADULT,PEDIATRIC,OB)     Skin Integrity  abrasion(s);bruise(s);scar(s);scab(s) 10/01/18 1043    GI WDL  WDL 10/01/18 1043   Inspection under devices  Full 09/30/18 1037    All Quadrants Bowel Sounds  audible and normoactive 10/01/18 1043   SAFETY      Last Bowel Movement  09/30/18 09/30/18 2137   Safety WDL  WDL 10/01/18 1257    GI Signs/Symptoms  fecal incontinence 09/30/18 2137   All Alarms  alarm(s) activated and audible 10/01/18 1257    Passing flatus  yes 10/01/18 1043                      Assessment WDL (Within Defined Limits) Definitions           Safety WDL     Effective: 09/28/15    Row Information: <b>WDL Definition:</b> Bed in low position, wheels locked; call light in reach; upper side rails up x 2; ID band on<br> <font color=\"gray\"><i>Item=AS safety " "wdl>>List=AS safety wdl>>Version=F14</i></font>      Skin WDL     Effective: 09/28/15    Row Information: <b>WDL Definition:</b> Warm; dry; intact; elastic; without discoloration; pressure points without redness<br> <font color=\"gray\"><i>Item=AS skin wdl>>List=AS skin wdl>>Version=F14</i></font>      Vitals     Vital Signs Flowsheet     QUICK ADDS     ANALGESIA SIDE EFFECTS MONITORING      Quick Adds  Fingerstick Glucose 09/29/18 0455   Side Effects Monitoring: Respiratory Quality  R 10/01/18 1248    VITAL SIGNS     Side Effects Monitoring: Respiratory Depth  N 10/01/18 1248    Temp  98  F (36.7  C) 10/01/18 1209   Side Effects Monitoring: Sedation Level  1 10/01/18 1248    Temp src  Oral 10/01/18 1209   ARAMIS COMA SCALE      Resp  16 10/01/18 1209   Best Eye Response  4-->(E4) spontaneous 09/30/18 1632    Pulse  57 09/28/18 2058   Best Motor Response  6-->(M6) obeys commands 09/30/18 1632    Heart Rate  58 10/01/18 1209   Best Verbal Response  5-->(V5) oriented 09/30/18 1632    Pulse/Heart Rate Source  Monitor 10/01/18 1209   [place] Coma Scale Score  15 09/30/18 1632    BP  133/67 10/01/18 1209   HEIGHT AND WEIGHT      BP Location  Right arm 10/01/18 1209   Height  1.778 m (5' 10\") 09/28/18 1151    OXYGEN THERAPY     Weight  75.2 kg (165 lb 12.6 oz) 09/30/18 0622    SpO2  93 % 10/01/18 1209   Weight Method  Bed scale 09/30/18 0622    O2 Device  None (Room air) 10/01/18 1209   Estimated Weight (From ED)  77.1 kg (170 lb) 09/28/18 1151    PAIN/COMFORT     POSITIONING      Patient Currently in Pain  yes 10/01/18 1248   Body Position  log-rolled;supine, head elevated 10/01/18 0823    Preferred Pain Scale  number (Numeric Rating Pain Scale) 09/30/18 1622   Head of Bed (HOB)  HOB at 20-30 degrees 10/01/18 0823    0-10 Pain Scale  3 10/01/18 1019   Positioning/Transfer Devices  pillows 09/30/18 1037    Pain Location  Back 10/01/18 1248   Chair  Upright in chair 10/01/18 1257    Pain Orientation  Lower 10/01/18 0823   " DAILY CARE      Pain Descriptors  Aching 10/01/18 1248   Activity Management  activity adjusted per tolerance 10/01/18 0823    Pain Intervention(s)  Repositioned 10/01/18 1248   Activity Assistance Provided  assistance, 1 person 10/01/18 0823    Response to Interventions  Decrease in pain 10/01/18 1019   Assistive Device Utilized  gait belt;walker 10/01/18 0823            Patient Lines/Drains/Airways Status    Active LINES/DRAINS/AIRWAYS     None            Patient Lines/Drains/Airways Status    Active PICC/CVC     None            Intake/Output Detail Report     Date Intake     Output Net    Shift P.O. I.V. IV Piggyback Total Urine Total       Day 09/30/18 0700 - 09/30/18 1459 660 600 -- 1260 -- -- 1260    Halley 09/30/18 1500 - 09/30/18 2259 520 -- -- 520 -- -- 520    Noc 09/30/18 2300 - 10/01/18 0659 -- 862.5 -- 862.5 150 150 712.5    Day 10/01/18 0700 - 10/01/18 1459 -- -- -- -- 150 150 -150    Halley 10/01/18 1500 - 10/01/18 2259 -- -- -- -- -- -- 0      Last Void/BM       Most Recent Value    Urine Occurrence 1 at 10/01/2018 1411    Stool Occurrence 1 at 09/30/2018 1243      Case Management/Discharge Planning     Case Management/Discharge Planning Flowsheet     REFERRAL INFORMATION     COPING/STRESS      Did the Initial Social Work Assessment result in a Social Work Case?  Yes 10/01/18 1157   Major Change/Loss/Stressor  relocation 09/28/18 1847    Admission Type  inpatient 10/01/18 1157   EXPECTED DISCHARGE      Arrived From  home or self-care 10/01/18 1157   Expected Discharge Date  10/01/18 09/29/18 0817    Referral Source  physician 10/01/18 1157   DISCHARGE PLANNING      # of Referrals Placed by CTS  Scheduled Follow-up appointments 10/01/18 1305   Transportation Available  family or friend will provide 09/29/18 1145    Reason For Consult  discharge planning 10/01/18 1157   FINAL RESOURCES      Record Reviewed  medical record;patient profile 10/01/18 1157   Equipment Currently Used at Home  none 09/29/18 1145     CTS Assigned to Case  Kalee Olivo RN 10/01/18 1305   ABUSE RISK SCREEN      LIVING ENVIRONMENT     QUESTION TO PATIENT:  Has a member of your family or a partner(now or in the past) intimidated, hurt, manipulated, or controlled you in any way?  no 09/28/18 1154    Lives With  facility resident 09/29/18 1145   QUESTION TO PATIENT: Do you feel safe going back to the place where you are living?  yes 09/28/18 1154    Living Arrangements  assisted living 10/01/18 1157   OBSERVATION: Is there reason to believe there has been maltreatment of a vulnerable adult (ie. Physical/Sexual/Emotional abuse, self neglect, lack of adequate food, shelter, medical care, or financial exploitation)?  no 09/28/18 1154    ASSESSMENT OF FAMILY/SOCIAL SUPPORT     OTHER      Marital Status   10/01/18 1157   Are you depressed or being treated for depression?  No 09/28/18 1847    Who is your support system?  Wife;Children 10/01/18 1157   HOMICIDE RISK      Spouse's Name  -- (Shira) 10/01/18 1157   Feels Like Hurting Others  no 09/28/18 1154    Description of Support System  Supportive;Involved 10/01/18 1157

## 2018-09-28 NOTE — TELEPHONE ENCOUNTER
Reason for Call:  Other Home care    Detailed comments: Srinivasa RUSH from [facility] called to give update to Dr Granados that pt Jose E want to ER today so Srinivasa will fax order to fax # [phone] to be sig right away if possibil.    Phone Number Patient can be reached at:[phone]     Best Time: anytie    Can we leave a detailed message on this number? YES    Call taken on 9/28/2018 at 12:02 PM by Alfreda Sutton

## 2018-09-28 NOTE — TELEPHONE ENCOUNTER
Rheumatologist needs to order the medication.    Nehemias Granados MD  [facility] [place]

## 2018-09-28 NOTE — IP AVS SNAPSHOT
` ` Patient Information     Patient Name Sex     Jose E Capps ([id number]) Male 1931       Room Bed    [facility] 0741-02      Patient Demographics     Address Phone E-mail Address    [address]  [place] [address] [phone] (Home)  [phone] (Mobile) [email or web address]      Patient Ethnicity & Race     Ethnic Group Patient Race    American White      Emergency Contact(s)     Name Relation Home Work Mobile    Jefry Silva Spouse [phone]    Marita Capps Daughter [phone]    Fang Ulloa  [phone]        Documents on File        Status Date Received Description       Documents for the Patient    Privacy Notice - [place] Received 12     Insurance Card Received () 12     External Medication Information Consent Accepted () 12     Patient ID Received () 05/10/16     Consent for Services - Hospital/Clinic Received () 12     Consent to Communicate Received () 12     Other Received 12 bcbs cob signed    Advance Directives and Living Will Not Received  Health Care Directive 11    HIM ETIENNE Authorization - File Only   [facility]  clinic 12    Insurance Card Received () 12     Insurance Card Received () 12     Consent for EHR Access  13 Copied from existing Consent for services - C/HOD collected on 2012    [facility] Specified Other       Consent for Services - Hospital/Clinic Received () 13     External Medication Information Consent Accepted 13     Advance Directives and Living Will Received 13 Validation of AD 2.22.11    Consent for Services - Hospital/Clinic Received () 14     Consent for Services - Hospital/Clinic       Insurance Card Received () 06/15/15 BCBS    Consent for Services - Hospital/Clinic Received () 06/15/15     Consent for Services/Privacy Notice - Hospital/Clinic  Received () 05/10/16     Insurance Card Received () 05/10/16 bc    Consent for Services/Privacy Notice - Hospital/Clinic Received () 17     Insurance Card Received 17 bc    Patient ID Received 17    Consent to Communicate  17 AUTHORIZATION TO DISCUSS PROTECTED HEALTH INFORMATION, 2017    Care Everywhere Prospective Auth Received 18     Insurance Card Received 18     Consent for Services - Geriatrics Received 18     Advance Directives and Living Will Received 18 POLST 18    Consent for Services - Hospital and Clinic Received 18     HIE Auth Received 18     Business/Insurance/Care Coordination/Health Form - Patient  18 ATTENDING PHYSICIAN STATEMENT 18       Documents for the Encounter    EMS/Ambulance Record  18 [place] FIRE DEPARTMENT    CMS IM for Patient Signature Received 18       Admission Information     Attending Provider Admitting Provider Admission Type Admission Date/Time    Balta Alcantar MD Bechard, Paul, MD Emergency 18  1157    Discharge Date Hospital Service Auth/Cert Status Service Area     Hospitalist Incomplete [facility] SERVICES    Unit Room/Bed Admission Status       [facility] [address] [facility] [address] 0741/0741-02 Admission (Confirmed)       Admission     Complaint    Falls, initial encounter      Hospital Account     Name Acct ID Class Status Primary Coverage    Jose E Capps [id number] Inpatient Open MEDICARE - MEDICARE FOR HB SUPPLEMENT            Guarantor Account (for Hospital Account #[id number])     Name Relation to Pt Service Area Active? Acct Type    Jose E Capps Self FCS Yes Personal/Family    Address Phone          [address]  [place] [id number] [phone](H)              Coverage Information (for Hospital Account #[id number])     1. MEDICARE/MEDICARE FOR HB SUPPLEMENT     F/O Payor/Plan Precert #    MEDICARE/MEDICARE FOR HB SUPPLEMENT      Subscriber Subscriber #    Jose E Capps [id number]    Address Phone    [employer] CLAIMS  [address]  [place] [address] [phone]          2. NIKITA/BCBS PLATINUM BLUE     F/O Payor/Plan Precert #    NIKITA/BCBS PLATINUM BLUE     Subscriber Subscriber #    Jose E Capps [id number]    Address Phone    [address] [address]  [place] [address] [phone]

## 2018-09-28 NOTE — IP AVS SNAPSHOT
"          [facility] [address] [address]: [phone]                                              INTERAGENCY TRANSFER FORM - PHYSICIAN ORDERS   2018                    Hospital Admission Date: 2018  GAURI WU   : 1931  Sex: Male        Attending Provider: Balta Alcantar MD     Allergies:  No Known Drug Allergy    Infection:  None   Service:  HOSPITALIST    Ht:  1.778 m (5' 10\")   Wt:  75.2 kg (165 lb 12.6 oz)   Admission Wt:  74 kg (163 lb 3.2 oz)    BMI:  23.79 kg/m 2   BSA:  1.93 m 2            Patient PCP Information     Provider PCP Type    Nehemias Granados MD General      ED Clinical Impression     Diagnosis Description Comment Added By Time Added    Elevated troponin [R74.8] Elevated troponin [R74.8]  Livia Wise CNP 2018  2:00 PM    Atrial fibrillation, unspecified type (H) [I48.91] Atrial fibrillation, unspecified type (H) [I48.91]  Livia Wise CNP 2018  2:00 PM    Fall, initial encounter [W19.XXXA] Fall, initial encounter [W19.XXXA]  Livia Wise CNP 2018  2:41 PM    Contusion of scalp, initial encounter [S00.03XA] Contusion of scalp, initial encounter [S00.03XA]  Livia Wise CNP 2018  2:42 PM      Hospital Problems as of 10/1/2018              Priority Class Noted POA    Falls, initial encounter Medium  2018 Yes      Non-Hospital Problems as of 10/1/2018              Priority Class Noted    ACP (advance care planning) Medium  4/3/2012    Seronegative rheumatoid arthritis (H) Medium  Unknown    Hyperlipidemia LDL goal <100 Medium  Unknown    Colon polyp Medium  Unknown    Hypertension goal BP (blood pressure) < 140/90 Medium  2012    Hip pain, left Medium  2016    History of malignant melanoma of skin Medium  2017    History of basal cell carcinoma Medium  2017    History of TIA (transient ischemic attack)  Medium  Unknown    RBBB (right bundle branch block) Medium  2018    Atherosclerosis of native coronary " artery of native heart without angina pectoris Medium  9/11/2018      Code Status History     Date Active Date Inactive Code Status Order ID Comments User Context    10/1/2018 12:48 PM  DNR/DNI [id number]  Emily Maldonado MD Outpatient    9/28/2018  5:00 PM 10/1/2018 12:48 PM DNR/DNI [id number]  Balta Alcantar MD Inpatient    11/12/2013  9:20 AM 9/28/2018  5:00 PM Full Code [id number] SEE Health Care directive dated 2/22/2011. [facility]         Medication Review      START taking        Dose / Directions Comments    apixaban ANTICOAGULANT 5 MG tablet   Commonly known as:  ELIQUIS   Used for:  Atrial fibrillation, unspecified type (H)        Dose:  5 mg   Take 1 tablet (5 mg) by mouth 2 times daily   Refills:  0          CONTINUE these medications which may have CHANGED, or have new prescriptions. If we are uncertain of the size of tablets/capsules you have at home, strength may be listed as something that might have changed.        Dose / Directions Comments    acetaminophen 500 MG tablet   Commonly known as:  MAPAP   This may have changed:  Another medication with the same name was removed. Continue taking this medication, and follow the directions you see here.   Used for:  Seronegative rheumatoid arthritis (H)        Dose:  500 mg   Take 1 tablet (500 mg) by mouth every 8 hours as needed for fever or pain   Quantity:  93 tablet   Refills:  12        leflunomide 10 MG tablet   Commonly known as:  ARAVA   This may have changed:  additional instructions   Used for:  Seronegative rheumatoid arthritis (H)        Dose:  10 mg   Take 1 tablet (10 mg) by mouth daily Hold it until you will return home   Refills:  0        metoprolol tartrate 25 MG tablet   Commonly known as:  LOPRESSOR   This may have changed:  how much to take   Used for:  Atrial fibrillation, unspecified type (H)        Dose:  6.25 mg   Take 0.25 tablets (6.25 mg) by mouth 2 times daily   Quantity:  60 tablet   Refills:  0           CONTINUE these medications which have NOT CHANGED        Dose / Directions Comments    atorvastatin 10 MG tablet   Commonly known as:  LIPITOR   Used for:  Mixed hyperlipidemia        Dose:  10 mg   Take 1 tablet (10 mg) by mouth daily   Quantity:  90 tablet   Refills:  3        Calcium + D3 600-200 MG-UNIT Tabs   Used for:  Imbalanced nutrition        TAKE 1 TABLET BY MOUTH ONCE DAILY   Quantity:  90 tablet   Refills:  3    Please authorize qty 31 with 12 refills for assisting living patient. Thanks       CEROVITE SENIOR Tabs   Used for:  Hypertension goal BP (blood pressure) < 140/90        TAKE 1 TABLET BY MOUTH ONCE DAILY   Quantity:  93 tablet   Refills:  3        fish oil-omega-3 fatty acids 1000 MG capsule   Used for:  Atherosclerosis of native coronary artery of native heart without angina pectoris        TAKE 1 CAPSULE BY MOUTH ONCE DAILY   Quantity:  31 capsule   Refills:  12        lisinopril 20 MG tablet   Commonly known as:  PRINIVIL/ZESTRIL   Used for:  Hypertension goal BP (blood pressure) < 140/90        Dose:  20 mg   Take 1 tablet (20 mg) by mouth daily   Quantity:  90 tablet   Refills:  3        polyethylene glycol Packet   Commonly known as:  MIRALAX/GLYCOLAX        Dose:  1 packet   Take 1 packet by mouth daily as needed for constipation   Refills:  0        sennosides 8.6 MG tablet   Commonly known as:  SENOKOT        Dose:  4 tablet   Take 4 tablets by mouth 2 times daily as needed   Refills:  0          STOP taking     SM ASPIRIN ADULT LOW STRENGTH 81 MG EC tablet   Generic drug:  aspirin                   Summary of Visit     Reason for your hospital stay       Recurrent falls  Left vertebral artery dissection  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  Paroxysmal Afib             After Care     Activity - Up with nursing assistance           Advance Diet as Tolerated       Follow this diet upon discharge: Orders Placed This Encounter      Combination Diet Thin Liquids (water, ice chips,  juice, milk, gelatin, ice cream, etc); Mechanical/Dental Soft Diet       Fall precautions           General info for SNF       Length of Stay Estimate: Short Term Care: Estimated # of Days <30  Condition at Discharge: Improving  Level of care:skilled   Rehabilitation Potential: Fair  Admission H&P remains valid and up-to-date: Yes  Recent Chemotherapy: N/A  Use Nursing Home Standing Orders: Yes       Mantoux instructions       Give two-step Mantoux (PPD) Per Facility Policy Yes             Referrals     Occupational Therapy Adult Consult       Evaluate and treat as clinically indicated.    Reason:  Acute CVA       Physical Therapy Adult Consult       Evaluate and treat as clinically indicated.    Reason:  Acute CVA       Speech Language Path Adult Consult       Evaluate and treat as clinically indicated.    Reason:  Dysphagia, CVA             Follow-Up Appointment Instructions     Future Labs/Procedures    Follow Up and recommended labs and tests     Comments:    Follow up with Nursing home physician in 2-3 days  No follow up labs or test are needed.    Follow up with primary care provider after discharge home    Follow up with Neurology in 4 weeks:  Appointment scheduled with Dr. Delgado at [facility] on November 1st, 11:00 am check in for 11:15 am appointment  [facility]  [address] [place] [address], [place] [address]  [phone]  The clinic will send a packet to patient's home address.  They would like the forms completed and sent back before this appointment.      Follow-Up Appointment Instructions     Follow Up and recommended labs and tests       Follow up with Nursing home physician in 2-3 days  No follow up labs or test are needed.    Follow up with primary care provider after discharge home    Follow up with Neurology in 4 weeks:  Appointment scheduled with Dr. Delgado at [facility] on November 1st, 11:00 am check in for 11:15 am  appointment  [facility]  [address] [place] [address], [address]  [place] [address]  [phone]  The clinic will send a packet to patient's home address.  They would like the forms completed and sent back before this appointment.             Statement of Approval     Ordered          10/01/18 1253  I have reviewed and agree with all the recommendations and orders detailed in this document.  EFFECTIVE NOW     Approved and electronically signed by:  Emily Maldonado MD

## 2018-09-28 NOTE — ED NOTES
Bed: ED25  Expected date:   Expected time:   Means of arrival:   Comments:  brenda - 84  M fall head injury

## 2018-09-28 NOTE — IP AVS SNAPSHOT
[facility]    [address]    [place] [address]    Phone:  [phone]                                       After Visit Summary   9/28/2018    Jose E Capps    MRN: [id number]           After Visit Summary Signature Page     I have received my discharge instructions, and my questions have been answered. I have discussed any challenges I see with this plan with the nurse or doctor.    ..........................................................................................................................................  Patient/Patient Representative Signature      ..........................................................................................................................................  Patient Representative Print Name and Relationship to Patient    ..................................................               ................................................  Date                                   Time    ..........................................................................................................................................  Reviewed by Signature/Title    ...................................................              ..............................................  Date                                               Time          22EPIC Rev 08/18

## 2018-09-28 NOTE — PHARMACY-ADMISSION MEDICATION HISTORY
Admission medication history interview status for the 9/28/2018  admission is complete. See EPIC admission navigator for prior to admission medications     Medication history source reliability:Good, Med list from [facility]    Actions taken by pharmacist (provider contacted, etc):called aditya on mel[facility] to get last doses     Additional medication history information not noted on PTA med list :None    Medication reconciliation/reorder completed by provider prior to medication history? No    Time spent in this activity: 15 minutes    Prior to Admission medications    Medication Sig Last Dose Taking? Auth Provider   acetaminophen (MAPAP) 500 MG tablet Take 1 tablet (500 mg) by mouth every 8 hours as needed for fever or pain prn Yes Nehemias Granados MD   Acetaminophen (TYLENOL PO) Take 500 mg by mouth 3 times daily While Awake  9/28/2018 at 0800 Yes Reported, Patient   atorvastatin (LIPITOR) 10 MG tablet Take 1 tablet (10 mg) by mouth daily 9/27/2018 at 2000 Yes Nehemias Granados MD   Calcium Carb-Cholecalciferol (CALCIUM + D3) 600-200 MG-UNIT TABS TAKE 1 TABLET BY MOUTH ONCE DAILY 9/28/2018 at 0800 Yes Nehemias Granados MD   leflunomide (ARAVA) 10 MG tablet Take 1 tablet by mouth daily. 9/28/2018 at 0800 Yes Hernan Duncan MD   lisinopril (PRINIVIL/ZESTRIL) 20 MG tablet Take 1 tablet (20 mg) by mouth daily 9/28/2018 at 0800 Yes Nehemias Granados MD   metoprolol tartrate (LOPRESSOR) 25 MG tablet Take 1 tablet (25 mg) by mouth 2 times daily 9/28/2018 at 0800 Yes Nehemias Granados MD   Multiple Vitamins-Minerals (CEROVITE SENIOR) TABS TAKE 1 TABLET BY MOUTH ONCE DAILY 9/28/2018 at 0800 Yes Nehemias Granados MD   OMEGA-3 FISH OIL 1000 MG capsule TAKE 1 CAPSULE BY MOUTH ONCE DAILY 9/28/2018 at 0800 Yes Nehemias Granados MD   polyethylene glycol (MIRALAX/GLYCOLAX) Packet Take 1 packet by mouth daily as needed for constipation prn Yes Reported, Patient   sennosides (SENOKOT) 8.6 MG tablet Take 4 tablets by mouth 2 times daily as needed  prn Yes Reported,  Patient   SM ASPIRIN ADULT LOW STRENGTH 81 MG EC tablet TAKE 1 TABLET BY MOUTH ONCE DAILY 9/28/2018 at 0800 Yes Nehemias Granados MD

## 2018-09-28 NOTE — TELEPHONE ENCOUNTER
S/w [facility] and advised leflunomide medication needs to be prescribed by the rheumatologist Dr. Gustabo Maurer.  Dr. Graandos is not willing to fill rx.    Gave pharmacy Dr. Maurer's office number and fax number per note in chart.    Denise Flaherty RN  EP Triage

## 2018-09-28 NOTE — ED PROVIDER NOTES
History     Chief Complaint:  Fall    The history is provided by the patient. History limited by: the patient is a poor historian.      Jose E Capps is a 87 year old male with a history of hypertension and CVA who presents via EMS with his daughter for evaluation after two falls in the last 24 hours. Per [facility] staff, the patient underwent a fall last night around 2130 during which time he sustained a 4 x 5 cm forehead abrasion and 1 x 1 cm nose abrasion. This was discovered this morning during the 0930 rounding. Around 1100, the patient's daughter called him and the patient told her that he had fallen onto the floor and needed to go to the emergency department. Staff then checked on him, found him on the floor, and called EMS who brought the patient in.    On presentation, the patient endorses generalized stiffness, forehead pain, and a headache. The patient is a very poor historian however does deny feeling dizzy or lightheaded or endorsing any chest pain prior to his fall, but does not know the reason he fell. Patient also endorses right knee pain. Patient has yet to take medication following his fall. The patient's daughter reports that the patient has been exhibiting worsening short-term memory loss over the last 3 months which has become quite bad in recent days. The patient moved into [place] about 2 months ago on account of this. Patient denies diarrhea, constipation, nausea, changes in appetite, or other complaint.      ECHO performed on 1/11/2018:  Interpretation Summary     The left ventricle is normal in size. There is mild concentric left ventricular hypertrophy. Left ventricular systolic function is normal. The visual ejection fraction is estimated at 55-60%. Grade I or early diastolic dysfunction. No regional wall motion abnormalities noted. There is no thrombus seen in the left ventricle The right ventricle is normal size. The right ventricular systolic function is normal.  Trace  "mitral and tricuspid regurgitation.  Mild aortic regurgitation.  Mild aortic root dilatation.  No pericardial effusion.  No previous study for comparison.    Allergies:  No Known Drug Allergy      Medications:    Lipitor  Arava  Lisinopril  Miralax  Senokot  Aspirin 81 mg  Lopressor    Past Medical History:    Atherosclerosis  Right bundle branch block  TIA  Malignant melanoma of skin  Basal cell carcinoma  Hypertension  ACP  Seronegative rheumatoid arthritis  Colon polyp  CVA  Benign prostatic hyperplasia     Past Surgical History:    Lateral lumbar spinal fusion  Joint replacement of fright hip  Right knee replacement  Melanoma excision    Family History:    Diabetes  Alzheimer disease  Obesity  Cancer     Social History:  Presents via EMS (with daughter at bedside)  Tobacco use: Former smoker (2 ppd for 30 years)  Alcohol use: Yes (2-3 drinks per night)  PCP: Nehemias Granados    Marital Status:       Review of Systems   Constitutional: Negative for appetite change.   Gastrointestinal: Negative for constipation, diarrhea and nausea.   Musculoskeletal: Positive for arthralgias.   Skin: Positive for wound.   Neurological: Positive for headaches.   All other systems reviewed and are negative.    Physical Exam     Patient Vitals for the past 24 hrs:   BP Temp Pulse Heart Rate Resp SpO2 Height   09/28/18 1530 153/71 - - 53 11 97 % -   09/28/18 1523 - - - 53 18 95 % -   09/28/18 1500 154/66 - - - - - -   09/28/18 1415 - - - - - 95 % -   09/28/18 1400 (!) 155/95 - - - - - -   09/28/18 1300 - - - - - 97 % -   09/28/18 1200 - - - - - 97 % -   09/28/18 1149 165/90 97.9  F (36.6  C) 72 - 18 96 % 1.778 m (5' 10\")        Physical Exam  Nursing notes reviewed. Vitals reviewed.  General: Alert. Well kept.   Head: right forehead contusion/abrasion.  Right parietal contusion/hematoma.  Eyes:  Conjunctiva non-injected, non-icteric.  Ears:TM s normal.  Nose: right sided nasal abrasion with no septal hematoma.   Neck/Throat: Moist " mucous membranes, oropharynx clear without erythema or exudate. No cervical lymphadenopathy.  Normal voice. No bite maloclusion.   Cardiac: irregular irregular rhythm. Normal heart sounds with no murmur/rubs/click. No peripheral edema.   Pulmonary: Clear and equal breath sounds bilaterally. No crackles/rales. No wheezing  Abdomen: Soft. Non-distended. Non-tender to palpation. No masses. No guarding or rebound.  Musculoskeletal: Normal gross range of motion of all 4 extremities.  Pain to palpation right anterior knee, left lateral hip with normal ROM.   Neurological: Alert and oriented x to person, place, situation. Unable to recall events surrounding fall or recent move.    Skin: Warm and dry without rashes or petechiae.  Psych: Affect normal. Good eye contact.     Emergency Department Course   ECG (12:59:42):  Rate 66 bpm. TX interval *. QRS duration 118. QT/QTc 482/505. P-R-T axes * 28 38. Atrial fibrillation with premature ventricular or aberrantly conducted complexes. Right bundle branch block. Abnormal ECG. Agree with computer interpretation. Interpreted at 1307 by Livia Wise, CNP.     Imaging:  Radiographic findings were communicated with the patient and family who voiced understanding of the findings.    XR Pelvis with hip, left, 1 view:  IMPRESSION: No acute fracture or dislocation.    Knee XR, 3 views, right:  IMPRESSION: No acute fracture or dislocation. Severe osteoarthritis. Joint effusion.    CT Head without contrast:  IMPRESSION:  Diffuse cerebral volume loss and cerebral white matter changes consistent with chronic small vessel ischemic disease. No evidence for acute intracranial pathology.     Imaging independently reviewed and agree with radiologist interpretation.    Laboratory:  CBC: AWNL (WBC 7.0, HGB 15.1, )   CMP:  (H) o/w WNL (Creatinine 0.74)   1336: Troponin: 0.049 (H)  Lipase: 103  Magnesium: 2.1     UA with micro: Ketones 10, Bacteria few o/w negative      Interventions:  1349: NS 1L IV Bolus   1350: Tylenol 1000 mg PO      Emergency Department Course:  Past medical records, nursing notes, and vitals reviewed.  1235: I performed an exam of the patient and obtained history, as documented above.    IV inserted and blood drawn. Above interventions provided. Blood was sent to the lab for further testing, results above.   The patient provided a urine sample here in the emergency department. This was sent for laboratory testing, findings above.  The patient was sent for a CT, XR's while in the emergency department, findings above.     Findings and plan explained to the Patient and daughter who consents to admission.     1505: Discussed the patient with Dr. Alcantar, who will admit the patient to an observation bed for further monitoring, evaluation, and treatment.      Impression & Plan     Medical Decision Making:  Jose E Capps is a 87 year old male who presents for evaluation after a fall. On examination, the patient has a nonfocal neurologic examination with the exception of short-term memory loss regarding incidents surrounding his fall and recent daily events. His daughter does note that this has been increasing over the last 3 months and has been discussed with his primary care doctor. There is no acute change today. He has a hematoma to the right forehead and the right parietal scalp. Head CT today is negative for intracranial bleeding or fracture. His neck was cleared clinically using NEXUS criteria. He also has mild left hip tenderness without bruising noted. X-ray of the pelvis and left hip showed on acute fracture or dislocation. He has been ambulatory without assistance. He also has right knee bruising and tenderness. X-ray today is negative for fracture but does show severe osteoarthritis and a mild joint effusion. CMP and CBC today are normal. Lipase and magnesium are also normal. EKG today shows new onset atrial fibrillation that is rate-controlled in  the emergency department. He was also found to have an elevated troponin at 0.049. He denies chest pain or shortness of breath and was given aspirin (324 mg) in the emergency department. No indication for heparin from the ED.  His urine is without signs of infection. Secondary to new onset atrial fibrillation with an elevation in his troponin, I talked with Dr. Alcantar regarding admission for observation and further management and evaluation and he graciously accepts. Patient and his daughter are in agreement with this plan.    Diagnosis:    ICD-10-CM   1. Elevated troponin R74.8   2. Atrial fibrillation, unspecified type (H) I48.91   3. Fall, initial encounter W19.XXXA   4. Contusion of scalp, initial encounter S00.03XA     Disposition:  Admitted to hospitalist service.  Scribe Disclosure:  I, Adriano Bailey, am serving as a scribe at 12:25 PM on 9/28/2018 to document services personally performed by Livia Wise CNP based on my observations and the provider's statements to me.   9/28/2018    EMERGENCY DEPARTMENT     Livia Wise CNP  09/28/18 4151

## 2018-09-28 NOTE — PROVIDER NOTIFICATION
Paged Dr. Alcantar, Pt complaining about R thumb pain, swollen,order xray of thumb/hand ?  Please advise or order.

## 2018-09-28 NOTE — IP AVS SNAPSHOT
"          [facility]: [phone]                                              INTERAGENCY TRANSFER FORM - LAB / IMAGING / EKG / EMG RESULTS   2018                    Hospital Admission Date: 2018  GAURI WU   : 1931  Sex: Male        Attending Provider: Balta Alcantar MD     Allergies:  No Known Drug Allergy    Infection:  None   Service:  HOSPITALIST    Ht:  1.778 m (5' 10\")   Wt:  75.2 kg (165 lb 12.6 oz)   Admission Wt:  74 kg (163 lb 3.2 oz)    BMI:  23.79 kg/m 2   BSA:  1.93 m 2            Patient PCP Information     Provider PCP Type    Nehemias Granados MD General         Lab Results - 3 Days      Lipid profile [[id number]]  Resulted: 18 1043, Result status: Final result    Ordering provider: Emily Maldonado MD  18 0000 Resulting lab: [facility]    Specimen Information    Type Source Collected On   Blood  18 0950          Components       Value Reference Range Flag Lab   Cholesterol 123 <200 mg/dL  FrStHsLb   Triglycerides 92 <150 mg/dL  FrStHsLb   HDL Cholesterol 44 >39 mg/dL  FrStHsLb   LDL Cholesterol Calculated 61 <100 mg/dL  FrStHsLb   Comment:  Desirable:       <100 mg/dl   Non HDL Cholesterol 79 <130 mg/dL  FrStHsLb            Basic metabolic panel [[id number]] (Abnormal)  Resulted: 18 1043, Result status: Final result    Ordering provider: Emily Maldonado MD  18 0000 Resulting lab: [facility]    Specimen Information    Type Source Collected On   Blood  18 0950          Components       Value Reference Range Flag Lab   Sodium 144 133 - 144 mmol/L  FrStHsLb   Potassium 3.7 3.4 - 5.3 mmol/L  FrStHsLb   Chloride 110 94 - 109 mmol/L H FrStHsLb   Carbon Dioxide 25 20 - 32 mmol/L  FrStHsLb   Anion Gap 9 3 - 14 mmol/L  FrStHsLb   Glucose 88 70 - 99 mg/dL  FrStHsLb   Urea Nitrogen 11 7 - 30 mg/dL  FrStHsLb   Creatinine 0.77 0.66 - 1.25 mg/dL  FrStHsLb   GFR Estimate >90 >60 " mL/min/1.7m2  FrStHsLb   Comment:  Non  GFR Calc   GFR Estimate If Black >90 >60 mL/min/1.7m2  FrStHsLb   Comment:  African American GFR Calc   Calcium 8.2 8.5 - 10.1 mg/dL L FrStHsLb            CBC with platelets [[id number]] (Abnormal)  Resulted: 09/30/18 1028, Result status: Final result    Ordering provider: Emily Maldonado MD  09/30/18 0000 Resulting lab: [facility]    Specimen Information    Type Source Collected On   Blood  09/30/18 0950          Components       Value Reference Range Flag Lab   WBC 6.8 4.0 - 11.0 10e9/L  FrStHsLb   RBC Count 4.11 4.4 - 5.9 10e12/L L FrStHsLb   Hemoglobin 13.1 13.3 - 17.7 g/dL L FrStHsLb   Hematocrit 39.3 40.0 - 53.0 % L FrStHsLb   MCV 96 78 - 100 fl  FrStHsLb   MCH 31.9 26.5 - 33.0 pg  FrStHsLb   MCHC 33.3 31.5 - 36.5 g/dL  FrStHsLb   RDW 13.3 10.0 - 15.0 %  FrStHsLb   Platelet Count 204 150 - 450 10e9/L  FrStHsLb            Glucose by meter [[id number]] (Abnormal)  Resulted: 09/29/18 0617, Result status: Final result    Ordering provider: Balta Alcantar MD  09/29/18 0602 Resulting lab: POINT OF CARE TEST, GLUCOSE    Specimen Information    Type Source Collected On     09/29/18 0602          Components       Value Reference Range Flag Lab   Glucose 104 70 - 99 mg/dL H 170            Troponin I [[id number]] (Abnormal)  Resulted: 09/29/18 0201, Result status: Final result    Ordering provider: Balta Alcantar MD  09/28/18 1915 Resulting lab: [facility]    Specimen Information    Type Source Collected On   Blood  09/29/18 0135          Components       Value Reference Range Flag Lab   Troponin I ES 0.057 0.000 - 0.045 ug/L H FrStHsLb   Comment:         The 99th percentile for upper reference range is 0.045 ug/L.  Troponin values   in the range of 0.045 - 0.120 ug/L may be associated with risks of adverse   clinical events.              Glucose by meter [[id number]]  Resulted: 09/29/18 0017, Result status: Final result     Ordering provider: Balta Alcantar MD  09/29/18 0005 Resulting lab: POINT OF CARE TEST, GLUCOSE    Specimen Information    Type Source Collected On     09/29/18 0005          Components       Value Reference Range Flag Lab   Glucose 92 70 - 99 mg/dL  170            Hemoglobin A1c [[id number]]  Resulted: 09/28/18 2255, Result status: Final result    Ordering provider: Balta Alcantar MD  09/28/18 2020 Resulting lab: [facility]    Specimen Information    Type Source Collected On     09/28/18 2020          Components       Value Reference Range Flag Lab   Hemoglobin A1C 5.6 0 - 5.6 %  FrStLb   Comment:         Normal <5.7% Prediabetes 5.7-6.4%  Diabetes 6.5% or higher - adopted from ADA   consensus guidelines.              Vitamin B12 [[id number]]  Resulted: 09/28/18 2117, Result status: Final result    Ordering provider: Balta Alcantar MD  09/28/18 1700 Resulting lab: [facility]    Specimen Information    Type Source Collected On   Blood  09/28/18 1732          Components       Value Reference Range Flag Lab   Vitamin B12 358 193 - 986 pg/mL  51            Troponin I [[id number]] (Abnormal)  Resulted: 09/28/18 2056, Result status: Final result    Ordering provider: Balta Alcantar MD  09/28/18 1700 Resulting lab: [facility]    Specimen Information    Type Source Collected On   Blood  09/28/18 2020          Components       Value Reference Range Flag Lab   Troponin I ES 0.047 0.000 - 0.045 ug/L H FrStHsLb   Comment:         The 99th percentile for upper reference range is 0.045 ug/L.  Troponin values   in the range of 0.045 - 0.120 ug/L may be associated with risks of adverse   clinical events.              CK total [[id number]] (Abnormal)  Resulted: 09/28/18 2056, Result status: Final result    Ordering provider: Balta Alcantar MD  09/28/18 2020 Resulting lab: [facility]    Specimen Information    Type Source Collected On     09/28/18 2020           Components       Value Reference Range Flag Lab   CK Total 322 30 - 300 U/L H FrStHsLb            INR [[id number]] (Abnormal)  Resulted: 09/28/18 2052, Result status: Final result    Ordering provider: Neil Ward APRN CNP  09/28/18 2018 Resulting lab: [facility]    Specimen Information    Type Source Collected On   Blood  09/28/18 2020          Components       Value Reference Range Flag Lab   INR 1.19 0.86 - 1.14 H FrStHsLb            Glucose by meter [[id number]] (Abnormal)  Resulted: 09/28/18 2025, Result status: Final result    Ordering provider: Balta Alcantar MD  09/28/18 2013 Resulting lab: POINT OF CARE TEST, GLUCOSE    Specimen Information    Type Source Collected On     09/28/18 2013          Components       Value Reference Range Flag Lab   Glucose 145 70 - 99 mg/dL H 170            Magnesium [[id number]]  Resulted: 09/28/18 1843, Result status: Final result    Ordering provider: Balta Alcantar MD  09/28/18 1700 Resulting lab: [facility]    Specimen Information    Type Source Collected On   Blood  09/28/18 1732          Components       Value Reference Range Flag Lab   Magnesium 2.1 1.6 - 2.3 mg/dL  FrStHsLb            Troponin I [[id number]] (Abnormal)  Resulted: 09/28/18 1843, Result status: Final result    Ordering provider: Balta Alcantar MD  09/28/18 1700 Resulting lab: [facility]    Specimen Information    Type Source Collected On   Blood  09/28/18 1732          Components       Value Reference Range Flag Lab   Troponin I ES 0.055 0.000 - 0.045 ug/L H FrStHsLb   Comment:         The 99th percentile for upper reference range is 0.045 ug/L.  Troponin values   in the range of 0.045 - 0.120 ug/L may be associated with risks of adverse   clinical events.              Magnesium [[id number]]  Resulted: 09/28/18 1505, Result status: Final result    Ordering provider: Livia Wise CNP  09/28/18 1305 Resulting lab: [facility]     Specimen Information    Type Source Collected On     09/28/18 1305          Components       Value Reference Range Flag Lab   Magnesium 2.1 1.6 - 2.3 mg/dL  FrStHsLb            Troponin I [[id number]] (Abnormal)  Resulted: 09/28/18 1336, Result status: Final result    Ordering provider: Lviia Wise CNP  09/28/18 1242 Resulting lab: [facility]    Specimen Information    Type Source Collected On   Blood  09/28/18 1305          Components       Value Reference Range Flag Lab   Troponin I ES 0.049 0.000 - 0.045 ug/L H FrStHsLb   Comment:         The 99th percentile for upper reference range is 0.045 ug/L.  Troponin values   in the range of 0.045 - 0.120 ug/L may be associated with risks of adverse   clinical events.              Comprehensive metabolic panel [[id number]] (Abnormal)  Resulted: 09/28/18 1336, Result status: Final result    Ordering provider: Livia Wise CNP  09/28/18 1242 Resulting lab: [facility]    Specimen Information    Type Source Collected On   Blood  09/28/18 1305          Components       Value Reference Range Flag Lab   Sodium 140 133 - 144 mmol/L  FrStHsLb   Potassium 4.5 3.4 - 5.3 mmol/L  FrStHsLb   Chloride 106 94 - 109 mmol/L  FrStHsLb   Carbon Dioxide 27 20 - 32 mmol/L  FrStHsLb   Anion Gap 7 3 - 14 mmol/L  FrStHsLb   Glucose 101 70 - 99 mg/dL H FrStHsLb   Urea Nitrogen 21 7 - 30 mg/dL  FrStHsLb   Creatinine 0.74 0.66 - 1.25 mg/dL  FrStHsLb   GFR Estimate >90 >60 mL/min/1.7m2  FrStHsLb   Comment:  Non  GFR Calc   GFR Estimate If Black >90 >60 mL/min/1.7m2  FrStHsLb   Comment:  African American GFR Calc   Calcium 8.7 8.5 - 10.1 mg/dL  FrStHsLb   Bilirubin Total 1.0 0.2 - 1.3 mg/dL  FrStHsLb   Albumin 3.4 3.4 - 5.0 g/dL  FrStHsLb   Protein Total 6.8 6.8 - 8.8 g/dL  FrStHsLb   Alkaline Phosphatase 97 40 - 150 U/L  FrStHsLb   ALT 27 0 - 70 U/L  FrStHsLb   AST 26 0 - 45 U/L  FrStHsLb            Lipase [[id number]]  Resulted: 09/28/18 1332,  Result status: Final result    Ordering provider: Livia Wise CNP  09/28/18 1242 Resulting lab: [facility]    Specimen Information    Type Source Collected On   Blood  09/28/18 1305          Components       Value Reference Range Flag Lab   Lipase 103 73 - 393 U/L  FrStHsLb            CBC with platelets differential [[id number]]  Resulted: 09/28/18 1328, Result status: Final result    Ordering provider: Livia Wise CNP  09/28/18 1242 Resulting lab: [facility]    Specimen Information    Type Source Collected On   Blood  09/28/18 1305          Components       Value Reference Range Flag Lab   WBC 7.0 4.0 - 11.0 10e9/L  FrStHsLb   RBC Count 4.66 4.4 - 5.9 10e12/L  FrStHsLb   Hemoglobin 15.1 13.3 - 17.7 g/dL  FrStHsLb   Hematocrit 44.9 40.0 - 53.0 %  FrStHsLb   MCV 96 78 - 100 fl  FrStHsLb   MCH 32.4 26.5 - 33.0 pg  FrStHsLb   MCHC 33.6 31.5 - 36.5 g/dL  FrStHsLb   RDW 13.4 10.0 - 15.0 %  FrStHsLb   Platelet Count 216 150 - 450 10e9/L  FrStHsLb   Diff Method Automated Method   FrStHsLb   % Neutrophils 71.6 %  FrStHsLb   % Lymphocytes 13.0 %  FrStHsLb   % Monocytes 13.3 %  FrStHsLb   % Eosinophils 1.6 %  FrStHsLb   % Basophils 0.4 %  FrStHsLb   % Immature Granulocytes 0.1 %  FrStHsLb   Nucleated RBCs 0 0 /100  FrStHsLb   Absolute Neutrophil 5.0 1.6 - 8.3 10e9/L  FrStHsLb   Absolute Lymphocytes 0.9 0.8 - 5.3 10e9/L  FrStHsLb   Absolute Monocytes 0.9 0.0 - 1.3 10e9/L  FrStHsLb   Absolute Eosinophils 0.1 0.0 - 0.7 10e9/L  FrStHsLb   Absolute Basophils 0.0 0.0 - 0.2 10e9/L  FrStHsLb   Abs Immature Granulocytes 0.0 0 - 0.4 10e9/L  FrStHsLb   Absolute Nucleated RBC 0.0   FrStHsLb            UA with Microscopic reflex to Culture [[id number]] (Abnormal)  Resulted: 09/28/18 1321, Result status: Final result    Ordering provider: Livia Wise CNP  09/28/18 1242 Resulting lab: [facility]    Specimen Information    Type Source Collected On   Midstream Urine Urine clean  "catch 09/28/18 1310          Components       Value Reference Range Flag Lab   Color Urine Light Yellow   FrStHsLb   Appearance Urine Clear   FrStHsLb   Glucose Urine Negative NEG^Negative mg/dL  FrStHsLb   Bilirubin Urine Negative NEG^Negative  FrStHsLb   Ketones Urine 10 NEG^Negative mg/dL A FrStHsLb   Specific [place] Urine 1.008 1.003 - 1.035  FrStHsLb   Blood Urine Negative NEG^Negative  FrStHsLb   pH Urine 6.0 5.0 - 7.0 pH  FrStHsLb   Protein Albumin Urine Negative NEG^Negative mg/dL  FrStHsLb   Urobilinogen mg/dL Normal 0.0 - 2.0 mg/dL  FrStHsLb   Nitrite Urine Negative NEG^Negative  FrStHsLb   Leukocyte Esterase Urine Negative NEG^Negative  FrStHsLb   Source Midstream Urine   FrStHsLb   WBC Urine <1 0 - 5 /HPF  FrStHsLb   RBC Urine <1 0 - 2 /HPF  FrStHsLb   Bacteria Urine Few NEG^Negative /HPF A FrStHsLb            Testing Performed By     Lab - Abbreviation Name Director Address Valid Date Range    14 - FrStHsLb [facility] Unknown [address] [address] 05/08/15 1057 - Present    51 - Unknown [facility] Unknown [address] [address] 12/31/14 1010 - Present    170 - Unknown POINT OF CARE TEST, GLUCOSE Unknown Unknown 10/31/11 1114 - Present            Unresulted Labs (24h ago through future)    Start       Ordered    Unscheduled  Magnesium  (Magnesium Replacement - Adult - \"High\" - Replacement for all levels less than or equal to 2 mg/dL)  CONDITIONAL (SPECIFY),   Routine     Comments:  Obtain Magnesium Level for these conditions:  *IF no magnesium result within 24 hrs before initiation of order set, draw magnesium level with next lab collect.    *2 HOURS AFTER last magnesium replacement dose when magnesium replacement given for level less than 1.6  *Next morning after magnesium dose.     Repeat Magnesium Replacement if necessary.    09/28/18 1700    Unscheduled  Potassium  (Potassium Replacement - \"Standard\" - For K levels less than " 3.4 mmol/L - UU,UR,UA,RH,SH,PH,WY )  CONDITIONAL (SPECIFY),   Routine     Comments:  Obtain Potassium Level for these conditions:  *IF no potassium result within 24 hours before initiation of order set, draw potassium level with next lab collect.    *2 HOURS AFTER last IV potassium replacement dose and 4 hours after an oral replacement dose.  *Next morning after potassium dose.     Repeat Potassium Replacement if necessary.    09/28/18 1700         Imaging Results - 3 Days      MR Brain w/o & w Contrast [[id number]]  Resulted: 09/29/18 1526, Result status: Final result    Ordering provider: Balta Alcantar MD  09/28/18 1700 Resulted by: Azam Danielle MD    Performed: 09/29/18 1318 - 09/29/18 1348 Resulting lab: RADIOLOGY RESULTS    Narrative:       MRI OF THE BRAIN WITHOUT AND WITH CONTRAST 9/29/2018 1:48 PM     COMPARISON: Head CT, head CTA and head CT perfusion study 9/28/2018.    HISTORY:  Frequent falls, discoordination, rule out stroke.     TECHNIQUE: Axial diffusion-weighted with ADC map, axial T2-weighted  with fat saturation, axial T1-weighted, axial turboFLAIR and coronal  T1-weighted images of the brain were acquired without intravenous  contrast.  Following intravenous administration of gadolinium (7 mL  Gadavist), axial T1-weighted images of the brain were acquired.     FINDINGS: There are recent tiny infarcts in the left thalamus and  posteromedial aspect of the right temporal lobe as well as a  contiguous infarct in the left parahippocampal formation. All of these  infarcts are in the posterior circulation conforming to bilateral  posterior cerebral artery territory infarcts. This also corresponds  with the perfusion defect noted in the left posterior cerebral artery  distribution on the comparison CT perfusion study. No other recent  infarcts noted.    There is mild diffuse cerebral volume loss. There are mild patchy  periventricular areas of abnormal T2 signal hyperintensity in the  cerebral  white matter bilaterally that are consistent with sequela of  chronic small vessel ischemic disease. The ventricles and basal  cisterns are within normal limits in configuration given the degree of  cerebral volume loss.  There is no midline shift.  There are no  extra-axial fluid collections.  There is no evidence for acute  intracranial hemorrhage.  There is no abnormal contrast enhancement in  the brain or its coverings.    There is no sinusitis or mastoiditis.      Impression:       IMPRESSION:   1. Scattered recent ischemic infarcts in the posterior cerebral artery  distributions bilaterally involving posterior medial temporal lobes  bilaterally and left thalamus.  2. Diffuse cerebral volume loss and cerebral white matter changes  consistent with chronic small vessel ischemic disease.     HOLGER PADILLA MD      CT Head Perfusion w Contrast [[id number]]  Resulted: 09/28/18 2115, Result status: Final result    Ordering provider: Neil Ward APRN CNP  09/28/18 2018 Resulted by: Gautam Luu MD    Performed: 09/28/18 2025 - 09/28/18 2043 Resulting lab: RADIOLOGY RESULTS    Narrative:       CT BRAIN PERFUSION   9/28/2018 8:43 PM    HISTORY: Code Stroke.    TECHNIQUE: Time sequential axial CT images of the head were acquired  during the administration of 50 mL Isovue-370 IV. Color perfusion maps  of the brain were created from this time sequential axial source data.      Radiation dose for this scan was reduced using automated exposure  control, adjustment of the mA and/or kV according to patient size, or  iterative reconstruction technique.    COMPARISON: None.    FINDINGS: There is prolongation of transit times and flow to the left  posterior cerebral artery distribution. Cerebral blood volume is  compensated. No other focal perfusion deficits are identified.      Impression:       IMPRESSION: Left posterior cerebral artery perfusion defect.    GAUTAM LUU MD      CT Head w/o Contrast [[id number]]   Resulted: 09/28/18 2115, Result status: Final result    Ordering provider: Neil aWrd APRN CNP  09/28/18 2021 Resulted by: Gautam Luu MD    Performed: 09/28/18 2025 - 09/28/18 2030 Resulting lab: RADIOLOGY RESULTS    Narrative:       CT SCAN OF THE HEAD WITHOUT CONTRAST   9/28/2018 8:30 PM     HISTORY: Code Stroke.    TECHNIQUE: Axial images of the head and coronal reformations without  IV contrast material. Radiation dose for this scan was reduced using  automated exposure control, adjustment of the mA and/or kV according  to patient size, or iterative reconstruction technique.    COMPARISON: None.    FINDINGS: Mild to moderate volume loss is present. Old left  periventricular infarct is present. Background of white matter  hypoattenuation likely represents chronic small vessel ischemic  change. No evidence of acute ischemia, hemorrhage, mass, mass effect,  or hydrocephalus. The visualized calvarium, skull base, paranasal  sinuses, and extracranial soft tissues are unremarkable.      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 No evidence of ischemia/edema or hemorrhage.    Findings were discussed by phone between Dr. Luu and Neil Ward on  9/28/2018 8:59 PM.    GAUTAM LUU MD      CTA Head Neck with Contrast [[id number]]  Resulted: 09/28/18 2115, Result status: Final result    Ordering provider: Neil Ward APRN CNP  09/28/18 2018 Resulted by: Gautam Luu MD    Performed: 09/28/18 2025 - 09/28/18 2035 Resulting lab: RADIOLOGY RESULTS    Narrative:       CT ANGIOGRAM OF THE HEAD AND NECK WITH CONTRAST  9/28/2018 8:35 PM     HISTORY: Code Stroke.    TECHNIQUE: CT angiography with an injection of 70 mL Isovue-370 IV  with scans through the head and neck. Images were transferred to a  separate 3-D workstation where multiplanar reformations and 3-D images  were created. Estimates of carotid stenoses are made relative to  the  distal internal carotid artery diameters except as noted. Radiation  dose for this scan was reduced using automated exposure control,  adjustment of the mA and/or kV according to patient size, or iterative  reconstruction technique.    COMPARISON: None.     CT HEAD FINDINGS: No contrast enhancing lesions are identified.    CT ANGIOGRAM HEAD FINDINGS: Left posterior cerebral artery is occluded  at the P1-2 junction. The intracranial vertebral arteries, basilar  artery, and right posterior cerebral arteries are patent. The internal  carotid arteries, anterior cerebral arteries, and middle cerebral  arteries are patent.     CT ANGIOGRAM NECK FINDINGS: The left vertebral artery is occluded at  the origin with reconstitution at the V3 and V4 segments. Right  vertebral artery is patent. The bilateral common carotid, internal  carotid, and external carotid arteries are patent. There is moderate  atherosclerotic plaquing at the bilateral carotid bifurcations without  evidence of flow-limiting stenosis. A two-vessel aortic arch is  present.     Emphysema noted at the lung apices. A right-sided thyroid nodule is  present measuring 2.4 cm. Severe degenerative changes are present  throughout the cervical spine.      Impression:       IMPRESSION:   1. 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   2. 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  3. Right thyroid nodule measuring up to 2.4 cm.    Findings were discussed by phone between Dr. Luu and the stroke  neurologist at 8:58 PM on 9/28/2018.    CESAR LUU MD      XR Hand Right G/E 3 Views [[id number]]  Resulted: 09/28/18 2010, Result status: Final result    Ordering provider: Balta Alcantar MD  09/28/18 1707 Resulted by: Anand Fernando MD    Performed: 09/28/18 1916 - 09/28/18 1919 Resulting lab: RADIOLOGY RESULTS    Narrative:       RIGHT HAND THREE OR MORE VIEWS   9/28/2018  7:19 PM     HISTORY: Thumb pain and swelling following fall. Rule out fracture.    COMPARISON: None.      Impression:       IMPRESSION: Three views of the right hand are performed. Degenerative  joint changes involving the metacarpophalangeal and interphalangeal  joints of the thumb are noted. Additional DIP and PIP joint  degenerative changes are noted within the fingers. No evidence of  acute fracture or dislocation. Carpal bones appear intact. Arterial  vascular calcification is noted in the interosseous space between the  distal radius and ulna where imaged. Tiny ossicles are noted along the  radial aspect of the metacarpophalangeal joints of the index and  middle fingers on the oblique view. No radiopaque foreign body is  identified. Mild soft tissue swelling is present involving the thumb.    EDNA RODRIGUEZ MD      XR Pelvis w Hip Left 1 View [[id number]]  Resulted: 09/28/18 1451, Result status: Final result    Ordering provider: Livia Wise CNP  09/28/18 1243 Resulted by: Александр Suggs MD    Performed: 09/28/18 1321 - 09/28/18 1335 Resulting lab: RADIOLOGY RESULTS    Narrative:       PELVIS AND HIP LEFT ONE VIEW   9/28/2018 1:35 PM     HISTORY: Pain.     COMPARISON: None.       Impression:       IMPRESSION: No acute fracture or dislocation.    АЛЕКСАНДР SUGGS MD      XR Knee Right 3 Views [[id number]]  Resulted: 09/28/18 1451, Result status: Final result    Ordering provider: Livia Wise CNP  09/28/18 1243 Resulted by: Александр Suggs MD    Performed: 09/28/18 1321 - 09/28/18 1336 Resulting lab: RADIOLOGY RESULTS    Narrative:       RIGHT KNEE THREE VIEWS   9/28/2018 1:36 PM     HISTORY: Right knee pain after fall.     COMPARISON: None.       Impression:       IMPRESSION: No acute fracture or dislocation. Severe osteoarthritis.  Joint effusion.    АЛЕКСАНДР SUGGS MD      CT Head w/o Contrast [[id number]]  Resulted: 09/28/18 1334, Result status: Final result    Ordering provider: Livia Wise  CNP  09/28/18 1242 Resulted by: Holger Danielle MD    Performed: 09/28/18 1311 - 09/28/18 1323 Resulting lab: RADIOLOGY RESULTS    Narrative:       CT OF THE HEAD WITHOUT CONTRAST  9/28/2018 1:23 PM     COMPARISON: None.    HISTORY:  Fall with frontal hematoma and right parietal hematoma.     TECHNIQUE: 5 mm thick axial CT images of the head were acquired  without IV contrast material.    FINDINGS:  There is moderate diffuse cerebral volume loss. There are  subtle patchy areas of decreased density in the cerebral white matter  bilaterally that are consistent with sequela of chronic small vessel  ischemic disease.     The ventricles and basal cisterns are within normal limits in  configuration given the degree of cerebral volume loss.  There is no  midline shift. There are no extra-axial fluid collections.     No intracranial hemorrhage, mass or recent infarct.    The visualized paranasal sinuses are well aerated. There is no  mastoiditis. There are no fractures of the visualized bones.       Impression:       IMPRESSION:  Diffuse cerebral volume loss and cerebral white matter  changes consistent with chronic small vessel ischemic disease. No  evidence for acute intracranial pathology.     Radiation dose for this scan was reduced using automated exposure  control, adjustment of the mA and/or kV according to patient size, or  iterative reconstruction technique.    HOLGER DANIELLE MD      Testing Performed By     Lab - Abbreviation Name Director Address Valid Date Range    104 - Rad Rslts RADIOLOGY RESULTS Unknown Unknown 02/16/05 1553 - Present               ECG/EMG Results      ECHO COMPLETE BUBBLE STUDY WITH OPTISON [[id number]]  Resulted: 09/29/18 1014, Result status: Edited Result - FINAL    Ordering provider: Balta Alcantar MD  09/28/18 1700 Resulted by: Charles Mccord MD    Performed: 09/29/18 1033 - 09/29/18 1033 Resulting lab: RADIOLOGY RESULTS    Narrative:        [id number]  [facility]  [id number]  [id number]^CESAR^LACI           [facility]  Echocardiography Laboratory  [address] [place] [address]        Name: GAURI WU  MRN: [id number]  : 1931  Study Date: 2018 10:14 AM  Age: 87 yrs  Gender: Male  Patient Location: [facility]  Reason For Study: Afib  Ordering Physician: LACI GUERRERO  Referring Physician: Nehemias Granados  Performed By: Petra Aldana     BSA: 1.9 m2  Height: 70 in  Weight: 163 lb  HR: 72  BP: 146/119 mmHg  _____________________________________________________________________________  __        Procedure  Complete Portable Bubble Echo Adult. Contrast Optison.  _____________________________________________________________________________  __        Interpretation Summary     Sinus rhythm was noted.  Left ventricular systolic function is normal.  The visual ejection fraction is estimated at 55-60%.  The left ventricle is normal in size.  There is mild concentric left ventricular hypertrophy.  The right ventricle is normal in structure, function and size.  There is mild biatrial enlargement.  There is mild (1+) aortic regurgitation.     No significant change since 2018  _____________________________________________________________________________  __        Left Ventricle  The left ventricle is normal in size. There is mild concentric left  ventricular hypertrophy. Left ventricular systolic function is normal. The  visual ejection fraction is estimated at 55-60%. Grade I or early diastolic  dysfunction. No regional wall motion abnormalities noted. There is no thrombus  seen in the left ventricle.     Right Ventricle  The right ventricle is normal in structure, function and size. There is no  mass or thrombus in the right ventricle.     Atria  There is mild biatrial enlargement. A contrast injection (Bubble Study) was  performed that was negative for flow across the interatrial septum. The left  atrial appendage is not  well visualized.     Mitral Valve  The mitral valve leaflets appear normal. There is no evidence of stenosis,  fluttering, or prolapse. There is no mitral regurgitation noted. There is no  mitral valve stenosis.        Tricuspid Valve  Normal tricuspid valve. No tricuspid regurgitation. Right ventricular systolic  pressure could not be approximated due to inadequate tricuspid regurgitation.  There is no tricuspid stenosis.     Aortic Valve  There is mild trileaflet aortic sclerosis. There is mild (1+) aortic  regurgitation. No aortic stenosis is present.     Pulmonic Valve  Normal pulmonic valve. There is no pulmonic valvular regurgitation. There is  no pulmonic valvular stenosis.     Vessels  Normal size aorta. Normal size ascending aorta. The IVC is normal in size and  reactivity with respiration, suggesting normal central venous pressure. The  pulmonary artery is normal size.     Pericardium  The pericardium appears normal. There is no pleural effusion.        Rhythm  Sinus rhythm was noted.  _____________________________________________________________________________  __  MMode/2D Measurements & Calculations  IVSd: 1.2 cm     LVIDd: 5.4 cm  LVIDs: 4.3 cm  LVPWd: 1.0 cm  FS: 20.2 %  LV mass(C)d: 241.5 grams  LV mass(C)dI: 126.2 grams/m2  Ao root diam: 3.8 cm  LA dimension: 4.3 cm  asc Aorta Diam: 3.6 cm  LA/Ao: 1.1  LA Volume (BP): 81.4 ml  LA Volume Index (BP): 42.6 ml/m2  RWT: 0.38           Doppler Measurements & Calculations  MV E max jl: 99.0 cm/sec  MV A max jl: 118.4 cm/sec  MV E/A: 0.84  Ao V2 max: 133.8 cm/sec  Ao max P.0 mmHg  PA acc time: 0.11 sec  E/E' av.0  Lateral E/e': 14.5  Medial E/e': 27.5           _____________________________________________________________________________  __           Report approved by: Dr. Charles Fall 2018 01:44 PM       1    Type Source Collected On     18 1014          View Image (below)        Echo complete [[id number]]  Resulted: 18  1033, Result status: In process    Ordering provider: Balta Alcantar MD  09/28/18 1700 Performed: 09/29/18 1033 - 09/29/18 1033    Resulting lab: RADIANT                   Encounter-Level Documents:     There are no encounter-level documents.      Order-Level Documents:     There are no order-level documents.

## 2018-09-28 NOTE — ED PROVIDER NOTES
Emergency Department Attending Supervision Note  9/28/2018  4:48 PM      I evaluated this patient in conjunction with Livia Wise CNP      Briefly, the patient presented with falls.  Patient had 2 falls in the last several days.  He has hit his head twice once in the front once in the back.  He does not recall why he fell.  He has dementia and has some difficulty with short-term memory.  He denies any vomiting, nausea, diarrhea.  He denies any chest pain or shortness breath.  Denies any palpitations.  Very vague as to what happened but he reported to nursing staff that he felt somewhat lightheaded.  No other acute changes noted by patient or family.      On my exam,   General: Resting on the bed.  Head: No obvious trauma to head.  Abrasion to forehead and nasal bridge   Ears, Nose, Throat:  External ears normal.  Nose normal.  No pharyngeal erythema, swelling or exudate.  Midline uvula.  clear TM  Eyes:  Conjunctivae clear.  Pupils are equal, round, and reactive.   Neck: Normal range of motion.  Neck supple.   CV: Regular rate and rhythm.  No murmurs.      Respiratory: Effort normal and breath sounds normal.  No wheezing or crackles.   Gastrointestinal: Soft.  No distension. There is no tenderness.   Musculoskeletal: Normal range of motion.  Non tender extremities to palpations.    Neuro: Alert. Moving all extremities appropriately.  Normal speech.  GCS 15.    Skin: Skin is warm and dry.  No rash noted. Ecchymosis over left hand.        My impression is falls, new onset atrial fibrillation, elevated troponin.  Broad differential pursued including but not limited to intracranial hemorrhage, subarachnoid, infection, A. fib, arrhythmia, dehydration, electrolyte metabolic or renal dysfunction, etc.  UA is negative no evidence of infection.  CBC shows no leukocytosis or anemia.  BMP shows no acute electrolyte metabolic or renal dysfunction.  Magnesium level is normal.  Lipase and LFTs are unremarkable not concerning  for obstructive biliary etiology, hepatitis, pancreatitis.  EKG shows A. fib, rhythm controlled, no evidence of acute ischemia.  Troponin is mildly elevated of unclear clinical significance.  This may be demand in the setting of new onset A. fib versus demand secondary to his falls is unclear.  He denies any active chest pain.  Head CT negative without any acute intracranial hemorrhage or mass or tumor.  Pelvic x-ray is negative without fracture dislocation.  X-ray of the knee is negative without acute fracture dislocation.  No other acute abnormalities noted on examination.  Given his falls, new onset A. fib and elevated troponin felt that he would benefit from admission.        Diagnosis    ICD-10-CM    1. Elevated troponin R74.8 Magnesium     Magnesium     CANCELED: Magnesium   2. Atrial fibrillation, unspecified type (H) I48.91    3. Fall, initial encounter W19.XXXA    4. Contusion of scalp, initial encounter S00.03XA          Olivia Hernandez MD  09/28/18 [id number]

## 2018-09-28 NOTE — IP AVS SNAPSHOT
` `     [facility]: [phone]            Medication Administration Report for Jose E Capps as of 10/01/18 1507   Legend:    Given Hold Not Given Due Canceled Entry Other Actions    Time Time (Time) Time  Time-Action       Inactive    Active    Linked        Medications 09/25/18 09/26/18 09/27/18 09/28/18 09/29/18 09/30/18 10/01/18    acetaminophen (TYLENOL) Suppository 650 mg  Dose: 650 mg  Freq: EVERY 4 HOURS PRN Route: RE  PRN Reason: mild pain  Start: 09/28/18 1700   Admin Instructions: Alternate ibuprofen (if ordered) with acetaminophen.  Maximum acetaminophen dose from all sources = 75 mg/kg/day not to exceed 4 grams/day.    Admin. Amount: 1 suppository (1 × 650 mg suppository)  Dispense Loc: [facility] 73               acetaminophen (TYLENOL) tablet 650 mg  Dose: 650 mg  Freq: EVERY 4 HOURS PRN Route: PO  PRN Reason: mild pain  Start: 09/28/18 1700   Admin Instructions: Alternate ibuprofen (if ordered) with acetaminophen.  Maximum acetaminophen dose from all sources = 75 mg/kg/day not to exceed 4 grams/day.    Admin. Amount: 2 tablet (2 × 325 mg tablet)  Last Admin: 10/01/18 0830  Dispense Loc: [facility] 73        2234 (650 mg)-Given        0415 (650 mg)-Given       0803 (650 mg)-Given       1627 (650 mg)-Given        0838 (650 mg)-Given       1504 (650 mg)-Given       2122 (650 mg)-Given        0830 (650 mg)-Given           apixaban ANTICOAGULANT (ELIQUIS) tablet 5 mg  Dose: 5 mg  Freq: 2 TIMES DAILY Route: PO  Start: 09/30/18 2100   Admin. Amount: 1 tablet (1 × 5 mg tablet)  Last Admin: 10/01/18 0829  Dispense Loc: [facility] 73          2122 (5 mg)-Given        0829 (5 mg)-Given       [ ] 2100           atorvastatin (LIPITOR) tablet 10 mg  Dose: 10 mg  Freq: EVERY EVENING Route: PO  Start: 09/29/18 2000   Admin. Amount: 1 tablet (1 × 10 mg tablet)  Last Admin: 09/30/18 2121  Dispense Loc: [facility]         2020 (10 mg)-Given        2121 (10 mg)-Given        [ ] 2000            "lidocaine (LMX4) cream  Freq: EVERY 1 HOUR PRN Route: Top  PRN Reason: pain  PRN Comment: with VAD insertion or accessing implanted port.  Start: 09/28/18 1700   Admin Instructions: Do NOT give if patient has a history of allergy to any local anesthetic or any \"marika\" product.   Apply 30 minutes prior to VAD insertion or port access.  MAX Dose:  2.5 g (  of 5 g tube)    Dispense Loc: Contact Rx for dose               lidocaine 1 % 1 mL  Dose: 1 mL  Freq: EVERY 1 HOUR PRN Route: OTHER  PRN Comment: mild pain with VAD insertion or accessing implanted port  Start: 09/28/18 1700   Admin Instructions: Do NOT give if patient has a history of allergy to any local anesthetic or any \"marika\" product. MAX dose 1 mL subcutaneous OR intradermal in divided doses.    Admin. Amount: 1 mL  Dispense Loc: [facility]  Volume: 20 mL               magnesium sulfate 2 g in water intermittent infusion  Dose: 2 g  Freq: DAILY PRN Route: IV  PRN Reason: magnesium supplementation  Start: 09/28/18 1700   Admin Instructions: For Serum Mg++ 1.6 - 2 mg/dL  Give 2 g and recheck magnesium level next AM.    Admin. Amount: 2 g = 50 mL Conc: 0.04 g/mL  Dispense Loc: Contact Rx for dose  Infused Over: 60 Minutes  Volume: 50 mL               magnesium sulfate 4 g in 100 mL sterile water (premade)  Dose: 4 g  Freq: EVERY 4 HOURS PRN Route: IV  PRN Reason: magnesium supplementation  Start: 09/28/18 1700   Admin Instructions: For serum Mg++ less than 1.6 mg/dL  Give 4 g and recheck magnesium level 2 hours after dose, and next AM.    Admin. Amount: 4 g = 100 mL Conc: 4 g/100 mL  Dispense Loc: Contact Rx for dose  Infused Over: 120 Minutes  Volume: 100 mL               Medication Instruction  Freq: CONTINUOUS PRN Route: XX  Start: 09/28/18 2146   Admin Instructions: No D5W IV solutions unless patient hypoglycemic.  Pharmacy to remove all dextrose from iv fluid orders as able.    Dispense Loc: [facility] Floor Stock               melatonin tablet 1 mg  Dose: 1 " mg  Freq: AT BEDTIME PRN Route: PO  PRN Reason: sleep  Start: 09/28/18 1700   Admin Instructions: Do not give unless at least 6 hours of uninterrupted sleep is expected.    Admin. Amount: 1 tablet (1 × 1 mg tablet)  Dispense Loc: [facility] 73               metoprolol tartrate (LOPRESSOR) quarter-tab 6.25 mg  Dose: 6.25 mg  Freq: 2 TIMES DAILY Route: PO  Start: 09/30/18 0900   Admin Instructions: Hold for pulse <60 or SBP <110    Admin. Amount: 1 quarter-tab (1 × 6.25 mg quarter-tab)  Last Admin: 10/01/18 0830  Dispense Loc: [facility] 73          0944 (6.25 mg)-Given       2121 (6.25 mg)-Given        0830 (6.25 mg)-Given       [ ] 2100           naloxone (NARCAN) injection 0.1-0.4 mg  Dose: 0.1-0.4 mg  Freq: EVERY 2 MIN PRN Route: IV  PRN Reason: opioid reversal  Start: 09/28/18 1700   Admin Instructions: For respiratory rate LESS than or EQUAL to 8.  Partial reversal dose:  0.1 mg titrated q 2 minutes for Analgesia Side Effects Monitoring Sedation Level of 3 (frequently drowsy, arousable, drifts to sleep during conversation).Full reversal dose:  0.4 mg bolus for Analgesia Side Effects Monitoring Sedation Level of 4 (somnolent, minimal or no response to stimulation).  For ordered IV doses 0.1-2mg give IVP. Give each 0.4mg over 15 seconds in emergency situations. For non-emergent situations further dilute in 9mL of NS to facilitate titration of response.    Admin. Amount: 0.1-0.4 mg = 0.25-1 mL Conc: 0.4 mg/mL  Dispense Loc:  [facility] 73  Volume: 1 mL               ondansetron (ZOFRAN-ODT) ODT tab 4 mg  Dose: 4 mg  Freq: EVERY 6 HOURS PRN Route: PO  PRN Reasons: nausea,vomiting  Start: 09/28/18 1700   Admin Instructions: This is Step 1 of nausea and vomiting management.  If nausea not resolved in 15 minutes, go to Step 2 prochlorperazine (COMPAZINE). Do not push through foil backing. Peel back foil and gently remove. Place on tongue immediately. Administration with liquid unnecessary  With dry hands, peel back foil backing and  gently remove tablet; do not push oral disintegrating tablet through foil backing; administer immediately on tongue and oral disintegrating tablet dissolves in seconds; then swallow with saliva; liquid not required.    Admin. Amount: 1 tablet (1 × 4 mg tablet)  Dispense Loc: [facility]              Or  ondansetron (ZOFRAN) injection 4 mg  Dose: 4 mg  Freq: EVERY 6 HOURS PRN Route: IV  PRN Reasons: nausea,vomiting  Start: 09/28/18 1700   Admin Instructions: This is Step 1 of nausea and vomiting management.  If nausea not resolved in 15 minutes, go to Step 2 prochlorperazine (COMPAZINE).  Irritant. For ordered IV doses 0.1-4 mg, give IV Push undiluted over 2-5 minutes.    Admin. Amount: 4 mg = 2 mL Conc: 4 mg/2 mL  Dispense Loc: [facility]  Infused Over: 2-5 Minutes  Volume: 2 mL               polyethylene glycol (MIRALAX/GLYCOLAX) Packet 17 g  Dose: 17 g  Freq: DAILY PRN Route: PO  PRN Reason: constipation  Start: 09/28/18 1700   Admin Instructions: Give in 8oz of  water, juice, or soda. Hold for loose stools.  This is the second step of a three step constipation treatment.  1 Packet = 17 grams. Mixed prescribed dose in 8 ounces of water. Follow with 8 oz. of water.    Admin. Amount: 17 g  Dispense Loc:  [facility] 73               potassium chloride (KLOR-CON) Packet 20-40 mEq  Dose: 20-40 mEq  Freq: EVERY 2 HOURS PRN Route: ORAL OR FEED  PRN Reason: potassium supplementation  Start: 09/28/18 1700   Admin Instructions: Use if unable to tolerate tablets.  If Serum K+ 3.0-3.3, dose = 60 mEq po total dose (40 mEq x1 followed in 2 hours by 20 mEq x1). Recheck K+ level 4 hours after dose and the next AM.  If Serum K+ 2.5-2.9, dose = 80 mEq po total dose (40 mEq Q2H x2). Recheck K+ level 4 hours after dose and the next AM.  If Serum K+ less than 2.5, See IV order.  Dissolve packet contents in 4-8 ounces of cold water or juice.    Admin. Amount: 20-40 mEq  Dispense Loc:  [facility] 73               potassium chloride 10 mEq in 100 mL  intermittent infusion with 10 mg lidocaine  Dose: 10 mEq  Freq: EVERY 1 HOUR PRN Route: IV  PRN Reason: potassium supplementation  Start: 09/28/18 1700   Admin Instructions: Infuse via PERIPHERAL LINE. Use potassium with lidocaine for pain with peripheral administration.  If Serum K+ 3.0-3.3, dose = 10 mEq/hr x4 doses (40 mEq IV total dose). Recheck K+ level 2 hours after dose and the next AM.  If Serum K+ less than 3.0, dose = 10 mEq/hr x6 doses (60 mEq IV total dose). Recheck K+ level 2 hours after dose and the next AM.    Admin. Amount: 10 mEq = 100 mL Conc: 10 mEq/100 mL  Dispense Loc: Contact Rx for dose  Infused Over: 1 Hours  Volume: 100 mL               potassium chloride 10 mEq in 100 mL sterile water intermittent infusion (premix)  Dose: 10 mEq  Freq: EVERY 1 HOUR PRN Route: IV  PRN Reason: potassium supplementation  Start: 09/28/18 1700   Admin Instructions: Infuse via PERIPHERAL LINE or CENTRAL LINE. Use for central line replacement if patient weight less than 65 kg, if patient is on TPN with high potassium content or if unit does not stock 20 mEq bags.   If Serum K+ 3.0-3.3, dose = 10 mEq/hr x4 doses (40 mEq IV total dose). Recheck K+ level 2 hours after dose and the next AM.   If Serum K+ less than 3.0, dose = 10 mEq/hr x6 doses (60 mEq IV total dose). Recheck K+ level 2 hours after dose and the next AM.    Admin. Amount: 10 mEq = 100 mL Conc: 10 mEq/100 mL  Dispense Loc: Contact Rx for dose  Infused Over: 60 Minutes  Volume: 100 mL               potassium chloride 20 mEq in 50 mL intermittent infusion  Dose: 20 mEq  Freq: EVERY 1 HOUR PRN Route: IV  PRN Reason: potassium supplementation  Start: 09/28/18 1700   Admin Instructions: Infuse via CENTRAL LINE Only. May need EKG if less than 65 kg or on TPN - Max rate is 0.3 mEq/kg/hr for patients not on EKG monitoring.   If Serum K+ 3.0-3.3, dose = 20 mEq/hr x2 doses (40 mEq IV total dose). Recheck K+ level 2 hours after dose and the next AM.  If Serum K+  less than 3.0, dose = 20 mEq/hr x3 doses (60 mEq IV total dose). Recheck K+ level 2 hours after dose and the next AM.    Admin. Amount: 20 mEq = 50 mL Conc: 20 mEq/50 mL  Dispense Loc: Contact Rx for dose  Volume: 50 mL               potassium chloride SA (K-DUR/KLOR-CON M) CR tablet 20-40 mEq  Dose: 20-40 mEq  Freq: EVERY 2 HOURS PRN Route: PO  PRN Reason: potassium supplementation  Start: 09/28/18 1700   Admin Instructions: Use if able to take PO.   If Serum K+ 3.0-3.3, dose = 60 mEq po total dose (40 mEq x1 followed in 2 hours by 20 mEq x1). Recheck K+ level 4 hours after dose and the next AM.  If Serum K+ 2.5-2.9, dose = 80 mEq po total dose (40 mEq Q2H x2). Recheck K+ level 4 hours after dose and the next AM.  If Serum K+ less than 2.5, See IV order.  DO NOT CRUSH    Admin. Amount: 1-2 tablet (1-2 × 20 mEq tablet)  Dispense Loc: [facility]               prochlorperazine (COMPAZINE) injection 5 mg  Dose: 5 mg  Freq: EVERY 6 HOURS PRN Route: IV  PRN Reasons: nausea,vomiting  Start: 09/28/18 1700   Admin Instructions: This is Step 2 of nausea and vomiting management. Give if nausea not resolved 15 minutes after giving ondansetron (ZOFRAN). If nausea not resolved in 15 minutes, go to Step 3 metoclopramide (REGLAN), if ordered.  For ordered IV doses 0.1-10 mg, give IV Push undiluted. Each 5mg over 1 minute.    Admin. Amount: 5 mg = 1 mL Conc: 5 mg/mL  Dispense Loc: [facility]  Infused Over: 1-2 Minutes  Volume: 1 mL              Or  prochlorperazine (COMPAZINE) tablet 5 mg  Dose: 5 mg  Freq: EVERY 6 HOURS PRN Route: PO  PRN Reason: vomiting  Start: 09/28/18 1700   Admin Instructions: This is Step 2 of nausea and vomiting management. Give if nausea not resolved 15 minutes after giving ondansetron (ZOFRAN). If nausea not resolved in 15 minutes, go to Step 3 metoclopramide (REGLAN), if ordered.    Admin. Amount: 1 tablet (1 × 5 mg tablet)  Dispense Loc:  [facility]              Or  prochlorperazine (COMPAZINE) Suppository  12.5 mg  Dose: 12.5 mg  Freq: EVERY 12 HOURS PRN Route: RE  PRN Reasons: nausea,vomiting  Start: 09/28/18 1700   Admin Instructions: This is Step 2 of nausea and vomiting management. Give if nausea not resolved 15 minutes after giving ondansetron (ZOFRAN). If nausea not resolved in 15 minutes, go to Step 3 metoclopramide (REGLAN), if ordered.    Admin. Amount: 0.5 suppository (0.5 × 25 mg suppository)  Dispense Loc:  Main Pharmacy               senna-docusate (SENOKOT-S;PERICOLACE) 8.6-50 MG per tablet 1 tablet  Dose: 1 tablet  Freq: 2 TIMES DAILY PRN Route: PO  PRN Reason: constipation  Start: 09/28/18 1700   Admin Instructions: If no bowel movement in 24 hours, increase to 2 tablets PO.  Hold for loose stools.  This is the first step of a three step constipation treatment.    Admin. Amount: 1 tablet  Dispense Loc: [facility] 73              Or  senna-docusate (SENOKOT-S;PERICOLACE) 8.6-50 MG per tablet 2 tablet  Dose: 2 tablet  Freq: 2 TIMES DAILY PRN Route: PO  PRN Reason: constipation  Start: 09/28/18 1700   Admin Instructions: Hold for loose stools.  This is the first step of a three step constipation treatment.    Admin. Amount: 2 tablet  Dispense Loc: [facility] 73               sodium chloride (PF) 0.9% PF flush 10 mL  Dose: 10 mL  Freq: EVERY 8 HOURS Route: IK  Start: 09/29/18 1045   Admin. Amount: 10 mL  Dispense Loc: Pending sale to [facility] Floor Stock  Volume: 10 mL         (1010)-Not Given       (1826)-Not Given        (0448)-Not Given       (1100)-Not Given       (1757)-Not Given        (0226)-Not Given       [ ] 1045       [ ] 1845           sodium chloride (PF) 0.9% PF flush 3 mL  Dose: 3 mL  Freq: EVERY 8 HOURS Route: IK  Start: 09/28/18 1715   Admin Instructions: And Q1H PRN, to lock peripheral IV dormant line.    Admin. Amount: 3 mL  Last Admin: 10/01/18 0226  Dispense Loc: Pending sale to [facility] Floor Stock  Volume: 3 mL        (2251)-Not Given        (0022)-Not Given       (0955)-Not Given       1630 (3 mL)-Given        0448 (3  mL)-Given       0842 (3 mL)-Given       1756 (3 mL)-Given        0226 (3 mL)-Given       (1035)-Not Given       [ ] 1715           sodium chloride (PF) 0.9% PF flush 3 mL  Dose: 3 mL  Freq: EVERY 1 HOUR PRN Route: IK  PRN Reason: line flush  PRN Comment: for peripheral IV flush post IV meds  Start: 18 1700   Admin. Amount: 3 mL  Dispense Loc: [facility] Floor Stock  Volume: 3 mL              Completed Medications  Medications 09/25/18 09/26/18 09/27/18 09/28/18 09/29/18 09/30/18 10/01/18         Dose: 1,000 mL  Freq: ONCE Route: IV  Last Dose: Stopped (18 154)  Start: 18 1242   End: 18 154   Admin. Amount: 1,000 mL  Last Admin: 18 134  Dispense Loc: [facility] Floor Stock  Infused Over: 1 Hours  Administrations Remainin  Volume: 1,000 mL        1349 (1,000 mL)-New Bag       1549-Stopped                Dose: 325 mg  Freq: ONCE Route: PO  Start: 18 1446   End: 18 1522   Admin. Amount: 1 tablet (1 × 325 mg tablet)  Last Admin: 18 152  Dispense Loc:  [facility]  Administrations Remainin        1522 (325 mg)-Given                Dose: 300 mg  Freq: ONCE Route: PO  Start: 18   End: 18   Admin. Amount: 4 tablet (4 × 75 mg tablet)  Last Admin: 18  Dispense Loc:  [facility] 73  Administrations Remainin        2211 (300 mg)-Given                Dose: 7 mL  Freq: ONCE Route: IV  Start: 18 1330   End: 18 134   Admin Instructions: Supplied by, and administered by [facility].    Admin. Amount: 7 mL  Last Admin: 18  Dispense Loc: [facility] FLOOR STOCK  Administrations Remainin  Volume: 7 mL         1340 (7 mL)-Given               Dose: 120 mL  Freq: ONCE Route: IV  Start: 18   End: 18   Admin. Amount: 120 mL  Last Admin: 18  Dispense Loc: [facility] FLOOR STOCK  Administrations Remainin  Volume: 120 mL         (120 mL)-Given                Dose: 3 mL  Freq: ONCE Route: IV  Start: 18 1045    End: 18   Admin Instructions: NDC  [id number]    Admin. Amount: 3 mL  Last Admin: 18  Dispense Loc:  [facility] FLOOR STOCK  Administrations Remainin  Volume: 9 mL         1045 (3 mL)-Given               Dose: 120 mL  Freq: ONCE Route: AS INSTRUCTE  Start: 18   End: 18   Admin Instructions: This entry is for use by Radiology to intermittently used as a flush in patients receiving a CT scan.    Admin. Amount: 120 mL  Last Admin: 18  Dispense Loc:  [facility] FLOOR STOCK  Administrations Remainin  Volume: 500 mL        3 (120 mL)-Given             Discontinued Medications  Medications 09/25/18 09/26/18 09/27/18 09/28/18 09/29/18 09/30/18 10/01/18         Dose: 2.5 mg  Freq: 2 TIMES DAILY Route: PO  Start: 18   End: 18   Admin. Amount: 1 tablet (1 × 2.5 mg tablet)          1109-Med Discontinued          Dose: 325 mg  Freq: DAILY Route: PO  Start: 18   End: 18 1053   Admin Instructions: DO NOT CRUSH.    Admin. Amount: 1 tablet (1 × 325 mg tablet)  Last Admin: 18  Dispense Loc: [facility] 73         0804 (325 mg)-Given        0838 (325 mg)-Given       1053-Med Discontinued          Dose: 81 mg  Freq: DAILY Route: PO  Start: 18   End: 18   Admin Instructions: DO NOT CRUSH.    Admin. Amount: 1 tablet (1 × 81 mg tablet)  Dispense Loc: [facility] 73        -Med Discontinued            Dose: 75 mg  Freq: DAILY Route: PO  Start: 18   End: 18 1053   Admin. Amount: 1 tablet (1 × 75 mg tablet)  Last Admin: 18  Dispense Loc: [facility] 73         0803 (75 mg)-Given        0838 (75 mg)-Given       1053-Med Discontinued          Dose: 20 mg  Freq: DAILY Route: PO  Start: 18   End: 18 1036   Admin Instructions: Hold for SBP <110    Admin. Amount: 1 tablet (1 × 20 mg tablet)  Dispense Loc:  [facility] 73         1036-Med Discontinued  (1038)-Not Given               Dose: 12.5  mg  Freq: 2 TIMES DAILY Route: PO  Start: 09/28/18 2100   End: 09/30/18 0747   Admin Instructions: Hold for pulse <60 or SBP <110    Admin. Amount: 1 half-tab (1 × 12.5 mg half-tab)  Last Admin: 09/29/18 2020  Dispense Loc: [facility] 73        (3740)-Not Given        1041 (12.5 mg)-Given       2020 (12.5 mg)-Given        0747-Med Discontinued          Rate: 75 mL/hr   Freq: CONTINUOUS Route: IV  Start: 09/28/18 1715   End: 10/01/18 1032   Last Admin: 10/01/18 0617  Dispense Loc: [facility] Floor Stock  Volume: 1,000 mL        2211 ( )-New Bag        0602 ( )-New Bag       0800 ( )-Rate/Dose Verify       1633 ( )-New Bag       1836 ( )-Rate/Dose Change       2026 ( )-Rate/Dose Verify        0403 ( )-New Bag       0843 ( )-Rate/Dose Verify       1651 ( )-New Bag        0617 ( )-New Bag       1032-Med Discontinued         Rate: 125 mL/hr Dose: 1000 mL  Freq: CONTINUOUS Route: IV  Start: 09/28/18 1243   End: 09/28/18 1700   Admin Instructions: Administer after the bolus.    Admin. Amount: 1,000 mL  Dispense Loc: [facility] Floor Stock  Volume: 1,000 mL        1700-Med Discontinued  (2251)-Not Given           Medications 09/25/18 09/26/18 09/27/18 09/28/18 09/29/18 09/30/18 10/01/18

## 2018-09-28 NOTE — H&P
[facility]    History and Physical  Hospitalist       Date of Admission:  9/28/2018    Assessment & Plan   Jose E Capps is a 87 year old male with past history TIA, HTN, RA, hyperlipidemia and CAD who presents with 2 falls in past 2 days, found to be in A-fib with controlled rate.    Recurrent falls:  Patient has no recollection of events, both falls unwitnessed.  Question if due to A-fib with slow ventricular response as pulse currently in 50's.  May also consider orthostasis.  CT head negative for acute pathology, however, he has difficulty with coordinated movements on exam with an otherwise non-focal neuro exam.  In setting of prior TIA and new-onset A-fib, feel CVA is worth ruling out.  - MRI brain  - telemetry  - orthostatic blood pressure  - neuro checks q4h  - PT  - continue daily aspirin  - check Vit B12  - would recommend complete abstinence from alcohol    New onset A-fib with controlled rate/slow rate: No known history per review of prior EKG.  Trop minimally elevated at 0.049 with no complaints of cardiorespiratory symptoms.  - continue ASA  - serial trop (very low suspicion for ACS)  - obtain TTE  - did not discuss anticoagulation with daughter, but with 2 falls in past 2 days, risk likely outweighs benefit  - decrease metoprolol to 12.5 mg bid    HTN:  Decrease metoprolol as above.  Continue lisinopril with hold parameters.    CAD:  Based on prior abnormal stress test.  Medically managed.  - aspirin, BB and ACE-I as aobve, continue PTA statin    RA:  He is not on immunosuppressives.    Cognitive impairment:  Daughter reports significant impairment in short term memory in past 3 months, see H&P for details.  - monitor for delirium      DVT Prophylaxis: Pneumatic Compression Devices  Code Status: DNR / DNI    Disposition: Expected discharge in 1-2 days pending negative work-up.    Balta Alcantar    Primary Care Physician   Nehemias Granados    Chief Complaint   falls    History is obtained  from the patient, discussion with daughter, ED provider and chart review.  Patient cannot provide any history due to his underlying forgetfullness.    History of Present Illness   Jose E Capps is a 87 year old male who presents with 2 falls in the past 2 days.  He resides independently in assisted living at [facility].  He had unwitnessed falls yesterday and one again this morning.  The patient has no recollection of the falls or any surrounding circumstances.  At approximately 11 AM today, his daughter called him on the phone.  He answered, but stated that he was on the floor and in pain.  She immediately hung up and called the  who found him on the floor.  It is unclear how long he was there.  Currently, he complains of some right shoulder pain due to his positioning in bed.  He reportedly had a single loose stool today.  He currently denies any chest pain or pressure, palpitations, shortness of breath, infectious symptoms or neurologic symptoms including dysarthria, dysphasia, word finding difficulty, headache, visual disturbance, focal paresthesias or weakness.  His daughter reports he is in his baseline state of confusion.  Supposedly he has had significant worsening of his memory over the past 3 months, though his daughter reports that he has experienced significant changes in his life.  He moved in to [facility] in August with his wife, but she was subsequently hospitalized and discharged to TCU and subsequently went to memory care as opposed to returning to the assisted living.  Patient reports he has had good appetite and has been eating well.  There is no known weight loss.  His remainder of review of systems is otherwise negative.    Past Medical History    Cognitive impairment  History of TIA  Hypertension  Rheumatoid arthritis  Hyperlipidemia  Coronary artery disease  History of melanoma and basal cell carcinoma    Past Surgical History   Resection of melanoma and basal cell carcinomas  Right  total hip arthroplasty  Left total knee arthroplasty  Fusion of L4-L5    Prior to Admission Medications   Prior to Admission Medications   Prescriptions Last Dose Informant Patient Reported? Taking?   Acetaminophen (TYLENOL PO) 9/28/2018 at 0800  Yes Yes   Sig: Take 500 mg by mouth 3 times daily While Awake    Calcium Carb-Cholecalciferol (CALCIUM + D3) 600-200 MG-UNIT TABS 9/28/2018 at 0800  No Yes   Sig: TAKE 1 TABLET BY MOUTH ONCE DAILY   Multiple Vitamins-Minerals (CEROVITE SENIOR) TABS 9/28/2018 at 0800  No Yes   Sig: TAKE 1 TABLET BY MOUTH ONCE DAILY   OMEGA-3 FISH OIL 1000 MG capsule 9/28/2018 at 0800  No Yes   Sig: TAKE 1 CAPSULE BY MOUTH ONCE DAILY   SM ASPIRIN ADULT LOW STRENGTH 81 MG EC tablet 9/28/2018 at 0800  No Yes   Sig: TAKE 1 TABLET BY MOUTH ONCE DAILY   acetaminophen (MAPAP) 500 MG tablet prn  No Yes   Sig: Take 1 tablet (500 mg) by mouth every 8 hours as needed for fever or pain   atorvastatin (LIPITOR) 10 MG tablet 9/27/2018 at 2000  No Yes   Sig: Take 1 tablet (10 mg) by mouth daily   leflunomide (ARAVA) 10 MG tablet 9/28/2018 at 0800  Yes Yes   Sig: Take 1 tablet by mouth daily.   lisinopril (PRINIVIL/ZESTRIL) 20 MG tablet 9/28/2018 at 0800  No Yes   Sig: Take 1 tablet (20 mg) by mouth daily   metoprolol tartrate (LOPRESSOR) 25 MG tablet 9/28/2018 at 0800  No Yes   Sig: Take 1 tablet (25 mg) by mouth 2 times daily   polyethylene glycol (MIRALAX/GLYCOLAX) Packet prn  Yes Yes   Sig: Take 1 packet by mouth daily as needed for constipation   sennosides (SENOKOT) 8.6 MG tablet prn  Yes Yes   Sig: Take 4 tablets by mouth 2 times daily as needed       Facility-Administered Medications: None     Allergies   Allergies   Allergen Reactions     No Known Drug Allergy        Social History   I have personally reviewed the social history with the patient showing 60-pack-year history of tobacco abuse, having quit 30 years ago.  He consumes 2-3 alcoholic beverages per night.  He resides in assisted living at  [facility].      Family History   Mother with Alzheimer's.  Father  of a PE.    Review of Systems   The 10 point Review of Systems is negative other than noted in the HPI or here.     Physical Exam   Temp: 97.9  F (36.6  C)   BP: 153/71 Pulse: 72 Heart Rate: 53 Resp: 11 SpO2: 97 % O2 Device: None (Room air)    Vital Signs with Ranges  Temp:  [97.9  F (36.6  C)] 97.9  F (36.6  C)  Pulse:  [72] 72  Heart Rate:  [53] 53  Resp:  [11-18] 11  BP: (153-165)/(66-95) 153/71  SpO2:  [95 %-97 %] 97 %  0 lbs 0 oz    Constitutional: well developed, well nourished male in no acute distress  Eyes: pupils equal, round and reactive to light, no icterus  HEENT: head normocephalic, atraumatic, mucous membranes moist, no cervical lymphadenopathy  Respiratory: lungs clear to auscultation bilaterally, no crackles or wheezes, no tachypnea  Cardiovascular: irregular rhythm, bradycardic, normal S1/S2, no murmur, rubs or gallops  GI: abdomen soft, non-tender, non-distended, normal bowel sounds, no hepatosplenomegaly  Lymph/Hematologic: No peripheral edema  Skin: No rash or bruising  Musculoskeletal: Extremities warm and well perfused  Neurologic: alert, oriented to person and hospital only, cranial nerves II-XII intact, 5/5 strength intact, difficulty with heel-shin testing and finger-nose testing, sensation to light touch intact  Psychiatric: normal affect    Data   Data reviewed today:  I personally reviewed the EKG tracing showing A-fib with controlled ventricular rate.    Recent Labs  Lab 18  1305   WBC 7.0   HGB 15.1   MCV 96         POTASSIUM 4.5   CHLORIDE 106   CO2 27   BUN 21   CR 0.74   ANIONGAP 7   GISELA 8.7   *   ALBUMIN 3.4   PROTTOTAL 6.8   BILITOTAL 1.0   ALKPHOS 97   ALT 27   AST 26   LIPASE 103   TROPI 0.049*       Recent Results (from the past 24 hour(s))   CT Head w/o Contrast    Narrative    CT OF THE HEAD WITHOUT CONTRAST  2018 1:23 PM     COMPARISON: None.    HISTORY:  Fall with frontal  hematoma and right parietal hematoma.     TECHNIQUE: 5 mm thick axial CT images of the head were acquired  without IV contrast material.    FINDINGS:  There is moderate diffuse cerebral volume loss. There are  subtle patchy areas of decreased density in the cerebral white matter  bilaterally that are consistent with sequela of chronic small vessel  ischemic disease.     The ventricles and basal cisterns are within normal limits in  configuration given the degree of cerebral volume loss.  There is no  midline shift. There are no extra-axial fluid collections.     No intracranial hemorrhage, mass or recent infarct.    The visualized paranasal sinuses are well aerated. There is no  mastoiditis. There are no fractures of the visualized bones.       Impression    IMPRESSION:  Diffuse cerebral volume loss and cerebral white matter  changes consistent with chronic small vessel ischemic disease. No  evidence for acute intracranial pathology.     Radiation dose for this scan was reduced using automated exposure  control, adjustment of the mA and/or kV according to patient size, or  iterative reconstruction technique.    HOLGER PADILLA MD   XR Pelvis w Hip Left 1 View    Narrative    PELVIS AND HIP LEFT ONE VIEW   9/28/2018 1:35 PM     HISTORY: Pain.     COMPARISON: None.       Impression    IMPRESSION: No acute fracture or dislocation.    АЛЕКСАНДР HUBER MD   XR Knee Right 3 Views    Narrative    RIGHT KNEE THREE VIEWS   9/28/2018 1:36 PM     HISTORY: Right knee pain after fall.     COMPARISON: None.       Impression    IMPRESSION: No acute fracture or dislocation. Severe osteoarthritis.  Joint effusion.    АЛЕКСАНДР HUBER MD

## 2018-09-28 NOTE — IP AVS SNAPSHOT
MRN:[id number]                      After Visit Summary   9/28/2018    Jose E Capps    MRN: [id number]           Thank you!     Thank you for choosing [facility] for your care. Our goal is always to provide you with excellent care. Hearing back from our patients is one way we can continue to improve our services. Please take a few minutes to complete the written survey that you may receive in the mail after you visit with us. Thank you!        Patient Information     Date Of Birth          9/28/1931        Designated Caregiver       Most Recent Value    Caregiver    Will someone help with your care after discharge? 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  For non-urgent questions about your medical care, please call your primary care provider or clinic, [phone]          Attending Provider     Provider Specialty    Livia Wise CNP Nurse Practitioner - Family    Balta Alcantar MD Internal Medicine       Primary Care Provider Office Phone # Fax #    Nehemias Granados [phone]      After Care Instructions     Activity - Up with nursing assistance           Advance Diet as Tolerated       Follow this diet upon discharge: Orders Placed This Encounter      Combination Diet Thin Liquids (water, ice chips, juice, milk, gelatin, ice cream, etc); Mechanical/Dental Soft Diet            Fall precautions           General info for SNF       Length of Stay Estimate: Short Term Care: Estimated # of Days <30  Condition at Discharge: Improving  Level of care:skilled   Rehabilitation Potential: Fair  Admission H&P  remains valid and up-to-date: Yes  Recent Chemotherapy: N/A  Use Nursing Home Standing Orders: Yes            Mantoux instructions       Give two-step Mantoux (PPD) Per Facility Policy Yes                  Follow-up Appointments     Follow Up and recommended labs and tests       Follow up with Nursing home physician in 2-3 days  No follow up labs or test are needed.    Follow up with primary care provider after discharge home    Follow up with Neurology in 4 weeks:  Appointment scheduled with Dr. Delgado at [facility] on November 1st, 11:00 am check in for 11:15 am appointment  [facility]  [address] [place] [address], [address]  [place] [address]  [phone]  The clinic will send a packet to patient's home address.  They would like the forms completed and sent back before this appointment.                  Additional Services     Occupational Therapy Adult Consult       Evaluate and treat as clinically indicated.    Reason:  Acute CVA            Physical Therapy Adult Consult       Evaluate and treat as clinically indicated.    Reason:  Acute CVA            Speech Language Path Adult Consult       Evaluate and treat as clinically indicated.    Reason:  Dysphagia, CVA                  Further instructions from your care team       Your risk factors for stroke or TIA (transient ischemic attack):    Your Risk Factors Your Results Normal Ranges   High blood pressure BP Readings from Last 1 Encounters:   10/01/18 133/67    Less than 120/80   Cholesterol              Total Lab Results   Component Value Date    CHOL 123 09/30/2018      Less than 150    Triglycerides   Lab Results   Component Value Date    TRIG 92 09/30/2018    Less than 150   LDL Lab Results   Component Value Date    LDL 61 09/30/2018       Less than 70   HDL Lab Results   Component Value Date    HDL 44 09/30/2018            Greater than 40 (men)  Greater than 50 (women)   Diabetes   Recent Labs  Lab 09/30/18  0950   GLC 88  "   Fasting blood glucose    Smoking/tobacco use  Quit smoking and tobacco   Overweight  Lose 1-2 pounds a week   Lack of exercise  30 minutes moderate activity each day   Other risk factors include carotid (neck) artery disease, atrial fibrillation and stress. You may be on new medicine to treat high blood pressure, cholesterol, diabetes or atrial fibrillation.    Understanding Stroke Booklet given to patient. Please refer to booklet for further information.    Stroke warning signs and symptoms - CALL 911 right away for:  - Sudden numbness or weakness in the face, arm or leg (often on one side of the body).  - Sudden confusion or trouble understanding what is going on.  - Sudden blurred or decreased vision in one or both eyes.  - Sudden trouble speaking, loss of balance, dizziness or problems with coordination.  - Sudden, severe headache for no reason.  - Fainting or seizures.  - Symptoms may go away then come back suddenly.      Pending Results     No orders found from 9/26/2018 to 9/29/2018.            Statement of Approval     Ordered          10/01/18 9883  I have reviewed and agree with all the recommendations and orders detailed in this document.  EFFECTIVE NOW     Approved and electronically signed by:  Emily Maldonado MD             Admission Information     Date & Time Provider Department Dept. Phone    9/28/2018 Balta Alcantar MD [address] [facility] [phone]      Your Vitals Were     Blood Pressure Pulse Temperature Respirations Height Weight    133/67 (BP Location: Right arm) 57 98  F (36.7  C) (Oral) 16 1.778 m (5' 10\") 75.2 kg (165 lb 12.6 oz)    Pulse Oximetry BMI (Body Mass Index)                93% 23.79 kg/m2          [employer] Information     [employer] gives you secure access to your electronic health record. If you see a primary care provider, you can also send messages to your care team and make appointments. If you have questions, please call your primary care " clinic.  If you do not have a primary care provider, please call [phone] and they will assist you.        Care EveryWhere ID     This is your Care EveryWhere ID. This could be used by other organizations to access your [facility] medical records  [id number]        Equal Access to Services     ADOLFOJORGE L OSMAN : Hadii aad ku hadbritnijose Sosylviaali, waaxda luqadaha, qaybta kaalmada adeegyada, ian patinotimmyching tolliver. So [facility] [phone].    ATENCIÓN: Si habla español, tiene a bradley disposición servicios gratuitos de asistencia lingüística. Llame al [phone].    We comply with applicable federal civil rights laws and [place] laws. We do not discriminate on the basis of race, color, national origin, age, disability, sex, sexual orientation, or gender identity.               Review of your medicines      START taking        Dose / Directions    apixaban ANTICOAGULANT 5 MG tablet   Commonly known as:  ELIQUIS   Used for:  Atrial fibrillation, unspecified type (H)        Dose:  5 mg   Take 1 tablet (5 mg) by mouth 2 times daily   Refills:  0         CONTINUE these medicines which may have CHANGED, or have new prescriptions. If we are uncertain of the size of tablets/capsules you have at home, strength may be listed as something that might have changed.        Dose / Directions    acetaminophen 500 MG tablet   Commonly known as:  MAPAP   This may have changed:  Another medication with the same name was removed. Continue taking this medication, and follow the directions you see here.   Used for:  Seronegative rheumatoid arthritis (H)        Dose:  500 mg   Take 1 tablet (500 mg) by mouth every 8 hours as needed for fever or pain   Quantity:  93 tablet   Refills:  12       leflunomide 10 MG tablet   Commonly known as:  ARAVA   This may have changed:  additional instructions   Used for:  Seronegative rheumatoid arthritis (H)        Dose:  10 mg   Take 1 tablet (10 mg) by mouth daily Hold it until you will  return home   Refills:  0       metoprolol tartrate 25 MG tablet   Commonly known as:  LOPRESSOR   This may have changed:  how much to take   Used for:  Atrial fibrillation, unspecified type (H)        Dose:  6.25 mg   Take 0.25 tablets (6.25 mg) by mouth 2 times daily   Quantity:  60 tablet   Refills:  0         CONTINUE these medicines which have NOT CHANGED        Dose / Directions    atorvastatin 10 MG tablet   Commonly known as:  LIPITOR   Used for:  Mixed hyperlipidemia        Dose:  10 mg   Take 1 tablet (10 mg) by mouth daily   Quantity:  90 tablet   Refills:  3       Calcium + D3 600-200 MG-UNIT Tabs   Used for:  Imbalanced nutrition        TAKE 1 TABLET BY MOUTH ONCE DAILY   Quantity:  90 tablet   Refills:  3       CEROVITE SENIOR Tabs   Used for:  Hypertension goal BP (blood pressure) < 140/90        TAKE 1 TABLET BY MOUTH ONCE DAILY   Quantity:  93 tablet   Refills:  3       fish oil-omega-3 fatty acids 1000 MG capsule   Used for:  Atherosclerosis of native coronary artery of native heart without angina pectoris        TAKE 1 CAPSULE BY MOUTH ONCE DAILY   Quantity:  31 capsule   Refills:  12       lisinopril 20 MG tablet   Commonly known as:  PRINIVIL/ZESTRIL   Used for:  Hypertension goal BP (blood pressure) < 140/90        Dose:  20 mg   Take 1 tablet (20 mg) by mouth daily   Quantity:  90 tablet   Refills:  3       polyethylene glycol Packet   Commonly known as:  MIRALAX/GLYCOLAX        Dose:  1 packet   Take 1 packet by mouth daily as needed for constipation   Refills:  0       sennosides 8.6 MG tablet   Commonly known as:  SENOKOT        Dose:  4 tablet   Take 4 tablets by mouth 2 times daily as needed   Refills:  0         STOP taking     SM ASPIRIN ADULT LOW STRENGTH 81 MG EC tablet   Generic drug:  aspirin                Where to get your medicines      Some of these will need a paper prescription and others can be bought over the counter. Ask your nurse if you have questions.     You don't need  a prescription for these medications     apixaban ANTICOAGULANT 5 MG tablet    leflunomide 10 MG tablet    metoprolol tartrate 25 MG tablet                Protect others around you: Learn how to safely use, store and throw away your medicines at www.disposemymeds.org.             Medication List: This is a list of all your medications and when to take them. Check marks below indicate your daily home schedule. Keep this list as a reference.      Medications           Morning Afternoon Evening Bedtime As Needed    acetaminophen 500 MG tablet   Commonly known as:  MAPAP   Take 1 tablet (500 mg) by mouth every 8 hours as needed for fever or pain   Last time this was given:  650 mg on 10/1/2018  8:30 AM                                apixaban ANTICOAGULANT 5 MG tablet   Commonly known as:  ELIQUIS   Take 1 tablet (5 mg) by mouth 2 times daily   Last time this was given:  5 mg on 10/1/2018  8:29 AM                                atorvastatin 10 MG tablet   Commonly known as:  LIPITOR   Take 1 tablet (10 mg) by mouth daily   Last time this was given:  10 mg on 9/30/2018  9:21 PM                                Calcium + D3 600-200 MG-UNIT Tabs   TAKE 1 TABLET BY MOUTH ONCE DAILY                                CEROVITE SENIOR Tabs   TAKE 1 TABLET BY MOUTH ONCE DAILY                                fish oil-omega-3 fatty acids 1000 MG capsule   TAKE 1 CAPSULE BY MOUTH ONCE DAILY                                leflunomide 10 MG tablet   Commonly known as:  ARAVA   Take 1 tablet (10 mg) by mouth daily Hold it until you will return home                                lisinopril 20 MG tablet   Commonly known as:  PRINIVIL/ZESTRIL   Take 1 tablet (20 mg) by mouth daily                                metoprolol tartrate 25 MG tablet   Commonly known as:  LOPRESSOR   Take 0.25 tablets (6.25 mg) by mouth 2 times daily   Last time this was given:  6.25 mg on 10/1/2018  8:30 AM                                polyethylene glycol Packet    Commonly known as:  MIRALAX/GLYCOLAX   Take 1 packet by mouth daily as needed for constipation                                sennosides 8.6 MG tablet   Commonly known as:  SENOKOT   Take 4 tablets by mouth 2 times daily as needed

## 2018-09-28 NOTE — PROGRESS NOTES
RECEIVING UNIT ED HANDOFF REVIEW    ED Nurse Handoff Report was reviewed by: Olivier Whatley on September 28, 2018 at 4:44 PM

## 2018-09-28 NOTE — ED NOTES
[facility]  ED Nurse Handoff Report    ED Chief complaint: Fall (pt has had two falls in past 24 hours. last night struck front of head. today struck back of head. no c/o neck pain. Pt with increased weakness in the past days)      ED Diagnosis:   Final diagnoses:   Elevated troponin   Atrial fibrillation, unspecified type (H)   Fall, initial encounter   Contusion of scalp, initial encounter       Code Status: Full Code    Allergies:   Allergies   Allergen Reactions     No Known Drug Allergy        Activity level - Baseline/Home:  Independent    Activity Level - Current:   Stand with Assist     Needed?: No    Isolation: No  Infection: Not Applicable  Bariatric?: No    Vital Signs:   Vitals:    09/28/18 1415 09/28/18 1500 09/28/18 1523 09/28/18 1530   BP:  154/66  153/71   Pulse:       Resp:   18 11   Temp:       SpO2: 95%  95% 97%   Height:           Cardiac Rhythm: ,        Pain level: 0-10 Pain Scale: 1    Is this patient confused?: Yes   [place] - Suicide Severity Rating Scale Completed?  Yes  If yes, what color did the patient score?  White    Patient Report: Initial Complaint: Pt. Fell at assisted living yesterday and today. Daughter brings him in for evaluation. Pt. Does not remember falling. Confused.  Focused Assessment: Alert and confused. Pt. States he is sore in the hands and has some eccymotic areas on hands and forehead. Pt. Denies pain or shortness of breath. Cardiac monitor reveals afib.  Tests Performed: labs, xray  Abnormal Results:   Results for orders placed or performed during the hospital encounter of 09/28/18   CT Head w/o Contrast    Narrative    CT OF THE HEAD WITHOUT CONTRAST  9/28/2018 1:23 PM     COMPARISON: None.    HISTORY:  Fall with frontal hematoma and right parietal hematoma.     TECHNIQUE: 5 mm thick axial CT images of the head were acquired  without IV contrast material.    FINDINGS:  There is moderate diffuse cerebral volume loss. There are  subtle  patchy areas of decreased density in the cerebral white matter  bilaterally that are consistent with sequela of chronic small vessel  ischemic disease.     The ventricles and basal cisterns are within normal limits in  configuration given the degree of cerebral volume loss.  There is no  midline shift. There are no extra-axial fluid collections.     No intracranial hemorrhage, mass or recent infarct.    The visualized paranasal sinuses are well aerated. There is no  mastoiditis. There are no fractures of the visualized bones.       Impression    IMPRESSION:  Diffuse cerebral volume loss and cerebral white matter  changes consistent with chronic small vessel ischemic disease. No  evidence for acute intracranial pathology.     Radiation dose for this scan was reduced using automated exposure  control, adjustment of the mA and/or kV according to patient size, or  iterative reconstruction technique.    HOLGER PADILLA MD   XR Pelvis w Hip Left 1 View    Narrative    PELVIS AND HIP LEFT ONE VIEW   9/28/2018 1:35 PM     HISTORY: Pain.     COMPARISON: None.       Impression    IMPRESSION: No acute fracture or dislocation.    АЛЕКСАНДР HUBER MD   XR Knee Right 3 Views    Narrative    RIGHT KNEE THREE VIEWS   9/28/2018 1:36 PM     HISTORY: Right knee pain after fall.     COMPARISON: None.       Impression    IMPRESSION: No acute fracture or dislocation. Severe osteoarthritis.  Joint effusion.    АЛЕКСАНДР HUBER MD   CBC with platelets differential   Result Value Ref Range    WBC 7.0 4.0 - 11.0 10e9/L    RBC Count 4.66 4.4 - 5.9 10e12/L    Hemoglobin 15.1 13.3 - 17.7 g/dL    Hematocrit 44.9 40.0 - 53.0 %    MCV 96 78 - 100 fl    MCH 32.4 26.5 - 33.0 pg    MCHC 33.6 31.5 - 36.5 g/dL    RDW 13.4 10.0 - 15.0 %    Platelet Count 216 150 - 450 10e9/L    Diff Method Automated Method     % Neutrophils 71.6 %    % Lymphocytes 13.0 %    % Monocytes 13.3 %    % Eosinophils 1.6 %    % Basophils 0.4 %    % Immature Granulocytes 0.1 %     Nucleated RBCs 0 0 /100    Absolute Neutrophil 5.0 1.6 - 8.3 10e9/L    Absolute Lymphocytes 0.9 0.8 - 5.3 10e9/L    Absolute Monocytes 0.9 0.0 - 1.3 10e9/L    Absolute Eosinophils 0.1 0.0 - 0.7 10e9/L    Absolute Basophils 0.0 0.0 - 0.2 10e9/L    Abs Immature Granulocytes 0.0 0 - 0.4 10e9/L    Absolute Nucleated RBC 0.0    Troponin I   Result Value Ref Range    Troponin I ES 0.049 (H) 0.000 - 0.045 ug/L   Comprehensive metabolic panel   Result Value Ref Range    Sodium 140 133 - 144 mmol/L    Potassium 4.5 3.4 - 5.3 mmol/L    Chloride 106 94 - 109 mmol/L    Carbon Dioxide 27 20 - 32 mmol/L    Anion Gap 7 3 - 14 mmol/L    Glucose 101 (H) 70 - 99 mg/dL    Urea Nitrogen 21 7 - 30 mg/dL    Creatinine 0.74 0.66 - 1.25 mg/dL    GFR Estimate >90 >60 mL/min/1.7m2    GFR Estimate If Black >90 >60 mL/min/1.7m2    Calcium 8.7 8.5 - 10.1 mg/dL    Bilirubin Total 1.0 0.2 - 1.3 mg/dL    Albumin 3.4 3.4 - 5.0 g/dL    Protein Total 6.8 6.8 - 8.8 g/dL    Alkaline Phosphatase 97 40 - 150 U/L    ALT 27 0 - 70 U/L    AST 26 0 - 45 U/L   UA with Microscopic reflex to Culture   Result Value Ref Range    Color Urine Light Yellow     Appearance Urine Clear     Glucose Urine Negative NEG^Negative mg/dL    Bilirubin Urine Negative NEG^Negative    Ketones Urine 10 (A) NEG^Negative mg/dL    Specific Gravity Urine 1.008 1.003 - 1.035    Blood Urine Negative NEG^Negative    pH Urine 6.0 5.0 - 7.0 pH    Protein Albumin Urine Negative NEG^Negative mg/dL    Urobilinogen mg/dL Normal 0.0 - 2.0 mg/dL    Nitrite Urine Negative NEG^Negative    Leukocyte Esterase Urine Negative NEG^Negative    Source Midstream Urine     WBC Urine <1 0 - 5 /HPF    RBC Urine <1 0 - 2 /HPF    Bacteria Urine Few (A) NEG^Negative /HPF   Lipase   Result Value Ref Range    Lipase 103 73 - 393 U/L   Magnesium   Result Value Ref Range    Magnesium 2.1 1.6 - 2.3 mg/dL   EKG 12-lead, tracing only   Result Value Ref Range    Interpretation ECG Click View Image link to view waveform  and result      Treatments provided: NS bolus 1000 ml total, aspirin 325    Family Comments: Daughter at bedside    OBS brochure/video discussed/provided to patient/family: Yes              Name of person given brochure if not patient: Daughter              Relationship to patient: Daughter    ED Medications:   Medications   0.9% sodium chloride BOLUS (0 mLs Intravenous Stopped 9/28/18 1549)     Followed by   sodium chloride 0.9% infusion (not administered)   acetaminophen (TYLENOL) tablet 1,000 mg (1,000 mg Oral Given 9/28/18 1350)   aspirin tablet 325 mg (325 mg Oral Given 9/28/18 1522)       Drips infusing?:  No    For the majority of the shift this patient was Green.   Interventions performed were updated on plan of care, sandwhich, reposition, fluids.    Severe Sepsis OR Septic Shock Diagnosis Present: No    To be done/followed up on inpatient unit:  No    ED NURSE PHONE NUMBER: [phone]

## 2018-09-28 NOTE — LETTER
Transition Communication Hand-off for Care Transitions to Next Level of Care Provider    Name: Jose E Capps  : 1931  MRN #: [id number]  Primary Care Provider: Nehemias Granados     Primary Clinic: [address] [place] [address] [address]  [place] [address]     Reason for Hospitalization:  Elevated troponin [R74.8]  Contusion of scalp, initial encounter [S00.03XA]  Fall, initial encounter [W19.XXXA]  Atrial fibrillation, unspecified type (H) [I48.91]  Admit Date/Time: 2018 11:57 AM  Discharge Date: 10/1/18  Payor Source: Payor: BCBS / Plan: BCBS PLATINUM BLUE / Product Type: PPO /     Readmission Assessment Measure (LOTTIE) Risk Score/category: Average           Reason for Communication Hand-off Referral: Other Discharge Plan    Discharge Plan: Discharged to TCU  Discharge Needs Assessment:  Needs       Most Recent Value    Equipment Currently Used at Home none    Transportation Available family or friend will provide    # of Referrals Placed by CTS Scheduled Follow-up appointments          Follow-up specialty is recommended: Yes, neurology    Follow-up plan:  No future appointments.    Any outstanding tests or procedures:        Referrals     Future Labs/Procedures    Occupational Therapy Adult Consult     Comments:    Evaluate and treat as clinically indicated.    Reason:  Acute CVA    Physical Therapy Adult Consult     Comments:    Evaluate and treat as clinically indicated.    Reason:  Acute CVA    Speech Language Path Adult Consult     Comments:    Evaluate and treat as clinically indicated.    Reason:  Dysphagia, CVA            Kalee Olivo RN Care Coordinator    AVS/Discharge Summary is the source of truth; this is a helpful guide for improved communication of patient story

## 2018-09-29 ENCOUNTER — APPOINTMENT (OUTPATIENT)
Dept: PHYSICAL THERAPY | Facility: CLINIC | Age: 83
DRG: 064 | End: 2018-09-29
Attending: HOSPITALIST
Payer: MEDICARE

## 2018-09-29 ENCOUNTER — APPOINTMENT (OUTPATIENT)
Dept: CARDIOLOGY | Facility: CLINIC | Age: 83
DRG: 064 | End: 2018-09-29
Attending: HOSPITALIST
Payer: MEDICARE

## 2018-09-29 ENCOUNTER — APPOINTMENT (OUTPATIENT)
Dept: OCCUPATIONAL THERAPY | Facility: CLINIC | Age: 83
DRG: 064 | End: 2018-09-29
Attending: NURSE PRACTITIONER
Payer: MEDICARE

## 2018-09-29 ENCOUNTER — APPOINTMENT (OUTPATIENT)
Dept: MRI IMAGING | Facility: CLINIC | Age: 83
DRG: 064 | End: 2018-09-29
Attending: HOSPITALIST
Payer: MEDICARE

## 2018-09-29 LAB
GLUCOSE BLDC GLUCOMTR-MCNC: 104 MG/DL (ref 70–99)
GLUCOSE BLDC GLUCOMTR-MCNC: 92 MG/DL (ref 70–99)
TROPONIN I SERPL-MCNC: 0.06 UG/L (ref 0–0.04)

## 2018-09-29 PROCEDURE — 97167 OT EVAL HIGH COMPLEX 60 MIN: CPT | Mod: GO

## 2018-09-29 PROCEDURE — 70553 MRI BRAIN STEM W/O & W/DYE: CPT

## 2018-09-29 PROCEDURE — 00000146 ZZHCL STATISTIC GLUCOSE BY METER IP

## 2018-09-29 PROCEDURE — A9270 NON-COVERED ITEM OR SERVICE: HCPCS | Mod: GY | Performed by: INTERNAL MEDICINE

## 2018-09-29 PROCEDURE — 40000895 ZZH STATISTIC SLP IP EVAL DEFER: Performed by: SPEECH-LANGUAGE PATHOLOGIST

## 2018-09-29 PROCEDURE — 84484 ASSAY OF TROPONIN QUANT: CPT | Performed by: HOSPITALIST

## 2018-09-29 PROCEDURE — 97116 GAIT TRAINING THERAPY: CPT | Mod: GP

## 2018-09-29 PROCEDURE — 25000132 ZZH RX MED GY IP 250 OP 250 PS 637: Mod: GY | Performed by: PSYCHIATRY & NEUROLOGY

## 2018-09-29 PROCEDURE — A9270 NON-COVERED ITEM OR SERVICE: HCPCS | Mod: GY | Performed by: HOSPITALIST

## 2018-09-29 PROCEDURE — 12000000 ZZH R&B MED SURG/OB

## 2018-09-29 PROCEDURE — 93306 TTE W/DOPPLER COMPLETE: CPT | Mod: 26 | Performed by: INTERNAL MEDICINE

## 2018-09-29 PROCEDURE — 25500064 ZZH RX 255 OP 636: Performed by: HOSPITALIST

## 2018-09-29 PROCEDURE — A9270 NON-COVERED ITEM OR SERVICE: HCPCS | Mod: GY | Performed by: PSYCHIATRY & NEUROLOGY

## 2018-09-29 PROCEDURE — 40000133 ZZH STATISTIC OT WARD VISIT

## 2018-09-29 PROCEDURE — 36415 COLL VENOUS BLD VENIPUNCTURE: CPT | Performed by: HOSPITALIST

## 2018-09-29 PROCEDURE — 25000128 H RX IP 250 OP 636: Performed by: PSYCHIATRY & NEUROLOGY

## 2018-09-29 PROCEDURE — 99233 SBSQ HOSP IP/OBS HIGH 50: CPT | Performed by: PSYCHIATRY & NEUROLOGY

## 2018-09-29 PROCEDURE — 25000132 ZZH RX MED GY IP 250 OP 250 PS 637: Mod: GY | Performed by: INTERNAL MEDICINE

## 2018-09-29 PROCEDURE — 40000193 ZZH STATISTIC PT WARD VISIT

## 2018-09-29 PROCEDURE — A9585 GADOBUTROL INJECTION: HCPCS | Performed by: HOSPITALIST

## 2018-09-29 PROCEDURE — 93306 TTE W/DOPPLER COMPLETE: CPT

## 2018-09-29 PROCEDURE — 97535 SELF CARE MNGMENT TRAINING: CPT | Mod: GO

## 2018-09-29 PROCEDURE — 97161 PT EVAL LOW COMPLEX 20 MIN: CPT | Mod: GP

## 2018-09-29 PROCEDURE — 25000132 ZZH RX MED GY IP 250 OP 250 PS 637: Mod: GY | Performed by: HOSPITALIST

## 2018-09-29 PROCEDURE — 97530 THERAPEUTIC ACTIVITIES: CPT | Mod: GO

## 2018-09-29 PROCEDURE — 99233 SBSQ HOSP IP/OBS HIGH 50: CPT | Performed by: INTERNAL MEDICINE

## 2018-09-29 RX ORDER — ATORVASTATIN CALCIUM 10 MG/1
10 TABLET, FILM COATED ORAL EVERY EVENING
Status: DISCONTINUED | OUTPATIENT
Start: 2018-09-29 | End: 2018-10-01 | Stop reason: HOSPADM

## 2018-09-29 RX ORDER — GADOBUTROL 604.72 MG/ML
7 INJECTION INTRAVENOUS ONCE
Status: COMPLETED | OUTPATIENT
Start: 2018-09-29 | End: 2018-09-29

## 2018-09-29 RX ADMIN — Medication 12.5 MG: at 10:41

## 2018-09-29 RX ADMIN — ACETAMINOPHEN 650 MG: 325 TABLET, FILM COATED ORAL at 16:27

## 2018-09-29 RX ADMIN — SODIUM CHLORIDE: 9 INJECTION, SOLUTION INTRAVENOUS at 06:02

## 2018-09-29 RX ADMIN — CLOPIDOGREL 75 MG: 75 TABLET, FILM COATED ORAL at 08:03

## 2018-09-29 RX ADMIN — ASPIRIN 325 MG: 325 TABLET, DELAYED RELEASE ORAL at 08:04

## 2018-09-29 RX ADMIN — SODIUM CHLORIDE: 9 INJECTION, SOLUTION INTRAVENOUS at 16:33

## 2018-09-29 RX ADMIN — ACETAMINOPHEN 650 MG: 325 TABLET, FILM COATED ORAL at 04:15

## 2018-09-29 RX ADMIN — Medication 12.5 MG: at 20:20

## 2018-09-29 RX ADMIN — HUMAN ALBUMIN MICROSPHERES AND PERFLUTREN 3 ML: 10; .22 INJECTION, SOLUTION INTRAVENOUS at 10:45

## 2018-09-29 RX ADMIN — ACETAMINOPHEN 650 MG: 325 TABLET, FILM COATED ORAL at 08:03

## 2018-09-29 RX ADMIN — ATORVASTATIN CALCIUM 10 MG: 10 TABLET, FILM COATED ORAL at 20:20

## 2018-09-29 RX ADMIN — GADOBUTROL 7 ML: 604.72 INJECTION INTRAVENOUS at 13:40

## 2018-09-29 ASSESSMENT — ACTIVITIES OF DAILY LIVING (ADL)
ADLS_ACUITY_SCORE: 14
ADLS_ACUITY_SCORE: 13

## 2018-09-29 NOTE — CONSULTS
Brief note.  Inpatient Code stroke phone discussion with RRT:  Stroke code called: 20:12  Stroke code response: 20:17  LKW: unknown, probably 4pm  CT head read:  20:26    88 yo m PMHX of HTN, RA, CAD and short term memory difficulties in last 3 months presented to [facility] after 2 falls without recollection of the events, hit his head twice front and back with skin bruising. CT head didn't show intracranial bleed. He was found to have a new atrial fibrillation. In the ED his neurological exam was normal except for bilateral dysmetria in both arms and legs. When he arrived to the floor at around 5pm he had Rt hemifield cut. Later this evening he tried to get out of bed and had difficulty due to t sided weakness. LKW was not exactly known, It was more than 4.5 hours from last chart documentation of normal exam. Patient was confused and disoriented so he is not the best historian. Had NIHSS of around 11, word finding difficulty, cass field cut, Rt hemiparesis (arm>leg), no antigravity in arm, numbness and dysmetria.    CT head no bleed  CTA: Left P1-P2 junction clot, left vertebral occlusion/dissection from origin to distal V3/V4.  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 Left vert dissection is probably the culprit because he had 2 head traumas yesterday and some left side neck pain. He was not a tPA candidate due to LKW more than 4.5hr and head 2 significant head trauma in the last 24hours.     Plan:  - Continue ASA 325mg po daily  - Add Plavix 300mg x 1, then 75mg po daily  - [facility] to [facility]h until AM. If clinical worsening will consider switching to heparin drip. We might consider repeating CT head if significant worsening.  - MRI brain, MRA neck with FAT SAT tomorrow to see if left vertebral artery occlusion is an acute dissection.  - He will  probably need anticoagulation in the next few days due to the diagnosis of afib.  - Permissive hypertension  - TTE tomorrow (probably low yield)  - I discussed my impression and plan with the patient and family though the speaker phone.    Yuli Garcia MD  Stroke Neurology

## 2018-09-29 NOTE — PLAN OF CARE
Problem: Patient Care Overview  Goal: Plan of Care/Patient Progress Review  Discharge Planner OT   Patient plan for discharge: TCU  Current status: Order received and eval completed. Pt presents after multi falls at his assisted living, while in hospital started having R sided weakness, neglect and visual field cut, code stroke called, CT showed Left posterior cerebral artery perfusion defect. Spoke with Pts DTR who states at assisted living Pt was getting help with his meds, he was no longer driving, but was independent with ADLs with no DME. Currently Pt with significant cognition deficits, scored a 11/30 on the SLUMS, he has weakness of RUE- 4/5 shoulder flexion, 4/5 elbow flex/extension, decreased  strength on R hand.  R Ataxia noted with gross/fine motor coordination while completed grasp/realease of a cup, and finger to nose test. Pt able to stand with min A to FWW-assist needed for weakness/balance. Pt required min A to don/doff his socks in chair for decreased balance, and weakness of R hand. Visually, Pt able to look to his R/scan and comprehend words, will further test visual field during ADLs. Left up in chair with alarm on. Numbness noted in RUE  Barriers to return to prior living situation: cognition, strength, safety, balance  Recommendations for discharge: TCU  Rationale for recommendations: Would benefit from rehab to address deficits in ADL performance.        Entered by: Mike Flowers 09/29/2018 9:21 AM

## 2018-09-29 NOTE — PROGRESS NOTES
09/29/18 1100   Quick Adds   Type of Visit Initial PT Evaluation   Living Environment   Lives With facility resident   Living Arrangements assisted living   Home Accessibility no concerns;bed and bath are not on the first floor   Number of Stairs to Enter Home 0   Number of Stairs Within Home 0   Stair Railings at Home none   Transportation Available family or friend will provide   Living Environment Comment Pt lives with spouse in an BHANU on the 4th floor with elevator access.    Self-Care   Dominant Hand right   Usual Activity Tolerance good   Current Activity Tolerance fair   Regular Exercise no   Equipment Currently Used at Home none   Activity/Exercise/Self-Care Comment Pt owns a WC and std cane. Pt was ind with all ADL's prior to admission per daughter who was present at bs.    Functional Level Prior   Ambulation 0-->independent   Transferring 0-->independent   Toileting 0-->independent   Bathing 0-->independent   Dressing 0-->independent   Eating 0-->independent   Communication 0-->understands/communicates without difficulty   Swallowing 0-->swallows foods/liquids without difficulty   Cognition 1 - attention or memory deficits   Number of times patient has fallen within last six months 2   Which of the above functional risks had a recent onset or change? ambulation;transferring   Prior Functional Level Comment Pt was ind with ambualtion without the use of any AD's    General Information   Onset of Illness/Injury or Date of Surgery - Date 09/28/18   Referring Physician Balta Alcantar MD   Patient/Family Goals Statement Be independent like before   Pertinent History of Current Problem (include personal factors and/or comorbidities that impact the POC) Pt admitted with 2 falls and Afib from the assisted living facility pt resides in.. 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 PMH includes: TIA, HTN, RA, hyperlipidemia and CAD    Precautions/Limitations fall precautions   Weight-Bearing Status - LLE full weight-bearing   Weight-Bearing Status - RLE full weight-bearing   General Observations Pleasant and cooperative   General Info Comments Activity: up with assist    Cognitive Status Examination   Orientation person;place   Level of Consciousness alert   Follows Commands and Answers Questions 75% of the time   Personal Safety and Judgment impaired   Memory impaired   Pain Assessment   Patient Currently in Pain (Pt c/o R hand pain using RW. R hand swelling 2/2 falls)   Integumentary/Edema   Integumentary/Edema Comments Pt noted with R hand and R knee swelling   Range of Motion (ROM)   ROM Comment BLE: WFL   Strength   Strength Comments RLE: 4-/5, LLE: 4+/5   Bed Mobility   Bed Mobility Comments Supine>sit: CGA and verbal cues using bed rail, Sit>supine: SBA   Transfer Skills   Transfer Comments STS x 2 with min assist and verbal cues on hand placement.    Gait   Gait Comments 20 ft with RW and mod assist for safety and verbal cues   Balance   Balance Comments Impaired static and dynamic standing balance.    Sensory Examination   Sensory Perception no deficits were identified   Coordination   Coordination Comments RLE: impaired    Muscle Tone   Muscle Tone no deficits were identified   General Therapy Interventions   Planned Therapy Interventions balance training;bed mobility training;gait training;motor coordination training;neuromuscular re-education;strengthening;stretching;transfer training;risk factor education;home program guidelines;progressive activity/exercise   Clinical Impression   Criteria for Skilled Therapeutic Intervention yes, treatment indicated   PT Diagnosis Imapired gait   Influenced by the following impairments Decreased balance, strength, and coordiantion in RLE   Functional limitations due to impairments Assistance needed with transfers and gait   Clinical Presentation Stable/Uncomplicated   Clinical Presentation  "Rationale Pt admitted with acute CVA and falls.    Clinical Decision Making (Complexity) Low complexity   Therapy Frequency` 2 times/day   Predicted Duration of Therapy Intervention (days/wks) 3   Anticipated Discharge Disposition Transitional Care Facility   Risk & Benefits of therapy have been explained Yes   Patient, Family & other staff in agreement with plan of care Yes   Clinical Impression Comments Pt presents with decreased strength, coordination and balance limiting independence with fucntional mobility and transfers. Pt will benefit from continued skilled PT interventions to acheive PLOF and independence.    [facility] AM-PAC  \"6 Clicks\" V.2 Basic Mobility Inpatient Short Form   1. Turning from your back to your side while in a flat bed without using bedrails? 3 - A Little   2. Moving from lying on your back to sitting on the side of a flat bed without using bedrails? 3 - A Little   3. Moving to and from a bed to a chair (including a wheelchair)? 3 - A Little   4. Standing up from a chair using your arms (e.g., wheelchair, or bedside chair)? 3 - A Little   5. To walk in hospital room? 2 - A Lot   6. Climbing 3-5 steps with a railing? 2 - A Lot   Basic Mobility Raw Score (Score out of 24.Lower scores equate to lower levels of function) 16   Total Evaluation Time   Total Evaluation Time (Minutes) 15     "

## 2018-09-29 NOTE — PROGRESS NOTES
09/29/18 0822   Quick Adds   Type of Visit Initial Occupational Therapy Evaluation   Living Environment   Lives With facility resident  (Lives at an assistedliving ([facility])   Living Arrangements assisted living   Home Accessibility no concerns   Number of Stairs to Enter Home 0   Number of Stairs Within Home 0   Stair Railings at Home none   Living Environment Comment Per chart he lives in an assisted living   Self-Care   Dominant Hand right   Functional Level Prior   Ambulation 0-->independent   Transferring 0-->independent   Toileting 0-->independent   Bathing 0-->independent   Dressing 0-->independent   Eating 0-->independent   Communication 0-->understands/communicates without difficulty   Swallowing 0-->swallows foods/liquids without difficulty   Cognition 1 - attention or memory deficits   Fall history within last six months yes   Number of times patient has fallen within last six months 2   Prior Functional Level Comment Pt stated he was independent, attempted to call daughter but could not get a hold of her   General Information   Onset of Illness/Injury or Date of Surgery - Date 09/28/18   Referring Physician Neil Ward APRN CNP   Patient/Family Goals Statement None stated   Additional Occupational Profile Info/Pertinent History of Current Problem Pt came in after recurrent falls at his assisted living. While in hopsital Pt expercienced R sided weakness/neglect. Code stroke called last night, CT showed Left posterior cerebral artery perfusion defect.Left posterior cerebral artery perfusion defect.   Precautions/Limitations fall precautions   General Observations Recieved in bed eating breakfast, Spoke with RN prior to seeing   Cognitive Status Examination   Orientation person;place   Level of Consciousness alert   Able to Follow Commands success, 2-step commands   Personal Safety (Cognitive) severe impairment   Memory impaired   Attention No deficits were identified   Organization/Problem Solving  Reports problems with problem solving during evaluation   Executive Function Planning ability impaired   Cognitive Comment See flow sheet, Pt unaware why he was in hospital, could not give PLOF, SLUMS completed    Visual Perception   Visual Perception No deficits were identified   Visual Perception Comments Pt able to read, comprehend and track with both eyes. He was attending to R side throughout eval. Chart reported R sided visual cut, in eval, he was able to track to R, will complete further testing   Sensory Examination   Sensory Comments RUE with decreased sensation when eyes closed   Pain Assessment   Patient Currently in Pain No   Range of Motion (ROM)   ROM Comment Full range in shoulder flexion bilateraly, elbow flex/ext, decreased finger range on R hand   Strength   Strength Comments diminshed strength R shoulder flexion 4/5, elbow flexion 4/5, elbow extension 4/5, and poor  strenght. LUE WFL 5/5 throughout   Hand Strength   Hand Strength Comments decreased  on RUE   Coordination   Coordination Comments ataxia noted on RUE with finger to nose test and gross motor grasp/realse with objects   Mobility   Bed Mobility Bed mobility skill: Supine to sit   Bed Mobility Skill: Supine to Sit   Level of [place]: Supine/Sit contact guard   Transfer Skill: Bed to Chair/Chair to Bed   Level of [place]: Bed to Chair minimum assist (75% patients effort)   Assistive Device - Transfer Skill Bed to Chair Chair to Bed Rehab Eval standard walker   Transfer Skill: Sit to Stand   Level of [place]: Sit/Stand minimum assist (75% patients effort)   Assistive Device for Transfer: Sit/Stand standard walker   Lower Body Dressing   Level of [place]: Dress Lower Body minimum assist (75% patients effort)  (to don/doff his socks in chair)   Grooming   Level of [place]: Grooming minimum assist (75% patients effort)  (while sitting in chair)   Eating/Self Feeding   Level of [place]: Eating independent  "  Instrumental Activities of Daily Living (IADL)   IADL Comments unsure of PLOF at this time   Activities of Daily Living Analysis   Impairments Contributing to Impaired Activities of Daily Living balance impaired;cognition impaired;strength decreased;sensory feedback impaired;sensation decreased   General Therapy Interventions   Planned Therapy Interventions ADL retraining;IADL retraining;balance training;cognition;neuromuscular re-education;strengthening   Clinical Impression   Criteria for Skilled Therapeutic Interventions Met yes, treatment indicated   OT Diagnosis Impairments in ADL performance   Influenced by the following impairments strength, cognition, safety, balance   Assessment of Occupational Performance 5 or more Performance Deficits   Identified Performance Deficits dressing, bathing, meds, grooming, toileting   Clinical Decision Making (Complexity) High complexity   Therapy Frequency 2 times/day   Predicted Duration of Therapy Intervention (days/wks) 3 days   Anticipated Discharge Disposition Acute Rehabilitation Facility   Risks and Benefits of Treatment have been explained. Yes   Patient, Family & other staff in agreement with plan of care Yes   [facility] TM \"6 Clicks\"   2016, Trustees of [facility], under license to [employer].  All rights reserved.   6 Clicks Short Forms Daily Activity Inpatient Short Form   [facility]  \"6 Clicks\" Daily Activity Inpatient Short Form   1. Putting on and taking off regular lower body clothing? 3 - A Little   2. Bathing (including washing, rinsing, drying)? 3 - A Little   3. Toileting, which includes using toilet, bedpan or urinal? 3 - A Little   4. Putting on and taking off regular upper body clothing? 3 - A Little   5. Taking care of personal grooming such as brushing teeth? 3 - A Little   6. Eating meals? 4 - None   Daily Activity Raw Score (Score out of 24.Lower scores equate to lower levels of function) 19   Total " Evaluation Time   Total Evaluation Time (Minutes) 15

## 2018-09-29 NOTE — PLAN OF CARE
Problem: Patient Care Overview  Goal: Plan of Care/Patient Progress Review  Outcome: No Change  RRT called at start of shift for new neuro changes, see 2000 assessment. A&Ox2, disorientated to time, situation. Neuros R field cut, word finding at times, RUE drift, RUE numbness and tingling (decreased sensation at times), R hand  moderate, RLE weakness, RUE and RLE ataxia. VSS. Tele SR with BBB . Regular diet, repeated beside swallow and passed. Bedrest overnight due to new R sided weakness. Gave Tylenol x1 for L neck pain. Lots of bruising, and abrasions. Incontinent of urine and bowel, voiding some in urinal with assistance.  Plan see neuro, PT, OT, Speech.

## 2018-09-29 NOTE — PLAN OF CARE
Problem: Patient Care Overview  Goal: Plan of Care/Patient Progress Review  Outcome: No Change  Tranfer from ED at 1700. Pt A&O x2 confused to place and situation. Rogers HR otherwise VSS, on RA. LS clear. NIH 1. CMS intact, denies numbness tingling. Neuros intact except for R field cut. Passed bedside swallow, advanced to Reg diet. Up A1 w/GB and walker. R thumb swelling, ecchymosis, MD notified, Right hand Xray order, completed, results showed no fracture. MRI ordered, check list completed, faxed. Anterior forehead abrasion, ecchymosis, SUNNY, scabbing. Troponin elevated, slight trend up with last lab draw. Nrsg will continue to monitor.

## 2018-09-29 NOTE — PROGRESS NOTES
Neurology Progress Note    Subjective: Pt reports he is definitely better than he was yesterday.  Pt's daughter agrees, but says he is still not to his previous baseline.      Vitals: Temp: 98.7  F (37.1  C) Temp  Min: 97.9  F (36.6  C)  Max: 98.9  F (37.2  C)  Resp: 16 Resp  Min: 11  Max: 20  SpO2: 98 % SpO2  Min: 94 %  Max: 98 %  Pulse: 57 Pulse  Min: 47  Max: 75  Heart Rate: 59 Heart Rate  Min: 53  Max: 76  BP: 147/77 Systolic (24hrs), Av , Min:137 , Max:172   Diastolic (24hrs), Av, Min:66, Max:119        Physical Examination:  General Exam: Awake, alert, NAD.  Neck: supple without meningismus.  Neuro:   HCF's:  Normal language and concentration.  Pt was slow to answer some questions and appeared to have some confusion vs mild cognitive impairment.  He kept calling his daughter by his wife's name, but then would correct it.   CN's:  VF's grossly full to confrontation - no evidence of an obvious field cut.  Pupils were 3 mm, reactive with no RAPD.  Fundoscopic examination limited by small pupils and pt compliance.  EOMI without nystagmus.  Facial sensation was normal.  Face was symmetrical without weakness.  Hearing was diminished to conversation.  Tongue protruded midline, palate corina symmetrically.  SCM's and traps were normal.   Motor:  Pt gave less effort in the entire RUE, but it was difficult to tell if pt was truly weak or if he was ataxic and uncoordinated.  At least 4/5 strength, but there was drift in RUE.  O/w normal bulk, tone, and strength throughout.  MSR's 1+ and symmetrical.  Toes down-going to plantar stim bilaterally.   Sensory: intact to LT in all extremities.   Cerebellar: F to N ataxic on the R and normal on the L.  H to S normal bilaterally.   Gait: deferred due to pt condition.    Imaging:  I reviewed the images and agree with the report.    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  Fortunately, he has only  suffered small infarcts and he is better symptomatically.  Based on the fact that there are scattered infarcts only in the posterior circulation, it is more likely that the emboli came from the L vert than the heart.  However, pt does have apparently new-onset a fib.  I suspect the falls could have been related to the new a fib, then he got a dissection from the falls, then had arterio-embolic infarcts.    Recommendations: At this point, it appears pt would be a candidate for anticoagulation due to the a fib, but I will defer that to hospitalist.  He either needs antiplatelet therapy or anticoagulation.  Since the infarcts are relatively small, I think anticoagulation can be started relatively soon if that is indicated.  I have discussed with pt and daughter the risks of ICH the sooner we start anticoagulation, but also discussed the risks of more strokes if we wait.  The available data suggest that there is no significant difference in stroke prevention using antiplatelets or anticoagulation specifically for extracranial dissection.  Therefore, antiplatelets alone for dissection are reasonable, so the anticoagulation would only be indicated specifically for the a fib.  Given the fact that the strokes are less likely due to the a fib, it would be reasonable to wait a couple of days before initiating anticoagulation.  Will discuss the timing with hospitalist and pt and daughter.

## 2018-09-29 NOTE — PROVIDER NOTIFICATION
Paged Dr. Maldonado, notified, Neurology wants to allow for permissive hypertension, please d/c BP meds. Thanks.

## 2018-09-29 NOTE — PLAN OF CARE
Problem: Patient Care Overview  Goal: Plan of Care/Patient Progress Review  Discharge Planner PT   Patient plan for discharge: Did not state   Current status: PT orders received, chart reviewed. PT eval completed, treatment initiated. Prior to admission pt was living at a [facility], [facility] with all ADLs and ambulate without assistive devices. Currently pt is at [facility] with supine<>sit, sat at EOB with supervision, STS with min assist and verbal cues and ambulated 60 ft with RW and mod assist for safety. Pt noted with impaired standing balance limiting safety. Pt also presents with impaired coordination and strength of RLE.   Barriers to return to prior living situation: Fall risk, Impaired strength and coordination affecting safety with gait, level of assistance needed.   Recommendations for discharge: ARC  Rationale for recommendations: Pt will benefit from continued skilled PT at an [facility] to achieve PLOF and independence.        Entered by: Jean Cosme 09/29/2018 11:34 AM

## 2018-09-29 NOTE — CODE/RAPID RESPONSE
"[facility]    House [facility] CODE STROKE NOTE  9/28/2018   Time Called: 2002    RRT called for: Neuro changes    Neuro:  RUE/RLE weakness, numbness, R visual field cut, word finding difficulty, ataxia (new deficit).  Time:  Unclear, before 5 pm (first noted visual field cut) (last known neuro normal).  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  Pt's daughter also present at the bedside.  Per nursing, pt returned from x-ray at approximately 1930, ambulated back to bed with use of walker without difficulty.  Shortly after, pt requested to get out of bed again; however, when given his walker, he stated he could not find his walker, which was present right in front of him.  Pt was assisted back to lying position in bed and RRT called.  Upon arrival, per nursing and also noted RUE/RLE ataxia, RUE pronator drift, unable to hold RUE against gravity, word finding difficulties, with intermittent garbled speech when asked to read sentences/word phrases.  Pt states his RUE feels \"dead\".  Pt is unable feel pressure or sensation on RLE.  CODE STROKE activated.  Also noted pt with recent head trauma (obvious abrasions/ecchymoses noted on R forehead).  Will obtain T&S if emergent surgery required.  VS during RRT HR 60s, SBP 160s.      INTERVENTIONS:  - CODE STROKE activation  - Stat INR, type & screen (recent head trauma, ? Bleed)  - Formal neuro stroke consultation, in addition to stroke order set  - Neuro stroke started pt on plavix and full dose ASA tonight; dysphagia screen ordered  - Updated pt and his daughter, Marita, at bedside and also discussed with " Dr. Garcia via phone with pt and his daughter  - APAP for L neck pain; consider cold packs as well  - Contact neurology/house [facility] immediately with any change in neuro status; per neurology stroke, if pt's neuro exam continues to worsen, initiation of heparin infusion considered  - Of note, discussed possibility of spine injury with fall as well with neurology; given acute change in neuro status without trauma at that time, CVA likely cause of RUE weakness/loss of sensation     Discussed with and defer further cares to nursing, Dr. Alcantar, hospitalist, and Dr. Garcia, neuro stroke.    Interval History     Jose E Capps is a 87 year old male who was admitted on 9/28/2018 for 2 falls, head trauma, found to be in a-fib, controlled rate.    Medical history significant for: TIA, HTN, RA, HLP, CAD    Code Status: DNR/DNI    Neil Ward, APRN, CNP  House [facility]    Allergies   Allergies   Allergen Reactions     No Known Drug Allergy      NEURO:  FACE: bilat symmetric smile, puff cheeks out, raise brow, 5/5 close eye  ARM: asymmetric left strong no drift, right no movement against gravity positive drift  SPEECH: intermittent word finding difficulty, intermittent garbled speech   LANGUAGE: receptive(follows commands)/ difficulty intermittently with expressive (spont speech)  TIME: unclear, prior to 5pm last known neuro normal   CN:  -II: pupil PERRL - bilat brisk    -III/VI/VI: EOMI w/o nystagmus  -V: sym LT bilat forehead, cheek, chin  -VII: sym smile, puff/blow out cheeks, raise brow, 5/5 close eye  -XI: 5/5 shrug bilat (trap) rotate chin-shoulder bilat (SCM)  -XII: tongue midline/straight out   Motor: Ataxia noted RUE/RLE, pronator drift RUE  Sens: Deficit noted RLE  Coord:  Ataxia RLE, unable to perform with RUE  PSYCH:  alert Ox3, appropriate     Constitutional: Pt lying in bed, awake, alert, in no apparent distress  Pulmonary: In no apparent distress, symmetrical chest rise  Cardiovascular: Appears well  perfused  GI: Not examined  Skin/Integumen: Warm, dry  Psych:  Calm  Extremities: Moves LUE/LLE without difficulty, unable to move RUE against gravity, weakened RLE with noted ataxia    Data     IMAGING: (X-ray/CT/MRI)   Recent Results (from the past 24 hour(s))   CT Head w/o Contrast    Narrative    CT OF THE HEAD WITHOUT CONTRAST  9/28/2018 1:23 PM     COMPARISON: None.    HISTORY:  Fall with frontal hematoma and right parietal hematoma.     TECHNIQUE: 5 mm thick axial CT images of the head were acquired  without IV contrast material.    FINDINGS:  There is moderate diffuse cerebral volume loss. There are  subtle patchy areas of decreased density in the cerebral white matter  bilaterally that are consistent with sequela of chronic small vessel  ischemic disease.     The ventricles and basal cisterns are within normal limits in  configuration given the degree of cerebral volume loss.  There is no  midline shift. There are no extra-axial fluid collections.     No intracranial hemorrhage, mass or recent infarct.    The visualized paranasal sinuses are well aerated. There is no  mastoiditis. There are no fractures of the visualized bones.       Impression    IMPRESSION:  Diffuse cerebral volume loss and cerebral white matter  changes consistent with chronic small vessel ischemic disease. No  evidence for acute intracranial pathology.     Radiation dose for this scan was reduced using automated exposure  control, adjustment of the mA and/or kV according to patient size, or  iterative reconstruction technique.    HOLGER PADILLA MD   XR Pelvis w Hip Left 1 View    Narrative    PELVIS AND HIP LEFT ONE VIEW   9/28/2018 1:35 PM     HISTORY: Pain.     COMPARISON: None.       Impression    IMPRESSION: No acute fracture or dislocation.    АЛЕКСАНДР HUBER MD   XR Knee Right 3 Views    Narrative    RIGHT KNEE THREE VIEWS   9/28/2018 1:36 PM     HISTORY: Right knee pain after fall.     COMPARISON: None.       Impression     IMPRESSION: No acute fracture or dislocation. Severe osteoarthritis.  Joint effusion.    АЛЕКСАНДР HUBER MD   XR Hand Right G/E 3 Views    Narrative    RIGHT HAND THREE OR MORE VIEWS   9/28/2018 7:19 PM     HISTORY: Thumb pain and swelling following fall. Rule out fracture.    COMPARISON: None.      Impression    IMPRESSION: Three views of the right hand are performed. Degenerative  joint changes involving the metacarpophalangeal and interphalangeal  joints of the thumb are noted. Additional DIP and PIP joint  degenerative changes are noted within the fingers. No evidence of  acute fracture or dislocation. Carpal bones appear intact. Arterial  vascular calcification is noted in the interosseous space between the  distal radius and ulna where imaged. Tiny ossicles are noted along the  radial aspect of the metacarpophalangeal joints of the index and  middle fingers on the oblique view. No radiopaque foreign body is  identified. Mild soft tissue swelling is present involving the thumb.    EDNA RODRIGUEZ MD   CT Head w/o Contrast    Narrative    CT SCAN OF THE HEAD WITHOUT CONTRAST   9/28/2018 8:30 PM     HISTORY: Code Stroke.    TECHNIQUE: Axial images of the head and coronal reformations without  IV contrast material. Radiation dose for this scan was reduced using  automated exposure control, adjustment of the mA and/or kV according  to patient size, or iterative reconstruction technique.    COMPARISON: None.    FINDINGS: Mild to moderate volume loss is present. Old left  periventricular infarct is present. Background of white matter  hypoattenuation likely represents chronic small vessel ischemic  change. No evidence of acute ischemia, hemorrhage, mass, mass effect,  or hydrocephalus. The visualized calvarium, skull base, paranasal  sinuses, and extracranial soft tissues are unremarkable.      Impression    IMPRESSION: Known left posterior cerebral artery occlusion and  perfusion deficit. No evidence of ischemia/edema  or hemorrhage.    Findings were discussed by phone between Dr. Luu and Neil Ward on  9/28/2018 8:59 PM.    CESAR LUU MD   CTA Head Neck with Contrast    Narrative    CT ANGIOGRAM OF THE HEAD AND NECK WITH CONTRAST  9/28/2018 8:35 PM     HISTORY: Code Stroke.    TECHNIQUE: CT angiography with an injection of 70 mL Isovue-370 IV  with scans through the head and neck. Images were transferred to a  separate 3-D workstation where multiplanar reformations and 3-D images  were created. Estimates of carotid stenoses are made relative to the  distal internal carotid artery diameters except as noted. Radiation  dose for this scan was reduced using automated exposure control,  adjustment of the mA and/or kV according to patient size, or iterative  reconstruction technique.    COMPARISON: None.     CT HEAD FINDINGS: No contrast enhancing lesions are identified.    CT ANGIOGRAM HEAD FINDINGS: Left posterior cerebral artery is occluded  at the P1-2 junction. The intracranial vertebral arteries, basilar  artery, and right posterior cerebral arteries are patent. The internal  carotid arteries, anterior cerebral arteries, and middle cerebral  arteries are patent.     CT ANGIOGRAM NECK FINDINGS: The left vertebral artery is occluded at  the origin with reconstitution at the V3 and V4 segments. Right  vertebral artery is patent. The bilateral common carotid, internal  carotid, and external carotid arteries are patent. There is moderate  atherosclerotic plaquing at the bilateral carotid bifurcations without  evidence of flow-limiting stenosis. A two-vessel aortic arch is  present.     Emphysema noted at the lung apices. A right-sided thyroid nodule is  present measuring 2.4 cm. Severe degenerative changes are present  throughout the cervical spine.      Impression    IMPRESSION:   1. 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   2. 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  3. Right thyroid nodule measuring up to 2.4 cm.    Findings were discussed by phone between Dr. Luu and the stroke  neurologist at 8:58 PM on 9/28/2018.    CESAR LUU MD   CT Head Perfusion w Contrast    Narrative    CT BRAIN PERFUSION   9/28/2018 8:43 PM    HISTORY: Code Stroke.    TECHNIQUE: Time sequential axial CT images of the head were acquired  during the administration of 50 mL Isovue-370 IV. Color perfusion maps  of the brain were created from this time sequential axial source data.      Radiation dose for this scan was reduced using automated exposure  control, adjustment of the mA and/or kV according to patient size, or  iterative reconstruction technique.    COMPARISON: None.    FINDINGS: There is prolongation of transit times and flow to the left  posterior cerebral artery distribution. Cerebral blood volume is  compensated. No other focal perfusion deficits are identified.      Impression    IMPRESSION: Left posterior cerebral artery perfusion defect.    CESAR LUU MD     Comprehensive Metabolic Panel:  Recent Labs  Lab 09/28/18  1732 09/28/18  1305   NA  --  140   POTASSIUM  --  4.5   CHLORIDE  --  106   CO2  --  27   ANIONGAP  --  7   GLC  --  101*   BUN  --  21   CR  --  0.74   GFRESTIMATED  --  >90   GFRESTBLACK  --  >90   GISELA  --  8.7   MAG 2.1 2.1   PROTTOTAL  --  6.8   ALBUMIN  --  3.4   BILITOTAL  --  1.0   ALKPHOS  --  97   AST  --  26   ALT  --  27     INR:    Recent Labs   Lab Test  09/28/18 2020   INR  1.19*     Time Spent on this Encounter   I spent 45 critical care minutes on the unit/floor managing the care of Jose E Capps. Over 50% of my time was spent counseling the patient and/or coordinating care regarding services listed in this note.

## 2018-09-29 NOTE — PLAN OF CARE
"Problem: Patient Care Overview  Goal: Plan of Care/Patient Progress Review  Discharge Planner SLP   Patient plan for discharge: Pt/daughter reported \"rehab center\"  Current status: SLP:  orders received. Chart reviewed and discussed with pt and family. No obvious speech/language deficits noted in conversation. Pt reported improved symptoms since yesterday with no concerns at this time. Pt/family denied s/sx of dysphagia. Education provided on role of SLP and pt/family verbalized agreement with recommended: No SLP needs identified at this time. Will defer full speech/language/cognitive evaluation to the next level of care.   Barriers to return to prior living situation: Cognition  Recommendations for discharge: TCU  Rationale for recommendations: See SLP plan above       Entered by: Radha Byrne 09/29/2018 11:33 AM       Addendum: Daughter later notified SLP that pt has had long term cognitive deficits and tends to be impulsive while eating. Daughter requested that foods be chopped into smaller pieces as this is pt's baseline. Pt educated on diet levels and verbalized agreement stating, \"yeah, it's okay\" with mechanical/dental soft diet.   "

## 2018-09-29 NOTE — PROGRESS NOTES
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; Left vertebral artery dissection   - MRI brain w and w/o contrast- Scattered recent ischemic infarcts in the posterior cerebral artery distributions bilaterally involving posterior medial temporal lobes bilaterally and left thalamus  - Neuro consult appreciated  - although he was in new Afib, it is felt that the culprit for acute stroke is left vertebral artery dissection/occlusion after fall with head trauma  - now he is back in NSR on Tele  - started on ASA and Plavix as per Neurology; Neurology considers to possible switch him to anticoagulation   - check lipid profile; continue PTA Lipitor 10 mg po daily for now  - permissive HTN  - Echo - EF 55-60%, grade I diastolic dysfunction   - PT/OT- rec ARF  - SLP- mechanical soft diet with thin liquids    2. Paroxysmal Afib  - EKG in ER showed Afib with controlled HR  - now back in NSR  -  continue PTA Metoprolol at decreased dose 12.5 mg po BID  - curently on  mg po daily  - although it is felt that his new stroke is likely related to vertebral artery dissection/occlusion, may need to start anticoagulation  - echo as above    3. CAD      Elevated troponins      HTN  - no h/o MI but had an abnormal stress test in 1/2018 (as part of preop clearance)- which showed -small area of nontransmural myocardial infarction affecting the basal and mid inferior wall with minimal yobany-infarct ischemia within the mid inferolateral wall.; he was asymptomatic and was cleared for surgery without further workup such as cardiac cath  - he has mildly elevated troponins but plateau-ed 0.049--0.047--0.057  - denies chest pain, no SOB; EKG with no ischemic changes  - Tele  - continue ASA, statin and Metoprolol 12.5 mg po BID  - hold PTA Lisinopril to allow permissive HTN- resume as indicated    4. RA  - hold PTA Leflunomide  for now    5. Cognitive impairment:  Daughter reports significant impairment in short term memory in past 3 months, see H&P for details.  - monitor for delirium  - OT eval    DVT Prophylaxis: Pneumatic Compression Devices  Code Status: DNR/DNI  Disposition Plan   Expected discharge: 2 - 3 days, recommended to ARU once cleared by Neurology.     Entered: Emily Maldonado MD 09/29/2018, 6:04 PM   Information in the above section will display in the discharge planner report.      Emily Maldonado MD    Interval History   Doing better, still with right arm ataxia; confused, pleasant; denies chest pain, no SOB, no N/V; discussed with RN    -Data reviewed today: I reviewed all new labs and imaging results over the last 24 hours. I personally reviewed the head CT image(s) showing as above and the brain MRI image(s) showing as above.    Physical Exam   Temp: 98  F (36.7  C) Temp src: Axillary BP: 168/84 Pulse: 57 Heart Rate: 60 Resp: 16 SpO2: 98 % O2 Device: None (Room air)    Vitals:    09/29/18  0604   Weight: 74 kg (163 lb 3.2 oz)     Vital Signs with Ranges  Temp:  [98  F (36.7  C)-98.9  F (37.2  C)] 98  F (36.7  C)  Pulse:  [47-75] 57  Heart Rate:  [57-76] 60  Resp:  [14-20] 16  BP: (147-172)/(66-86) 168/84  SpO2:  [94 %-98 %] 98 %  I/O last 3 completed shifts:  In: 2240 [P.O.:1140; I.V.:1100]  Out: -     Constitutional: Awake, alert, NAD  Respiratory: Bilateral air entry, no wheezing, no rales, no crackles  Cardiovascular: S1S2, RRR, no murmurs, no rubs  GI: abd- soft, nonT, nonD, BS present  Skin/Integumen: no rashes, no cyanosis  Neuro- confused, RUE ataxia/dysmetria on finger-to-nose testing, no slurred speech    Medications     - MEDICATION INSTRUCTIONS -       sodium chloride 100 mL/hr at 09/29/18 1633       aspirin  325 mg Oral Daily     atorvastatin  10 mg Oral QPM     clopidogrel  75 mg Oral Daily     metoprolol tartrate  12.5 mg Oral BID     sodium chloride (PF)  10 mL Intracatheter Q8H     sodium chloride (PF)  3 mL Intracatheter Q8H       Data     Recent Labs  Lab 09/29/18  0135 09/28/18  2020 09/28/18  1732 09/28/18  1305   WBC  --   --   --  7.0   HGB  --   --   --  15.1   MCV  --   --   --  96   PLT  --   --   --  216   INR  --  1.19*  --   --    NA  --   --   --  140   POTASSIUM  --   --   --  4.5   CHLORIDE  --   --   --  106   CO2  --   --   --  27   BUN  --   --   --  21   CR  --   --   --  0.74   ANIONGAP  --   --   --  7   GISELA  --   --   --  8.7   GLC  --   --   --  101*   ALBUMIN  --   --   --  3.4   PROTTOTAL  --   --   --  6.8   BILITOTAL  --   --   --  1.0   ALKPHOS  --   --   --  97   ALT  --   --   --  27   AST  --   --   --  26   LIPASE  --   --   --  103   TROPI 0.057* 0.047* 0.055* 0.049*       Recent Results (from the past 24 hour(s))   XR Hand Right G/E 3 Views    Narrative    RIGHT HAND THREE OR MORE VIEWS   9/28/2018 7:19 PM     HISTORY: Thumb pain and swelling following fall. Rule out fracture.    COMPARISON: None.      Impression    IMPRESSION: Three views of the right  hand are performed. Degenerative  joint changes involving the metacarpophalangeal and interphalangeal  joints of the thumb are noted. Additional DIP and PIP joint  degenerative changes are noted within the fingers. No evidence of  acute fracture or dislocation. Carpal bones appear intact. Arterial  vascular calcification is noted in the interosseous space between the  distal radius and ulna where imaged. Tiny ossicles are noted along the  radial aspect of the metacarpophalangeal joints of the index and  middle fingers on the oblique view. No radiopaque foreign body is  identified. Mild soft tissue swelling is present involving the thumb.    EDNA RODRIGUEZ MD   CT Head w/o Contrast    Narrative    CT SCAN OF THE HEAD WITHOUT CONTRAST   9/28/2018 8:30 PM     HISTORY: Code Stroke.    TECHNIQUE: Axial images of the head and coronal reformations without  IV contrast material. Radiation dose for this scan was reduced using  automated exposure control, adjustment of the mA and/or kV according  to patient size, or iterative reconstruction technique.    COMPARISON: None.    FINDINGS: Mild to moderate volume loss is present. Old left  periventricular infarct is present. Background of white matter  hypoattenuation likely represents chronic small vessel ischemic  change. No evidence of acute ischemia, hemorrhage, mass, mass effect,  or hydrocephalus. The visualized calvarium, skull base, paranasal  sinuses, and extracranial soft tissues are unremarkable.      Impression    IMPRESSION: Known left posterior cerebral artery occlusion and  perfusion deficit. No evidence of ischemia/edema or hemorrhage.    Findings were discussed by phone between Dr. Luu and Neil Ward on  9/28/2018 8:59 PM.    CESAR LUU MD   CTA Head Neck with Contrast    Narrative    CT ANGIOGRAM OF THE HEAD AND NECK WITH CONTRAST  9/28/2018 8:35 PM     HISTORY: Code Stroke.    TECHNIQUE: CT angiography with an injection of 70 mL Isovue-370 IV  with scans  through the head and neck. Images were transferred to a  separate 3-D workstation where multiplanar reformations and 3-D images  were created. Estimates of carotid stenoses are made relative to the  distal internal carotid artery diameters except as noted. Radiation  dose for this scan was reduced using automated exposure control,  adjustment of the mA and/or kV according to patient size, or iterative  reconstruction technique.    COMPARISON: None.     CT HEAD FINDINGS: No contrast enhancing lesions are identified.    CT ANGIOGRAM HEAD FINDINGS: Left posterior cerebral artery is occluded  at the P1-2 junction. The intracranial vertebral arteries, basilar  artery, and right posterior cerebral arteries are patent. The internal  carotid arteries, anterior cerebral arteries, and middle cerebral  arteries are patent.     CT ANGIOGRAM NECK FINDINGS: The left vertebral artery is occluded at  the origin with reconstitution at the V3 and V4 segments. Right  vertebral artery is patent. The bilateral common carotid, internal  carotid, and external carotid arteries are patent. There is moderate  atherosclerotic plaquing at the bilateral carotid bifurcations without  evidence of flow-limiting stenosis. A two-vessel aortic arch is  present.     Emphysema noted at the lung apices. A right-sided thyroid nodule is  present measuring 2.4 cm. Severe degenerative changes are present  throughout the cervical spine.      Impression    IMPRESSION:   1. 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   2. 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  3. Right thyroid nodule measuring up to 2.4 cm.    Findings were discussed by phone between Dr. Luu and the stroke  neurologist at 8:58 PM on 9/28/2018.    CESAR LUU MD   CT Head Perfusion w Contrast    Narrative    CT BRAIN PERFUSION   9/28/2018 8:43 PM    HISTORY: Code Stroke.    TECHNIQUE: Time sequential  axial CT images of the head were acquired  during the administration of 50 mL Isovue-370 IV. Color perfusion maps  of the brain were created from this time sequential axial source data.      Radiation dose for this scan was reduced using automated exposure  control, adjustment of the mA and/or kV according to patient size, or  iterative reconstruction technique.    COMPARISON: None.    FINDINGS: There is prolongation of transit times and flow to the left  posterior cerebral artery distribution. Cerebral blood volume is  compensated. No other focal perfusion deficits are identified.      Impression    IMPRESSION: Left posterior cerebral artery perfusion defect.    CESAR MOLINA MD   MR Brain w/o & w Contrast    Narrative    MRI OF THE BRAIN WITHOUT AND WITH CONTRAST 9/29/2018 1:48 PM     COMPARISON: Head CT, head CTA and head CT perfusion study 9/28/2018.    HISTORY:  Frequent falls, discoordination, rule out stroke.     TECHNIQUE: Axial diffusion-weighted with ADC map, axial T2-weighted  with fat saturation, axial T1-weighted, axial turboFLAIR and coronal  T1-weighted images of the brain were acquired without intravenous  contrast.  Following intravenous administration of gadolinium (7 mL  Gadavist), axial T1-weighted images of the brain were acquired.     FINDINGS: There are recent tiny infarcts in the left thalamus and  posteromedial aspect of the right temporal lobe as well as a  contiguous infarct in the left parahippocampal formation. All of these  infarcts are in the posterior circulation conforming to bilateral  posterior cerebral artery territory infarcts. This also corresponds  with the perfusion defect noted in the left posterior cerebral artery  distribution on the comparison CT perfusion study. No other recent  infarcts noted.    There is mild diffuse cerebral volume loss. There are mild patchy  periventricular areas of abnormal T2 signal hyperintensity in the  cerebral white matter bilaterally that  are consistent with sequela of  chronic small vessel ischemic disease. The ventricles and basal  cisterns are within normal limits in configuration given the degree of  cerebral volume loss.  There is no midline shift.  There are no  extra-axial fluid collections.  There is no evidence for acute  intracranial hemorrhage.  There is no abnormal contrast enhancement in  the brain or its coverings.    There is no sinusitis or mastoiditis.      Impression    IMPRESSION:   1. Scattered recent ischemic infarcts in the posterior cerebral artery  distributions bilaterally involving posterior medial temporal lobes  bilaterally and left thalamus.  2. Diffuse cerebral volume loss and cerebral white matter changes  consistent with chronic small vessel ischemic disease.     HOLGER PADILLA MD

## 2018-09-29 NOTE — PLAN OF CARE
Problem: Patient Care Overview  Goal: Plan of Care/Patient Progress Review  Outcome: No Change  Pt A&O x 1-2, confused to time and situation, intermittent to place, forgetful. VSS, on RA. LS clear. 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 RUE/RLE numbness, RUE pronator drift, ataxia, RLE ataxia, hemiparesis, 4/5, right field cut, otherwise CMS and Neuros intact. Tylenol given for pain. Up A1 w/GB and walker. Incont of urine, voiding adequately. +BS, passing flatus, no BM today. Diet modified to Mech Soft. Forehead abrasion, ecchymosis, SUNNY, scabbing. ECHO completed, neg bubble. MRI completed, see results, showed scatter ischemic infarcts. ARU vs TCU ???, discharge pending. Daughter need to bring in copy of POA papers. Nrsg will continue to monitor.

## 2018-09-30 ENCOUNTER — APPOINTMENT (OUTPATIENT)
Dept: PHYSICAL THERAPY | Facility: CLINIC | Age: 83
DRG: 064 | End: 2018-09-30
Payer: MEDICARE

## 2018-09-30 ENCOUNTER — APPOINTMENT (OUTPATIENT)
Dept: OCCUPATIONAL THERAPY | Facility: CLINIC | Age: 83
DRG: 064 | End: 2018-09-30
Payer: MEDICARE

## 2018-09-30 LAB
ANION GAP SERPL CALCULATED.3IONS-SCNC: 9 MMOL/L (ref 3–14)
BUN SERPL-MCNC: 11 MG/DL (ref 7–30)
CALCIUM SERPL-MCNC: 8.2 MG/DL (ref 8.5–10.1)
CHLORIDE SERPL-SCNC: 110 MMOL/L (ref 94–109)
CHOLEST SERPL-MCNC: 123 MG/DL
CO2 SERPL-SCNC: 25 MMOL/L (ref 20–32)
CREAT SERPL-MCNC: 0.77 MG/DL (ref 0.66–1.25)
ERYTHROCYTE [DISTWIDTH] IN BLOOD BY AUTOMATED COUNT: 13.3 % (ref 10–15)
GFR SERPL CREATININE-BSD FRML MDRD: >90 ML/MIN/1.7M2
GLUCOSE SERPL-MCNC: 88 MG/DL (ref 70–99)
HCT VFR BLD AUTO: 39.3 % (ref 40–53)
HDLC SERPL-MCNC: 44 MG/DL
HGB BLD-MCNC: 13.1 G/DL (ref 13.3–17.7)
LDLC SERPL CALC-MCNC: 61 MG/DL
MCH RBC QN AUTO: 31.9 PG (ref 26.5–33)
MCHC RBC AUTO-ENTMCNC: 33.3 G/DL (ref 31.5–36.5)
MCV RBC AUTO: 96 FL (ref 78–100)
NONHDLC SERPL-MCNC: 79 MG/DL
PLATELET # BLD AUTO: 204 10E9/L (ref 150–450)
POTASSIUM SERPL-SCNC: 3.7 MMOL/L (ref 3.4–5.3)
RBC # BLD AUTO: 4.11 10E12/L (ref 4.4–5.9)
SODIUM SERPL-SCNC: 144 MMOL/L (ref 133–144)
TRIGL SERPL-MCNC: 92 MG/DL
WBC # BLD AUTO: 6.8 10E9/L (ref 4–11)

## 2018-09-30 PROCEDURE — 40000193 ZZH STATISTIC PT WARD VISIT

## 2018-09-30 PROCEDURE — 97112 NEUROMUSCULAR REEDUCATION: CPT | Mod: GO

## 2018-09-30 PROCEDURE — 25000132 ZZH RX MED GY IP 250 OP 250 PS 637: Mod: GY | Performed by: PSYCHIATRY & NEUROLOGY

## 2018-09-30 PROCEDURE — 99233 SBSQ HOSP IP/OBS HIGH 50: CPT | Performed by: INTERNAL MEDICINE

## 2018-09-30 PROCEDURE — 80061 LIPID PANEL: CPT | Performed by: INTERNAL MEDICINE

## 2018-09-30 PROCEDURE — A9270 NON-COVERED ITEM OR SERVICE: HCPCS | Mod: GY | Performed by: HOSPITALIST

## 2018-09-30 PROCEDURE — 97110 THERAPEUTIC EXERCISES: CPT | Mod: GP

## 2018-09-30 PROCEDURE — 99232 SBSQ HOSP IP/OBS MODERATE 35: CPT | Performed by: PSYCHIATRY & NEUROLOGY

## 2018-09-30 PROCEDURE — 25000132 ZZH RX MED GY IP 250 OP 250 PS 637: Mod: GY | Performed by: HOSPITALIST

## 2018-09-30 PROCEDURE — 97535 SELF CARE MNGMENT TRAINING: CPT | Mod: GO

## 2018-09-30 PROCEDURE — A9270 NON-COVERED ITEM OR SERVICE: HCPCS | Mod: GY | Performed by: PSYCHIATRY & NEUROLOGY

## 2018-09-30 PROCEDURE — 97530 THERAPEUTIC ACTIVITIES: CPT | Mod: GO

## 2018-09-30 PROCEDURE — 36415 COLL VENOUS BLD VENIPUNCTURE: CPT | Performed by: INTERNAL MEDICINE

## 2018-09-30 PROCEDURE — 25000128 H RX IP 250 OP 636: Performed by: INTERNAL MEDICINE

## 2018-09-30 PROCEDURE — 40000133 ZZH STATISTIC OT WARD VISIT

## 2018-09-30 PROCEDURE — 97116 GAIT TRAINING THERAPY: CPT | Mod: GP

## 2018-09-30 PROCEDURE — 85027 COMPLETE CBC AUTOMATED: CPT | Performed by: INTERNAL MEDICINE

## 2018-09-30 PROCEDURE — 25000132 ZZH RX MED GY IP 250 OP 250 PS 637: Mod: GY | Performed by: INTERNAL MEDICINE

## 2018-09-30 PROCEDURE — A9270 NON-COVERED ITEM OR SERVICE: HCPCS | Mod: GY | Performed by: INTERNAL MEDICINE

## 2018-09-30 PROCEDURE — 12000000 ZZH R&B MED SURG/OB

## 2018-09-30 PROCEDURE — 80048 BASIC METABOLIC PNL TOTAL CA: CPT | Performed by: INTERNAL MEDICINE

## 2018-09-30 RX ADMIN — ATORVASTATIN CALCIUM 10 MG: 10 TABLET, FILM COATED ORAL at 21:21

## 2018-09-30 RX ADMIN — SODIUM CHLORIDE: 9 INJECTION, SOLUTION INTRAVENOUS at 04:03

## 2018-09-30 RX ADMIN — ACETAMINOPHEN 650 MG: 325 TABLET, FILM COATED ORAL at 15:04

## 2018-09-30 RX ADMIN — CLOPIDOGREL 75 MG: 75 TABLET, FILM COATED ORAL at 08:38

## 2018-09-30 RX ADMIN — ASPIRIN 325 MG: 325 TABLET, DELAYED RELEASE ORAL at 08:38

## 2018-09-30 RX ADMIN — ACETAMINOPHEN 650 MG: 325 TABLET, FILM COATED ORAL at 21:22

## 2018-09-30 RX ADMIN — Medication 6.25 MG: at 09:44

## 2018-09-30 RX ADMIN — APIXABAN 5 MG: 5 TABLET, FILM COATED ORAL at 21:22

## 2018-09-30 RX ADMIN — ACETAMINOPHEN 650 MG: 325 TABLET, FILM COATED ORAL at 08:38

## 2018-09-30 RX ADMIN — Medication 6.25 MG: at 21:21

## 2018-09-30 RX ADMIN — SODIUM CHLORIDE: 9 INJECTION, SOLUTION INTRAVENOUS at 16:51

## 2018-09-30 ASSESSMENT — ACTIVITIES OF DAILY LIVING (ADL)
ADLS_ACUITY_SCORE: 14

## 2018-09-30 NOTE — PLAN OF CARE
"Problem: Patient Care Overview  Goal: Plan of Care/Patient Progress Review  Outcome: Improving  0700 - 1900 Pt A&O x 1-2, confused to time and situation, intermittent to place, forgetful. VSS, on RA. LS clear. Tele SR w/ BBB. RUE/RLE numbness, patient states \"improving\", RUE/RLE ataxia with intermittent RUE pronator drift otherwise CMS and Neuros intact. Tylenol given for pain. Up A1 w/GB and walker. Incont of urine, voiding adequately. +BS, passing flatus, BM today. Tolerating Mech Soft well, good appetite.  Forehead abrasion, ecchymosis, SUNNY, scabbing. ARU vs TCU, discharge pending 1-2 days. Will start on Eliquis on this evening. Scheduled Metoprolol dose adjustment d/t anabelle HR. Daughter needs to bring in copy of POA papers. Nrsg will continue to monitor.       "

## 2018-09-30 NOTE — PROGRESS NOTES
Patient had brief period of bradycardia, low heart rate of 38, patient asymptomatic and sleeping. Tele strip in chart.     Patient has had 3 more episodes of bradycardia, but asymptomatic and sleeping.

## 2018-09-30 NOTE — PROVIDER NOTIFICATION
Paged Dr. Maldonado, notified, ... patient had a few runs of Rogers HR, as low as 35 during the night shift, while sleeping, asymptomatic.

## 2018-09-30 NOTE — PLAN OF CARE
"Problem: Patient Care Overview  Goal: Plan of Care/Patient Progress Review  Discharge Planner PT   Patient plan for discharge: Home  Current status: Pt refused any OOB mobility at this time stating \"i am waiting on the specialist to come talk to me and I don't want to miss them\". Pt agreed to LE there ex in bed. Pt is alert and Oriented x 2. Pt participated in B heel slides, ankle pumps, SLR and quad sets x 20 each.  Barriers to return to prior living situation: Fall risk, decreased strength, coordination, level of assistance.   Recommendations for discharge: ARU. Pt's family prefers TCU associated with the Assisted living facility, pt is from.   Rationale for recommendations: Pt will benefit from continued skilled PT to achieve PLOF and independence.        Entered by: Jean Cosme 09/30/2018 9:35 AM           "

## 2018-09-30 NOTE — PLAN OF CARE
Problem: Patient Care Overview  Goal: Plan of Care/Patient Progress Review  Discharge Planner OT   Patient plan for discharge: rehab  Current status: Pt refused OOB activities today. While in Bed, Pt given blue sponge with handout, completed 10 repetitions of power grasp, three sidney pinch and individual finger pinch using R hand. Pt completed functional reaching using grasp/release technique, he was able to grab in all planes in front of him, lifting R arm too full flexion. Completed oral hygiene task, cues to use R hand, min A needed to open toothpaste due to decreased fine motor strength of R hand. Left in bed with alarm on  Barriers to return to prior living situation: safety, cognition, decreased strength.   Recommendations for discharge: Spoke with Pt DTR yesterday who expressed interest in patient going to TCU located at his facilitate living. Pt would be appropriate for ARC if pt/family are in agreement, he is progressing well and motivated  Rationale for recommendations: would benefit from continued therapy to address deficits in ADLs performance and R sided weakness       Entered by: Mike Flowers 09/30/2018 9:20 AM

## 2018-09-30 NOTE — PROGRESS NOTES
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; Left vertebral artery dissection   - MRI brain w and w/o contrast- Scattered recent ischemic infarcts in the posterior cerebral artery distributions bilaterally involving posterior medial temporal lobes bilaterally and left thalamus  - Neuro consult appreciated  - although he was in new Afib, it is felt that the culprit for acute stroke is left vertebral artery dissection/occlusion after fall with head trauma  - now he is back in NSR on Tele  - initially started on ASA and Plavix as per Neurology; although it was felt that his stroke was cause by left vertebral artery, paroxysmal A fib increases risk of stroke also- so will switch to Eliquis- see below  - lipid profile LDL 61, total cholest 123; continue PTA Lipitor 10 mg po daily for now  - permissive HTN  - Echo - EF 55-60%, grade I diastolic dysfunction   - PT/OT- rec  ARF  - SLP- mechanical soft diet with thin liquids    2. Paroxysmal Afib  - EKG in ER showed Afib with controlled HR  - now back in NSR  [PTA on Metoprolol 25 mg po BID]  - here started on Metoprolol 12.5 mg po BID  - noted with some bradycardia in 40's last night while sleeping  - will decrease Metoprolol to 6.25 mg po BID with holding parameters  - although it is felt that his new stroke is likely related to vertebral artery dissection/occlusion, may need to start anticoagulation  - echo as above; CHADSVasc score is 5  - discussed with Neurology, Dr Dickson; because the strokes are small- the risk of hemorrhagic transformation is small  - start Eliquis 5 mg po BID now and stop ASA    3. CAD      Elevated troponins      HTN  - no h/o MI but had an abnormal stress test in 1/2018 (as part of preop clearance)- which showed -small area of nontransmural myocardial infarction affecting the basal and mid inferior wall with minimal yobany-infarct ischemia within the mid inferolateral wall.; he was asymptomatic and was cleared for surgery without further workup such as cardiac cath  - he has mildly elevated troponins but plateau-ed 0.049--0.047--0.057  - denies chest pain, no SOB; EKG with no ischemic changes  - Tele  - continue statin and Metoprolol 6.25 mg po BID; ASA was stopped- he was started on Eliquis  - hold PTA Lisinopril to allow permissive HTN- resume as indicated    4. RA  - hold PTA Leflunomide  for now    5. Cognitive impairment:  Daughter reports significant impairment in short term memory in past 3 months, see H&P for details.  - monitor for delirium  - OT eval    DVT Prophylaxis: Pneumatic Compression Devices  Code Status: DNR/DNI  Disposition Plan   Expected discharge: 1-2 days, recommended to ARU vs TCU     I have spent 35 minutes taking care of Mr Capps with >50% of time spent coordinating the care, discussing with team and counseling the family.        Entered: Emily Maldonado MD 09/30/2018, 10:50 AM    Information in the above section will display in the discharge planner report.      Emily Maldonado MD    Interval History   Doing fine, still with right arm ataxia; confused, pleasant; denies chest pain, no SOB, no N/V; discussed with daughter at bedside- informed her about the plan to switch ASA/Plavix to Eliquis; she agreed with the plan and will inform her sister; discussed with Dr Dickson and RN    -Data reviewed today: I reviewed all new labs and imaging results over the last 24 hours. I personally reviewed the Tele image(s) showing NSR.    Physical Exam   Temp: 98.2  F (36.8  C) Temp src: Oral BP: 164/76   Heart Rate: 72 Resp: 16 SpO2: 97 % O2 Device: None (Room air)    Vitals:    09/29/18 0604 09/30/18 0500   Weight: 74 kg (163 lb 3.2 oz) 75.2 kg (165 lb 12.6 oz)     Vital Signs with Ranges  Temp:  [97.4  F (36.3  C)-98.7  F (37.1  C)] 98.2  F (36.8  C)  Heart Rate:  [54-80] 72  Resp:  [16-18] 16  BP: (147-180)/(73-88) 164/76  SpO2:  [94 %-98 %] 97 %  I/O last 3 completed shifts:  In: 1792.5 [P.O.:900; I.V.:892.5]  Out: -     Constitutional: Awake, alert, NAD  Respiratory: Bilateral air entry, no wheezing, no rales, no crackles  Cardiovascular: S1S2, RRR, no murmurs, no rubs  GI: abd- soft, nonT, nonD, BS present  Skin/Integumen: no rashes, no cyanosis  Neuro- mildly confused, RUE ataxia/dysmetria on finger-to-nose testing, no slurred speech    Medications     - MEDICATION INSTRUCTIONS -       sodium chloride 75 mL/hr at 09/30/18 0843       aspirin  325 mg Oral Daily     atorvastatin  10 mg Oral QPM     clopidogrel  75 mg Oral Daily     metoprolol tartrate  6.25 mg Oral BID     sodium chloride (PF)  10 mL Intracatheter Q8H     sodium chloride (PF)  3 mL Intracatheter Q8H       Data     Recent Labs  Lab 09/30/18  0950 09/29/18  0135 09/28/18 2020 09/28/18  1732 09/28/18  1305   WBC 6.8  --   --   --  7.0   HGB 13.1*  --   --   --  15.1   MCV 96  --   --   --  96     --   --   --  216   INR  --   --   1.19*  --   --      --   --   --  140   POTASSIUM 3.7  --   --   --  4.5   CHLORIDE 110*  --   --   --  106   CO2 25  --   --   --  27   BUN 11  --   --   --  21   CR 0.77  --   --   --  0.74   ANIONGAP 9  --   --   --  7   GISELA 8.2*  --   --   --  8.7   GLC 88  --   --   --  101*   ALBUMIN  --   --   --   --  3.4   PROTTOTAL  --   --   --   --  6.8   BILITOTAL  --   --   --   --  1.0   ALKPHOS  --   --   --   --  97   ALT  --   --   --   --  27   AST  --   --   --   --  26   LIPASE  --   --   --   --  103   TROPI  --  0.057* 0.047* 0.055* 0.049*       Recent Results (from the past 24 hour(s))   MR Brain w/o & w Contrast    Narrative    MRI OF THE BRAIN WITHOUT AND WITH CONTRAST 9/29/2018 1:48 PM     COMPARISON: Head CT, head CTA and head CT perfusion study 9/28/2018.    HISTORY:  Frequent falls, discoordination, rule out stroke.     TECHNIQUE: Axial diffusion-weighted with ADC map, axial T2-weighted  with fat saturation, axial T1-weighted, axial turboFLAIR and coronal  T1-weighted images of the brain were acquired without intravenous  contrast.  Following intravenous administration of gadolinium (7 mL  Gadavist), axial T1-weighted images of the brain were acquired.     FINDINGS: There are recent tiny infarcts in the left thalamus and  posteromedial aspect of the right temporal lobe as well as a  contiguous infarct in the left parahippocampal formation. All of these  infarcts are in the posterior circulation conforming to bilateral  posterior cerebral artery territory infarcts. This also corresponds  with the perfusion defect noted in the left posterior cerebral artery  distribution on the comparison CT perfusion study. No other recent  infarcts noted.    There is mild diffuse cerebral volume loss. There are mild patchy  periventricular areas of abnormal T2 signal hyperintensity in the  cerebral white matter bilaterally that are consistent with sequela of  chronic small vessel ischemic disease. The ventricles  and basal  cisterns are within normal limits in configuration given the degree of  cerebral volume loss.  There is no midline shift.  There are no  extra-axial fluid collections.  There is no evidence for acute  intracranial hemorrhage.  There is no abnormal contrast enhancement in  the brain or its coverings.    There is no sinusitis or mastoiditis.      Impression    IMPRESSION:   1. Scattered recent ischemic infarcts in the posterior cerebral artery  distributions bilaterally involving posterior medial temporal lobes  bilaterally and left thalamus.  2. Diffuse cerebral volume loss and cerebral white matter changes  consistent with chronic small vessel ischemic disease.     HOLGER PADILLA MD

## 2018-09-30 NOTE — PLAN OF CARE
Problem: Patient Care Overview  Goal: Plan of Care/Patient Progress Review  Discharge Planner OT   Patient plan for discharge: rehab  Current status: Pt completed slums, noted Pt lethargic throughout. He needed verbal cueing to maintain attention, scored a 15/30-indicating, significant cognition deficits. Will re-attempt when Pt is more alert  Barriers to return to prior living situation: cognition, safety, strength  Recommendations for discharge: TCU  Rationale for recommendations: Pt would benefit from continued therapy to address deficits in ADLs. TCU seems more fitting due to Pt decreased endurance and lethargy throughout the day.        Entered by: Mike Flowers 09/30/2018 3:25 PM

## 2018-09-30 NOTE — PROGRESS NOTES
Neurology Progress Note    Subjective: Pt feels that his R arm is improved, but still not back to baseline.  He thinks he might be a little confused this morning.    Vitals: Temp: 98.2  F (36.8  C) Temp  Min: 97.4  F (36.3  C)  Max: 98.7  F (37.1  C)  Resp: 16 Resp  Min: 16  Max: 18  SpO2: 97 % SpO2  Min: 94 %  Max: 98 %    No Data Recorded  Heart Rate: 72 Heart Rate  Min: 54  Max: 80  BP: 164/76 Systolic (24hrs), Av , Min:147 , Max:180   Diastolic (24hrs), Av, Min:70, Max:88    Physical Examination:  General Exam: Awake, alert, NAD.  Neck: supple without meningismus.  Neuro:                        HCF's:  Normal language and concentration.  Pt was slow to answer some questions and appeared to have some confusion vs mild cognitive impairment.  He kept calling his daughter by his wife's name, but then would correct it.                        CN's:  VF's grossly full to confrontation - no evidence of an obvious field cut.  Pupils were 3 mm, reactive with no RAPD.  Fundoscopic examination limited by small pupils and pt compliance.  EOMI without nystagmus.  Facial sensation was normal.  Face was symmetrical without weakness.  Hearing was diminished to conversation.  Tongue protruded midline, palate corina symmetrically.  SCM's and traps were normal.                        Motor:  Pt gave less effort in the entire RUE, but it was difficult to tell if pt was truly weak or if he was ataxic and uncoordinated.  At least 4/5 strength, but there was drift in RUE.  O/w normal bulk, tone, and strength throughout.  MSR's 1+ and symmetrical.  Toes down-going to plantar stim bilaterally.                        Sensory: intact to LT in all extremities.                        Cerebellar: F to N mildly ataxic on the R (improved from yesterday) and normal on the L.  H to S normal bilaterally.                        Gait: deferred due to pt condition.     Impression: 86 y/o man with an occluded and dissected L vertebral artery  and occluded L PCA at the P1 segment.  Fortunately, he has only suffered small infarcts and he is better symptomatically.  Based on the fact that there are scattered infarcts only in the posterior circulation, it is more likely that the emboli came from the L vert than the heart.  However, pt does have apparently new-onset a fib.  I suspect the falls could have been related to the new a fib, then he got a dissection from the falls, then had arterio-embolic infarcts.     Recommendations: From a purely neuro standpoint, anticoagulation would be acceptable to begin today (infarcts were not large), if hospitalist service believes the a fib will continue and, therefore, there is an indication for anticoagulation.  Our preference would be Eliquis.  However, as noted yesterday, pt's daughter should ok the start of the anticoagulation - we discussed yesterday that the longer we wait to initiate anticoagulation, the lesser the risk of having hemorrhagic transformation of the small infarcts, but also the greater the risk of having strokes from the a fib.  No further neurological w/u or tx warranted at this time and ok to discharge from a neuro standpoint.  Please call with any further questions.

## 2018-10-01 ENCOUNTER — APPOINTMENT (OUTPATIENT)
Dept: PHYSICAL THERAPY | Facility: CLINIC | Age: 83
DRG: 064 | End: 2018-10-01
Payer: MEDICARE

## 2018-10-01 ENCOUNTER — APPOINTMENT (OUTPATIENT)
Dept: OCCUPATIONAL THERAPY | Facility: CLINIC | Age: 83
DRG: 064 | End: 2018-10-01
Payer: MEDICARE

## 2018-10-01 VITALS
OXYGEN SATURATION: 93 % | BODY MASS INDEX: 23.73 KG/M2 | RESPIRATION RATE: 16 BRPM | DIASTOLIC BLOOD PRESSURE: 67 MMHG | WEIGHT: 165.79 LBS | SYSTOLIC BLOOD PRESSURE: 133 MMHG | TEMPERATURE: 98 F | HEIGHT: 70 IN | HEART RATE: 57 BPM

## 2018-10-01 PROCEDURE — 97116 GAIT TRAINING THERAPY: CPT | Mod: GP | Performed by: PHYSICAL THERAPY ASSISTANT

## 2018-10-01 PROCEDURE — 40000133 ZZH STATISTIC OT WARD VISIT

## 2018-10-01 PROCEDURE — 97535 SELF CARE MNGMENT TRAINING: CPT | Mod: GO

## 2018-10-01 PROCEDURE — 40000193 ZZH STATISTIC PT WARD VISIT: Performed by: PHYSICAL THERAPY ASSISTANT

## 2018-10-01 PROCEDURE — 97530 THERAPEUTIC ACTIVITIES: CPT | Mod: GO

## 2018-10-01 PROCEDURE — A9270 NON-COVERED ITEM OR SERVICE: HCPCS | Mod: GY | Performed by: INTERNAL MEDICINE

## 2018-10-01 PROCEDURE — A9270 NON-COVERED ITEM OR SERVICE: HCPCS | Mod: GY | Performed by: HOSPITALIST

## 2018-10-01 PROCEDURE — 25000128 H RX IP 250 OP 636: Performed by: INTERNAL MEDICINE

## 2018-10-01 PROCEDURE — 25000132 ZZH RX MED GY IP 250 OP 250 PS 637: Mod: GY | Performed by: INTERNAL MEDICINE

## 2018-10-01 PROCEDURE — 99239 HOSP IP/OBS DSCHRG MGMT >30: CPT | Performed by: INTERNAL MEDICINE

## 2018-10-01 PROCEDURE — 25000132 ZZH RX MED GY IP 250 OP 250 PS 637: Mod: GY | Performed by: HOSPITALIST

## 2018-10-01 RX ORDER — METOPROLOL TARTRATE 25 MG/1
6.25 TABLET, FILM COATED ORAL 2 TIMES DAILY
Qty: 60 TABLET | DISCHARGE
Start: 2018-10-01 | End: 2018-11-13

## 2018-10-01 RX ORDER — LEFLUNOMIDE 10 MG/1
10 TABLET ORAL DAILY
DISCHARGE
Start: 2018-10-01 | End: 2018-10-30

## 2018-10-01 RX ADMIN — Medication 6.25 MG: at 08:30

## 2018-10-01 RX ADMIN — APIXABAN 5 MG: 5 TABLET, FILM COATED ORAL at 08:29

## 2018-10-01 RX ADMIN — SODIUM CHLORIDE: 9 INJECTION, SOLUTION INTRAVENOUS at 06:17

## 2018-10-01 RX ADMIN — ACETAMINOPHEN 650 MG: 325 TABLET, FILM COATED ORAL at 08:30

## 2018-10-01 ASSESSMENT — ACTIVITIES OF DAILY LIVING (ADL)
ADLS_ACUITY_SCORE: 14

## 2018-10-01 NOTE — DISCHARGE SUMMARY
[facility]    Discharge Summary  Hospitalist    Date of Admission:  9/28/2018  Date of Discharge:  10/1/2018  3:45 PM  Discharging Provider: Emily Maldonado MD  Date of Service (when I saw the patient): 10/01/18    Discharge Diagnoses   Acute CVA  Left vertebral artery dissection  Paroxysmal Afib  HTN   HLP  CAD  RA  Cognitive impairment     History of Present Illness   Jose E Capps is a 87 year old male with past history TIA, HTN, RA, hyperlipidemia and CAD who presents with 2 falls in past 2 days, found to be in A-fib with controlled rate; then , after he arrived on the floor, he had new onset RUE/RLE weakness, numbness, R visual field cut, word finding difficulty, ataxia; STAT CTA head and neck showed-  Left posterior cerebral artery occlusion at the P1-2 junction with eft vertebral artery dissection; for a detailed HPI- please refer to H&P done by Dr Balta Alcantar on 09/28/2018.    Hospital Course   Jose E Capps was admitted on 9/28/2018.  The following problems were addressed during his hospitalization:    1. Acute CVA in the left PCA distribution     Left vertebral artery dissection  - admitted for evaluation of 2 falls in the last 2 days; as per H&P- he has had significant worsening of his memory over the past 3 months  - CT head on admission was negative for acute pathology  - on admission- was found to have new Afib with controlled rate in ER  - on 09/28 evening-  had acute onset right upper and lower extremity ataxia, weakness/numbness, word finding difficulties  - CT head with iv contrast showed left posterior cerebral artery perfusion defect  - CTA head and neck showed-  Left posterior cerebral artery occlusion at the P1-2 junction; Left vertebral artery dissection   - MRI brain w and w/o contrast- Scattered recent ischemic infarcts in the posterior cerebral artery distributions bilaterally involving posterior medial temporal lobes bilaterally and left thalamus  - Neuro  consult appreciated  - although he was in new Afib, it is felt that the culprit for acute stroke is left vertebral artery dissection/occlusion after fall with head trauma  - now he is back in NSR on Tele  - initially started on ASA and Plavix as per Neurology; although it was felt that his stroke was cause by left vertebral artery, paroxysmal A fib increases risk of stroke also- so he was switched to Eliquis- see below  - lipid profile LDL 61, total cholest 123; continue PTA Lipitor 10 mg po daily for now  - blood pressure control  - Echo - EF 55-60%, grade I diastolic dysfunction   - PT/OT- rec AR[facility], family prefers [facility]  - SLP- mechanical soft diet with thin liquids  - his deficits have improved but he still has some right upper extremity dysmetria and mild weakness; confusion also improved.  - follow up with Neurology in 4 week     2. Paroxysmal Afib  - EKG in ER showed Afib with controlled HR  - now back in NSR  [PTA on Metoprolol 25 mg po BID]  - here started on Metoprolol 12.5 mg po BID but noted with some bradycardia in 40's 2 nights ago while sleeping  - decreased Metoprolol to 6.25 mg po BID with holding parameters  - although it is felt that his new stroke is likely related to vertebral artery dissection/occlusion, paroxysmal A fib puts him at daphnie risk for stroke (CHADSVasc score 5); it was discussed with Neurology, Dr Dickson; because the strokes are small- the risk of hemorrhagic transformation is small so he was started on Eliquis 5 mg po BID  - echo as above     3. CAD      Elevated troponins      HTN  - no h/o MI but had an abnormal stress test in 1/2018 (as part of preop clearance)- which showed -small area of nontransmural myocardial infarction affecting the basal and mid inferior wall with minimal yobany-infarct ischemia within the mid inferolateral wall.; he was asymptomatic and was cleared for surgery without further workup such as cardiac cath  - he has mildly elevated troponins but plateau-ed  0.049--0.047--0.057  - denies chest pain, no SOB; EKG with no ischemic changes  - Tele  - continue statin and Metoprolol 6.25 mg po BID; ASA was stopped- he was started on Eliquis  - resume PTA Lisinopril after discharge     4. RA  - hold PTA Leflunomide  for now, consider resuming after returning home     5. Cognitive impairment:  Daughter reports significant impairment in short term memory in past 3 months, see H&P for details.  - monitor for delirium  - OT kelechial    Emily Maldonado MD    Significant Results and Procedures   See below    Echocardiogram 09/29/2018    Left ventricular systolic function is normal.  The visual ejection fraction is estimated at 55-60%.  The left ventricle is normal in size.  There is mild concentric left ventricular hypertrophy.  The right ventricle is normal in structure, function and size.  There is mild biatrial enlargement.  There is mild (1+) aortic regurgitation.    Pending Results   None  Unresulted Labs Ordered in the Past 30 Days of this Admission     No orders found from 7/30/2018 to 9/29/2018.          Code Status   DNR / DNI       Primary Care Physician   Nehemias Granados        Discharge Disposition   Discharged to short-term care facility  Condition at discharge: Satisfactory    Consultations This Hospital Stay   PHYSICAL THERAPY ADULT IP CONSULT  NEUROLOGY IP CONSULT  OCCUPATIONAL THERAPY ADULT IP CONSULT  SPEECH LANGUAGE PATH ADULT IP CONSULT  SMOKING CESSATION PROGRAM IP CONSULT  CARE TRANSITION RN/SW IP CONSULT  PHYSICAL THERAPY ADULT IP CONSULT  OCCUPATIONAL THERAPY ADULT IP CONSULT  SPEECH LANGUAGE PATH ADULT IP CONSULT    Time Spent on this Encounter   Emily SOUZA, personally saw the patient today and spent greater than 30 minutes discharging this patient.    Discharge Orders     General info for SNF   Length of Stay Estimate: Short Term Care: Estimated # of Days <30  Condition at Discharge: Improving  Level of care:skilled   Rehabilitation Potential:  Fair  Admission H&P remains valid and up-to-date: Yes  Recent Chemotherapy: N/A  Use Nursing Home Standing Orders: Yes     Mantoux instructions   Give two-step Mantoux (PPD) Per Facility Policy Yes     Reason for your hospital stay   Recurrent falls  Left vertebral artery dissection  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  Paroxysmal Afib     Follow Up and recommended labs and tests   Follow up with Nursing home physician in 2-3 days  No follow up labs or test are needed.  Follow up with primary care provider after discharge home  Follow up with Neurology in 4 weeks     Activity - Up with nursing assistance     DNR/DNI     Physical Therapy Adult Consult   Evaluate and treat as clinically indicated.    Reason:  Acute CVA     Occupational Therapy Adult Consult   Evaluate and treat as clinically indicated.    Reason:  Acute CVA     Speech Language Path Adult Consult   Evaluate and treat as clinically indicated.    Reason:  Dysphagia, CVA     Fall precautions     Advance Diet as Tolerated   Follow this diet upon discharge: Orders Placed This Encounter     Combination Diet Thin Liquids (water, ice chips, juice, milk, gelatin, ice cream, etc); Mechanical/Dental Soft Diet       Discharge Medications   Current Discharge Medication List      START taking these medications    Details   apixaban ANTICOAGULANT (ELIQUIS) 5 MG tablet Take 1 tablet (5 mg) by mouth 2 times daily    Associated Diagnoses: Atrial fibrillation, unspecified type (H)         CONTINUE these medications which have CHANGED    Details   leflunomide (ARAVA) 10 MG tablet Take 1 tablet (10 mg) by mouth daily Hold it until you will return home    Associated Diagnoses: Seronegative rheumatoid arthritis (H)      metoprolol tartrate (LOPRESSOR) 25 MG tablet Take 0.25 tablets (6.25 mg) by mouth 2 times daily  Qty: 60 tablet    Associated Diagnoses: Atrial fibrillation, unspecified type (H)         CONTINUE these medications which have NOT CHANGED    Details    acetaminophen (MAPAP) 500 MG tablet Take 1 tablet (500 mg) by mouth every 8 hours as needed for fever or pain  Qty: 93 tablet, Refills: 12    Associated Diagnoses: Seronegative rheumatoid arthritis (H)      atorvastatin (LIPITOR) 10 MG tablet Take 1 tablet (10 mg) by mouth daily  Qty: 90 tablet, Refills: 3    Associated Diagnoses: Mixed hyperlipidemia      Calcium Carb-Cholecalciferol (CALCIUM + D3) 600-200 MG-UNIT TABS TAKE 1 TABLET BY MOUTH ONCE DAILY  Qty: 90 tablet, Refills: 3    Comments: Please authorize qty 31 with 12 refills for assisting living patient. Thanks  Associated Diagnoses: Imbalanced nutrition      lisinopril (PRINIVIL/ZESTRIL) 20 MG tablet Take 1 tablet (20 mg) by mouth daily  Qty: 90 tablet, Refills: 3    Associated Diagnoses: Hypertension goal BP (blood pressure) < 140/90      Multiple Vitamins-Minerals (CEROVITE SENIOR) TABS TAKE 1 TABLET BY MOUTH ONCE DAILY  Qty: 93 tablet, Refills: 3    Associated Diagnoses: Hypertension goal BP (blood pressure) < 140/90      OMEGA-3 FISH OIL 1000 MG capsule TAKE 1 CAPSULE BY MOUTH ONCE DAILY  Qty: 31 capsule, Refills: 12    Associated Diagnoses: Atherosclerosis of native coronary artery of native heart without angina pectoris      polyethylene glycol (MIRALAX/GLYCOLAX) Packet Take 1 packet by mouth daily as needed for constipation      sennosides (SENOKOT) 8.6 MG tablet Take 4 tablets by mouth 2 times daily as needed          STOP taking these medications       SM ASPIRIN ADULT LOW STRENGTH 81 MG EC tablet Comments:   Reason for Stopping:             Allergies   Allergies   Allergen Reactions     No Known Drug Allergy      Data   Most Recent 3 CBC's:  Recent Labs   Lab Test  09/30/18   0950  09/28/18   1305  09/11/18   1711   WBC  6.8  7.0  6.9   HGB  13.1*  15.1  14.2   MCV  96  96  101*   PLT  204  216  258      Most Recent 3 BMP's:  Recent Labs   Lab Test  09/30/18   0950  09/28/18   1305  09/11/18   1711   NA  144  140  145*   POTASSIUM  3.7  4.5  5.1    CHLORIDE  110*  106  108   CO2  25  27  29   BUN  11  21  24   CR  0.77  0.74  1.12   ANIONGAP  9  7  8   GISELA  8.2*  8.7  8.4*   GLC  88  101*  95     Most Recent 2 LFT's:  Recent Labs   Lab Test  09/28/18   1305  09/11/18   1711   AST  26  20   ALT  27  33   ALKPHOS  97  94   BILITOTAL  1.0  0.6     Most Recent INR's and Anticoagulation Dosing History:  Anticoagulation Dose History     Recent Dosing and Labs Latest Ref Rng & Units 9/28/2018    INR 0.86 - 1.14 1.19(H)        Most Recent 3 Troponin's:  Recent Labs   Lab Test  09/29/18   0135  09/28/18   2020  09/28/18   1732   TROPI  0.057*  0.047*  0.055*     Most Recent Cholesterol Panel:  Recent Labs   Lab Test  09/30/18   0950   CHOL  123   LDL  61   HDL  44   TRIG  92     Most Recent 6 Bacteria Isolates From Any Culture (See EPIC Reports for Culture Details):No lab results found.  Most Recent TSH, T4 and A1c Labs:  Recent Labs   Lab Test  09/28/18 2020 09/11/18   1711   TSH   --   1.81   A1C  5.6   --      Results for orders placed or performed during the hospital encounter of 09/28/18   CT Head w/o Contrast    Narrative    CT OF THE HEAD WITHOUT CONTRAST  9/28/2018 1:23 PM     COMPARISON: None.    HISTORY:  Fall with frontal hematoma and right parietal hematoma.     TECHNIQUE: 5 mm thick axial CT images of the head were acquired  without IV contrast material.    FINDINGS:  There is moderate diffuse cerebral volume loss. There are  subtle patchy areas of decreased density in the cerebral white matter  bilaterally that are consistent with sequela of chronic small vessel  ischemic disease.     The ventricles and basal cisterns are within normal limits in  configuration given the degree of cerebral volume loss.  There is no  midline shift. There are no extra-axial fluid collections.     No intracranial hemorrhage, mass or recent infarct.    The visualized paranasal sinuses are well aerated. There is no  mastoiditis. There are no fractures of the visualized  bones.       Impression    IMPRESSION:  Diffuse cerebral volume loss and cerebral white matter  changes consistent with chronic small vessel ischemic disease. No  evidence for acute intracranial pathology.     Radiation dose for this scan was reduced using automated exposure  control, adjustment of the mA and/or kV according to patient size, or  iterative reconstruction technique.    HOLGER PADILLA MD   XR Pelvis w Hip Left 1 View    Narrative    PELVIS AND HIP LEFT ONE VIEW   9/28/2018 1:35 PM     HISTORY: Pain.     COMPARISON: None.       Impression    IMPRESSION: No acute fracture or dislocation.    АЛЕКСАНДР HUBER MD   XR Knee Right 3 Views    Narrative    RIGHT KNEE THREE VIEWS   9/28/2018 1:36 PM     HISTORY: Right knee pain after fall.     COMPARISON: None.       Impression    IMPRESSION: No acute fracture or dislocation. Severe osteoarthritis.  Joint effusion.    АЛЕКСАНДР HUBER MD   MR Brain w/o & w Contrast    Narrative    MRI OF THE BRAIN WITHOUT AND WITH CONTRAST 9/29/2018 1:48 PM     COMPARISON: Head CT, head CTA and head CT perfusion study 9/28/2018.    HISTORY:  Frequent falls, discoordination, rule out stroke.     TECHNIQUE: Axial diffusion-weighted with ADC map, axial T2-weighted  with fat saturation, axial T1-weighted, axial turboFLAIR and coronal  T1-weighted images of the brain were acquired without intravenous  contrast.  Following intravenous administration of gadolinium (7 mL  Gadavist), axial T1-weighted images of the brain were acquired.     FINDINGS: There are recent tiny infarcts in the left thalamus and  posteromedial aspect of the right temporal lobe as well as a  contiguous infarct in the left parahippocampal formation. All of these  infarcts are in the posterior circulation conforming to bilateral  posterior cerebral artery territory infarcts. This also corresponds  with the perfusion defect noted in the left posterior cerebral artery  distribution on the comparison CT perfusion study.  No other recent  infarcts noted.    There is mild diffuse cerebral volume loss. There are mild patchy  periventricular areas of abnormal T2 signal hyperintensity in the  cerebral white matter bilaterally that are consistent with sequela of  chronic small vessel ischemic disease. The ventricles and basal  cisterns are within normal limits in configuration given the degree of  cerebral volume loss.  There is no midline shift.  There are no  extra-axial fluid collections.  There is no evidence for acute  intracranial hemorrhage.  There is no abnormal contrast enhancement in  the brain or its coverings.    There is no sinusitis or mastoiditis.      Impression    IMPRESSION:   1. Scattered recent ischemic infarcts in the posterior cerebral artery  distributions bilaterally involving posterior medial temporal lobes  bilaterally and left thalamus.  2. Diffuse cerebral volume loss and cerebral white matter changes  consistent with chronic small vessel ischemic disease.     HOLGER PADILLA MD   XR Hand Right G/E 3 Views    Narrative    RIGHT HAND THREE OR MORE VIEWS   9/28/2018 7:19 PM     HISTORY: Thumb pain and swelling following fall. Rule out fracture.    COMPARISON: None.      Impression    IMPRESSION: Three views of the right hand are performed. Degenerative  joint changes involving the metacarpophalangeal and interphalangeal  joints of the thumb are noted. Additional DIP and PIP joint  degenerative changes are noted within the fingers. No evidence of  acute fracture or dislocation. Carpal bones appear intact. Arterial  vascular calcification is noted in the interosseous space between the  distal radius and ulna where imaged. Tiny ossicles are noted along the  radial aspect of the metacarpophalangeal joints of the index and  middle fingers on the oblique view. No radiopaque foreign body is  identified. Mild soft tissue swelling is present involving the thumb.    EDNA RODRIGUEZ MD   CTA Head Neck with Contrast    Narrative     CT ANGIOGRAM OF THE HEAD AND NECK WITH CONTRAST  9/28/2018 8:35 PM     HISTORY: Code Stroke.    TECHNIQUE: CT angiography with an injection of 70 mL Isovue-370 IV  with scans through the head and neck. Images were transferred to a  separate 3-D workstation where multiplanar reformations and 3-D images  were created. Estimates of carotid stenoses are made relative to the  distal internal carotid artery diameters except as noted. Radiation  dose for this scan was reduced using automated exposure control,  adjustment of the mA and/or kV according to patient size, or iterative  reconstruction technique.    COMPARISON: None.     CT HEAD FINDINGS: No contrast enhancing lesions are identified.    CT ANGIOGRAM HEAD FINDINGS: Left posterior cerebral artery is occluded  at the P1-2 junction. The intracranial vertebral arteries, basilar  artery, and right posterior cerebral arteries are patent. The internal  carotid arteries, anterior cerebral arteries, and middle cerebral  arteries are patent.     CT ANGIOGRAM NECK FINDINGS: The left vertebral artery is occluded at  the origin with reconstitution at the V3 and V4 segments. Right  vertebral artery is patent. The bilateral common carotid, internal  carotid, and external carotid arteries are patent. There is moderate  atherosclerotic plaquing at the bilateral carotid bifurcations without  evidence of flow-limiting stenosis. A two-vessel aortic arch is  present.     Emphysema noted at the lung apices. A right-sided thyroid nodule is  present measuring 2.4 cm. Severe degenerative changes are present  throughout the cervical spine.      Impression    IMPRESSION:   1. 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   2. 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  3. Right thyroid nodule measuring up to 2.4 cm.    Findings were discussed by phone between Dr. Luu and the stroke  neurologist at 8:58 PM  on 9/28/2018.    CESAR LUU MD   CT Head w/o Contrast    Narrative    CT SCAN OF THE HEAD WITHOUT CONTRAST   9/28/2018 8:30 PM     HISTORY: Code Stroke.    TECHNIQUE: Axial images of the head and coronal reformations without  IV contrast material. Radiation dose for this scan was reduced using  automated exposure control, adjustment of the mA and/or kV according  to patient size, or iterative reconstruction technique.    COMPARISON: None.    FINDINGS: Mild to moderate volume loss is present. Old left  periventricular infarct is present. Background of white matter  hypoattenuation likely represents chronic small vessel ischemic  change. No evidence of acute ischemia, hemorrhage, mass, mass effect,  or hydrocephalus. The visualized calvarium, skull base, paranasal  sinuses, and extracranial soft tissues are unremarkable.      Impression    IMPRESSION: Known left posterior cerebral artery occlusion and  perfusion deficit. No evidence of ischemia/edema or hemorrhage.    Findings were discussed by phone between Dr. Luu and Neil Ward on  9/28/2018 8:59 PM.    CESAR LUU MD   CT Head Perfusion w Contrast    Narrative    CT BRAIN PERFUSION   9/28/2018 8:43 PM    HISTORY: Code Stroke.    TECHNIQUE: Time sequential axial CT images of the head were acquired  during the administration of 50 mL Isovue-370 IV. Color perfusion maps  of the brain were created from this time sequential axial source data.      Radiation dose for this scan was reduced using automated exposure  control, adjustment of the mA and/or kV according to patient size, or  iterative reconstruction technique.    COMPARISON: None.    FINDINGS: There is prolongation of transit times and flow to the left  posterior cerebral artery distribution. Cerebral blood volume is  compensated. No other focal perfusion deficits are identified.      Impression    IMPRESSION: Left posterior cerebral artery perfusion defect.    CESAR LUU MD

## 2018-10-01 NOTE — PROGRESS NOTES
Neurology appointment scheduled:    Appointment scheduled with Dr. Delgado at [facility] on November 1st, 11:00 am check in for 11:15 am appointment

## 2018-10-01 NOTE — PLAN OF CARE
Problem: Patient Care Overview  Goal: Plan of Care/Patient Progress Review  OT: attempted OT but patient already dressed and ready for discharge to TCU soon.    Comments: Occupational Therapy Discharge Summary    Reason for therapy discharge:    Discharged to transitional care facility.    Progress towards therapy goal(s). See goals on Care Plan in [facility] electronic health record for goal details.  Goals partially met.  Barriers to achieving goals:   limited tolerance for therapy and discharge from facility.    Therapy recommendation(s):    Continued therapy is recommended.  Rationale/Recommendations:  Continue OT at TCU to maximize (I), safety and endurance with ADL, iADL and mobility.

## 2018-10-01 NOTE — PLAN OF CARE
Problem: Patient Care Overview  Goal: Plan of Care/Patient Progress Review  Outcome: Adequate for Discharge Date Met: 10/01/18  Pt alert, oriented x1-2. Forgetful. VSS. Tele afib with CVR and BBB. Neuros stable with R sided hemiparesis, RUE ataxia, and decreased sensation R side of face and RUE. RU and LEs 4/5 strength with weak to moderate . Lung sounds clear. Incontinent of urine. PRN tylenol administered for HA and low back pain. Abrasions and bruising to forehead, L arm and knee. Ambulating with asst of 1 GB and walker. Reviewed written discharge instructions with pt's daughter Marita over the phone, all questions answered. Pt discharged to [facility].

## 2018-10-01 NOTE — PLAN OF CARE
Problem: Patient Care Overview  Goal: Plan of Care/Patient Progress Review  Outcome: No Change  Alert/intermittently confused & Oriented to self only, forgetful. RUE hemiparesis, ataxia, intermittent pronator drift, RLE hemiparesis and ataxia, otherwise Neuros intact. Hypertensive, but within defined parameters, otherwise VSS. Tele A-fib CVR BBB. Mechanical/dental soft diet. Up with one, gait belt and walker. Incontinent. C/o 4/10 right hand pain, decreased with prn tylenol. Plan for nursing to continue to monitor, discharge to TCU vs ARU pending progress.

## 2018-10-01 NOTE — PLAN OF CARE
Problem: Patient Care Overview  Goal: Plan of Care/Patient Progress Review  Discharge Planner OT   Patient plan for discharge: none stated  Current status: Pt mod I for bed mobility, verbal cues for sequencing and techniques, HOB elevated and bedrail used. Pt seated at EOB to address tolerance before attempting to stand, 1-2 verbal reminders for proper posture and use of R hand to stablize self. CGA sit to stand with cues for technique and safety with use of FWW. Instrucuted in PLB and use of walker for tranfser to chair. Pt mod I for bed mobility, verbal cues for sequencing and techniques, HOB elevated and bedrail used. Pt seated at EOB to address tolerance before attempting to stand, 1-2 verbal reminders for proper posture and use of R hand to stablize self. CGA sit to stand with cues for technique and safety with use of FWW. Instrucuted in PLB and use of walker for tranfser to chair  Barriers to return to prior living situation: decreased strength, decreased independence with ADLs, safety  Recommendations for discharge: TCU  Rationale for recommendations: Pt below baseline for safety and ADLs, continued OT intervention at next level of care (TCU) beneficial to ensure safe level of independence for self care at PLOF.       Entered by: Cheryl Anderson 10/01/2018 1:23 PM

## 2018-10-01 NOTE — PROGRESS NOTES
Care Transition Initial Assessment - STEFANO  Reason For Consult: discharge planning  Met with: Patient and spoke with pt's daughterMarita   Active Problems:    Falls, initial encounter       DATA  Lives With: facility resident  Living Arrangements: assisted living at [facility]  Description of Support System: Supportive, Involved  Who is your support system?: Wife, Children. DaughterMarita, assisting with discharge planning     Identified issues/concerns regarding health management: Pt admitted for falls at home and after evaluation, found to have a stroke and A-fib. Pt does have some memory issues and the daughterMarita indicates that they would like pt to discharge to [facility].  Referral sent via DOD.  [facility] has accepted for admission today.     Transportation Available: family or friend will provide      ASSESSMENT  Cognitive Status:  Alert with some confusion  Concerns to be addressed: TCU plaecment at [facility] where wife is currently.   PLAN  Financial costs for the patient includes N/A  Patient anticipates discharging to:  [facility].    Addendum: [facility] from [facility] has accepted pt and arranged to a transport at 15:30 today to TCU.  CTRN,RN,HUC,BB, hospitalist all updated with plan.  SW contacted pt's daughterMarita and updated with discharge plan for this afternoon.   Discharge orders sent via DOD.  PAS-RR    D: Per DHS regulation, STEFANO completed and submitted PAS-RR to [place] [employer] Direct Connect via the Senior LinkAge Line.  PAS-RR confirmation # is : [id number]  P: Further questions may be directed to [facility] LinkAge Line at #[phone], option #4 for PAS-RR staff.  FER Ibrahim  [facility]  Phone: [phone]

## 2018-10-01 NOTE — DISCHARGE INSTRUCTIONS
Your risk factors for stroke or TIA (transient ischemic attack):    Your Risk Factors Your Results Normal Ranges   High blood pressure BP Readings from Last 1 Encounters:   10/01/18 133/67    Less than 120/80   Cholesterol              Total Lab Results   Component Value Date    CHOL 123 09/30/2018      Less than 150    Triglycerides   Lab Results   Component Value Date    TRIG 92 09/30/2018    Less than 150   LDL Lab Results   Component Value Date    LDL 61 09/30/2018       Less than 70   HDL Lab Results   Component Value Date    HDL 44 09/30/2018            Greater than 40 (men)  Greater than 50 (women)   Diabetes   Recent Labs  Lab 09/30/18  0950   GLC 88    Fasting blood glucose    Smoking/tobacco use  Quit smoking and tobacco   Overweight  Lose 1-2 pounds a week   Lack of exercise  30 minutes moderate activity each day   Other risk factors include carotid (neck) artery disease, atrial fibrillation and stress. You may be on new medicine to treat high blood pressure, cholesterol, diabetes or atrial fibrillation.    Understanding Stroke Booklet given to patient. Please refer to booklet for further information.    Stroke warning signs and symptoms - CALL 911 right away for:  - Sudden numbness or weakness in the face, arm or leg (often on one side of the body).  - Sudden confusion or trouble understanding what is going on.  - Sudden blurred or decreased vision in one or both eyes.  - Sudden trouble speaking, loss of balance, dizziness or problems with coordination.  - Sudden, severe headache for no reason.  - Fainting or seizures.  - Symptoms may go away then come back suddenly.

## 2018-10-01 NOTE — PLAN OF CARE
Problem: Patient Care Overview  Goal: Plan of Care/Patient Progress Review  Discharge Planner PT   Patient plan for discharge: Home  Current status: Pt performed sit to/from stand transfer with mod assist for the first 2 trials and min assist with the subsequent 4 trials.  Pt required cues for safety during transfers.  Performed gait training x 230 ft total using wheeled walker and min assist for balance with w/c following.  Pt required 5 seated rest breaks during gait due to fatigue.  C/o pain in right wrist and UE fatigue during gait.  Pt amb at slow pace with mild unsteadiness noted throughout.  Increased LOB noted during turns.  Required assist with walker management at times.  Pt is very motivated to walk and to make it back to his room.   Barriers to return to prior living situation: Fall risk, decreased strength, coordination, level of assistance.   Recommendations for discharge: ARU per plan established by the PT.  Pt's family prefers TCU associated with the Assisted living facility that pt is from.   Rationale for recommendations: Pt will benefit from continued skilled PT to achieve PLOF and independence. Pt is very motivated to participate in PT.       Entered by: Olivia Gutierrez 10/01/2018 11:07 AM       Pt is discharging to TCU this PM.  PT goals not met.    Physical Therapy Discharge Summary    Reason for therapy discharge:    Discharged to transitional care facility.    Progress towards therapy goal(s). See goals on Care Plan in [facility] electronic health record for goal details.  Goals not met.  Barriers to achieving goals:   discharge from facility.    Therapy recommendation(s):    Continued therapy is recommended.  Rationale/Recommendations:  PT at TCU to progress mobility.

## 2018-10-01 NOTE — PROGRESS NOTES
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; Left vertebral artery dissection   - MRI brain w and w/o contrast- Scattered recent ischemic infarcts in the posterior cerebral artery distributions bilaterally involving posterior medial temporal lobes bilaterally and left thalamus  - Neuro consult appreciated  - although he was in new Afib, it is felt that the culprit for acute stroke is left vertebral artery dissection/occlusion after fall with head trauma  - now he is back in NSR on Tele  - initially started on ASA and Plavix as per Neurology; although it was felt that his stroke was cause by left vertebral artery, paroxysmal A fib increases risk of stroke also- so will switch to Eliquis- see below  - lipid profile LDL 61, total cholest 123; continue PTA Lipitor 10 mg po daily for now  - permissive HTN  - Echo - EF 55-60%, grade I diastolic dysfunction   - PT/OT- rec  ARF, family prefers [facility]  - SLP- mechanical soft diet with thin liquids    2. Paroxysmal Afib  - EKG in ER showed Afib with controlled HR  - now back in NSR  [PTA on Metoprolol 25 mg po BID]  - here started on Metoprolol 12.5 mg po BID  - noted with some bradycardia in 40's 2 nights ago while sleeping  - decreased Metoprolol to 6.25 mg po BID with holding parameters  - although it is felt that his new stroke is likely related to vertebral artery dissection/occlusion, may need to start anticoagulation  - echo as above; CHADSVasc score is 5  - discussed with Neurology, Dr Dickson; because the strokes are small- the risk of hemorrhagic transformation is small  - started Eliquis 5 mg po BID on 9/30 and stopped ASA    3. CAD      Elevated troponins      HTN  - no h/o MI but had an abnormal stress test in 1/2018 (as part of preop clearance)- which showed -small area of nontransmural myocardial infarction affecting the basal and mid inferior wall with minimal yobany-infarct ischemia within the mid inferolateral wall.; he was asymptomatic and was cleared for surgery without further workup such as cardiac cath  - he has mildly elevated troponins but plateau-ed 0.049--0.047--0.057  - denies chest pain, no SOB; EKG with no ischemic changes  - Tele  - continue statin and Metoprolol 6.25 mg po BID; ASA was stopped- he was started on Eliquis  - hold PTA Lisinopril to allow permissive HTN- resume as indicated    4. RA  - hold PTA Leflunomide  for now    5. Cognitive impairment:  Daughter reports significant impairment in short term memory in past 3 months, see H&P for details.  - monitor for delirium  - OT eval    DVT Prophylaxis: Pneumatic Compression Devices  Code Status: DNR/DNI  Disposition Plan   Expected discharge: possible discharge to TCU today         Entered: Emily Maldonado MD 10/01/2018, 10:33 AM   Information in the above section will display in the discharge planner report.      Emily Maldonado  MD    Interval History   Doing better, confusion improved a little, right arm dysmetria improved; denies chest pain, no SOB, no N/V; discussed with RN    -Data reviewed today: I reviewed all new labs and imaging results over the last 24 hours. I personally reviewed the Tele image(s) showing NSR.    Physical Exam   Temp: 98.7  F (37.1  C) Temp src: Tympanic BP: 127/64   Heart Rate: 56 Resp: 16 SpO2: 94 % O2 Device: None (Room air)    Vitals:    09/29/18 0604 09/30/18 0500   Weight: 74 kg (163 lb 3.2 oz) 75.2 kg (165 lb 12.6 oz)     Vital Signs with Ranges  Temp:  [97.8  F (36.6  C)-98.9  F (37.2  C)] 98.7  F (37.1  C)  Heart Rate:  [56-80] 56  Resp:  [16-18] 16  BP: (127-161)/(64-95) 127/64  SpO2:  [92 %-97 %] 94 %  I/O last 3 completed shifts:  In: 2642.5 [P.O.:1180; I.V.:1462.5]  Out: 150 [Urine:150]    Constitutional: Awake, alert, NAD  Respiratory: Bilateral air entry, no wheezing, no rales, no crackles  Cardiovascular: S1S2, RRR, no murmurs, no rubs  GI: abd- soft, nonT, nonD, BS present  Skin/Integumen: no rashes, no cyanosis  Neuro- mildly confused, RUE ataxia/dysmetria on finger-to-nose testing- improved, no slurred speech    Medications     - MEDICATION INSTRUCTIONS -         apixaban ANTICOAGULANT  5 mg Oral BID     atorvastatin  10 mg Oral QPM     metoprolol tartrate  6.25 mg Oral BID     sodium chloride (PF)  10 mL Intracatheter Q8H     sodium chloride (PF)  3 mL Intracatheter Q8H       Data     Recent Labs  Lab 09/30/18  0950 09/29/18  0135 09/28/18  2020 09/28/18  1732 09/28/18  1305   WBC 6.8  --   --   --  7.0   HGB 13.1*  --   --   --  15.1   MCV 96  --   --   --  96     --   --   --  216   INR  --   --  1.19*  --   --      --   --   --  140   POTASSIUM 3.7  --   --   --  4.5   CHLORIDE 110*  --   --   --  106   CO2 25  --   --   --  27   BUN 11  --   --   --  21   CR 0.77  --   --   --  0.74   ANIONGAP 9  --   --   --  7   GISELA 8.2*  --   --   --  8.7   GLC 88  --   --   --  101*    ALBUMIN  --   --   --   --  3.4   PROTTOTAL  --   --   --   --  6.8   BILITOTAL  --   --   --   --  1.0   ALKPHOS  --   --   --   --  97   ALT  --   --   --   --  27   AST  --   --   --   --  26   LIPASE  --   --   --   --  103   TROPI  --  0.057* 0.047* 0.055* 0.049*       No results found for this or any previous visit (from the past 24 hour(s)).

## 2018-10-02 ENCOUNTER — PATIENT OUTREACH (OUTPATIENT)
Dept: CARE COORDINATION | Facility: CLINIC | Age: 83
End: 2018-10-02

## 2018-10-02 ENCOUNTER — NURSING HOME VISIT (OUTPATIENT)
Dept: GERIATRICS | Facility: CLINIC | Age: 83
End: 2018-10-02
Payer: COMMERCIAL

## 2018-10-02 VITALS
OXYGEN SATURATION: 95 % | TEMPERATURE: 97.9 F | SYSTOLIC BLOOD PRESSURE: 176 MMHG | WEIGHT: 170 LBS | RESPIRATION RATE: 18 BRPM | BODY MASS INDEX: 24.34 KG/M2 | DIASTOLIC BLOOD PRESSURE: 77 MMHG | HEIGHT: 70 IN | HEART RATE: 63 BPM

## 2018-10-02 DIAGNOSIS — R79.89 ELEVATED TROPONIN: ICD-10-CM

## 2018-10-02 DIAGNOSIS — I10 HYPERTENSION GOAL BP (BLOOD PRESSURE) < 140/90: ICD-10-CM

## 2018-10-02 DIAGNOSIS — I25.119 CORONARY ARTERY DISEASE WITH ANGINA PECTORIS, UNSPECIFIED VESSEL OR LESION TYPE, UNSPECIFIED WHETHER NATIVE OR TRANSPLANTED HEART (H): ICD-10-CM

## 2018-10-02 DIAGNOSIS — M06.9 RHEUMATOID ARTHRITIS, INVOLVING UNSPECIFIED SITE, UNSPECIFIED RHEUMATOID FACTOR PRESENCE: ICD-10-CM

## 2018-10-02 DIAGNOSIS — I48.0 PAROXYSMAL ATRIAL FIBRILLATION (H): ICD-10-CM

## 2018-10-02 DIAGNOSIS — I63.9 ACUTE CVA (CEREBROVASCULAR ACCIDENT) (H): Primary | ICD-10-CM

## 2018-10-02 DIAGNOSIS — I77.74 DISSECTING HEMORRHAGE OF LEFT VERTEBRAL ARTERY (H): ICD-10-CM

## 2018-10-02 DIAGNOSIS — R41.89 COGNITIVE IMPAIRMENT: ICD-10-CM

## 2018-10-02 DIAGNOSIS — R53.81 PHYSICAL DECONDITIONING: ICD-10-CM

## 2018-10-02 PROCEDURE — 99310 SBSQ NF CARE HIGH MDM 45: CPT | Performed by: NURSE PRACTITIONER

## 2018-10-02 NOTE — LETTER
10/2/2018        RE: Jose E Capps  [address] [facility] [address]  [place] [address]        [facility]  PRIMARY CARE PROVIDER AND CLINIC:  Nehemias Granados [address] [place] [address]  / SHIRLEY MANDUJANO [id number]  Chief Complaint   Patient presents with     Hospital F/U     [facility] Medical Record Number:  [id number]    HPI:    Jose E Capps is a 87 year old  (9/28/1931),admitted to the [facility]  from [facility].  Hospital stay 9/28/2018 through 10/1/2018.  Admitted to this facility for  rehab, medical management and nursing care.  HPI information obtained from: facility chart records, facility staff, patient report and [facility] chart review.      Hospital Course     Jose E Capps was admitted on 9/28/2018.  The following problems were addressed during his hospitalization:     1. Acute CVA in the left PCA distribution     Left vertebral artery dissection  - admitted for evaluation of 2 falls in the last 2 days; as per H&P- he has had significant worsening of his memory over the past 3 months  - CT head on admission was negative for acute pathology  - on admission- was found to have new Afib with controlled rate in ER  - on 09/28 evening-  had acute onset right upper and lower extremity ataxia, weakness/numbness, word finding difficulties  - CT head with iv contrast showed left posterior cerebral artery perfusion defect  - CTA head and neck showed-  Left posterior cerebral artery occlusion at the P1-2 junction; Left vertebral artery dissection   - MRI brain w and w/o contrast- Scattered recent ischemic infarcts in the posterior cerebral artery distributions bilaterally involving posterior medial temporal lobes bilaterally and left thalamus  - Neuro consult appreciated  - although he was in new Afib, it is felt that the culprit for acute stroke is left vertebral artery dissection/occlusion after fall with head trauma  - now he is back in NSR on Tele  - initially  started on ASA and Plavix as per Neurology; although it was felt that his stroke was cause by left vertebral artery, paroxysmal A fib increases risk of stroke also- so he was switched to Eliquis- see below  - lipid profile LDL 61, total cholest 123; continue PTA Lipitor 10 mg po daily for now  - blood pressure control  - Echo - EF 55-60%, grade I diastolic dysfunction   - PT/OT- rec [facility], family prefers [facility]  - SLP- mechanical soft diet with thin liquids  - his deficits have improved but he still has some right upper extremity dysmetria and mild weakness; confusion also improved.  - follow up with Neurology in 4 week      2. Paroxysmal Afib  - EKG in ER showed Afib with controlled HR  - now back in NSR  [PTA on Metoprolol 25 mg po BID]  - here started on Metoprolol 12.5 mg po BID but noted with some bradycardia in 40's 2 nights ago while sleeping  - decreased Metoprolol to 6.25 mg po BID with holding parameters  - although it is felt that his new stroke is likely related to vertebral artery dissection/occlusion, paroxysmal A fib puts him at daphnie risk for stroke (CHADSVasc score 5); it was discussed with Neurology, Dr Dickson; because the strokes are small- the risk of hemorrhagic transformation is small so he was started on Eliquis 5 mg po BID  - echo as above      3. CAD      Elevated troponins      HTN  - no h/o MI but had an abnormal stress test in 1/2018 (as part of preop clearance)- which showed -small area of nontransmural myocardial infarction affecting the basal and mid inferior wall with minimal yobany-infarct ischemia within the mid inferolateral wall.; he was asymptomatic and was cleared for surgery without further workup such as cardiac cath  - he has mildly elevated troponins but plateau-ed 0.049--0.047--0.057  - denies chest pain, no SOB; EKG with no ischemic changes  - Tele  - continue statin and Metoprolol 6.25 mg po BID; ASA was stopped- he was started on Eliquis  - resume PTA Lisinopril after  discharge      4. RA  - hold PTA Leflunomide  for now, consider resuming after returning home      5. Cognitive impairment:  Daughter reports significant impairment in short term memory in past 3 months, see H&P for details.  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  He states that he feels that he is doing really well and is looking forward to going home pretty soon.  No ongoing residual right upper extremity weakness but states his right lower extremity not nearly as weak as the upper.  Does note swelling of his right upper extremity as well, along with some numbness and tingling.  He denies any chest pain and/or palpitations since arrival to the unit along with any falls, loss of consciousness, increased fatigue, shortness of breath/dyspnea on exertion, fever, chills and/or bowel or bladder concerns.  He states therapy can be to him this morning but spent most of the time asking questions-it was explained to him that this needs to be done to create appropriate plan of care ongoing so that we may be able to get him out of here in an efficient way ensuring that he is safe at the time of discharge to return home.    CODE STATUS/ADVANCE DIRECTIVES DISCUSSION:   DNR / DNI  Patient's living condition: lives in an assisted living  facility    ALLERGIES:No known drug allergy  PAST MEDICAL HISTORY:  has a past medical history of BPH (benign prostatic hyperplasia); Colonic polyps; CVA (cerebral vascular accident) (H) (2009); Hyperlipidemia LDL goal <100; Hypertension; and Seronegative rheumatoid arthritis (H) (2011). He also has no past medical history of Asymptomatic human immunodeficiency virus (HIV) infection status (H); Blood in semen; Cerebral palsy (H); Complication of anesthesia; Congenital renal agenesis and dysgenesis; Epididymitis, bilateral; Epididymitis, left; Epididymitis, right; Goiter; Gout; Hernia, abdominal; History of spinal cord injury; History of thrombophlebitis; Horseshoe kidney; Hydrocephalus; Malignant hyperthermia; Mumps; Orchitis, epididymitis, and epididymo-orchitis, with abscess; Palpitations; Parkinsons disease (H); Penile discharge; Prostate infection; Spider veins; Spina bifida (H); STD (sexually transmitted disease); Swelling of testicle; Tethered cord (H); or Tuberculosis.  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  FAMILY HISTORY: family history includes Alzheimer Disease in his mother; Cancer in his sister and sister; Cardiovascular in his father; Diabetes in his mother; Obesity in his father and mother.  SOCIAL HISTORY:  reports that he has quit smoking. His smoking use included Cigarettes. He has a 60.00 pack-year smoking history. He has never used smokeless tobacco. He reports that he drinks about 10.8 oz of alcohol per week  He reports that he does not use illicit drugs.    Post Discharge Medication Reconciliation Status: discharge medications reconciled, continue medications without change.  Current Outpatient Prescriptions   Medication Sig Dispense Refill     acetaminophen (MAPAP) 500 MG tablet Take 1 tablet (500 mg) by mouth every  "8 hours as needed for fever or pain 93 tablet 12     apixaban ANTICOAGULANT (ELIQUIS) 5 MG tablet Take 1 tablet (5 mg) by mouth 2 times daily       atorvastatin (LIPITOR) 10 MG tablet Take 1 tablet (10 mg) by mouth daily 90 tablet 3     Calcium Carb-Cholecalciferol (CALCIUM + D3) 600-200 MG-UNIT TABS TAKE 1 TABLET BY MOUTH ONCE DAILY 90 tablet 3     lisinopril (PRINIVIL/ZESTRIL) 20 MG tablet Take 1 tablet (20 mg) by mouth daily 90 tablet 3     metoprolol tartrate (LOPRESSOR) 25 MG tablet Take 0.25 tablets (6.25 mg) by mouth 2 times daily 60 tablet      Multiple Vitamins-Minerals (CEROVITE SENIOR) TABS TAKE 1 TABLET BY MOUTH ONCE DAILY 93 tablet 3     OMEGA-3 FISH OIL 1000 MG capsule TAKE 1 CAPSULE BY MOUTH ONCE DAILY 31 capsule 12     polyethylene glycol (MIRALAX/GLYCOLAX) Packet Take 17 g by mouth daily as needed for constipation        sennosides (SENOKOT) 8.6 MG tablet Take 4 tablets by mouth 2 times daily as needed        leflunomide (ARAVA) 10 MG tablet Take 1 tablet (10 mg) by mouth daily Hold it until you will return home (Patient not taking: Reported on 10/2/2018)         ROS:  10 point ROS of systems including Constitutional, Eyes, Respiratory, Cardiovascular, Gastroenterology, Genitourinary, Integumentary, Muscularskeletal, Psychiatric were all negative except for pertinent positives noted in my HPI.    Exam:  /77  Pulse 63  Temp 97.9  F (36.6  C)  Resp 18  Ht 5' 10\" (1.778 m)  Wt 170 lb (77.1 kg)  SpO2 95%  BMI 24.39 kg/m2  GENERAL APPEARANCE:  Alert, in no distress, appears healthy, oriented, cooperative, Elderly male sitting up in wheelchair in his room  ENT:  Mouth and posterior oropharynx normal, moist mucous membranes, normal hearing acuity  EYES:  EOM, conjunctivae, lids, pupils and irises normal, Wears glasses  NECK:  No adenopathy,masses or thyromegaly  RESP:  respiratory effort and palpation of chest normal, lungs clear to auscultation , no respiratory distress  CV:  Palpation and " auscultation of heart done , regular rate and rhythm, no murmur, rub, or gallop, +2 pedal pulses, peripheral edema 1+ in RUE  ABDOMEN:  normal bowel sounds, soft, nontender, no hepatosplenomegaly or other masses  M/S:   Gait and station abnormal -Unable to assess secondary to patient being wheelchair  Digits and nails abnormal -Arthritic changes present  Examination of:   right upper extremity, left upper extremity, right lower extremity and left lower extremity  Inspection, ROM, stability and muscle strength Decreased in right upper extremity secondary to CVA; normal in left upper extremity and bilateral lower extremities  SKIN:  Palpation of skin and subcutaneous tissue baseline, Inspection of skin and subcutaneous tissue Abnormal, wound appearance: 2 wounds present on frontal aspect of forehead/scalp healing well, scabbed over, with no signs or symptoms of infection.  NEURO:   Cranial nerves 2-12 are normal tested and grossly at patient's baseline  PSYCH:  oriented X 3, normal insight, judgement and memory, affect and mood normal    Lab/Diagnostic data:  CBC RESULTS:   Recent Labs   Lab Test  09/30/18   0950  09/28/18   1305   WBC  6.8  7.0   RBC  4.11*  4.66   HGB  13.1*  15.1   HCT  39.3*  44.9   MCV  96  96   MCH  31.9  32.4   MCHC  33.3  33.6   RDW  13.3  13.4   PLT  204  216       Last Basic Metabolic Panel:  Recent Labs   Lab Test  09/30/18   0950  09/28/18   1305   NA  144  140   POTASSIUM  3.7  4.5   CHLORIDE  110*  106   GISELA  8.2*  8.7   CO2  25  27   BUN  11  21   CR  0.77  0.74   GLC  88  101*       Liver Function Studies -   Recent Labs   Lab Test  09/28/18   1305  09/11/18   1711   PROTTOTAL  6.8  7.0   ALBUMIN  3.4  3.8   BILITOTAL  1.0  0.6   ALKPHOS  97  94   AST  26  20   ALT  27  33       TSH   Date Value Ref Range Status   09/11/2018 1.81 0.40 - 4.00 mU/L Final     Lab Results   Component Value Date    A1C 5.6 09/28/2018    A1C 6.0 07/01/2015     Lab Results   Component Value Date    INR 1.19  09/28/2018       ASSESSMENT/PLAN:  (I63.9) Acute CVA (cerebrovascular accident) (H)  (primary encounter diagnosis)  (I77.74) 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  Appears to be doing very well since transition to TCU.  Active medication regimen.  PT/OT following.  Plan: Follow-up with neurology per their recommendations.  CBC 10/3.    (I48.0) Paroxysmal atrial fibrillation (H)  Comment: Chronic.  Started on a regimen of apixaban as noted above with concern for bleed.  Rate controlled on metoprolol.  Pulse Readings from Last 3 Encounters:   10/02/18 63   09/28/18 57   09/11/18 54   Plan: Stable on current regimen; continue medications as currently ordered.    (I25.119) Coronary artery disease with angina pectoris, unspecified vessel or lesion type, unspecified whether native or transplanted heart (H)  Comment: Chronic.  On 3/facial along with Lipitor.  Recent Labs   Lab Test  09/30/18   0950  09/11/18   1711   07/01/15   0710  05/13/14   0815   CHOL  123  145   < >  200*  191   HDL  44  54   < >  76  57   LDL  61  58   < >  98  93   TRIG  92  167*   < >  128  204*   CHOLHDLRATIO   --    --    --   2.6  3.4    < > = values in this interval not displayed.   Plan: Stable on current regimen; continue medications as currently ordered.    (R74.8) Elevated troponin  Comment: As noted above.  No symptoms since transferred to TCU.  Plan: Monitor    (I10) Hypertension goal BP (blood pressure) < 140/90  Comment: Chronic.  Managed on a regimen of lisinopril and metoprolol.  BP's: 176/77, 168/73, 176/90  Plan: Stable on current regimen; continue medications as currently ordered.  BMP 10/3.    (M06.9) Rheumatoid arthritis, involving unspecified site, unspecified rheumatoid factor presence (H)  Comment: Chronic.  Denies pain today.  On a regimen of Arava and as needed Tylenol.  Managed by rheumatology clinic.  Plan: Continue management per rheumatology.    (R41.89) Cognitive  impairment  Comment: As noted above.  Remains appropriate during today's conversation also he does not have extensive conversation, suspect this is correlated with word finding difficulties.  OT is following.  Plan: OT-advanced per their recommendations.    (R53.81) Physical deconditioning  Comment: Secondary to above-noted diagnoses and recent hospitalization  Plan: PT/OT eval/treat-advance further recommendation.    Electronically signed by:  SHIRA Donahue CNP        Sincerely,        SHIRA Donahue CNP

## 2018-10-02 NOTE — PROGRESS NOTES
[facility]  PRIMARY CARE PROVIDER AND CLINIC:  Nehemias Granados [address] [facility]  / [place] [id number]  Chief Complaint   Patient presents with     Hospital F/U     [place] Medical Record Number:  [id number]    HPI:    Jose E Capps is a 87 year old  (9/28/1931),admitted to the [facility]  from [facility].  Hospital stay 9/28/2018 through 10/1/2018.  Admitted to this facility for  rehab, medical management and nursing care.  HPI information obtained from: facility chart records, facility staff, patient report and [facility] chart review.      Hospital Course     Jose E Capps was admitted on 9/28/2018.  The following problems were addressed during his hospitalization:     1. Acute CVA in the left PCA distribution     Left vertebral artery dissection  - admitted for evaluation of 2 falls in the last 2 days; as per H&P- he has had significant worsening of his memory over the past 3 months  - CT head on admission was negative for acute pathology  - on admission- was found to have new Afib with controlled rate in ER  - on 09/28 evening-  had acute onset right upper and lower extremity ataxia, weakness/numbness, word finding difficulties  - CT head with iv contrast showed left posterior cerebral artery perfusion defect  - CTA head and neck showed-  Left posterior cerebral artery occlusion at the P1-2 junction; Left vertebral artery dissection   - MRI brain w and w/o contrast- Scattered recent ischemic infarcts in the posterior cerebral artery distributions bilaterally involving posterior medial temporal lobes bilaterally and left thalamus  - Neuro consult appreciated  - although he was in new Afib, it is felt that the culprit for acute stroke is left vertebral artery dissection/occlusion after fall with head trauma  - now he is back in NSR on Tele  - initially started on ASA and Plavix as per Neurology; although it was felt that his stroke was cause by  left vertebral artery, paroxysmal A fib increases risk of stroke also- so he was switched to Eliquis- see below  - lipid profile LDL 61, total cholest 123; continue PTA Lipitor 10 mg po daily for now  - blood pressure control  - Echo - EF 55-60%, grade I diastolic dysfunction   - PT/OT- rec [facility], family prefers [facility]  - SLP- mechanical soft diet with thin liquids  - his deficits have improved but he still has some right upper extremity dysmetria and mild weakness; confusion also improved.  - follow up with Neurology in 4 week      2. Paroxysmal Afib  - EKG in ER showed Afib with controlled HR  - now back in NSR  [PTA on Metoprolol 25 mg po BID]  - here started on Metoprolol 12.5 mg po BID but noted with some bradycardia in 40's 2 nights ago while sleeping  - decreased Metoprolol to 6.25 mg po BID with holding parameters  - although it is felt that his new stroke is likely related to vertebral artery dissection/occlusion, paroxysmal A fib puts him at daphnie risk for stroke (CHADSVasc score 5); it was discussed with Neurology, Dr Dickson; because the strokes are small- the risk of hemorrhagic transformation is small so he was started on Eliquis 5 mg po BID  - echo as above      3. CAD      Elevated troponins      HTN  - no h/o MI but had an abnormal stress test in 1/2018 (as part of preop clearance)- which showed -small area of nontransmural myocardial infarction affecting the basal and mid inferior wall with minimal yobany-infarct ischemia within the mid inferolateral wall.; he was asymptomatic and was cleared for surgery without further workup such as cardiac cath  - he has mildly elevated troponins but plateau-ed 0.049--0.047--0.057  - denies chest pain, no SOB; EKG with no ischemic changes  - Tele  - continue statin and Metoprolol 6.25 mg po BID; ASA was stopped- he was started on Eliquis  - resume PTA Lisinopril after discharge      4. RA  - hold PTA Leflunomide  for now, consider resuming after returning  home      5. Cognitive impairment:  Daughter reports significant impairment in short term memory in past 3 months, see H&P for details.  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  He states that he feels that he is doing really well and is looking forward to going home pretty soon.  No ongoing residual right upper extremity weakness but states his right lower extremity not nearly as weak as the upper.  Does note swelling of his right upper extremity as well, along with some numbness and tingling.  He denies any chest pain and/or palpitations since arrival to the unit along with any falls, loss of consciousness, increased fatigue, shortness of breath/dyspnea on exertion, fever, chills and/or bowel or bladder concerns.  He states therapy can be to him this morning but spent most of the time asking questions-it was explained to him that this needs to be done to create appropriate plan of care ongoing so that we may be able to get him out of here in an efficient way ensuring that he is safe at the time of discharge to return home.    CODE STATUS/ADVANCE DIRECTIVES DISCUSSION:   DNR / DNI  Patient's living condition: lives in an assisted living facility    ALLERGIES:No known drug allergy  PAST MEDICAL HISTORY:  has a past medical history of BPH  (benign prostatic hyperplasia); Colonic polyps; CVA (cerebral vascular accident) (H) (2009); Hyperlipidemia LDL goal <100; Hypertension; and Seronegative rheumatoid arthritis (H) (2011). He also has no past medical history of Asymptomatic human immunodeficiency virus (HIV) infection status (H); Blood in semen; Cerebral palsy (H); Complication of anesthesia; Congenital renal agenesis and dysgenesis; Epididymitis, bilateral; Epididymitis, left; Epididymitis, right; Goiter; Gout; Hernia, abdominal; History of spinal cord injury; History of thrombophlebitis; Horseshoe kidney; Hydrocephalus; Malignant hyperthermia; Mumps; Orchitis, epididymitis, and epididymo-orchitis, with abscess; Palpitations; Parkinsons disease (H); Penile discharge; Prostate infection; Spider veins; Spina bifida (H); STD (sexually transmitted disease); Swelling of testicle; Tethered cord (H); or Tuberculosis.  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  FAMILY HISTORY: family history includes Alzheimer Disease in his mother; Cancer in his sister and sister; Cardiovascular in his father; Diabetes in his mother; Obesity in his father and mother.  SOCIAL HISTORY:  reports that he has quit smoking. His smoking use included Cigarettes. He has a 60.00 pack-year smoking history. He has never used smokeless tobacco. He reports that he drinks about 10.8 oz of alcohol per week  He reports that he does not use illicit drugs.    Post Discharge Medication Reconciliation Status: discharge medications reconciled, continue medications without change.  Current Outpatient Prescriptions   Medication Sig Dispense Refill     acetaminophen (MAPAP) 500 MG tablet Take 1 tablet (500 mg) by mouth every 8 hours as needed for fever or pain 93 tablet 12     apixaban ANTICOAGULANT (ELIQUIS) 5 MG tablet  "Take 1 tablet (5 mg) by mouth 2 times daily       atorvastatin (LIPITOR) 10 MG tablet Take 1 tablet (10 mg) by mouth daily 90 tablet 3     Calcium Carb-Cholecalciferol (CALCIUM + D3) 600-200 MG-UNIT TABS TAKE 1 TABLET BY MOUTH ONCE DAILY 90 tablet 3     lisinopril (PRINIVIL/ZESTRIL) 20 MG tablet Take 1 tablet (20 mg) by mouth daily 90 tablet 3     metoprolol tartrate (LOPRESSOR) 25 MG tablet Take 0.25 tablets (6.25 mg) by mouth 2 times daily 60 tablet      Multiple Vitamins-Minerals (CEROVITE SENIOR) TABS TAKE 1 TABLET BY MOUTH ONCE DAILY 93 tablet 3     OMEGA-3 FISH OIL 1000 MG capsule TAKE 1 CAPSULE BY MOUTH ONCE DAILY 31 capsule 12     polyethylene glycol (MIRALAX/GLYCOLAX) Packet Take 17 g by mouth daily as needed for constipation        sennosides (SENOKOT) 8.6 MG tablet Take 4 tablets by mouth 2 times daily as needed        leflunomide (ARAVA) 10 MG tablet Take 1 tablet (10 mg) by mouth daily Hold it until you will return home (Patient not taking: Reported on 10/2/2018)         ROS:  10 point ROS of systems including Constitutional, Eyes, Respiratory, Cardiovascular, Gastroenterology, Genitourinary, Integumentary, Muscularskeletal, Psychiatric were all negative except for pertinent positives noted in my HPI.    Exam:  /77  Pulse 63  Temp 97.9  F (36.6  C)  Resp 18  Ht 5' 10\" (1.778 m)  Wt 170 lb (77.1 kg)  SpO2 95%  BMI 24.39 kg/m2  GENERAL APPEARANCE:  Alert, in no distress, appears healthy, oriented, cooperative, Elderly male sitting up in wheelchair in his room  ENT:  Mouth and posterior oropharynx normal, moist mucous membranes, normal hearing acuity  EYES:  EOM, conjunctivae, lids, pupils and irises normal, Wears glasses  NECK:  No adenopathy,masses or thyromegaly  RESP:  respiratory effort and palpation of chest normal, lungs clear to auscultation , no respiratory distress  CV:  Palpation and auscultation of heart done , regular rate and rhythm, no murmur, rub, or gallop, +2 pedal pulses, " peripheral edema 1+ in RUE  ABDOMEN:  normal bowel sounds, soft, nontender, no hepatosplenomegaly or other masses  M/S:   Gait and station abnormal -Unable to assess secondary to patient being wheelchair  Digits and nails abnormal -Arthritic changes present  Examination of:   right upper extremity, left upper extremity, right lower extremity and left lower extremity  Inspection, ROM, stability and muscle strength Decreased in right upper extremity secondary to CVA; normal in left upper extremity and bilateral lower extremities  SKIN:  Palpation of skin and subcutaneous tissue baseline, Inspection of skin and subcutaneous tissue Abnormal, wound appearance: 2 wounds present on frontal aspect of forehead/scalp healing well, scabbed over, with no signs or symptoms of infection.  NEURO:   Cranial nerves 2-12 are normal tested and grossly at patient's baseline  PSYCH:  oriented X 3, normal insight, judgement and memory, affect and mood normal    Lab/Diagnostic data:  CBC RESULTS:   Recent Labs   Lab Test  09/30/18   0950  09/28/18   1305   WBC  6.8  7.0   RBC  4.11*  4.66   HGB  13.1*  15.1   HCT  39.3*  44.9   MCV  96  96   MCH  31.9  32.4   MCHC  33.3  33.6   RDW  13.3  13.4   PLT  204  216       Last Basic Metabolic Panel:  Recent Labs   Lab Test  09/30/18   0950  09/28/18   1305   NA  144  140   POTASSIUM  3.7  4.5   CHLORIDE  110*  106   GISELA  8.2*  8.7   CO2  25  27   BUN  11  21   CR  0.77  0.74   GLC  88  101*       Liver Function Studies -   Recent Labs   Lab Test  09/28/18   1305  09/11/18   1711   PROTTOTAL  6.8  7.0   ALBUMIN  3.4  3.8   BILITOTAL  1.0  0.6   ALKPHOS  97  94   AST  26  20   ALT  27  33       TSH   Date Value Ref Range Status   09/11/2018 1.81 0.40 - 4.00 mU/L Final     Lab Results   Component Value Date    A1C 5.6 09/28/2018    A1C 6.0 07/01/2015     Lab Results   Component Value Date    INR 1.19 09/28/2018       ASSESSMENT/PLAN:  (I63.9) Acute CVA (cerebrovascular accident) (H)  (primary  encounter diagnosis)  (I77.74) 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  Appears to be doing very well since transition to TCU.  Active medication regimen.  PT/OT following.  Plan: Follow-up with neurology per their recommendations.  CBC 10/3.    (I48.0) Paroxysmal atrial fibrillation (H)  Comment: Chronic.  Started on a regimen of apixaban as noted above with concern for bleed.  Rate controlled on metoprolol.  Pulse Readings from Last 3 Encounters:   10/02/18 63   09/28/18 57   09/11/18 54   Plan: Stable on current regimen; continue medications as currently ordered.    (I25.119) Coronary artery disease with angina pectoris, unspecified vessel or lesion type, unspecified whether native or transplanted heart (H)  Comment: Chronic.  On 3/facial along with Lipitor.  Recent Labs   Lab Test  09/30/18   0950  09/11/18   1711   07/01/15   0710  05/13/14   0815   CHOL  123  145   < >  200*  191   HDL  44  54   < >  76  57   LDL  61  58   < >  98  93   TRIG  92  167*   < >  128  204*   CHOLHDLRATIO   --    --    --   2.6  3.4    < > = values in this interval not displayed.   Plan: Stable on current regimen; continue medications as currently ordered.    (R74.8) Elevated troponin  Comment: As noted above.  No symptoms since transferred to TCU.  Plan: Monitor    (I10) Hypertension goal BP (blood pressure) < 140/90  Comment: Chronic.  Managed on a regimen of lisinopril and metoprolol.  BP's: 176/77, 168/73, 176/90  Plan: Stable on current regimen; continue medications as currently ordered.  BMP 10/3.    (M06.9) Rheumatoid arthritis, involving unspecified site, unspecified rheumatoid factor presence (H)  Comment: Chronic.  Denies pain today.  On a regimen of Arava and as needed Tylenol.  Managed by rheumatology clinic.  Plan: Continue management per rheumatology.    (R41.89) Cognitive impairment  Comment: As noted above.  Remains appropriate during today's conversation also he does not have  extensive conversation, suspect this is correlated with word finding difficulties.  OT is following.  Plan: OT-advanced per their recommendations.    (R53.81) Physical deconditioning  Comment: Secondary to above-noted diagnoses and recent hospitalization  Plan: PT/OT eval/treat-advance further recommendation.    Electronically signed by:  SHIRA Donahue CNP

## 2018-10-02 NOTE — PROGRESS NOTES
Clinic Care Coordination Contact  Care Coordination Transition Communication         Clinical Data: Patient was hospitalized at [facility]   Date of Admission:  9/28/2018  Date of Discharge:  10/1/2018  3:45 PM   Discharge Diagnoses     Acute CVA  Left vertebral artery dissection  Paroxysmal Afib  HTN   HLP  CAD  RA  Cognitive impairment      Transition to Facility:              Facility Name:  [facility] (Pt's wife is also at [place])                 Plan: RN/SW Care Coordinator will await notification from facility staff informing RN/SW Care Coordinator of patient's discharge plans/needs. RN/SW Care Coordinator will review chart and outreach to facility staff every 4 weeks and as needed.

## 2018-10-03 ENCOUNTER — HOSPITAL LABORATORY (OUTPATIENT)
Dept: OTHER | Facility: CLINIC | Age: 83
End: 2018-10-03

## 2018-10-03 LAB
ANION GAP SERPL CALCULATED.3IONS-SCNC: 6 MMOL/L (ref 3–14)
BUN SERPL-MCNC: 15 MG/DL (ref 7–30)
CALCIUM SERPL-MCNC: 8.9 MG/DL (ref 8.5–10.1)
CHLORIDE SERPL-SCNC: 107 MMOL/L (ref 94–109)
CO2 SERPL-SCNC: 29 MMOL/L (ref 20–32)
CREAT SERPL-MCNC: 0.92 MG/DL (ref 0.66–1.25)
ERYTHROCYTE [DISTWIDTH] IN BLOOD BY AUTOMATED COUNT: 13.6 % (ref 10–15)
GFR SERPL CREATININE-BSD FRML MDRD: 78 ML/MIN/1.7M2
GLUCOSE SERPL-MCNC: 94 MG/DL (ref 70–99)
HCT VFR BLD AUTO: 40.9 % (ref 40–53)
HGB BLD-MCNC: 13.2 G/DL (ref 13.3–17.7)
MCH RBC QN AUTO: 31.3 PG (ref 26.5–33)
MCHC RBC AUTO-ENTMCNC: 32.3 G/DL (ref 31.5–36.5)
MCV RBC AUTO: 97 FL (ref 78–100)
PLATELET # BLD AUTO: 271 10E9/L (ref 150–450)
POTASSIUM SERPL-SCNC: 3.7 MMOL/L (ref 3.4–5.3)
RBC # BLD AUTO: 4.22 10E12/L (ref 4.4–5.9)
SODIUM SERPL-SCNC: 142 MMOL/L (ref 133–144)
WBC # BLD AUTO: 7.4 10E9/L (ref 4–11)

## 2018-10-05 ENCOUNTER — NURSING HOME VISIT (OUTPATIENT)
Dept: GERIATRICS | Facility: CLINIC | Age: 83
End: 2018-10-05
Payer: COMMERCIAL

## 2018-10-05 VITALS
TEMPERATURE: 99.4 F | WEIGHT: 169.4 LBS | HEIGHT: 70 IN | RESPIRATION RATE: 16 BRPM | BODY MASS INDEX: 24.25 KG/M2 | DIASTOLIC BLOOD PRESSURE: 72 MMHG | HEART RATE: 89 BPM | OXYGEN SATURATION: 92 % | SYSTOLIC BLOOD PRESSURE: 117 MMHG

## 2018-10-05 DIAGNOSIS — I48.0 PAROXYSMAL ATRIAL FIBRILLATION (H): ICD-10-CM

## 2018-10-05 DIAGNOSIS — R53.81 PHYSICAL DECONDITIONING: Primary | ICD-10-CM

## 2018-10-05 DIAGNOSIS — I10 ESSENTIAL HYPERTENSION: ICD-10-CM

## 2018-10-05 DIAGNOSIS — I77.74 VERTEBRAL ARTERY DISSECTION (H): ICD-10-CM

## 2018-10-05 DIAGNOSIS — I25.10 ATHEROSCLEROSIS OF NATIVE CORONARY ARTERY OF NATIVE HEART WITHOUT ANGINA PECTORIS: ICD-10-CM

## 2018-10-05 DIAGNOSIS — E78.5 HYPERLIPIDEMIA LDL GOAL <100: ICD-10-CM

## 2018-10-05 DIAGNOSIS — I63.50 CEREBROVASCULAR ACCIDENT INVOLVING POSTERIOR CIRCULATION (H): ICD-10-CM

## 2018-10-05 DIAGNOSIS — R41.89 COGNITIVE IMPAIRMENT: ICD-10-CM

## 2018-10-05 PROCEDURE — 99306 1ST NF CARE HIGH MDM 50: CPT | Performed by: INTERNAL MEDICINE

## 2018-10-05 NOTE — LETTER
10/5/2018        RE: Jose E Capps  [address] [facility] [address]  [place] [address]        PRIMARY CARE PROVIDER AND CLINIC RESPONSIBLE:  Nehemias Granados, [address] [place] [address]  / [place] [address]        ADMISSION HISTORY AND PHYSICAL EXAMINATION     Chief Complaint   Patient presents with     Fatigue     Neurologic Problem         HISTORY OF PRESENT ILLNESS:  87 year old male, (9/28/1931), admitted to the [facility] for continuation of medical care and rehab.  HPI information obtained from: facility chart records, facility staff, patient report and [facility] chart review.    Jose E Capps is a 87 year old male with history of TIA, HTN, RA, hyperlipidemia and CAD and fall 2 days prior to admission who was admitted at [facility] from 9/28 to 10/1/18 due to atrial fibrillation, then , after he arrived on the floor, he had new onset RUE/RLE weakness, numbness, right visual field cut, word finding difficulty, ataxia; STAT CTA head and neck showed left posterior cerebral artery occlusion at the P1-2 junction with eft vertebral artery dissection. MRI brain w and w/o contrast- Scattered recent ischemic infarcts in the posterior cerebral artery distributions bilaterally involving posterior medial temporal lobes bilaterally and left thalamus. Neurologist was consulted. Initially treated ASA and Plavix, but later changed to Apixaban. Rate control was given metoprolol. Echo - EF 55-60%, grade I diastolic dysfunction. He was seen by PT/OT and speech tx and was on mechanical soft diet. He has significant short term memory loss as per family lately. He feels weak and tired and he is poor historian. He has weakness and swelling of right forearm and hand and has stocking in his forearm. Slept well, appetite good and had BM. Patient denies chest pain, dyspnea, abdominal pain, n&v, fever, chills, dysuria, leg pain or swelling. The rest of ROS is unremarkable. Patient is seen and examined by DELILAH Delgado NP.  Please see DELILAH Delgado's admit noted dated 10/2/18 for details of admission, past medical history, family history, allergies, medication list, social history and other details pertinent with this admission. Hospital admission and dc summary reviewed.    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    excised       Current Outpatient Prescriptions   Medication Sig     acetaminophen (MAPAP) 500 MG tablet Take 1 tablet (500 mg) by mouth every 8 hours as needed for fever or pain     apixaban ANTICOAGULANT (ELIQUIS) 5 MG tablet Take 1 tablet (5 mg) by mouth 2 times daily     atorvastatin (LIPITOR) 10 MG tablet Take 1 tablet (10 mg) by mouth daily     Calcium Carb-Cholecalciferol (CALCIUM + D3) 600-200 MG-UNIT TABS TAKE 1 TABLET BY MOUTH ONCE DAILY     lisinopril (PRINIVIL/ZESTRIL) 20 MG tablet Take 1 tablet (20 mg) by mouth daily     metoprolol tartrate (LOPRESSOR) 25 MG tablet Take 0.25 tablets (6.25 mg) by mouth 2 times daily     Multiple Vitamins-Minerals (CEROVITE SENIOR) TABS TAKE 1 TABLET BY MOUTH ONCE DAILY     OMEGA-3 FISH OIL 1000 MG capsule TAKE 1 CAPSULE BY MOUTH ONCE DAILY     polyethylene glycol (MIRALAX/GLYCOLAX) Packet Take 17 g by mouth daily as needed for constipation      sennosides (SENOKOT) 8.6 MG tablet Take 4 tablets by mouth 2 times daily as needed      leflunomide (ARAVA) 10 MG tablet Take 1 tablet (10 mg) by mouth daily Hold it until you will return home (Patient not taking: Reported on  "10/2/2018)     No current facility-administered medications for this visit.        Allergies   Allergen Reactions     No Known Drug Allergy        Social History     Social History     Marital status:      Spouse name: wife - 2nd Silva     Number of children: 5     Years of education: N/A     Occupational History     retired      Social History Main Topics     Smoking status: Former Smoker     Packs/day: 2.00     Years: 30.00     Types: Cigarettes     Smokeless tobacco: Never Used     Alcohol use 10.8 oz/week     4 Standard drinks or equivalent, 14 Shots of liquor per week      Comment: 2-3 drinks a night     Drug use: No     Sexual activity: Yes     Other Topics Concern     Exercise Yes     Social History Narrative    ** Merged History Encounter **         ** Data from: 6/27/18 Enc Dept: [facility]    ** Merged History Encounter **              ** Data from: 6/12/13 Enc Dept: [facility]    [facility] 6/12/2013          Information reviewed:  Medications, vital signs, orders, nursing notes, problem list, hospital information.     ROS: All 10 point review of system completed, those pertinent positive, please see H&P, the remaining ROS is negative.    /72  Pulse 89  Temp 99.4  F (37.4  C)  Resp 16  Ht 5' 10\" (1.778 m)  Wt 169 lb 6.4 oz (76.8 kg)  SpO2 92%  BMI 24.31 kg/m2    PHYSICAL EXAMINATION:   GENERAL:  No acute distress.  SKIN:  Dry and warm.  There is no rash at area of skin examined.  HEENT:  Head without trauma.  Pupils round, reactive. Exam of conjunctiva and lids are normal. Sclera without icterus. There is no oral thrush.  NECK:  Supple. No jugular venous distension.  CHEST: No reproducible chest tenderness.   LUNGS:  Normal respiratory effort. Lungs reveal decreased breath sounds at bases. No rales or wheezes.  HEART:  Irregular rate and rhythm.  No murmur, gallops or rubs auscultated.  ABDOMEN:  Soft, bowel sounds positive.  There is no tenderness or guarding. "   EXTREMITIES: No edema. Swelling and weakness of right hand.  NEUROLOGIC:  Alert and oriented x3. Moving all ext. Gait not tested.  PSYCH: Recent and remote memory is impaired. Mood and affect are normal.    Lab/Diagnostic data:  Reviewed    CBC Lab Results (Last 5 results in 365 days)       WBC RBC Hgb Hct MCV MCH MCHC RDW Plt Neut # Lymph # Eos # Baso # Immat. Gran #   10/03/18 0755 7.4 4.22 13.2 40.9 97 31.3 32.3 13.6 271 -- -- -- -- --   09/30/18 0950 6.8 4.11 13.1 39.3 96 31.9 33.3 13.3 204 -- -- -- -- --   09/28/18 1305 7.0 4.66 15.1 44.9 96 32.4 33.6 13.4 216 5.0 0.9 0.1 0.0 0.0   09/11/18 1711 6.9 4.39 14.2 44.2 101 32.3 32.1 13.5 258 -- -- -- -- --   01/23/18 0000 -- -- 11.4 -- -- -- -- -- -- -- -- -- -- --   CMP Labs (Last 5 results in 365 days)       Na+ K+ Cl CO2 ANION GAP Glc BUN Creat GFR GFR-Black Calcium Mg ALT AST A1C TSH LDL HIV   10/03/18 0755 142 3.7 107 29 6 94 15 0.92 78 >90 8.9 -- -- -- -- -- -- --   09/30/18 0950 -- -- -- -- -- -- -- -- -- -- -- -- -- -- -- -- 61 --   09/30/18 0950 144 3.7 110 25 9 88 11 0.77 >90 >90 8.2 -- -- -- -- -- -- --   09/29/18 0602 -- -- -- -- -- 104 -- -- -- -- -- -- -- -- -- -- -- --   09/29/18 0005 -- -- -- -- -- 92 -- -- -- -- -- -- -- -- -- -- --      Results for orders placed or performed during the hospital encounter of 09/28/18   CT Head w/o Contrast    Narrative    CT OF THE HEAD WITHOUT CONTRAST  9/28/2018 1:23 PM     COMPARISON: None.    HISTORY:  Fall with frontal hematoma and right parietal hematoma.     TECHNIQUE: 5 mm thick axial CT images of the head were acquired  without IV contrast material.    FINDINGS:  There is moderate diffuse cerebral volume loss. There are  subtle patchy areas of decreased density in the cerebral white matter  bilaterally that are consistent with sequela of chronic small vessel  ischemic disease.     The ventricles and basal cisterns are within normal limits in  configuration given the degree of cerebral volume loss.  There  is no  midline shift. There are no extra-axial fluid collections.     No intracranial hemorrhage, mass or recent infarct.    The visualized paranasal sinuses are well aerated. There is no  mastoiditis. There are no fractures of the visualized bones.       Impression    IMPRESSION:  Diffuse cerebral volume loss and cerebral white matter  changes consistent with chronic small vessel ischemic disease. No  evidence for acute intracranial pathology.     Radiation dose for this scan was reduced using automated exposure  control, adjustment of the mA and/or kV according to patient size, or  iterative reconstruction technique.    HOLGER PADILLA MD   XR Pelvis w Hip Left 1 View    Narrative    PELVIS AND HIP LEFT ONE VIEW   9/28/2018 1:35 PM     HISTORY: Pain.     COMPARISON: None.       Impression    IMPRESSION: No acute fracture or dislocation.    АЛЕКСАНДР HUBER MD   XR Knee Right 3 Views    Narrative    RIGHT KNEE THREE VIEWS   9/28/2018 1:36 PM     HISTORY: Right knee pain after fall.     COMPARISON: None.       Impression    IMPRESSION: No acute fracture or dislocation. Severe osteoarthritis.  Joint effusion.    АЛЕКСАНДР HUBER MD   MR Brain w/o & w Contrast    Narrative    MRI OF THE BRAIN WITHOUT AND WITH CONTRAST 9/29/2018 1:48 PM     COMPARISON: Head CT, head CTA and head CT perfusion study 9/28/2018.    HISTORY:  Frequent falls, discoordination, rule out stroke.     TECHNIQUE: Axial diffusion-weighted with ADC map, axial T2-weighted  with fat saturation, axial T1-weighted, axial turboFLAIR and coronal  T1-weighted images of the brain were acquired without intravenous  contrast.  Following intravenous administration of gadolinium (7 mL  Gadavist), axial T1-weighted images of the brain were acquired.     FINDINGS: There are recent tiny infarcts in the left thalamus and  posteromedial aspect of the right temporal lobe as well as a  contiguous infarct in the left parahippocampal formation. All of these  infarcts are  in the posterior circulation conforming to bilateral  posterior cerebral artery territory infarcts. This also corresponds  with the perfusion defect noted in the left posterior cerebral artery  distribution on the comparison CT perfusion study. No other recent  infarcts noted.    There is mild diffuse cerebral volume loss. There are mild patchy  periventricular areas of abnormal T2 signal hyperintensity in the  cerebral white matter bilaterally that are consistent with sequela of  chronic small vessel ischemic disease. The ventricles and basal  cisterns are within normal limits in configuration given the degree of  cerebral volume loss.  There is no midline shift.  There are no  extra-axial fluid collections.  There is no evidence for acute  intracranial hemorrhage.  There is no abnormal contrast enhancement in  the brain or its coverings.    There is no sinusitis or mastoiditis.      Impression    IMPRESSION:   1. Scattered recent ischemic infarcts in the posterior cerebral artery  distributions bilaterally involving posterior medial temporal lobes  bilaterally and left thalamus.  2. Diffuse cerebral volume loss and cerebral white matter changes  consistent with chronic small vessel ischemic disease.     HOLGER PADILLA MD   XR Hand Right G/E 3 Views    Narrative    RIGHT HAND THREE OR MORE VIEWS   9/28/2018 7:19 PM     HISTORY: Thumb pain and swelling following fall. Rule out fracture.    COMPARISON: None.      Impression    IMPRESSION: Three views of the right hand are performed. Degenerative  joint changes involving the metacarpophalangeal and interphalangeal  joints of the thumb are noted. Additional DIP and PIP joint  degenerative changes are noted within the fingers. No evidence of  acute fracture or dislocation. Carpal bones appear intact. Arterial  vascular calcification is noted in the interosseous space between the  distal radius and ulna where imaged. Tiny ossicles are noted along the  radial aspect of  the metacarpophalangeal joints of the index and  middle fingers on the oblique view. No radiopaque foreign body is  identified. Mild soft tissue swelling is present involving the thumb.    EDNA RODRIGUEZ MD   CTA Head Neck with Contrast    Narrative    CT ANGIOGRAM OF THE HEAD AND NECK WITH CONTRAST  9/28/2018 8:35 PM     HISTORY: Code Stroke.    TECHNIQUE: CT angiography with an injection of 70 mL Isovue-370 IV  with scans through the head and neck. Images were transferred to a  separate 3-D workstation where multiplanar reformations and 3-D images  were created. Estimates of carotid stenoses are made relative to the  distal internal carotid artery diameters except as noted. Radiation  dose for this scan was reduced using automated exposure control,  adjustment of the mA and/or kV according to patient size, or iterative  reconstruction technique.    COMPARISON: None.     CT HEAD FINDINGS: No contrast enhancing lesions are identified.    CT ANGIOGRAM HEAD FINDINGS: Left posterior cerebral artery is occluded  at the P1-2 junction. The intracranial vertebral arteries, basilar  artery, and right posterior cerebral arteries are patent. The internal  carotid arteries, anterior cerebral arteries, and middle cerebral  arteries are patent.     CT ANGIOGRAM NECK FINDINGS: The left vertebral artery is occluded at  the origin with reconstitution at the V3 and V4 segments. Right  vertebral artery is patent. The bilateral common carotid, internal  carotid, and external carotid arteries are patent. There is moderate  atherosclerotic plaquing at the bilateral carotid bifurcations without  evidence of flow-limiting stenosis. A two-vessel aortic arch is  present.     Emphysema noted at the lung apices. A right-sided thyroid nodule is  present measuring 2.4 cm. Severe degenerative changes are present  throughout the cervical spine.      Impression    IMPRESSION:   1. 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   2.  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  3. Right thyroid nodule measuring up to 2.4 cm.    Findings were discussed by phone between Dr. Luu and the stroke  neurologist at 8:58 PM on 9/28/2018.    CESRA LUU MD   CT Head w/o Contrast    Narrative    CT SCAN OF THE HEAD WITHOUT CONTRAST   9/28/2018 8:30 PM     HISTORY: Code Stroke.    TECHNIQUE: Axial images of the head and coronal reformations without  IV contrast material. Radiation dose for this scan was reduced using  automated exposure control, adjustment of the mA and/or kV according  to patient size, or iterative reconstruction technique.    COMPARISON: None.    FINDINGS: Mild to moderate volume loss is present. Old left  periventricular infarct is present. Background of white matter  hypoattenuation likely represents chronic small vessel ischemic  change. No evidence of acute ischemia, hemorrhage, mass, mass effect,  or hydrocephalus. The visualized calvarium, skull base, paranasal  sinuses, and extracranial soft tissues are unremarkable.      Impression    IMPRESSION: Known left posterior cerebral artery occlusion and  perfusion deficit. No evidence of ischemia/edema or hemorrhage.    Findings were discussed by phone between Dr. Luu and Neil Ward on  9/28/2018 8:59 PM.    CESAR LUU MD   CT Head Perfusion w Contrast    Narrative    CT BRAIN PERFUSION   9/28/2018 8:43 PM    HISTORY: Code Stroke.    TECHNIQUE: Time sequential axial CT images of the head were acquired  during the administration of 50 mL Isovue-370 IV. Color perfusion maps  of the brain were created from this time sequential axial source data.      Radiation dose for this scan was reduced using automated exposure  control, adjustment of the mA and/or kV according to patient size, or  iterative reconstruction technique.    COMPARISON: None.    FINDINGS: There is prolongation of transit times and flow to the  left  posterior cerebral artery distribution. Cerebral blood volume is  compensated. No other focal perfusion deficits are identified.      Impression    IMPRESSION: Left posterior cerebral artery perfusion defect.    CESAR MOLINA MD         ASSESSMENT / PLAN:     Physical deconditioning  Plan: PT/OT with increase independence, self-care, strength and endurance and other cares that help return to home/facility of origin, fall precautions. Care conference with patient and family for the progress of rehab and disposition issues will be discussed as planned. Rehab evaluation and other evaluations including CPT are at rehab logs, to be reviewed separately.  Fall risk assessment as well as cognitive evaluation will be formed during rehab stay if indicated.    Cerebrovascular accident involving posterior circulation (H)  and Vertebral artery dissection (H)  Plan: Continue PT/OT and speech therapist and Apixaban. Follow up neurologist as scheduled.    Paroxysmal atrial fibrillation (H)  Plan: Continue Apixaban and metoprolol. Follow up cardiologist as scheduled.    Atherosclerosis of native coronary artery of native heart without angina pectoris and Essential hypertension  .Plan: continue current medications metoprolol, lisinopril and lipitor, monitor I&O and daily weighs and labs if indicated. Follow up cardiologist and PCP as scheduled.    Hyperlipidemia LDL goal <100  .Plan: continue current medication Statins.    Cognitive impairment  Plan: evaluate CPT/SLUMS by OT team. Fall and delirium prevention.    Other problems with same care. Primary care doctor and other specialists to address those chronic problems in next clinic appointment to be scheduled upon discharge from the TCU.      Total time spent with patient visit was 37 min including patient visit, review of past records, patients counseling and coordinating care.      Electronically signed by:  Juan Hairston MD              Sincerely,        Juan  OLIVIER Hairston MD

## 2018-10-05 NOTE — PROGRESS NOTES
PRIMARY CARE PROVIDER AND CLINIC RESPONSIBLE:  Nehemias Granados, [address] [place] [address]  / [place] [address]        ADMISSION HISTORY AND PHYSICAL EXAMINATION     Chief Complaint   Patient presents with     Fatigue     Neurologic Problem         HISTORY OF PRESENT ILLNESS:  87 year old male, (9/28/1931), admitted to the [facility] for continuation of medical care and rehab.  HPI information obtained from: facility chart records, facility staff, patient report and [facility] chart review.    Jose E Capps is a 87 year old male with history of TIA, HTN, RA, hyperlipidemia and CAD and fall 2 days prior to admission who was admitted at [facility] from 9/28 to 10/1/18 due to atrial fibrillation, then , after he arrived on the floor, he had new onset RUE/RLE weakness, numbness, right visual field cut, word finding difficulty, ataxia; STAT CTA head and neck showed left posterior cerebral artery occlusion at the P1-2 junction with eft vertebral artery dissection. MRI brain w and w/o contrast- Scattered recent ischemic infarcts in the posterior cerebral artery distributions bilaterally involving posterior medial temporal lobes bilaterally and left thalamus. Neurologist was consulted. Initially treated ASA and Plavix, but later changed to Apixaban. Rate control was given metoprolol. Echo - EF 55-60%, grade I diastolic dysfunction. He was seen by PT/OT and speech tx and was on mechanical soft diet. He has significant short term memory loss as per family lately. He feels weak and tired and he is poor historian. He has weakness and swelling of right forearm and hand and has stocking in his forearm. Slept well, appetite good and had BM. Patient denies chest pain, dyspnea, abdominal pain, n&v, fever, chills, dysuria, leg pain or swelling. The rest of ROS is unremarkable. Patient is seen and examined by DELILAH Delgado NP. Please see DELILAH Delgado's admit noted dated 10/2/18 for details of admission, past medical history,  family history, allergies, medication list, social history and other details pertinent with this admission. Hospital admission and dc summary reviewed.    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    excised       Current Outpatient Prescriptions   Medication Sig     acetaminophen (MAPAP) 500 MG tablet Take 1 tablet (500 mg) by mouth every 8 hours as needed for fever or pain     apixaban ANTICOAGULANT (ELIQUIS) 5 MG tablet Take 1 tablet (5 mg) by mouth 2 times daily     atorvastatin (LIPITOR) 10 MG tablet Take 1 tablet (10 mg) by mouth daily     Calcium Carb-Cholecalciferol (CALCIUM + D3) 600-200 MG-UNIT TABS TAKE 1 TABLET BY MOUTH ONCE DAILY     lisinopril (PRINIVIL/ZESTRIL) 20 MG tablet Take 1 tablet (20 mg) by mouth daily     metoprolol tartrate (LOPRESSOR) 25 MG tablet Take 0.25 tablets (6.25 mg) by mouth 2 times daily     Multiple Vitamins-Minerals (CEROVITE SENIOR) TABS TAKE 1 TABLET BY MOUTH ONCE DAILY     OMEGA-3 FISH OIL 1000 MG capsule TAKE 1 CAPSULE BY MOUTH ONCE DAILY     polyethylene glycol (MIRALAX/GLYCOLAX) Packet Take 17 g by mouth daily as needed for constipation      sennosides (SENOKOT) 8.6 MG tablet Take 4 tablets by mouth 2 times daily as needed      leflunomide (ARAVA) 10 MG tablet Take 1 tablet (10 mg) by mouth daily Hold it until you will return home (Patient not taking: Reported on 10/2/2018)     No current facility-administered medications for this visit.        Allergies  "  Allergen Reactions     No Known Drug Allergy        Social History     Social History     Marital status:      Spouse name: wife - 2nd Silva     Number of children: 5     Years of education: N/A     Occupational History     retired      Social History Main Topics     Smoking status: Former Smoker     Packs/day: 2.00     Years: 30.00     Types: Cigarettes     Smokeless tobacco: Never Used     Alcohol use 10.8 oz/week     4 Standard drinks or equivalent, 14 Shots of liquor per week      Comment: 2-3 drinks a night     Drug use: No     Sexual activity: Yes     Other Topics Concern     Exercise Yes     Social History Narrative    ** Merged History Encounter **         ** Data from: 6/27/18 Enc Dept: [facility]    ** Merged History Encounter **              ** Data from: 6/12/13 Enc Dept:  [facility]    [facility] 6/12/2013          Information reviewed:  Medications, vital signs, orders, nursing notes, problem list, hospital information.     ROS: All 10 point review of system completed, those pertinent positive, please see H&P, the remaining ROS is negative.    /72  Pulse 89  Temp 99.4  F (37.4  C)  Resp 16  Ht 5' 10\" (1.778 m)  Wt 169 lb 6.4 oz (76.8 kg)  SpO2 92%  BMI 24.31 kg/m2    PHYSICAL EXAMINATION:   GENERAL:  No acute distress.  SKIN:  Dry and warm.  There is no rash at area of skin examined.  HEENT:  Head without trauma.  Pupils round, reactive. Exam of conjunctiva and lids are normal. Sclera without icterus. There is no oral thrush.  NECK:  Supple. No jugular venous distension.  CHEST: No reproducible chest tenderness.   LUNGS:  Normal respiratory effort. Lungs reveal decreased breath sounds at bases. No rales or wheezes.  HEART:  Irregular rate and rhythm.  No murmur, gallops or rubs auscultated.  ABDOMEN:  Soft, bowel sounds positive.  There is no tenderness or guarding.   EXTREMITIES: No edema. Swelling and weakness of right hand.  NEUROLOGIC:  Alert and oriented x3. " Moving all ext. Gait not tested.  PSYCH: Recent and remote memory is impaired. Mood and affect are normal.    Lab/Diagnostic data:  Reviewed    CBC Lab Results (Last 5 results in 365 days)       WBC RBC Hgb Hct MCV MCH MCHC RDW Plt Neut # Lymph # Eos # Baso # Immat. Gran #   10/03/18 0755 7.4 4.22 13.2 40.9 97 31.3 32.3 13.6 271 -- -- -- -- --   09/30/18 0950 6.8 4.11 13.1 39.3 96 31.9 33.3 13.3 204 -- -- -- -- --   09/28/18 1305 7.0 4.66 15.1 44.9 96 32.4 33.6 13.4 216 5.0 0.9 0.1 0.0 0.0   09/11/18 1711 6.9 4.39 14.2 44.2 101 32.3 32.1 13.5 258 -- -- -- -- --   01/23/18 0000 -- -- 11.4 -- -- -- -- -- -- -- -- -- -- --   CMP Labs (Last 5 results in 365 days)       Na+ K+ Cl CO2 ANION GAP Glc BUN Creat GFR GFR-Black Calcium Mg ALT AST A1C TSH LDL HIV   10/03/18 0755 142 3.7 107 29 6 94 15 0.92 78 >90 8.9 -- -- -- -- -- -- --   09/30/18 0950 -- -- -- -- -- -- -- -- -- -- -- -- -- -- -- -- 61 --   09/30/18 0950 144 3.7 110 25 9 88 11 0.77 >90 >90 8.2 -- -- -- -- -- -- --   09/29/18 0602 -- -- -- -- -- 104 -- -- -- -- -- -- -- -- -- -- -- --   09/29/18 0005 -- -- -- -- -- 92 -- -- -- -- -- -- -- -- -- -- --      Results for orders placed or performed during the hospital encounter of 09/28/18   CT Head w/o Contrast    Narrative    CT OF THE HEAD WITHOUT CONTRAST  9/28/2018 1:23 PM     COMPARISON: None.    HISTORY:  Fall with frontal hematoma and right parietal hematoma.     TECHNIQUE: 5 mm thick axial CT images of the head were acquired  without IV contrast material.    FINDINGS:  There is moderate diffuse cerebral volume loss. There are  subtle patchy areas of decreased density in the cerebral white matter  bilaterally that are consistent with sequela of chronic small vessel  ischemic disease.     The ventricles and basal cisterns are within normal limits in  configuration given the degree of cerebral volume loss.  There is no  midline shift. There are no extra-axial fluid collections.     No intracranial hemorrhage,  mass or recent infarct.    The visualized paranasal sinuses are well aerated. There is no  mastoiditis. There are no fractures of the visualized bones.       Impression    IMPRESSION:  Diffuse cerebral volume loss and cerebral white matter  changes consistent with chronic small vessel ischemic disease. No  evidence for acute intracranial pathology.     Radiation dose for this scan was reduced using automated exposure  control, adjustment of the mA and/or kV according to patient size, or  iterative reconstruction technique.    HOLGER PADILLA MD   XR Pelvis w Hip Left 1 View    Narrative    PELVIS AND HIP LEFT ONE VIEW   9/28/2018 1:35 PM     HISTORY: Pain.     COMPARISON: None.       Impression    IMPRESSION: No acute fracture or dislocation.    АЛЕКСАНДР HUBER MD   XR Knee Right 3 Views    Narrative    RIGHT KNEE THREE VIEWS   9/28/2018 1:36 PM     HISTORY: Right knee pain after fall.     COMPARISON: None.       Impression    IMPRESSION: No acute fracture or dislocation. Severe osteoarthritis.  Joint effusion.    АЛЕКСАНДР HUBER MD   MR Brain w/o & w Contrast    Narrative    MRI OF THE BRAIN WITHOUT AND WITH CONTRAST 9/29/2018 1:48 PM     COMPARISON: Head CT, head CTA and head CT perfusion study 9/28/2018.    HISTORY:  Frequent falls, discoordination, rule out stroke.     TECHNIQUE: Axial diffusion-weighted with ADC map, axial T2-weighted  with fat saturation, axial T1-weighted, axial turboFLAIR and coronal  T1-weighted images of the brain were acquired without intravenous  contrast.  Following intravenous administration of gadolinium (7 mL  Gadavist), axial T1-weighted images of the brain were acquired.     FINDINGS: There are recent tiny infarcts in the left thalamus and  posteromedial aspect of the right temporal lobe as well as a  contiguous infarct in the left parahippocampal formation. All of these  infarcts are in the posterior circulation conforming to bilateral  posterior cerebral artery territory infarcts.  This also corresponds  with the perfusion defect noted in the left posterior cerebral artery  distribution on the comparison CT perfusion study. No other recent  infarcts noted.    There is mild diffuse cerebral volume loss. There are mild patchy  periventricular areas of abnormal T2 signal hyperintensity in the  cerebral white matter bilaterally that are consistent with sequela of  chronic small vessel ischemic disease. The ventricles and basal  cisterns are within normal limits in configuration given the degree of  cerebral volume loss.  There is no midline shift.  There are no  extra-axial fluid collections.  There is no evidence for acute  intracranial hemorrhage.  There is no abnormal contrast enhancement in  the brain or its coverings.    There is no sinusitis or mastoiditis.      Impression    IMPRESSION:   1. Scattered recent ischemic infarcts in the posterior cerebral artery  distributions bilaterally involving posterior medial temporal lobes  bilaterally and left thalamus.  2. Diffuse cerebral volume loss and cerebral white matter changes  consistent with chronic small vessel ischemic disease.     HOLGER PADILLA MD   XR Hand Right G/E 3 Views    Narrative    RIGHT HAND THREE OR MORE VIEWS   9/28/2018 7:19 PM     HISTORY: Thumb pain and swelling following fall. Rule out fracture.    COMPARISON: None.      Impression    IMPRESSION: Three views of the right hand are performed. Degenerative  joint changes involving the metacarpophalangeal and interphalangeal  joints of the thumb are noted. Additional DIP and PIP joint  degenerative changes are noted within the fingers. No evidence of  acute fracture or dislocation. Carpal bones appear intact. Arterial  vascular calcification is noted in the interosseous space between the  distal radius and ulna where imaged. Tiny ossicles are noted along the  radial aspect of the metacarpophalangeal joints of the index and  middle fingers on the oblique view. No radiopaque  foreign body is  identified. Mild soft tissue swelling is present involving the thumb.    EDNA RODRIGUEZ MD   CTA Head Neck with Contrast    Narrative    CT ANGIOGRAM OF THE HEAD AND NECK WITH CONTRAST  9/28/2018 8:35 PM     HISTORY: Code Stroke.    TECHNIQUE: CT angiography with an injection of 70 mL Isovue-370 IV  with scans through the head and neck. Images were transferred to a  separate 3-D workstation where multiplanar reformations and 3-D images  were created. Estimates of carotid stenoses are made relative to the  distal internal carotid artery diameters except as noted. Radiation  dose for this scan was reduced using automated exposure control,  adjustment of the mA and/or kV according to patient size, or iterative  reconstruction technique.    COMPARISON: None.     CT HEAD FINDINGS: No contrast enhancing lesions are identified.    CT ANGIOGRAM HEAD FINDINGS: Left posterior cerebral artery is occluded  at the P1-2 junction. The intracranial vertebral arteries, basilar  artery, and right posterior cerebral arteries are patent. The internal  carotid arteries, anterior cerebral arteries, and middle cerebral  arteries are patent.     CT ANGIOGRAM NECK FINDINGS: The left vertebral artery is occluded at  the origin with reconstitution at the V3 and V4 segments. Right  vertebral artery is patent. The bilateral common carotid, internal  carotid, and external carotid arteries are patent. There is moderate  atherosclerotic plaquing at the bilateral carotid bifurcations without  evidence of flow-limiting stenosis. A two-vessel aortic arch is  present.     Emphysema noted at the lung apices. A right-sided thyroid nodule is  present measuring 2.4 cm. Severe degenerative changes are present  throughout the cervical spine.      Impression    IMPRESSION:   1. 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   2. Left vertebral artery dissection with occlusion at the origin and  reconstitution at the V3/V4  junction.  3. Right thyroid nodule measuring up to 2.4 cm.    Findings were discussed by phone between Dr. Luu and the stroke  neurologist at 8:58 PM on 9/28/2018.    CESAR LUU MD   CT Head w/o Contrast    Narrative    CT SCAN OF THE HEAD WITHOUT CONTRAST   9/28/2018 8:30 PM     HISTORY: Code Stroke.    TECHNIQUE: Axial images of the head and coronal reformations without  IV contrast material. Radiation dose for this scan was reduced using  automated exposure control, adjustment of the mA and/or kV according  to patient size, or iterative reconstruction technique.    COMPARISON: None.    FINDINGS: Mild to moderate volume loss is present. Old left  periventricular infarct is present. Background of white matter  hypoattenuation likely represents chronic small vessel ischemic  change. No evidence of acute ischemia, hemorrhage, mass, mass effect,  or hydrocephalus. The visualized calvarium, skull base, paranasal  sinuses, and extracranial soft tissues are unremarkable.      Impression    IMPRESSION: Known left posterior cerebral artery occlusion and  perfusion deficit. No evidence of ischemia/edema or hemorrhage.    Findings were discussed by phone between Dr. Luu and Neil Ward on  9/28/2018 8:59 PM.    CESAR LUU MD   CT Head Perfusion w Contrast    Narrative    CT BRAIN PERFUSION   9/28/2018 8:43 PM    HISTORY: Code Stroke.    TECHNIQUE: Time sequential axial CT images of the head were acquired  during the administration of 50 mL Isovue-370 IV. Color perfusion maps  of the brain were created from this time sequential axial source data.      Radiation dose for this scan was reduced using automated exposure  control, adjustment of the mA and/or kV according to patient size, or  iterative reconstruction technique.    COMPARISON: None.    FINDINGS: There is prolongation of transit times and flow to the left  posterior cerebral artery distribution. Cerebral blood volume is  compensated. No other focal  perfusion deficits are identified.      Impression    IMPRESSION: Left posterior cerebral artery perfusion defect.    CESAR MOLINA MD         ASSESSMENT / PLAN:     Physical deconditioning  Plan: PT/OT with increase independence, self-care, strength and endurance and other cares that help return to home/facility of origin, fall precautions. Care conference with patient and family for the progress of rehab and disposition issues will be discussed as planned. Rehab evaluation and other evaluations including CPT are at rehab logs, to be reviewed separately.  Fall risk assessment as well as cognitive evaluation will be formed during rehab stay if indicated.    Cerebrovascular accident involving posterior circulation (H)  and Vertebral artery dissection (H)  Plan: Continue PT/OT and speech therapist and Apixaban. Follow up neurologist as scheduled.    Paroxysmal atrial fibrillation (H)  Plan: Continue Apixaban and metoprolol. Follow up cardiologist as scheduled.    Atherosclerosis of native coronary artery of native heart without angina pectoris and Essential hypertension  .Plan: continue current medications metoprolol, lisinopril and lipitor, monitor I&O and daily weighs and labs if indicated. Follow up cardiologist and PCP as scheduled.    Hyperlipidemia LDL goal <100  .Plan: continue current medication Statins.    Cognitive impairment  Plan: evaluate CPT/SLUMS by OT team. Fall and delirium prevention.    Other problems with same care. Primary care doctor and other specialists to address those chronic problems in next clinic appointment to be scheduled upon discharge from the TCU.      Total time spent with patient visit was 37 min including patient visit, review of past records, patients counseling and coordinating care.      Electronically signed by:  Juan Hairston MD

## 2018-10-09 ENCOUNTER — PATIENT OUTREACH (OUTPATIENT)
Dept: CARE COORDINATION | Facility: CLINIC | Age: 83
End: 2018-10-09

## 2018-10-09 NOTE — PROGRESS NOTES
Clinic Care Coordination Contact  Care Coordination Transition Communication            Clinical Data: Patient was hospitalized at [facility]   Date of Admission:  9/28/2018  Date of Discharge:  10/1/2018  3:45 PM   Discharge Diagnoses      Acute CVA  Left vertebral artery dissection  Paroxysmal Afib  HTN   HLP  CAD  RA  Cognitive impairment        Patient seen by [facility] 10/05/18     Transition to Facility:              Facility Name:  [facility] (Pt's wife was at [place]. She is current hospitalized)       Plan: RN/SW Care Coordinator will await notification from facility staff informing RN/SW Care Coordinator of patient's discharge plans/needs. RN/SW Care Coordinator will review chart and outreach to facility staff every 4 weeks and as needed

## 2018-10-10 ENCOUNTER — HOSPITAL LABORATORY (OUTPATIENT)
Dept: OTHER | Facility: CLINIC | Age: 83
End: 2018-10-10

## 2018-10-10 LAB
ERYTHROCYTE [DISTWIDTH] IN BLOOD BY AUTOMATED COUNT: 13.4 % (ref 10–15)
HCT VFR BLD AUTO: 40.9 % (ref 40–53)
HGB BLD-MCNC: 13.4 G/DL (ref 13.3–17.7)
MCH RBC QN AUTO: 31.7 PG (ref 26.5–33)
MCHC RBC AUTO-ENTMCNC: 32.8 G/DL (ref 31.5–36.5)
MCV RBC AUTO: 97 FL (ref 78–100)
PLATELET # BLD AUTO: 295 10E9/L (ref 150–450)
RBC # BLD AUTO: 4.23 10E12/L (ref 4.4–5.9)
WBC # BLD AUTO: 7.6 10E9/L (ref 4–11)

## 2018-10-12 ENCOUNTER — NURSING HOME VISIT (OUTPATIENT)
Dept: GERIATRICS | Facility: CLINIC | Age: 83
End: 2018-10-12
Payer: COMMERCIAL

## 2018-10-12 VITALS
OXYGEN SATURATION: 96 % | TEMPERATURE: 98 F | RESPIRATION RATE: 16 BRPM | BODY MASS INDEX: 23.82 KG/M2 | HEART RATE: 63 BPM | DIASTOLIC BLOOD PRESSURE: 65 MMHG | HEIGHT: 70 IN | SYSTOLIC BLOOD PRESSURE: 125 MMHG | WEIGHT: 166.4 LBS

## 2018-10-12 DIAGNOSIS — R41.89 COGNITIVE IMPAIRMENT: ICD-10-CM

## 2018-10-12 DIAGNOSIS — Z86.73 HISTORY OF TIA (TRANSIENT ISCHEMIC ATTACK) AND STROKE: Primary | ICD-10-CM

## 2018-10-12 DIAGNOSIS — I48.0 PAROXYSMAL A-FIB (H): ICD-10-CM

## 2018-10-12 DIAGNOSIS — M06.00 SERONEGATIVE RHEUMATOID ARTHRITIS (H): ICD-10-CM

## 2018-10-12 DIAGNOSIS — I10 HYPERTENSION GOAL BP (BLOOD PRESSURE) < 140/90: ICD-10-CM

## 2018-10-12 DIAGNOSIS — I25.10 ATHEROSCLEROSIS OF NATIVE CORONARY ARTERY OF NATIVE HEART WITHOUT ANGINA PECTORIS: ICD-10-CM

## 2018-10-12 PROCEDURE — 99309 SBSQ NF CARE MODERATE MDM 30: CPT | Performed by: NURSE PRACTITIONER

## 2018-10-12 NOTE — PROGRESS NOTES
"[facility]    Chief Complaint   Patient presents with     GINI       [place] Medical Record Number:  [id number]  Place of Service where encounter took place:  [facility] [place] ([facility]) [[id number]]    HPI:    Jose E Capps is a 87 year old  (1931), who is being seen today for continuation of medical care and rehab at [facility]. HPI information obtained from: facility chart records, facility staff, patient report and [facility] chart review.Today's concern is:    History of TIA (transient ischemic attack)   Admit to [facility] -10/1 for acute CVA, (left posterior cerebral infarct & dissecting artery), presumably triggered by fall w/head trauma 2-days prior. Patient was found to have new a-fib when presenting with TIA, although this was no presumed to be the cause of his TIA. Endorses swelling and weakness in the RUE; RLE weakness improved.     Atherosclerosis of native coronary artery of native heart without angina pectoris  Great improvement in lipid profile  - Triglycerides 92, HDL 44, LDL 61, cholesterol 123    Paroxysmal A-fib (H)  Newly diagnosed when presenting with TIA; returned to sinus rhythm while inpatient.     Hypertension goal BP (blood pressure) < 140/90  Baseline BPs 120's - 140's / 60's - 80's on 20 mg Lisinopril daily.     Cognitive impairment  Acute worsening over the last six months per family. SLUMS , CPT 3.8/5.6.     Patient was napping in bed when seen today. Noted he's been working well with PT and has a good appetite.Wife just .  Endorsed shock and stated it's \"unfair\" that his wife passed, but that he's accepting it and going about his daily life as usual. Encouraged with his strength returning in his right side that was weakened with stroke. Denies ability to sense when his heart rate is irregular; no palpitations, chest pain, etc., stating \"I'm good\". Denies pain or unmet needs; unsure of plans to discharge from TCU after " wife's passing.     ALLERGIES: No known drug allergy  Past Medical, Surgical, Family and Social History reviewed and updated in [facility].    Current Outpatient Prescriptions   Medication Sig Dispense Refill     acetaminophen (MAPAP) 500 MG tablet Take 1 tablet (500 mg) by mouth every 8 hours as needed for fever or pain 93 tablet 12     apixaban ANTICOAGULANT (ELIQUIS) 5 MG tablet Take 1 tablet (5 mg) by mouth 2 times daily       atorvastatin (LIPITOR) 10 MG tablet Take 1 tablet (10 mg) by mouth daily 90 tablet 3     Calcium Carb-Cholecalciferol (CALCIUM + D3) 600-200 MG-UNIT TABS TAKE 1 TABLET BY MOUTH ONCE DAILY 90 tablet 3     lisinopril (PRINIVIL/ZESTRIL) 20 MG tablet Take 1 tablet (20 mg) by mouth daily 90 tablet 3     metoprolol tartrate (LOPRESSOR) 25 MG tablet Take 0.25 tablets (6.25 mg) by mouth 2 times daily 60 tablet      Multiple Vitamins-Minerals (CEROVITE SENIOR) TABS TAKE 1 TABLET BY MOUTH ONCE DAILY 93 tablet 3     OMEGA-3 FISH OIL 1000 MG capsule TAKE 1 CAPSULE BY MOUTH ONCE DAILY 31 capsule 12     polyethylene glycol (MIRALAX/GLYCOLAX) Packet Take 17 g by mouth daily as needed for constipation        sennosides (SENOKOT) 8.6 MG tablet Take 4 tablets by mouth 2 times daily as needed        leflunomide (ARAVA) 10 MG tablet Take 1 tablet (10 mg) by mouth daily Hold it until you will return home (Patient not taking: Reported on 10/2/2018)       Medications reviewed:  Medications reconciled to facility chart and changes were made to reflect current medications as identified as above med list. Below are the changes that were made:   Medications stopped since last EPIC medication reconciliation:   There are no discontinued medications.    Medications started since last [facility] medication reconciliation:  No orders of the defined types were placed in this encounter.        REVIEW OF SYSTEMS:  4 point ROS including Respiratory, CV, GI and , other than that noted in the HPI,  is negative    Physical Exam:  BP  "125/65  Pulse 63  Temp 98  F (36.7  C)  Resp 16  Ht 5' 10\" (1.778 m)  Wt 166 lb 6.4 oz (75.5 kg)  SpO2 96%  BMI 23.88 kg/m2  GENERAL APPEARANCE:  Alert, in no distress  ENT:  Mouth and posterior oropharynx normal, moist mucous membranes, hearing acuity - mild [place]  EYES:  EOM, conjunctivae, lids, pupils and irises normal  NECK:  No adenopathy, masses or thyromegaly  RESP:  respiratory effort normal, no respiratory distress, Lung sounds clear  CV:  Regular rate and irregularly irregular rhythm w/A-fib, no murmur, no rub or gallop, trace edema in RUE  ABDOMEN:  normal bowel sounds, soft, nontender, no hepatosplenomegaly or other masses  M/S:   Gait and station - defer to PT; using walker, Fingernails normal, good capillary refill, strength 5/5 in bilateral upper extremities! (good return of strength in RUE)  SKIN:  Healing scabs on forehead  NEURO: 2-12 in normal limits and at patient's baseline - speech intact, succinct w/words  PSYCH:  insight and judgement mildly impaired, memory intact - able to verbalize wife's passing, good appetite, working with PT, affect and mood normal.      Recent Labs:  CBC RESULTS:   Recent Labs   Lab Test  10/10/18   0715  10/03/18   0755   WBC  7.6  7.4   RBC  4.23*  4.22*   HGB  13.4  13.2*   HCT  40.9  40.9   MCV  97  97   MCH  31.7  31.3   MCHC  32.8  32.3   RDW  13.4  13.6   PLT  295  271       Last Basic Metabolic Panel:  Recent Labs   Lab Test  10/03/18   0755  09/30/18   0950   NA  142  144   POTASSIUM  3.7  3.7   CHLORIDE  107  110*   GISELA  8.9  8.2*   CO2  29  25   BUN  15  11   CR  0.92  0.77   GLC  94  88       Liver Function Studies -   Recent Labs   Lab Test  09/28/18   1305  09/11/18   1711   PROTTOTAL  6.8  7.0   ALBUMIN  3.4  3.8   BILITOTAL  1.0  0.6   ALKPHOS  97  94   AST  26  20   ALT  27  33       TSH   Date Value Ref Range Status   09/11/2018 1.81 0.40 - 4.00 mU/L Final     Lab Results   Component Value Date    A1C 5.6 09/28/2018    A1C 6.0 07/01/2015 "         Assessment/Plan:  (Z86.73) History of TIA (transient ischemic attack)   (primary encounter diagnosis)  Comment: minimal assist with ADLs. Walks 150 feet with PT using 2-wheeled walker.   Plan: Advanced to regular diet & regular consistency 10/11. Continue PT & OT for skill improvement. Neurology appointment scheduled 11/1/18. Continue TIA medication prevention (Apixaban & Metoprolol) - see below.     (I25.10) Atherosclerosis of native coronary artery of native heart without angina pectoris  Comment: Hx of recent stroke per above  Plan: Continue 10 mg Lipitor daily; re-check lipid panel per PCP.     (I48.0) Paroxysmal A-fib (H)  Comment: HR controlled on Metoprolol (60's - 70's).   Plan: Continue Metoprolol 6.25 mg BID and continue Apixaban for TIA prevention w/A-fib. Needs cardiology follow-up scheduled.     (I10) Hypertension goal BP (blood pressure) < 140/90  Comment: BP well-controlled on Lisinopril 20 mg daily and metoprolol 6.25 mg daily; BP was soft this am so Lisinopril not given. Watch for ischemic symptoms w/history of elevated troponins. BP's: 125/65, 109/56, 114/50  Plan: Continue medication regimen; Consider dose adjustment if BPs persistently low.    (R41.89) Cognitive impairment  Comment: Had been in [facility] independent living facility w/wife. Wife passed away 2 days ago.    Plan: OT - therapy for 1-2 more weeks. Need plan for living situation.     (M06.00) Seronegative rheumatoid arthritis  Comment: no concerning symptoms  Plan: Continue holding Leflunomide - patient to follow-up w/Rheumatology regarding resuming    The above evaluation was completed by Leatha Arita NP and transcribed by ZBIGNIEW Gayle student.      Electronically signed by  NORI Fritz  Cell: [phone]

## 2018-10-12 NOTE — LETTER
"    10/12/2018        RE: Jose E Capps  [address]  [place] [address]        [facility]    Chief Complaint   Patient presents with     RECHECK       [facility] Record Number:  [id number]  Place of Service where encounter took place:  [facility] [place] ([facility]) [[id number]]    HPI:    Jose E Capps is a 87 year old  (1931), who is being seen today for continuation of medical care and rehab at [facility]. HPI information obtained from: facility chart records, facility staff, patient report and [facility] chart review.Today's concern is:    History of TIA (transient ischemic attack)   Admit to [facility] -10/1 for acute CVA, (left posterior cerebral infarct & dissecting artery), presumably triggered by fall w/head trauma 2-days prior. Patient was found to have new a-fib when presenting with TIA, although this was no presumed to be the cause of his TIA. Endorses swelling and weakness in the RUE; RLE weakness improved.     Atherosclerosis of native coronary artery of native heart without angina pectoris  Great improvement in lipid profile  - Triglycerides 92, HDL 44, LDL 61, cholesterol 123    Paroxysmal A-fib (H)  Newly diagnosed when presenting with TIA; returned to sinus rhythm while inpatient.     Hypertension goal BP (blood pressure) < 140/90  Baseline BPs 120's - 140's / 60's - 80's on 20 mg Lisinopril daily.     Cognitive impairment  Acute worsening over the last six months per family. SLUMS , CPT 3.8/5.6.     Patient was napping in bed when seen today. Noted he's been working well with PT and has a good appetite.Wife just .  Endorsed shock and stated it's \"unfair\" that his wife passed, but that he's accepting it and going about his daily life as usual. Encouraged with his strength returning in his right side that was weakened with stroke. Denies ability to sense when his heart rate is irregular; no palpitations, chest pain, " "etc., stating \"I'm good\". Denies pain or unmet needs; unsure of plans to discharge from TCU after wife's passing.     ALLERGIES: No known drug allergy  Past Medical, Surgical, Family and Social History reviewed and updated in [facility].    Current Outpatient Prescriptions   Medication Sig Dispense Refill     acetaminophen (MAPAP) 500 MG tablet Take 1 tablet (500 mg) by mouth every 8 hours as needed for fever or pain 93 tablet 12     apixaban ANTICOAGULANT (ELIQUIS) 5 MG tablet Take 1 tablet (5 mg) by mouth 2 times daily       atorvastatin (LIPITOR) 10 MG tablet Take 1 tablet (10 mg) by mouth daily 90 tablet 3     Calcium Carb-Cholecalciferol (CALCIUM + D3) 600-200 MG-UNIT TABS TAKE 1 TABLET BY MOUTH ONCE DAILY 90 tablet 3     lisinopril (PRINIVIL/ZESTRIL) 20 MG tablet Take 1 tablet (20 mg) by mouth daily 90 tablet 3     metoprolol tartrate (LOPRESSOR) 25 MG tablet Take 0.25 tablets (6.25 mg) by mouth 2 times daily 60 tablet      Multiple Vitamins-Minerals (CEROVITE SENIOR) TABS TAKE 1 TABLET BY MOUTH ONCE DAILY 93 tablet 3     OMEGA-3 FISH OIL 1000 MG capsule TAKE 1 CAPSULE BY MOUTH ONCE DAILY 31 capsule 12     polyethylene glycol (MIRALAX/GLYCOLAX) Packet Take 17 g by mouth daily as needed for constipation        sennosides (SENOKOT) 8.6 MG tablet Take 4 tablets by mouth 2 times daily as needed        leflunomide (ARAVA) 10 MG tablet Take 1 tablet (10 mg) by mouth daily Hold it until you will return home (Patient not taking: Reported on 10/2/2018)       Medications reviewed:  Medications reconciled to facility chart and changes were made to reflect current medications as identified as above med list. Below are the changes that were made:   Medications stopped since last EPIC medication reconciliation:   There are no discontinued medications.    Medications started since last [facility] medication reconciliation:  No orders of the defined types were placed in this encounter.        REVIEW OF SYSTEMS:  4 point ROS including " "Respiratory, CV, GI and , other than that noted in the HPI,  is negative    Physical Exam:  /65  Pulse 63  Temp 98  F (36.7  C)  Resp 16  Ht 5' 10\" (1.778 m)  Wt 166 lb 6.4 oz (75.5 kg)  SpO2 96%  BMI 23.88 kg/m2  GENERAL APPEARANCE:  Alert, in no distress  ENT:  Mouth and posterior oropharynx normal, moist mucous membranes, hearing acuity - mild [place]  EYES:  EOM, conjunctivae, lids, pupils and irises normal  NECK:  No adenopathy, masses or thyromegaly  RESP:  respiratory effort normal, no respiratory distress, Lung sounds clear  CV:  Regular rate and irregularly irregular rhythm w/A-fib, no murmur, no rub or gallop, trace edema in RUE  ABDOMEN:  normal bowel sounds, soft, nontender, no hepatosplenomegaly or other masses  M/S:   Gait and station - defer to PT; using walker, Fingernails normal, good capillary refill, strength 5/5 in bilateral upper extremities! (good return of strength in RUE)  SKIN:  Healing scabs on forehead  NEURO: 2-12 in normal limits and at patient's baseline - speech intact, succinct w/words  PSYCH:  insight and judgement mildly impaired, memory intact - able to verbalize wife's passing, good appetite, working with PT, affect and mood normal.      Recent Labs:  CBC RESULTS:   Recent Labs   Lab Test  10/10/18   0715  10/03/18   0755   WBC  7.6  7.4   RBC  4.23*  4.22*   HGB  13.4  13.2*   HCT  40.9  40.9   MCV  97  97   MCH  31.7  31.3   MCHC  32.8  32.3   RDW  13.4  13.6   PLT  295  271       Last Basic Metabolic Panel:  Recent Labs   Lab Test  10/03/18   0755  09/30/18   0950   NA  142  144   POTASSIUM  3.7  3.7   CHLORIDE  107  110*   GISELA  8.9  8.2*   CO2  29  25   BUN  15  11   CR  0.92  0.77   GLC  94  88       Liver Function Studies -   Recent Labs   Lab Test  09/28/18   1305  09/11/18   1711   PROTTOTAL  6.8  7.0   ALBUMIN  3.4  3.8   BILITOTAL  1.0  0.6   ALKPHOS  97  94   AST  26  20   ALT  27  33       TSH   Date Value Ref Range Status   09/11/2018 1.81 0.40 - 4.00 mU/L " Final     Lab Results   Component Value Date    A1C 5.6 09/28/2018    A1C 6.0 07/01/2015         Assessment/Plan:  (Z86.73) History of TIA (transient ischemic attack)   (primary encounter diagnosis)  Comment: minimal assist with ADLs. Walks 150 feet with PT using 2-wheeled walker.   Plan: Advanced to regular diet & regular consistency 10/11. Continue PT & OT for skill improvement. Neurology appointment scheduled 11/1/18. Continue TIA medication prevention (Apixaban & Metoprolol) - see below.     (I25.10) Atherosclerosis of native coronary artery of native heart without angina pectoris  Comment: Hx of recent stroke per above  Plan: Continue 10 mg Lipitor daily; re-check lipid panel per PCP.     (I48.0) Paroxysmal A-fib (H)  Comment: HR controlled on Metoprolol (60's - 70's).   Plan: Continue Metoprolol 6.25 mg BID and continue Apixaban for TIA prevention w/A-fib. Needs cardiology follow-up scheduled.     (I10) Hypertension goal BP (blood pressure) < 140/90  Comment: BP well-controlled on Lisinopril 20 mg daily and metoprolol 6.25 mg daily; BP was soft this am so Lisinopril not given. Watch for ischemic symptoms w/history of elevated troponins. BP's: 125/65, 109/56, 114/50  Plan: Continue medication regimen; Consider dose adjustment if BPs persistently low.    (R41.89) Cognitive impairment  Comment: Had been in  independent living facility w/wife. Wife passed away 2 days ago.    Plan: OT - therapy for 1-2 more weeks. Need plan for living situation.     (M06.00) Seronegative rheumatoid arthritis  Comment: no concerning symptoms  Plan: Continue holding Leflunomide - patient to follow-up w/Rheumatology regarding resuming    The above evaluation was completed by Leatha Arita NP and transcribed by ZBIGNIEW Gayle student.      Electronically signed by  NORI Fritz  Cell: [phone]                    Sincerely,        SHIRA Webster CNP

## 2018-10-22 ENCOUNTER — TELEPHONE (OUTPATIENT)
Dept: FAMILY MEDICINE | Facility: CLINIC | Age: 83
End: 2018-10-22

## 2018-10-22 ENCOUNTER — PATIENT OUTREACH (OUTPATIENT)
Dept: CARE COORDINATION | Facility: CLINIC | Age: 83
End: 2018-10-22

## 2018-10-22 ENCOUNTER — DISCHARGE SUMMARY NURSING HOME (OUTPATIENT)
Dept: GERIATRICS | Facility: CLINIC | Age: 83
End: 2018-10-22
Payer: COMMERCIAL

## 2018-10-22 VITALS
OXYGEN SATURATION: 96 % | TEMPERATURE: 98 F | DIASTOLIC BLOOD PRESSURE: 64 MMHG | RESPIRATION RATE: 16 BRPM | WEIGHT: 166.4 LBS | SYSTOLIC BLOOD PRESSURE: 115 MMHG | BODY MASS INDEX: 23.82 KG/M2 | HEIGHT: 70 IN | HEART RATE: 78 BPM

## 2018-10-22 DIAGNOSIS — R53.81 PHYSICAL DECONDITIONING: ICD-10-CM

## 2018-10-22 DIAGNOSIS — R41.89 COGNITIVE IMPAIRMENT: ICD-10-CM

## 2018-10-22 DIAGNOSIS — I10 HYPERTENSION GOAL BP (BLOOD PRESSURE) < 140/90: ICD-10-CM

## 2018-10-22 DIAGNOSIS — I48.0 PAROXYSMAL A-FIB (H): ICD-10-CM

## 2018-10-22 DIAGNOSIS — Z86.73 HISTORY OF TIA (TRANSIENT ISCHEMIC ATTACK) AND STROKE: Primary | ICD-10-CM

## 2018-10-22 DIAGNOSIS — I25.10 ATHEROSCLEROSIS OF NATIVE CORONARY ARTERY OF NATIVE HEART WITHOUT ANGINA PECTORIS: ICD-10-CM

## 2018-10-22 PROCEDURE — 99316 NF DSCHRG MGMT 30 MIN+: CPT | Performed by: NURSE PRACTITIONER

## 2018-10-22 NOTE — PROGRESS NOTES
[place] [facility] DISCHARGE SUMMARY    PATIENT'S NAME: Jose E Capps  YOB: 1931  MEDICAL RECORD NUMBER:  [id number]  [facility] where encounter took [facility] [[id number]]    PRIMARY CARE PROVIDER AND CLINIC RESPONSIBLE AFTER TRANSFER: Nehemias Granados [address] [place] [address]  / [place] [id number]     TRANSFERRING PROVIDERS: SHIRA Webster CNP, Juan Hairston MD  DATE OF SNF ADMISSION:  October / 01 / 2018  DATE OF SNF (anticipated) DISCHARGE: October / 22 / 2018  DISCHARGE DISPOSITION: Assisted Living: [facility]   RECENT HOSPITALIZATION/ED:  Hospital  [facility] stay 9/28/2018 to 10/1/2018.     CODE STATUS/ADVANCE DIRECTIVES DISCUSSION:   DNR / DNI     Allergies   Allergen Reactions     No Known Drug Allergy      Condition on Discharge:  Improving.  Function:  Needs some help with ADLs. Walking up to 150' with walker.   Cognitive Scores: SLUMS 14/30    Equipment: walker    DISCHARGE DIAGNOSIS:   1. History of TIA (transient ischemic attack)     2. Atherosclerosis of native coronary artery of native heart without angina pectoris    3. Paroxysmal A-fib (H)    4. Hypertension goal BP (blood pressure) < 140/90    5. Cognitive impairment        HPI Nursing Facility Course:  HPI information obtained from: facility chart records, facility staff, patient report and [facility] chart review.    History of TIA (transient ischemic attack)   Admit to [facility] 9/28-10/1 for acute CVA, (left posterior cerebral infarct & dissecting artery), presumably triggered by fall w/head trauma 2-days prior. Patient was found to have new a-fib when presenting with TIA, although this was no presumed to be the cause of his TIA. Endorses swelling and weakness in the RUE; RLE weakness improved.     Atherosclerosis of native coronary artery of native heart without angina pectoris  Great improvement in lipid profile 9/30 -  Triglycerides 92, HDL 44, LDL 61, cholesterol 123    Paroxysmal A-fib (H)  Newly diagnosed when presenting with TIA; returned to sinus rhythm while inpatient.     Hypertension goal BP (blood pressure) < 140/90  Baseline BPs 120's - 140's / 60's - 80's on 20 mg Lisinopril daily.     Cognitive impairment  Acute worsening over the last six months per family. SLUMS , CPT 3.8/5.6.     Physical deconditioning  Live in [facility]. Wife just . We will send supplemental care from [facility]. He has made progress with therapy and is walking up to 150' with walker.     PAST MEDICAL HISTORY:  has a past medical history of BPH (benign prostatic hyperplasia); Colonic polyps; CVA (cerebral vascular accident) (H) (); Hyperlipidemia LDL goal <100; Hypertension; and Seronegative rheumatoid arthritis (H) (). He also has no past medical history of Asymptomatic human immunodeficiency virus (HIV) infection status (H); Blood in semen; Cerebral palsy (H); Complication of anesthesia; Congenital renal agenesis and dysgenesis; Epididymitis, bilateral; Epididymitis, left; Epididymitis, right; Goiter; Gout; Hernia, abdominal; History of spinal cord injury; History of thrombophlebitis; Horseshoe kidney; Hydrocephalus; Malignant hyperthermia; Mumps; Orchitis, epididymitis, and epididymo-orchitis, with abscess; Palpitations; Parkinsons disease (H); Penile discharge; Prostate infection; Spider veins; Spina bifida (H); STD (sexually transmitted disease); Swelling of testicle; Tethered cord (H); or Tuberculosis.    DISCHARGE MEDICATIONS:  Current Outpatient Prescriptions   Medication Sig Dispense Refill     acetaminophen (MAPAP) 500 MG tablet Take 1 tablet (500 mg) by mouth every 8 hours as needed for fever or pain 93 tablet 12     apixaban ANTICOAGULANT (ELIQUIS) 5 MG tablet Take 1 tablet (5 mg) by mouth 2 times daily       atorvastatin (LIPITOR) 10 MG tablet Take 1 tablet (10 mg) by mouth daily 90 tablet 3     Calcium Carb-Cholecalciferol  "(CALCIUM + D3) 600-200 MG-UNIT TABS TAKE 1 TABLET BY MOUTH ONCE DAILY 90 tablet 3     lisinopril (PRINIVIL/ZESTRIL) 20 MG tablet Take 1 tablet (20 mg) by mouth daily 90 tablet 3     metoprolol tartrate (LOPRESSOR) 25 MG tablet Take 0.25 tablets (6.25 mg) by mouth 2 times daily 60 tablet      Multiple Vitamins-Minerals (CEROVITE SENIOR) TABS TAKE 1 TABLET BY MOUTH ONCE DAILY 93 tablet 3     OMEGA-3 FISH OIL 1000 MG capsule TAKE 1 CAPSULE BY MOUTH ONCE DAILY 31 capsule 12     polyethylene glycol (MIRALAX/GLYCOLAX) Packet Take 17 g by mouth daily as needed for constipation        sennosides (SENOKOT) 8.6 MG tablet Take 4 tablets by mouth 2 times daily as needed        leflunomide (ARAVA) 10 MG tablet Take 1 tablet (10 mg) by mouth daily Hold it until you will return home (Patient not taking: Reported on 10/2/2018)         MEDICATION CHANGES/RATIONALE:   none  Controlled medications sent with patient:   not applicable/none     ROS:    4 point ROS including Respiratory, CV, GI and , other than that noted in the HPI,  is negative    Physical Exam:   Vitals: /64  Pulse 78  Temp 98  F (36.7  C)  Resp 16  Ht 5' 10\" (1.778 m)  Wt 166 lb 6.4 oz (75.5 kg)  SpO2 96%  BMI 23.88 kg/m2  BMI= Body mass index is 23.88 kg/(m^2).    GENERAL APPEARANCE:  Alert, in no distress  ENT:  Mouth and posterior oropharynx normal, moist mucous membranes, hearing acuity - mild [place]  EYES:  EOM, conjunctivae, lids, pupils and irises normal  RESP:  respiratory effort normal, no respiratory distress, Lung sounds clear  CV:  Regular rate and irregularly irregular rhythm w/A-fib, no murmur, no rub or gallop, trace edema in RUE  ABDOMEN:  normal bowel sounds, soft, nontender, no hepatosplenomegaly or other masses  M/S:   Gait and station - defer to PT; using walker, Fingernails normal, good capillary refill, strength 5/5 in bilateral upper extremities! (good return of strength in RUE)  SKIN:  Healing scabs on forehead  NEURO: 2-12 in " normal limits and at patient's baseline - speech intact, succinct w/words  PSYCH:  insight and judgement mildly impaired, memory intact - able to verbalize wife's passing, good appetite, working with PT, affect and mood normal.    DISCHARGE PLAN:  Occupational Therapy, Physical Therapy, Registered Nurse, Home Health Aide and From:  [facility]  Patient instructed to follow-up with:  PCP in 7 days      [facility] scheduled appointments:  No Future Appointments    MTM referral needed and placed by this provider: No    Pending labs: none  SNF labs   CBC RESULTS:   Recent Labs   Lab Test  10/10/18   0715  10/03/18   0755   WBC  7.6  7.4   RBC  4.23*  4.22*   HGB  13.4  13.2*   HCT  40.9  40.9   MCV  97  97   MCH  31.7  31.3   MCHC  32.8  32.3   RDW  13.4  13.6   PLT  295  271       Last Basic Metabolic Panel:  Recent Labs   Lab Test  10/03/18   0755  09/30/18   0950   NA  142  144   POTASSIUM  3.7  3.7   CHLORIDE  107  110*   GISELA  8.9  8.2*   CO2  29  25   BUN  15  11   CR  0.92  0.77   GLC  94  88       Liver Function Studies -   Recent Labs   Lab Test  09/28/18   1305  09/11/18   1711   PROTTOTAL  6.8  7.0   ALBUMIN  3.4  3.8   BILITOTAL  1.0  0.6   ALKPHOS  97  94   AST  26  20   ALT  27  33       TSH   Date Value Ref Range Status   09/11/2018 1.81 0.40 - 4.00 mU/L Final     Lab Results   Component Value Date    A1C 5.6 09/28/2018    A1C 6.0 07/01/2015     Discharge Treatments:none    TOTAL DISCHARGE TIME:   Greater than 30 minutes  Electronically signed by:  SHIRA Webster CNP       Documentation of Face-to-Face and Certification for Home Health Services     Patient: Jose E Capps   YOB: 1931  MR Number: [id number]  Today's Date: 10/22/2018    I certify that patient: Jose E Capps is under my care and that I, or a nurse practitioner or physician's assistant working with me, had a face-to-face encounter that meets the physician face-to-face encounter requirements with this patient  on: 10/22/2018.    This encounter with the patient was in whole, or in part, for the following medical condition, which is the primary reason for home health care:   Encounter Diagnoses   Name Primary?     History of TIA (transient ischemic attack)  Yes     Atherosclerosis of native coronary artery of native heart without angina pectoris      Paroxysmal A-fib (H)      Hypertension goal BP (blood pressure) < 140/90      Cognitive impairment      Physical deconditioning    .    I certify that, based on my findings, the following services are medically necessary home health services: Nursing, Occupational Therapy, Physical Therapy and HHA.    My clinical findings support the need for the above services because: Nurse is needed: To assess med set up after changes in medications or other medical regimen.., Occupational Therapy Services are needed to assess and treat cognitive ability and address ADL safety due to impairment in cognition. and Physical Therapy Services are needed to assess and treat the following functional impairments: fall risk with Bennett 20/56.    Further, I certify that my clinical findings support that this patient is homebound (i.e. absences from home require considerable and taxing effort and are for medical reasons or Buddhist services or infrequently or of short duration when for other reasons) because: Requires assistance of another person or specialized equipment to access medical services because patient: Is unable to exit home safely on own due to: fall risk...    Based on the above findings. I certify that this patient is confined to the home and needs intermittent skilled nursing care, physical therapy and/or speech therapy.  The patient is under my care, and I have initiated the establishment of the plan of care.  This patient will be followed by a physician who will periodically review the plan of care.  Physician/Provider to provide follow up care: Nehemias Granados Medicare  certified PECOS Physician: Electronically signed by Dr. Juan Hairston MD, and only signing for initial order. Please send all follow up questions and concerns or needed follow up signatures to the PCP Nehemias Granados.  Physician Signature: See electronic signature associated with these discharge orders.  Date: 10/22/2018

## 2018-10-22 NOTE — LETTER
10/22/2018        RE: Jose E Capps  [address]  [place] [address]          [place] [facility] DISCHARGE SUMMARY    PATIENT'S NAME: Jose E Capps  YOB: 1931  MEDICAL RECORD NUMBER:  [id number]  Place of Service where encounter took place[facility] [[id number]]    PRIMARY CARE PROVIDER AND CLINIC RESPONSIBLE AFTER TRANSFER: Nehemias Granados [address] [place] [address]  / [place] [address]     TRANSFERRING PROVIDERS: SHIRA Webster CNP, Juan Hairston MD  DATE OF SNF ADMISSION:  October / 01 / 2018  DATE OF SNF (anticipated) DISCHARGE: October / 22 / 2018  DISCHARGE DISPOSITION: [facility]   RECENT HOSPITALIZATION/ED:  [facility] stay 9/28/2018 to 10/1/2018.     CODE STATUS/ADVANCE DIRECTIVES DISCUSSION:   DNR / DNI     Allergies   Allergen Reactions     No Known Drug Allergy      Condition on Discharge:  Improving.  Function:  Needs some help with ADLs. Walking up to 150' with walker.   Cognitive Scores: SLUMS 14/30    Equipment: walker    DISCHARGE DIAGNOSIS:   1. History of TIA (transient ischemic attack)     2. Atherosclerosis of native coronary artery of native heart without angina pectoris    3. Paroxysmal A-fib (H)    4. Hypertension goal BP (blood pressure) < 140/90    5. Cognitive impairment        HPI Nursing Facility Course:  HPI information obtained from: facility chart records, facility staff, patient report and [facility] chart review.    History of TIA (transient ischemic attack)   Admit to [facility] 9/28-10/1 for acute CVA, (left posterior cerebral infarct & dissecting artery), presumably triggered by fall w/head trauma 2-days prior. Patient was found to have new a-fib when presenting with TIA, although this was no presumed to be the cause of his TIA. Endorses swelling and weakness in the RUE; RLE weakness improved.     Atherosclerosis of native coronary  artery of native heart without angina pectoris  Great improvement in lipid profile  - Triglycerides 92, HDL 44, LDL 61, cholesterol 123    Paroxysmal A-fib (H)  Newly diagnosed when presenting with TIA; returned to sinus rhythm while inpatient.     Hypertension goal BP (blood pressure) < 140/90  Baseline BPs 120's - 140's / 60's - 80's on 20 mg Lisinopril daily.     Cognitive impairment  Acute worsening over the last six months per family. SLUMS , CPT 3.8/5.6.     Physical deconditioning  Live in [facility]. Wife just . We will send supplemental care from [facility]. He has made progress with therapy and is walking up to 150' with walker.     PAST MEDICAL HISTORY:  has a past medical history of BPH (benign prostatic hyperplasia); Colonic polyps; CVA (cerebral vascular accident) (H) (); Hyperlipidemia LDL goal <100; Hypertension; and Seronegative rheumatoid arthritis (H) (). He also has no past medical history of Asymptomatic human immunodeficiency virus (HIV) infection status (H); Blood in semen; Cerebral palsy (H); Complication of anesthesia; Congenital renal agenesis and dysgenesis; Epididymitis, bilateral; Epididymitis, left; Epididymitis, right; Goiter; Gout; Hernia, abdominal; History of spinal cord injury; History of thrombophlebitis; Horseshoe kidney; Hydrocephalus; Malignant hyperthermia; Mumps; Orchitis, epididymitis, and epididymo-orchitis, with abscess; Palpitations; Parkinsons disease (H); Penile discharge; Prostate infection; Spider veins; Spina bifida (H); STD (sexually transmitted disease); Swelling of testicle; Tethered cord (H); or Tuberculosis.    DISCHARGE MEDICATIONS:  Current Outpatient Prescriptions   Medication Sig Dispense Refill     acetaminophen (MAPAP) 500 MG tablet Take 1 tablet (500 mg) by mouth every 8 hours as needed for fever or pain 93 tablet 12     apixaban ANTICOAGULANT (ELIQUIS) 5 MG tablet Take 1 tablet (5 mg) by mouth 2 times daily       atorvastatin (LIPITOR) 10 MG  "tablet Take 1 tablet (10 mg) by mouth daily 90 tablet 3     Calcium Carb-Cholecalciferol (CALCIUM + D3) 600-200 MG-UNIT TABS TAKE 1 TABLET BY MOUTH ONCE DAILY 90 tablet 3     lisinopril (PRINIVIL/ZESTRIL) 20 MG tablet Take 1 tablet (20 mg) by mouth daily 90 tablet 3     metoprolol tartrate (LOPRESSOR) 25 MG tablet Take 0.25 tablets (6.25 mg) by mouth 2 times daily 60 tablet      Multiple Vitamins-Minerals (CEROVITE SENIOR) TABS TAKE 1 TABLET BY MOUTH ONCE DAILY 93 tablet 3     OMEGA-3 FISH OIL 1000 MG capsule TAKE 1 CAPSULE BY MOUTH ONCE DAILY 31 capsule 12     polyethylene glycol (MIRALAX/GLYCOLAX) Packet Take 17 g by mouth daily as needed for constipation        sennosides (SENOKOT) 8.6 MG tablet Take 4 tablets by mouth 2 times daily as needed        leflunomide (ARAVA) 10 MG tablet Take 1 tablet (10 mg) by mouth daily Hold it until you will return home (Patient not taking: Reported on 10/2/2018)         MEDICATION CHANGES/RATIONALE:   none  Controlled medications sent with patient:   not applicable/none     ROS:    4 point ROS including Respiratory, CV, GI and , other than that noted in the HPI,  is negative    Physical Exam:   Vitals: /64  Pulse 78  Temp 98  F (36.7  C)  Resp 16  Ht 5' 10\" (1.778 m)  Wt 166 lb 6.4 oz (75.5 kg)  SpO2 96%  BMI 23.88 kg/m2  BMI= Body mass index is 23.88 kg/(m^2).    GENERAL APPEARANCE:  Alert, in no distress  ENT:  Mouth and posterior oropharynx normal, moist mucous membranes, hearing acuity - mild [place]  EYES:  EOM, conjunctivae, lids, pupils and irises normal  RESP:  respiratory effort normal, no respiratory distress, Lung sounds clear  CV:  Regular rate and irregularly irregular rhythm w/A-fib, no murmur, no rub or gallop, trace edema in RUE  ABDOMEN:  normal bowel sounds, soft, nontender, no hepatosplenomegaly or other masses  M/S:   Gait and station - defer to PT; using walker, Fingernails normal, good capillary refill, strength 5/5 in bilateral upper extremities! " (good return of strength in RUE)  SKIN:  Healing scabs on forehead  NEURO: 2-12 in normal limits and at patient's baseline - speech intact, succinct w/words  PSYCH:  insight and judgement mildly impaired, memory intact - able to verbalize wife's passing, good appetite, working with PT, affect and mood normal.    DISCHARGE PLAN:  Occupational Therapy, Physical Therapy, Registered Nurse, Home Health Aide and From:  [facility]  Patient instructed to follow-up with:  PCP in 7 days      [facility] scheduled appointments:  No Future Appointments    MTM referral needed and placed by this provider: No    Pending labs: none  SNF labs   CBC RESULTS:   Recent Labs   Lab Test  10/10/18   0715  10/03/18   0755   WBC  7.6  7.4   RBC  4.23*  4.22*   HGB  13.4  13.2*   HCT  40.9  40.9   MCV  97  97   MCH  31.7  31.3   MCHC  32.8  32.3   RDW  13.4  13.6   PLT  295  271       Last Basic Metabolic Panel:  Recent Labs   Lab Test  10/03/18   0755  09/30/18   0950   NA  142  144   POTASSIUM  3.7  3.7   CHLORIDE  107  110*   GISELA  8.9  8.2*   CO2  29  25   BUN  15  11   CR  0.92  0.77   GLC  94  88       Liver Function Studies -   Recent Labs   Lab Test  09/28/18   1305  09/11/18   1711   PROTTOTAL  6.8  7.0   ALBUMIN  3.4  3.8   BILITOTAL  1.0  0.6   ALKPHOS  97  94   AST  26  20   ALT  27  33       TSH   Date Value Ref Range Status   09/11/2018 1.81 0.40 - 4.00 mU/L Final     Lab Results   Component Value Date    A1C 5.6 09/28/2018    A1C 6.0 07/01/2015     Discharge Treatments:none    TOTAL DISCHARGE TIME:   Greater than 30 minutes  Electronically signed by:  SHIRA Webster CNP       Documentation of Face-to-Face and Certification for Home Health Services     Patient: Jose E Capps   YOB: 1931  MR Number: [id number]  Today's Date: 10/22/2018    I certify that patient: Jose E Capps is under my care and that I, or a nurse practitioner or physician's assistant working with me, had a face-to-face  encounter that meets the physician face-to-face encounter requirements with this patient on: 10/22/2018.    This encounter with the patient was in whole, or in part, for the following medical condition, which is the primary reason for home health care:   Encounter Diagnoses   Name Primary?     History of TIA (transient ischemic attack)  Yes     Atherosclerosis of native coronary artery of native heart without angina pectoris      Paroxysmal A-fib (H)      Hypertension goal BP (blood pressure) < 140/90      Cognitive impairment      Physical deconditioning    .    I certify that, based on my findings, the following services are medically necessary home health services: Nursing, Occupational Therapy, Physical Therapy and HHA.    My clinical findings support the need for the above services because: Nurse is needed: To assess med set up after changes in medications or other medical regimen.., Occupational Therapy Services are needed to assess and treat cognitive ability and address ADL safety due to impairment in cognition. and Physical Therapy Services are needed to assess and treat the following functional impairments: fall risk with Bennett 20/56.    Further, I certify that my clinical findings support that this patient is homebound (i.e. absences from home require considerable and taxing effort and are for medical reasons or Christianity services or infrequently or of short duration when for other reasons) because: Requires assistance of another person or specialized equipment to access medical services because patient: Is unable to exit home safely on own due to: fall risk...    Based on the above findings. I certify that this patient is confined to the home and needs intermittent skilled nursing care, physical therapy and/or speech therapy.  The patient is under my care, and I have initiated the establishment of the plan of care.  This patient will be followed by a physician who will periodically review the plan of  care.  Physician/Provider to provide follow up care: Nehemias Granados    Responsible Medicare certified PECOS Physician: Electronically signed by Dr. Juan Hairston MD, and only signing for initial order. Please send all follow up questions and concerns or needed follow up signatures to the PCP Nehemias Granados.  Physician Signature: See electronic signature associated with these discharge orders.  Date: 10/22/2018      Sincerely,        SHIRA Webster CNP

## 2018-10-22 NOTE — PROGRESS NOTES
Clinic Care Coordination Contact  Care Team Conversations    Patient will be discharged from TCU back to assisted living.  While patient was in TCU his wife dies while in the hospital (long term COPD)   Clinic Care Coordinator RN will follow up when patient has discharged.

## 2018-10-22 NOTE — TELEPHONE ENCOUNTER
"Fax received for [employer]-Fraud Notice\" form  Marita Capps would like it faxed back to her attention at [phone]    Daughter also states the patient had a bad stroke on 9/28/18    Vianca YEE  "

## 2018-10-25 ENCOUNTER — TELEPHONE (OUTPATIENT)
Dept: FAMILY MEDICINE | Facility: CLINIC | Age: 83
End: 2018-10-25

## 2018-10-25 ENCOUNTER — MEDICAL CORRESPONDENCE (OUTPATIENT)
Dept: HEALTH INFORMATION MANAGEMENT | Facility: CLINIC | Age: 83
End: 2018-10-25

## 2018-10-26 ENCOUNTER — TELEPHONE (OUTPATIENT)
Dept: FAMILY MEDICINE | Facility: CLINIC | Age: 83
End: 2018-10-26

## 2018-10-29 ENCOUNTER — TRANSFERRED RECORDS (OUTPATIENT)
Dept: HEALTH INFORMATION MANAGEMENT | Facility: CLINIC | Age: 83
End: 2018-10-29

## 2018-10-29 NOTE — TELEPHONE ENCOUNTER
POLST faxed and then sent to abstracting in the orange folder located in the blue bags  Vianca YEE

## 2018-10-29 NOTE — TELEPHONE ENCOUNTER
POLST reviewed. Patient wishes to be DNR/DNI. Order pended, PCP please review and sign. POLST faxed back and sent to scan.   Sharda Reynaga RN   [facility] - Triage

## 2018-10-30 ENCOUNTER — TELEPHONE (OUTPATIENT)
Dept: FAMILY MEDICINE | Facility: CLINIC | Age: 83
End: 2018-10-30

## 2018-10-30 ENCOUNTER — OFFICE VISIT (OUTPATIENT)
Dept: FAMILY MEDICINE | Facility: CLINIC | Age: 83
End: 2018-10-30
Payer: COMMERCIAL

## 2018-10-30 VITALS
TEMPERATURE: 98 F | WEIGHT: 166 LBS | HEIGHT: 70 IN | SYSTOLIC BLOOD PRESSURE: 110 MMHG | OXYGEN SATURATION: 94 % | DIASTOLIC BLOOD PRESSURE: 52 MMHG | HEART RATE: 61 BPM | BODY MASS INDEX: 23.77 KG/M2

## 2018-10-30 DIAGNOSIS — Z53.9 DIAGNOSIS NOT YET DEFINED: Primary | ICD-10-CM

## 2018-10-30 DIAGNOSIS — I77.74 DISSECTING HEMORRHAGE OF LEFT VERTEBRAL ARTERY (H): ICD-10-CM

## 2018-10-30 DIAGNOSIS — Z09 HOSPITAL DISCHARGE FOLLOW-UP: Primary | ICD-10-CM

## 2018-10-30 DIAGNOSIS — Z23 NEED FOR VACCINATION: ICD-10-CM

## 2018-10-30 DIAGNOSIS — I63.532 CEREBROVASCULAR ACCIDENT (CVA) DUE TO OCCLUSION OF LEFT POSTERIOR CEREBRAL ARTERY (H): ICD-10-CM

## 2018-10-30 PROCEDURE — G0180 MD CERTIFICATION HHA PATIENT: HCPCS | Performed by: FAMILY MEDICINE

## 2018-10-30 PROCEDURE — 99214 OFFICE O/P EST MOD 30 MIN: CPT | Mod: 25 | Performed by: FAMILY MEDICINE

## 2018-10-30 PROCEDURE — 90471 IMMUNIZATION ADMIN: CPT | Performed by: FAMILY MEDICINE

## 2018-10-30 PROCEDURE — 90750 HZV VACC RECOMBINANT IM: CPT | Performed by: FAMILY MEDICINE

## 2018-10-30 NOTE — PROGRESS NOTES
SUBJECTIVE:   Jose E Capps is a 87 year old male who presents to clinic today for the following health issues:          Hospital Follow-up Visit:    Hospital/Nursing Home/IP Rehab Facility: [facility]  Date of Admission: 09/28/18  Date of Discharge: 10/1/18  Reason(s) for Admission: Stroke            Problems taking medications regularly:  None       Medication changes since discharge: None       Problems adhering to non-medication therapy:  None    Summary of hospitalization:  [facility] discharge summary reviewed  Diagnostic Tests/Treatments reviewed.  Follow up needed: neurologist      Update since discharge: improved.     Post Discharge Medication Reconciliation: discharge medications reconciled, continue medications without change.  Plan of care communicated with patient and his daughter.     Coding guidelines for this visit:  Type of Medical   Decision Making Face-to-Face Visit       within 7 Days of discharge Face-to-Face Visit        within 14 days of discharge   Moderate Complexity [id number]   High Complexity [id number]          Right hand has been swollen which is progressively improving.  It was thought that it was related to the fall.  Hand x-ray was negative for fracture.        Problem list and histories reviewed & adjusted, as indicated.  Additional history: as documented    Patient Active Problem List   Diagnosis     Seronegative rheumatoid arthritis (H)     Hyperlipidemia LDL goal <100     Colon polyp     Hypertension goal BP (blood pressure) < 140/90     Hip pain, left     History of malignant melanoma of skin     History of basal cell carcinoma     History of TIA (transient ischemic attack)      RBBB (right bundle branch block)     Atherosclerosis of native coronary artery of native heart without angina pectoris     Falls, initial encounter     Physical deconditioning     Cerebrovascular accident (CVA) due to occlusion of left posterior cerebral artery (H)      Past Surgical History:   Procedure Laterality Date     C LAT LUMBAR SPINE FUSION       JOINT REPLACEMENT, HIP RT/LT  2007    R     JOINT REPLACEMTN, KNEE RT/LT  2004     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    excised       Social History   Substance Use Topics     Smoking status: Former Smoker     Packs/day: 2.00     Years: 30.00     Types: Cigarettes     Smokeless tobacco: Never Used     Alcohol use 10.8 oz/week     4 Standard drinks or equivalent, 14 Shots of liquor per week      Comment: 2-3 drinks a night     Family History   Problem Relation Age of Onset     Diabetes Mother      Alzheimer Disease Mother      Obesity Mother      Cardiovascular Father      Obesity Father      Cancer Sister      Cancer Sister          Current Outpatient Prescriptions   Medication Sig Dispense Refill     acetaminophen (MAPAP) 500 MG tablet Take 1 tablet (500 mg) by mouth every 8 hours as needed for fever or pain 93 tablet 12     apixaban ANTICOAGULANT (ELIQUIS) 5 MG tablet Take 1 tablet (5 mg) by mouth 2 times daily       ASPIRIN NOT PRESCRIBED (INTENTIONAL) Please choose reason not prescribed, below 0 each 0     atorvastatin (LIPITOR) 10 MG tablet Take 1 tablet (10 mg) by mouth daily 90 tablet 3     Calcium Carb-Cholecalciferol (CALCIUM + D3) 600-200 MG-UNIT TABS TAKE 1 TABLET BY MOUTH ONCE DAILY 90 tablet 3     lisinopril (PRINIVIL/ZESTRIL) 20 MG tablet Take 1 tablet (20 mg) by mouth daily 90 tablet 3     metoprolol tartrate (LOPRESSOR) 25 MG tablet Take 0.25 tablets (6.25 mg) by mouth 2 times daily 60 tablet      Multiple Vitamins-Minerals (CEROVITE SENIOR) TABS TAKE 1 TABLET BY MOUTH ONCE DAILY 93 tablet 3     OMEGA-3 FISH OIL 1000 MG capsule TAKE 1 CAPSULE BY MOUTH ONCE DAILY 31 capsule 12     polyethylene glycol (MIRALAX/GLYCOLAX) Packet Take 17 g by mouth daily as needed for constipation        sennosides (SENOKOT) 8.6 MG tablet Take 4 tablets by mouth 2 times daily as needed        Allergies   Allergen Reactions  "    No Known Drug Allergy        Reviewed and updated as needed this visit by clinical staff  Tobacco  Allergies  Meds  Med Hx  Surg Hx  Fam Hx  Soc Hx      Reviewed and updated as needed this visit by Provider  Meds         ROS:  CONSTITUTIONAL: NEGATIVE for fever, chills, change in weight  ENT/MOUTH: NEGATIVE for ear, mouth and throat problems  RESP: NEGATIVE for significant cough or SOB  CV: NEGATIVE for chest pain, palpitations or peripheral edema    OBJECTIVE:                                                    /52  Pulse 61  Temp 98  F (36.7  C) (Tympanic)  Ht 5' 10\" (1.778 m)  Wt 166 lb (75.3 kg)  SpO2 94%  BMI 23.82 kg/m2  Body mass index is 23.82 kg/(m^2).   GENERAL: healthy, alert, well nourished, well hydrated, no distress  HENT: ear canals- normal; TMs- normal; Nose- normal; Mouth- no ulcers, no lesions  NECK: no tenderness, no adenopathy, no asymmetry, no masses, no stiffness; thyroid- normal to palpation  RESP: lungs clear to auscultation - no rales, no rhonchi, no wheezes  CV: regular rates and rhythm, normal S1 S2, no S3 or S4 and no murmur, no click or rub -  ABDOMEN: soft, no tenderness, no  hepatosplenomegaly, no masses, normal bowel sounds  MS: extremities-right arm and right leg with slight weakness as compared to the left side.  No sensory loss.  Right hand swelling noted.  No ecchymosis or erythema.  No tenderness.         ASSESSMENT/PLAN:                                                    1. Hospital discharge follow-up  Clinically stable.  Slowly progressively improving.  Continue physical therapy    2. Cerebrovascular accident (CVA) due to occlusion of left posterior cerebral artery (H)  No recurrence since the hospital discharge.  Resume Eliquis.  Follow-up with neurology as planned.    3. 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 Need for vaccination    - SHINGRIX [99030]  - 1st  Administration  [00400]          Nehemias Granados MD  [facility] [place]

## 2018-10-30 NOTE — MR AVS SNAPSHOT
"              After Visit Summary   10/30/2018    Jose E Capps    MRN: [id number]           Patient Information     Date Of Birth          9/28/1931        Visit Information        Provider Department      10/30/2018 1:40 PM Nehemias Granados MD [facility] [place]         Follow-ups after your visit        Follow-up notes from your care team     Return in about 3 months (around 1/30/2019) for medical recheck .      Who to contact     If you have questions or need follow up information about today's clinic visit or your schedule please contact [facility] [place] directly at [phone].  Normal or non-critical lab and imaging results will be communicated to you by MyChart, letter or phone within 4 business days after the clinic has received the results. If you do not hear from us within 7 days, please contact the clinic through [employer] or phone. If you have a critical or abnormal lab result, we will notify you by phone as soon as possible.  Submit refill requests through [employer] or call your pharmacy and they will forward the refill request to us. Please allow 3 business days for your refill to be completed.          Additional Information About Your Visit        MyChart Information     [employer] gives you secure access to your electronic health record. If you see a primary care provider, you can also send messages to your care team and make appointments. If you have questions, please call your primary care clinic.  If you do not have a primary care provider, please call [phone] and they will assist you.        Care EveryWhere ID     This is your Care EveryWhere ID. This could be used by other organizations to access your [place] medical records  [id number]        Your Vitals Were     Pulse Temperature Height Pulse Oximetry BMI (Body Mass Index)       61 98  F (36.7  C) (Tympanic) 5' 10\" (1.778 m) 94% 23.82 kg/m2        Blood Pressure from Last 3 Encounters:   10/30/18 110/52   10/22/18 " 115/64   10/12/18 125/65    Weight from Last 3 Encounters:   10/30/18 166 lb (75.3 kg)   10/22/18 166 lb 6.4 oz (75.5 kg)   10/12/18 166 lb 6.4 oz (75.5 kg)              Today, you had the following     No orders found for display         Today's Medication Changes          These changes are accurate as of 10/30/18  1:40 PM.  If you have any questions, ask your nurse or doctor.               Stop taking these medicines if you haven't already. Please contact your care team if you have questions.     leflunomide 10 MG tablet   Commonly known as:  ARAVA   Stopped by:  Nehemias Granados MD                    Primary Care Provider Office Phone # Fax #    Nehemias Granados [phone]       [address]  [place] [address]        Equal Access to Services     [facility]: Hadii cecille enamorado hadasho Sodenise, waaxda luqelder, qaybta kaalmada avi, ian mary . So [facility] [phone].    ATENCIÓN: Si habla español, tiene a bradley disposición servicios gratuitos de asistencia lingüística. Llame al [phone].    We comply with applicable federal civil rights laws and [place] laws. We do not discriminate on the basis of race, color, national origin, age, disability, sex, sexual orientation, or gender identity.            Thank you!     Thank you for choosing [facility] [place]  for your care. Our goal is always to provide you with excellent care. Hearing back from our patients is one way we can continue to improve our services. Please take a few minutes to complete the written survey that you may receive in the mail after your visit with us. Thank you!             Your Updated Medication List - Protect others around you: Learn how to safely use, store and throw away your medicines at www.disposemymeds.org.          This list is accurate as of 10/30/18  1:40 PM.  Always use your most recent med list.                   Brand Name Dispense Instructions for use Diagnosis     acetaminophen 500 MG tablet    MAPAP    93 tablet    Take 1 tablet (500 mg) by mouth every 8 hours as needed for fever or pain    Seronegative rheumatoid arthritis (H)       apixaban ANTICOAGULANT 5 MG tablet    ELIQUIS     Take 1 tablet (5 mg) by mouth 2 times daily    Atrial fibrillation, unspecified type (H)       atorvastatin 10 MG tablet    LIPITOR    90 tablet    Take 1 tablet (10 mg) by mouth daily    Mixed hyperlipidemia       Calcium + D3 600-200 MG-UNIT Tabs     90 tablet    TAKE 1 TABLET BY MOUTH ONCE DAILY    Imbalanced nutrition       CEROVITE SENIOR Tabs     93 tablet    TAKE 1 TABLET BY MOUTH ONCE DAILY    Hypertension goal BP (blood pressure) < 140/90       fish oil-omega-3 fatty acids 1000 MG capsule     31 capsule    TAKE 1 CAPSULE BY MOUTH ONCE DAILY    Atherosclerosis of native coronary artery of native heart without angina pectoris       lisinopril 20 MG tablet    PRINIVIL/ZESTRIL    90 tablet    Take 1 tablet (20 mg) by mouth daily    Hypertension goal BP (blood pressure) < 140/90       metoprolol tartrate 25 MG tablet    LOPRESSOR    60 tablet    Take 0.25 tablets (6.25 mg) by mouth 2 times daily    Atrial fibrillation, unspecified type (H)       polyethylene glycol Packet    MIRALAX/GLYCOLAX     Take 17 g by mouth daily as needed for constipation        sennosides 8.6 MG tablet    SENOKOT     Take 4 tablets by mouth 2 times daily as needed

## 2018-10-30 NOTE — TELEPHONE ENCOUNTER
Nino PT from [email or web address] calling for orders for PT 2w4 and 1w4 for gait training, safety in home and fall prevention. Verbal given per [facility] protocol.     Sharda Reynaga RN   [facility] - Triage

## 2018-10-31 ENCOUNTER — DOCUMENTATION ONLY (OUTPATIENT)
Dept: OTHER | Facility: CLINIC | Age: 83
End: 2018-10-31

## 2018-11-07 ENCOUNTER — TELEPHONE (OUTPATIENT)
Dept: FAMILY MEDICINE | Facility: CLINIC | Age: 83
End: 2018-11-07

## 2018-11-07 DIAGNOSIS — I25.10 ATHEROSCLEROSIS OF NATIVE CORONARY ARTERY OF NATIVE HEART WITHOUT ANGINA PECTORIS: ICD-10-CM

## 2018-11-07 DIAGNOSIS — E78.2 MIXED HYPERLIPIDEMIA: ICD-10-CM

## 2018-11-07 DIAGNOSIS — I10 HYPERTENSION GOAL BP (BLOOD PRESSURE) < 140/90: ICD-10-CM

## 2018-11-07 DIAGNOSIS — I48.91 ATRIAL FIBRILLATION, UNSPECIFIED TYPE (H): ICD-10-CM

## 2018-11-07 DIAGNOSIS — E63.8 IMBALANCED NUTRITION: ICD-10-CM

## 2018-11-07 RX ORDER — ATORVASTATIN CALCIUM 10 MG/1
TABLET, FILM COATED ORAL
Refills: 11 | OUTPATIENT
Start: 2018-11-07

## 2018-11-07 RX ORDER — LISINOPRIL 20 MG/1
TABLET ORAL
Refills: 11 | OUTPATIENT
Start: 2018-11-07

## 2018-11-07 RX ORDER — MULTIVIT-MIN/FA/LYCOPEN/LUTEIN .4-300-25
TABLET ORAL
Refills: 11 | OUTPATIENT
Start: 2018-11-07

## 2018-11-07 RX ORDER — METOPROLOL TARTRATE 25 MG/1
TABLET, FILM COATED ORAL
Refills: 11 | OUTPATIENT
Start: 2018-11-07

## 2018-11-07 RX ORDER — CHLORAL HYDRATE 500 MG
CAPSULE ORAL
Refills: 11 | OUTPATIENT
Start: 2018-11-07

## 2018-11-07 NOTE — TELEPHONE ENCOUNTER
Eliquis: Routing refill request to provider for review/approval because:  Medication has not been previously ordered.  Listed as Transitional?      All other medications have 1 year of refills as they have just been filled last month for 1 year.  Loan Pinzon RN - Triage  [facility]

## 2018-11-07 NOTE — TELEPHONE ENCOUNTER
"Requested Prescriptions   Pending Prescriptions Disp Refills     atorvastatin (LIPITOR) 10 MG tablet [Pharmacy Med Name: ATORVASTATIN TAB 10MG]  11    Last Written Prescription Date:  9/27/2018  Last Fill Quantity: 90,  # refills: 3   Last office visit: 10/30/2018 with prescribing provider:  Darwin   Future Office Visit:     Sig: TAKE 1 TABLET BY MOUTH ONCE DAILY    Statins Protocol Failed    11/7/2018  4:14 PM       Failed - No abnormal creatine kinase in past 12 months    Recent Labs   Lab Test  09/28/18   2020   CKT  322*               Passed - LDL on file in past 12 months    Recent Labs   Lab Test  09/30/18   0950   LDL  61            Passed - Recent (12 mo) or future (30 days) visit within the authorizing provider's specialty    Patient had office visit in the last 12 months or has a visit in the next 30 days with authorizing provider or within the authorizing provider's specialty.  See \"Patient Info\" tab in inbasket, or \"Choose Columns\" in Meds & Orders section of the refill encounter.             Passed - Patient is age 18 or older        Calcium Carb-Cholecalciferol (CALCIUM + D3) 600-200 MG-UNIT TABS [Pharmacy Med Name: CALCIUM 600MG/VIT D 200IU]  11    Last Written Prescription Date:  9/17/2018  Last Fill Quantity: 90,  # refills: 3   Last office visit: 10/30/2018 with prescribing provider:  Darwin   Future Office Visit:     Sig: TAKE 1 TABLET BY MOUTH ONCE DAILY    Vitamin Supplements (Adult) Protocol Passed    11/7/2018  4:14 PM       Passed - High dose Vitamin D not ordered       Passed - Recent (12 mo) or future (30 days) visit within the authorizing provider's specialty    Patient had office visit in the last 12 months or has a visit in the next 30 days with authorizing provider or within the authorizing provider's specialty.  See \"Patient Info\" tab in inbasket, or \"Choose Columns\" in Meds & Orders section of the refill encounter.              Multiple Vitamins-Minerals (CEROVITE SENIOR) TABS [Pharmacy " "Med Name: CEROVITE TAB SENIOR]  11    Last Written Prescription Date:  9/17/2018  Last Fill Quantity: 93,  # refills: 3   Last office visit: 10/30/2018 with prescribing provider:  Darwin   Future Office Visit:     Sig: TAKE 1 TABLET BY MOUTH ONCE DAILY    Vitamin Supplements (Adult) Protocol Passed    11/7/2018  4:14 PM       Passed - High dose Vitamin D not ordered       Passed - Recent (12 mo) or future (30 days) visit within the authorizing provider's specialty    Patient had office visit in the last 12 months or has a visit in the next 30 days with authorizing provider or within the authorizing provider's specialty.  See \"Patient Info\" tab in inbasket, or \"Choose Columns\" in Meds & Orders section of the refill encounter.              ELIQUIS 5 MG tablet [Pharmacy Med Name: ELIQUIS TAB 5MG]  11    Last Written Prescription Date:  NO fills notes transitional  Last Fill Quantity: -,  # refills: -   Last office visit: 10/30/2018 with prescribing provider:  Darwin   Future Office Visit:     Sig: TAKE 1 TABLET BY MOUTH TWICE DAILY    Direct Oral Anticoagulant Agents Failed    11/7/2018  4:14 PM       Failed - Patient is 18-79 years of age       Passed - Normal Platelets on file in past 12 months    Recent Labs   Lab Test  10/10/18   0715   PLT  295              Passed - Medication is active on med list       Passed - Serum creatinine less than or equal to 1.4 on file in past 12 mos    Recent Labs   Lab Test  10/03/18   0755   CR  0.92            Passed - Weight is greater than 60 kg for the past year    Wt Readings from Last 3 Encounters:   10/30/18 166 lb (75.3 kg)   10/22/18 166 lb 6.4 oz (75.5 kg)   10/12/18 166 lb 6.4 oz (75.5 kg)            Passed - Recent (6 mo) or future (30 days) visit within the authorizing provider's specialty        OMEGA-3 FISH OIL 1000 MG capsule [Pharmacy Med Name: FISH OIL CAP 1000MG]  11    Last Written Prescription Date:  9/12/2018  Last Fill Quantity: 31,  # refills: 12   Last office " "visit: 10/30/2018 with prescribing provider:  Darwin   Future Office Visit:     Sig: TAKE 1 CAPSULE BY MOUTH ONCE DAILY    Vitamin Supplements (Adult) Protocol Passed    11/7/2018  4:14 PM       Passed - High dose Vitamin D not ordered       Passed - Recent (12 mo) or future (30 days) visit within the authorizing provider's specialty    Patient had office visit in the last 12 months or has a visit in the next 30 days with authorizing provider or within the authorizing provider's specialty.  See \"Patient Info\" tab in inbasket, or \"Choose Columns\" in Meds & Orders section of the refill encounter.              lisinopril (PRINIVIL/ZESTRIL) 20 MG tablet [Pharmacy Med Name: LISINOPRIL TAB 20MG]  11    Last Written Prescription Date:  9/27/2018  Last Fill Quantity: 90,  # refills: 3   Last office visit: 10/30/2018 with prescribing provider:  Darwin     Future Office Visit:     Sig: TAKE 1 TABLET BY MOUTH ONCE DAILY    ACE Inhibitors (Including Combos) Protocol Passed    11/7/2018  4:14 PM       Passed - Blood pressure under 140/90 in past 12 months    BP Readings from Last 3 Encounters:   10/30/18 110/52   10/22/18 115/64   10/12/18 125/65                Passed - Recent (12 mo) or future (30 days) visit within the authorizing provider's specialty    Patient had office visit in the last 12 months or has a visit in the next 30 days with authorizing provider or within the authorizing provider's specialty.  See \"Patient Info\" tab in inbasket, or \"Choose Columns\" in Meds & Orders section of the refill encounter.             Passed - Patient is age 18 or older       Passed - Normal serum creatinine on file in past 12 months    Recent Labs   Lab Test  10/03/18   0755   CR  0.92            Passed - Normal serum potassium on file in past 12 months    Recent Labs   Lab Test  10/03/18   0755   POTASSIUM  3.7             metoprolol tartrate (LOPRESSOR) 25 MG tablet [Pharmacy Med Name: METOPROL TAR TAB 25MG]  11    Last Written " "Prescription Date:  10/1/2018  Last Fill Quantity: 60,  # refills: 0   Last office visit: 10/30/2018 with prescribing provider:  Darwin   Future Office Visit:     Sig: TAKE ONE-FOURTH TABLET (6.25MG) BY MOUTH TWICE DAILY    Beta-Blockers Protocol Passed    11/7/2018  4:14 PM       Passed - Blood pressure under 140/90 in past 12 months    BP Readings from Last 3 Encounters:   10/30/18 110/52   10/22/18 115/64   10/12/18 125/65                Passed - Patient is age 6 or older       Passed - Recent (12 mo) or future (30 days) visit within the authorizing provider's specialty    Patient had office visit in the last 12 months or has a visit in the next 30 days with authorizing provider or within the authorizing provider's specialty.  See \"Patient Info\" tab in inbasket, or \"Choose Columns\" in Meds & Orders section of the refill encounter.                "

## 2018-11-08 ENCOUNTER — MEDICAL CORRESPONDENCE (OUTPATIENT)
Dept: HEALTH INFORMATION MANAGEMENT | Facility: CLINIC | Age: 83
End: 2018-11-08

## 2018-11-09 DIAGNOSIS — I25.10 ATHEROSCLEROSIS OF NATIVE CORONARY ARTERY OF NATIVE HEART WITHOUT ANGINA PECTORIS: ICD-10-CM

## 2018-11-09 DIAGNOSIS — E78.2 MIXED HYPERLIPIDEMIA: ICD-10-CM

## 2018-11-09 DIAGNOSIS — I10 HYPERTENSION GOAL BP (BLOOD PRESSURE) < 140/90: ICD-10-CM

## 2018-11-09 DIAGNOSIS — E63.8 IMBALANCED NUTRITION: ICD-10-CM

## 2018-11-09 DIAGNOSIS — I48.91 ATRIAL FIBRILLATION, UNSPECIFIED TYPE (H): ICD-10-CM

## 2018-11-09 RX ORDER — APIXABAN 5 MG/1
TABLET, FILM COATED ORAL
Qty: 180 TABLET | Refills: 3 | Status: SHIPPED | OUTPATIENT
Start: 2018-11-09 | End: 2018-11-13

## 2018-11-12 RX ORDER — ATORVASTATIN CALCIUM 10 MG/1
TABLET, FILM COATED ORAL
Refills: 11 | OUTPATIENT
Start: 2018-11-12

## 2018-11-12 RX ORDER — APIXABAN 5 MG/1
TABLET, FILM COATED ORAL
Refills: 11 | OUTPATIENT
Start: 2018-11-12

## 2018-11-12 RX ORDER — METOPROLOL TARTRATE 25 MG/1
TABLET, FILM COATED ORAL
Refills: 11 | OUTPATIENT
Start: 2018-11-12

## 2018-11-12 RX ORDER — LISINOPRIL 20 MG/1
TABLET ORAL
Refills: 11 | OUTPATIENT
Start: 2018-11-12

## 2018-11-12 RX ORDER — MULTIVIT-MIN/FA/LYCOPEN/LUTEIN .4-300-25
TABLET ORAL
Refills: 11 | OUTPATIENT
Start: 2018-11-12

## 2018-11-12 RX ORDER — CHLORAL HYDRATE 500 MG
CAPSULE ORAL
Qty: 28 CAPSULE | Refills: 11 | OUTPATIENT
Start: 2018-11-12

## 2018-11-12 NOTE — TELEPHONE ENCOUNTER
"All of these medications have refills  Called pharmacist and notified - he will have his tech look into why they were sending this request as they can see in Epic that there are multiple refills on all medication  Ok to change to a 31 day supply and refills if needed    Erica RyanRN BSN  [facility]  [phone]    Requested Prescriptions   Pending Prescriptions Disp Refills     atorvastatin (LIPITOR) 10 MG tablet [Pharmacy Med Name: ATORVASTATIN TAB 10MG]  11    Last Written Prescription Date:  09/27/18  Last Fill Quantity: 90,  # refills: 3   Last office visit: 10/30/2018 with prescribing provider:     Future Office Visit:     Sig: TAKE 1 TABLET BY MOUTH ONCE DAILY    Statins Protocol Failed    11/9/2018  3:28 PM       Failed - No abnormal creatine kinase in past 12 months    Recent Labs   Lab Test  09/28/18   2020   CKT  322*               Passed - LDL on file in past 12 months    Recent Labs   Lab Test  09/30/18   0950   LDL  61            Passed - Recent (12 mo) or future (30 days) visit within the authorizing provider's specialty    Patient had office visit in the last 12 months or has a visit in the next 30 days with authorizing provider or within the authorizing provider's specialty.  See \"Patient Info\" tab in inbasket, or \"Choose Columns\" in Meds & Orders section of the refill encounter.             Passed - Patient is age 18 or older        ELIQUIS 5 MG tablet [Pharmacy Med Name: ELIQUIS TAB 5MG]  11    Last Written Prescription Date:  11/09/18  Last Fill Quantity: 180,  # refills: 3   Last office visit: 10/30/2018 with prescribing provider:     Future Office Visit:   Sig: TAKE 1 TABLET BY MOUTH TWICE DAILY    Direct Oral Anticoagulant Agents Failed    11/9/2018  3:28 PM       Failed - Patient is 18-79 years of age       Passed - Normal Platelets on file in past 12 months    Recent Labs   Lab Test  10/10/18   0715   PLT  295              Passed - Medication is active on med list       " "Passed - Serum creatinine less than or equal to 1.4 on file in past 12 mos    Recent Labs   Lab Test  10/03/18   0755   CR  0.92            Passed - Weight is greater than 60 kg for the past year    Wt Readings from Last 3 Encounters:   10/30/18 166 lb (75.3 kg)   10/22/18 166 lb 6.4 oz (75.5 kg)   10/12/18 166 lb 6.4 oz (75.5 kg)            Passed - Recent (6 mo) or future (30 days) visit within the authorizing provider's specialty        lisinopril (PRINIVIL/ZESTRIL) 20 MG tablet [Pharmacy Med Name: LISINOPRIL TAB 20MG]  11     Sig: TAKE 1 TABLET BY MOUTH ONCE DAILY    ACE Inhibitors (Including Combos) Protocol Passed    11/9/2018  3:28 PM       Passed - Blood pressure under 140/90 in past 12 months    BP Readings from Last 3 Encounters:   10/30/18 110/52   10/22/18 115/64   10/12/18 125/65                Passed - Recent (12 mo) or future (30 days) visit within the authorizing provider's specialty    Patient had office visit in the last 12 months or has a visit in the next 30 days with authorizing provider or within the authorizing provider's specialty.  See \"Patient Info\" tab in inbasket, or \"Choose Columns\" in Meds & Orders section of the refill encounter.             Passed - Patient is age 18 or older       Passed - Normal serum creatinine on file in past 12 months    Recent Labs   Lab Test  10/03/18   0755   CR  0.92            Passed - Normal serum potassium on file in past 12 months    Recent Labs   Lab Test  10/03/18   0755   POTASSIUM  3.7             metoprolol tartrate (LOPRESSOR) 25 MG tablet [Pharmacy Med Name: METOPROL TAR TAB 25MG]  11    Last Written Prescription Date:  09/27/18  Last Fill Quantity: 180,  # refills: 3   Last office visit: 10/30/2018 with prescribing provider:     Future Office Visit:     Sig: TAKE ONE-FOURTH TABLET (6.25MG) BY MOUTH TWICE DAILY    Beta-Blockers Protocol Passed    11/9/2018  3:28 PM       Passed - Blood pressure under 140/90 in past 12 months    BP Readings from " "Last 3 Encounters:   10/30/18 110/52   10/22/18 115/64   10/12/18 125/65                Passed - Patient is age 6 or older       Passed - Recent (12 mo) or future (30 days) visit within the authorizing provider's specialty    Patient had office visit in the last 12 months or has a visit in the next 30 days with authorizing provider or within the authorizing provider's specialty.  See \"Patient Info\" tab in inbasket, or \"Choose Columns\" in Meds & Orders section of the refill encounter.              Calcium Carb-Cholecalciferol (CALCIUM + D3) 600-200 MG-UNIT TABS [Pharmacy Med Name: CALCIUM 600MG/VIT D 200IU]  11     Sig: TAKE 1 TABLET BY MOUTH ONCE DAILY    Vitamin Supplements (Adult) Protocol Passed    11/9/2018  3:28 PM       Passed - High dose Vitamin D not ordered       Passed - Recent (12 mo) or future (30 days) visit within the authorizing provider's specialty    Patient had office visit in the last 12 months or has a visit in the next 30 days with authorizing provider or within the authorizing provider's specialty.  See \"Patient Info\" tab in inbasket, or \"Choose Columns\" in Meds & Orders section of the refill encounter.              Multiple Vitamins-Minerals (CEROVITE SENIOR) TABS [Pharmacy Med Name: CEROVITE TAB SENIOR]  11     Sig: TAKE 1 TABLET BY MOUTH ONCE DAILY    Vitamin Supplements (Adult) Protocol Passed    11/9/2018  3:28 PM       Passed - High dose Vitamin D not ordered       Passed - Recent (12 mo) or future (30 days) visit within the authorizing provider's specialty    Patient had office visit in the last 12 months or has a visit in the next 30 days with authorizing provider or within the authorizing provider's specialty.  See \"Patient Info\" tab in inbasket, or \"Choose Columns\" in Meds & Orders section of the refill encounter.              OMEGA-3 FISH OIL 1000 MG capsule [Pharmacy Med Name: FISH OIL CAP 1000MG]  11     Sig: TAKE 1 CAPSULE BY MOUTH ONCE DAILY    Vitamin Supplements (Adult) " "Protocol Passed    11/9/2018  3:28 PM       Passed - High dose Vitamin D not ordered       Passed - Recent (12 mo) or future (30 days) visit within the authorizing provider's specialty    Patient had office visit in the last 12 months or has a visit in the next 30 days with authorizing provider or within the authorizing provider's specialty.  See \"Patient Info\" tab in inbasket, or \"Choose Columns\" in Meds & Orders section of the refill encounter.                  "

## 2018-11-13 DIAGNOSIS — I25.10 ATHEROSCLEROSIS OF NATIVE CORONARY ARTERY OF NATIVE HEART WITHOUT ANGINA PECTORIS: ICD-10-CM

## 2018-11-13 RX ORDER — LISINOPRIL 20 MG/1
20 TABLET ORAL DAILY
Qty: 90 TABLET | Refills: 3 | Status: SHIPPED | OUTPATIENT
Start: 2018-11-13 | End: 2019-06-26

## 2018-11-13 RX ORDER — ATORVASTATIN CALCIUM 10 MG/1
10 TABLET, FILM COATED ORAL DAILY
Qty: 90 TABLET | Refills: 3 | Status: SHIPPED | OUTPATIENT
Start: 2018-11-13 | End: 2019-10-09

## 2018-11-13 RX ORDER — MULTIVIT-MIN/FA/LYCOPEN/LUTEIN .4-300-25
1 TABLET ORAL DAILY
Qty: 90 TABLET | Refills: 3 | Status: SHIPPED | OUTPATIENT
Start: 2018-11-13 | End: 2019-10-09

## 2018-11-13 RX ORDER — CHLORAL HYDRATE 500 MG
CAPSULE ORAL
Qty: 90 CAPSULE | Refills: 3 | Status: SHIPPED | OUTPATIENT
Start: 2018-11-13 | End: 2019-06-26

## 2018-11-13 RX ORDER — METOPROLOL TARTRATE 25 MG/1
6.25 TABLET, FILM COATED ORAL 2 TIMES DAILY
Qty: 60 TABLET | Refills: 3 | Status: SHIPPED | OUTPATIENT
Start: 2018-11-13 | End: 2019-12-03

## 2018-11-13 NOTE — TELEPHONE ENCOUNTER
Pharmacist from  long term pharmacy calling states pt does need refills on the medications listed below.    Pt went into assisted living and now is home but the meds that were ordered in September do not transfer back from assisted living and pharmacy needs new orders.      meds and pharmacy pended.    Prescription approved per [facility] Refill Protocol.    Routing refill request to provider for review/approval because:  Drug not on the [facility] refill protocol for Multivitamin, Fish oil, and calcium with vitamin D.    Denise GILBERT RN  EP Triage

## 2018-11-14 RX ORDER — CHLORAL HYDRATE 500 MG
CAPSULE ORAL
Refills: 11 | OUTPATIENT
Start: 2018-11-14

## 2018-11-16 ENCOUNTER — TELEPHONE (OUTPATIENT)
Dept: FAMILY MEDICINE | Facility: CLINIC | Age: 83
End: 2018-11-16

## 2018-11-26 ENCOUNTER — HOSPITAL LABORATORY (OUTPATIENT)
Dept: OTHER | Facility: CLINIC | Age: 83
End: 2018-11-26

## 2018-11-30 LAB
CREAT SERPL-MCNC: 1.06 MG/DL (ref 0.66–1.25)
GFR SERPL CREATININE-BSD FRML MDRD: 66 ML/MIN/1.7M2

## 2018-12-13 ENCOUNTER — OFFICE VISIT (OUTPATIENT)
Dept: OPHTHALMOLOGY | Facility: CLINIC | Age: 83
End: 2018-12-13
Attending: OPHTHALMOLOGY
Payer: MEDICARE

## 2018-12-13 DIAGNOSIS — H53.40 VISUAL FIELD DEFECT: Primary | ICD-10-CM

## 2018-12-13 DIAGNOSIS — I63.532 CEREBROVASCULAR ACCIDENT (CVA) DUE TO OCCLUSION OF LEFT POSTERIOR CEREBRAL ARTERY (H): ICD-10-CM

## 2018-12-13 DIAGNOSIS — H53.10 SUBJECTIVE VISUAL DISTURBANCE: ICD-10-CM

## 2018-12-13 DIAGNOSIS — H53.10 SUBJECTIVE VISUAL DISTURBANCE: Primary | ICD-10-CM

## 2018-12-13 PROCEDURE — G0463 HOSPITAL OUTPT CLINIC VISIT: HCPCS | Mod: ZF

## 2018-12-13 PROCEDURE — 92083 EXTENDED VISUAL FIELD XM: CPT | Mod: ZF | Performed by: OPHTHALMOLOGY

## 2018-12-13 ASSESSMENT — REFRACTION_WEARINGRX
OD_SPHERE: -2.00
OD_AXIS: 010
OD_ADD: +2.50
OS_CYLINDER: +1.00
OS_AXIS: 180
OS_SPHERE: -1.00
OD_CYLINDER: +2.00
OS_ADD: +2.50
SPECS_TYPE: BIFOCAL

## 2018-12-13 ASSESSMENT — CONF VISUAL FIELD
OS_INFERIOR_NASAL_RESTRICTION: 3
OD_SUPERIOR_NASAL_RESTRICTION: 3

## 2018-12-13 ASSESSMENT — VISUAL ACUITY
OD_PH_CC: 60+2
METHOD: SNELLEN - LINEAR
OD_CC: 20/60
OS_CC: 20/30
OS_CC+: +2
CORRECTION_TYPE: GLASSES

## 2018-12-13 ASSESSMENT — SLIT LAMP EXAM - LIDS
COMMENTS: NORMAL
COMMENTS: NORMAL

## 2018-12-13 ASSESSMENT — TONOMETRY
OS_IOP_MMHG: 12
IOP_METHOD: TONOPEN
OD_IOP_MMHG: 11

## 2018-12-13 ASSESSMENT — CUP TO DISC RATIO
OD_RATIO: 0.5
OS_RATIO: 0.6

## 2018-12-13 NOTE — LETTER
2018         RE:  :  MRN: Jose E Capps  [id number]  [id number]     Dear Dr. Delgado:     Thank you for asking me to see your very pleasant patient, Jose E Capps, in neuro-ophthalmic consultation.  I would like to thank you for sending your records and I have summarized them in the history of present illness. He presented with his son who provided additional history.  My assessment and plan are below.  For further details, please see my attached clinic note.           Assessment & Plan     Jose E Capps is a 87 year old male with the following diagnoses:   1. Visual field defect    2. Subjective visual disturbance    3. Cerebrovascular accident (CVA) due to occlusion of left posterior cerebral artery (H)         Jose E Capps is an 87 year old male who presents for evaluation of subjective blurry vision.  He fell and hit his head on a coffee table on .  While in the hospital had some problems speaking, right sided word cut, right sided weakness  He was diagnosed with having left PCA stroke and left vertebral artery dissection.  He has to move reading material off to the left side.  Denies problems with bumping into things but not moving around much since stroke.  Denies double vision.  He has a history of atrial fibrillation and is on blood thinners      Visual acuity 20/60 RIGHT eye and 20/30.  No nystagmus.  Extraocular movements are full.  Anterior segment exam shows posterior chamber intraocular lens (PCIOL) both eyes with trace posterior capsular opacity (PCO) RIGHT eye.   Visual field shows a right homonymous hemianopia.       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  We discussed that this is not likely to improve.  We discussed that he does not meet the requirements to drive in the [place] [facility], but he was not driving prior to the stroke.  If he starts noticing more difficulties  reading and getting around, could consider having low vision evaluation with Mary Crook.  We discussed strategies to help him read better.  Follow up as needed for worsening symptoms.          Again, thank you for allowing me to participate in the care of your patient.      Sincerely,    Charles Acosta MD  Professor  Ophthalmology Residency   Director of Neuro-Ophthalmology  [place] [facility] [employer] Endowed Chair  Departments of Ophthalmology, Neurology, and Neurosurgery  [facility] [address] [place]  [address]  T - [phone]  F - [phone]  CONRAD [email or web address]      CC: Olivia Joyner RN  VIA In Basket     Tab Figueroa MD  [address] [facility] [address]  VIA In Basket     Nehemias Granados MD  [address]  [place] [address]  VIA In Basket     Shelton Delgado MD  [facility]  [address]  [facility] [address]  VIA Facsimile: [phone]       DX = homonymous hemianopia, stroke

## 2018-12-13 NOTE — NURSING NOTE
Patient presents for consultation for Subjective visual disturbance bilateral. The vision is good out of the LE, the RE is blurred 3-4months after traumatic fall in September with slight stroke. No pain or discomfort, no redness itching or tears. No flashes or floaters. No eye drops. Wears glasses CORRINE Lyly Richard COT 1:50 PM December 13, 2018

## 2018-12-13 NOTE — PROGRESS NOTES
Assessment & Plan     Jose E Capps is a 87 year old male with the following diagnoses:   1. Visual field defect    2. Subjective visual disturbance    3. Cerebrovascular accident (CVA) due to occlusion of left posterior cerebral artery (H)         Jose E Capps is an 87 year old male who presents for evaluation of subjective blurry vision.  He fell and hit his head on a coffee table on September 28th.  While in the hospital had some problems speaking, right sided word cut, right sided weakness  He was diagnosed with having left PCA stroke and left vertebral artery dissection.  He has to move reading material off to the left side.  Denies problems with bumping into things but not moving around much since stroke.  Denies double vision.  He has a history of atrial fibrillation and is on blood thinners      Visual acuity 20/60 RIGHT eye and 20/30.  No nystagmus.  Extraocular movements are full.  Anterior segment exam shows posterior chamber intraocular lens (PCIOL) both eyes with trace posterior capsular opacity (PCO) RIGHT eye.   Visual field shows a right homonymous hemianopia.       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  We discussed that this is not likely to improve.  We discussed that he does not meet the requirements to drive in the [place] [facility], but he was not driving prior to the stroke.  If he starts noticing more difficulties reading and getting around, could consider having low vision evaluation with Mary Crook.  We discussed strategies to help him read better.  Follow up as needed for worsening symptoms.           Attending Physician Attestation:  Complete documentation of historical and exam elements from today's encounter can be found in the full encounter summary report (not reduplicated in this progress note).  I personally obtained the chief complaint(s) and history of present illness.  I  confirmed and edited as necessary the review of systems, past medical/surgical history, family history, social history, and examination findings as documented by others; and I examined the patient myself.  I personally reviewed the relevant tests, images, and reports as documented above.  I formulated and edited as necessary the assessment and plan and discussed the findings and management plan with the patient and family. - Charles Cifuentes M.D.  PGY-3, Ophthalmology

## 2018-12-19 ENCOUNTER — TELEPHONE (OUTPATIENT)
Dept: FAMILY MEDICINE | Facility: CLINIC | Age: 83
End: 2018-12-19

## 2018-12-19 NOTE — TELEPHONE ENCOUNTER
Nino PT with [facility] calling. She is requesting PT orders 1w2, 2w6 and HHA 1w2, 2w6. Verbal given per [facility] protocol.  Sharda Reynaga RN   [facility] - Triage

## 2018-12-24 ENCOUNTER — MEDICAL CORRESPONDENCE (OUTPATIENT)
Dept: HEALTH INFORMATION MANAGEMENT | Facility: CLINIC | Age: 83
End: 2018-12-24

## 2018-12-28 DIAGNOSIS — Z53.9 DIAGNOSIS NOT YET DEFINED: Primary | ICD-10-CM

## 2018-12-28 PROCEDURE — G0179 MD RECERTIFICATION HHA PT: HCPCS | Performed by: FAMILY MEDICINE

## 2019-01-02 ENCOUNTER — TELEPHONE (OUTPATIENT)
Dept: FAMILY MEDICINE | Facility: CLINIC | Age: 84
End: 2019-01-02

## 2019-01-02 NOTE — TELEPHONE ENCOUNTER
Nino from [facility] calling for verbal orders for PT and home health aide.    PT 2x/week for 5 weeks and home health aid 1x/week for 1 week and then 2x/week for 4 weeks.      Nino can be reached at [phone].    Verbal orders for homecare given for above and per protocol    Denise GILBERT RN  EP Triage

## 2019-01-09 ENCOUNTER — TELEPHONE (OUTPATIENT)
Dept: FAMILY MEDICINE | Facility: CLINIC | Age: 84
End: 2019-01-09

## 2019-01-09 DIAGNOSIS — Z53.9 DIAGNOSIS NOT YET DEFINED: Primary | ICD-10-CM

## 2019-01-09 PROCEDURE — G0180 MD CERTIFICATION HHA PATIENT: HCPCS | Performed by: FAMILY MEDICINE

## 2019-01-14 ENCOUNTER — PATIENT OUTREACH (OUTPATIENT)
Dept: CARE COORDINATION | Facility: CLINIC | Age: 84
End: 2019-01-14

## 2019-01-14 NOTE — PROGRESS NOTES
Clinic Care Coordination Contact  Care Coordination Communication         Home Care Contact:              Home Care Agency: [facility]               Contact name () and phone number: Nino [phone].                Plan: RN/SW Care Coordinator will await notification from home care staff informing RN/SW Care Coordinator of patients discharge plans/needs. RN/SW Care Coordinator will review chart and outreach to home care every 4 weeks and as needed.

## 2019-01-17 ENCOUNTER — CARE COORDINATION (OUTPATIENT)
Dept: MEDSURG UNIT | Facility: CLINIC | Age: 84
End: 2019-01-17

## 2019-01-17 NOTE — PROGRESS NOTES
Writer attempted to reach pt via telephone x3 for 90 day post CVA mRs score. No answer x3, therefore unable to obtain mRS score.

## 2019-01-25 ENCOUNTER — TELEPHONE (OUTPATIENT)
Dept: FAMILY MEDICINE | Facility: CLINIC | Age: 84
End: 2019-01-25

## 2019-01-25 NOTE — TELEPHONE ENCOUNTER
leflunomide (ARAVA)  10 MG tablet     Last Written Prescription Date:  10/01/2018  Last Fill Quantity: 31,   # refills: 12  Last Office Visit: 10/30/2018 NARA Granados  Future Office visit:       Routing refill request to provider for review/approval because:  Drug not on the [facility], [facility] or [facility] refill protocol or controlled substance  Drug not active on patient's medication list

## 2019-01-25 NOTE — TELEPHONE ENCOUNTER
Please to call the pharmacy to check with them and see who has been prescribing this medication.  If he has been taking it, please added to the active med list and have the pharmacy route to the ordering provider.    Nehemias Granados MD  [facility], [place]

## 2019-01-25 NOTE — TELEPHONE ENCOUNTER
Nino calling from [facility] home care for verbal orders for:     PT 2 x a week for 4 weeks for balance, gait lower extremity strengthening.    Routing to provider per [facility] protocol.    Nino call back: [phone]    LIBIA TellezN, RN  [employer] Triage

## 2019-01-25 NOTE — TELEPHONE ENCOUNTER
Also checked any discontinued medications and its also not listed there.         Bibi Cadet BSN, RN   [facility]

## 2019-01-28 RX ORDER — LEFLUNOMIDE 10 MG/1
10 TABLET ORAL DAILY
Qty: 90 TABLET | Refills: 3 | COMMUNITY
Start: 2019-01-28 | End: 2019-07-19

## 2019-01-28 RX ORDER — LEFLUNOMIDE 10 MG/1
TABLET ORAL
Refills: 11 | OUTPATIENT
Start: 2019-01-28

## 2019-01-28 NOTE — TELEPHONE ENCOUNTER
LEFLUNOMIDE 10 MG TABS New     Add as: LEFLUNOMIDE 10 MG PO TABS        External Pharmacy 1/2/2019                        Dispense Date Outside Name Pharmacy Quantity Refills remaining Days supply Sig Source               1/2/2019 LEFLUNOMIDE 10 MG TABS [facility] - [place], [place] - [address]  28.00 tablet 17 28     Unit Strength: 10 mg  Authorized by: HOLGER NDIAYE

## 2019-01-28 NOTE — TELEPHONE ENCOUNTER
Called pharmacy and left a detailed message that this was being denied and to contact Dr. Azam Ruiz.    Erica RyanRN BSN  [facility]  [phone]

## 2019-01-28 NOTE — TELEPHONE ENCOUNTER
I agree with the recommended orders.  Please give them a verbal okay.    Nehemias Granados MD  [facility], [place]

## 2019-01-29 ENCOUNTER — TELEPHONE (OUTPATIENT)
Dept: FAMILY MEDICINE | Facility: CLINIC | Age: 84
End: 2019-01-29

## 2019-02-12 ENCOUNTER — TELEPHONE (OUTPATIENT)
Dept: FAMILY MEDICINE | Facility: CLINIC | Age: 84
End: 2019-02-12

## 2019-02-12 NOTE — TELEPHONE ENCOUNTER
Called Nino Physical therapist back.     Left a non detailed message for Nino to return call.     Bibi VALLE, RN   [facility]

## 2019-02-12 NOTE — TELEPHONE ENCOUNTER
Okay to give a verbal order to proceed.     Nehemias Granados MD  [facility], [place]

## 2019-02-12 NOTE — TELEPHONE ENCOUNTER
Nino a [facility] Physical Therapist calling into the clinic with new symptoms from the patient and a request for a verbal order.    Order requesting to add intervention for Pain in Right knee    Symptoms: Mild Effusion, no tenderness to palpations, no bruising. There was no documentation of a fall. Ligament joint test negative. Patient does have a knee extension deficit of 18 degrees, as compared to the left side which has full extension. Patient does have end range pain with right knee extension.     Patient 's vitals are normal.     Nino would like to know if the patient should be seen and if so to please call her at [phone] okay to leave a detailed vm.      .Ellie GONZALEZ    [facility] [place]

## 2019-02-12 NOTE — TELEPHONE ENCOUNTER
Nino Physical Therapist calling back.  Verbal orders given, however, PCP, do you want to see patient in clinic?    Nino can be reached at: [phone], ok to leave detailed message.    LEONARD Tellez, RN  [employer] Triage

## 2019-02-13 ENCOUNTER — MEDICAL CORRESPONDENCE (OUTPATIENT)
Dept: HEALTH INFORMATION MANAGEMENT | Facility: CLINIC | Age: 84
End: 2019-02-13

## 2019-02-13 NOTE — TELEPHONE ENCOUNTER
Detailed message left with info from provider and return number to call with any questions or concerns.    Denise GILBERT RN  EP Triage

## 2019-02-13 NOTE — TELEPHONE ENCOUNTER
No with this whether I would rather have him not come to the clinic.  Unless symptoms are not improving with physical therapy or any imaging needed.    Nehemias Granados MD  [facility], [place]

## 2019-02-18 ENCOUNTER — TELEPHONE (OUTPATIENT)
Dept: GERIATRICS | Facility: CLINIC | Age: 84
End: 2019-02-18

## 2019-02-18 ENCOUNTER — HOSPITAL ENCOUNTER (EMERGENCY)
Facility: CLINIC | Age: 84
Discharge: HOME | End: 2019-02-18
Attending: EMERGENCY MEDICINE | Admitting: EMERGENCY MEDICINE
Payer: COMMERCIAL

## 2019-02-18 ENCOUNTER — PATIENT OUTREACH (OUTPATIENT)
Dept: CARE COORDINATION | Facility: CLINIC | Age: 84
End: 2019-02-18

## 2019-02-18 VITALS
SYSTOLIC BLOOD PRESSURE: 158 MMHG | HEIGHT: 64 IN | WEIGHT: 99.2 LBS | BODY MASS INDEX: 16.94 KG/M2 | TEMPERATURE: 97.8 F | HEART RATE: 81 BPM | OXYGEN SATURATION: 95 % | DIASTOLIC BLOOD PRESSURE: 69 MMHG | RESPIRATION RATE: 16 BRPM

## 2019-02-18 DIAGNOSIS — R19.7 NAUSEA VOMITING AND DIARRHEA: ICD-10-CM

## 2019-02-18 DIAGNOSIS — E86.0 DEHYDRATION: ICD-10-CM

## 2019-02-18 DIAGNOSIS — R11.2 NAUSEA VOMITING AND DIARRHEA: ICD-10-CM

## 2019-02-18 LAB
ALBUMIN UR-MCNC: 10 MG/DL
ANION GAP SERPL CALCULATED.3IONS-SCNC: 7 MMOL/L (ref 3–14)
APPEARANCE UR: CLEAR
BASOPHILS # BLD AUTO: 0 10E9/L (ref 0–0.2)
BASOPHILS NFR BLD AUTO: 0.2 %
BILIRUB UR QL STRIP: NEGATIVE
BUN SERPL-MCNC: 23 MG/DL (ref 7–30)
CALCIUM SERPL-MCNC: 8.7 MG/DL (ref 8.5–10.1)
CHLORIDE SERPL-SCNC: 106 MMOL/L (ref 94–109)
CO2 SERPL-SCNC: 26 MMOL/L (ref 20–32)
COLOR UR AUTO: YELLOW
CREAT SERPL-MCNC: 1.04 MG/DL (ref 0.66–1.25)
DIFFERENTIAL METHOD BLD: ABNORMAL
EOSINOPHIL # BLD AUTO: 0 10E9/L (ref 0–0.7)
EOSINOPHIL NFR BLD AUTO: 0.3 %
ERYTHROCYTE [DISTWIDTH] IN BLOOD BY AUTOMATED COUNT: 14.3 % (ref 10–15)
GFR SERPL CREATININE-BSD FRML MDRD: 64 ML/MIN/{1.73_M2}
GLUCOSE SERPL-MCNC: 131 MG/DL (ref 70–99)
GLUCOSE UR STRIP-MCNC: NEGATIVE MG/DL
HCT VFR BLD AUTO: 45.5 % (ref 40–53)
HGB BLD-MCNC: 15.2 G/DL (ref 13.3–17.7)
HGB UR QL STRIP: ABNORMAL
IMM GRANULOCYTES # BLD: 0 10E9/L (ref 0–0.4)
IMM GRANULOCYTES NFR BLD: 0.3 %
INTERPRETATION ECG - MUSE: NORMAL
KETONES UR STRIP-MCNC: NEGATIVE MG/DL
LEUKOCYTE ESTERASE UR QL STRIP: ABNORMAL
LYMPHOCYTES # BLD AUTO: 0.7 10E9/L (ref 0.8–5.3)
LYMPHOCYTES NFR BLD AUTO: 5.9 %
MCH RBC QN AUTO: 31 PG (ref 26.5–33)
MCHC RBC AUTO-ENTMCNC: 33.4 G/DL (ref 31.5–36.5)
MCV RBC AUTO: 93 FL (ref 78–100)
MONOCYTES # BLD AUTO: 0.6 10E9/L (ref 0–1.3)
MONOCYTES NFR BLD AUTO: 4.8 %
MUCOUS THREADS #/AREA URNS LPF: PRESENT /LPF
NEUTROPHILS # BLD AUTO: 10.1 10E9/L (ref 1.6–8.3)
NEUTROPHILS NFR BLD AUTO: 88.5 %
NITRATE UR QL: NEGATIVE
NRBC # BLD AUTO: 0 10*3/UL
NRBC BLD AUTO-RTO: 0 /100
PH UR STRIP: 5 PH (ref 5–7)
PLATELET # BLD AUTO: 191 10E9/L (ref 150–450)
POTASSIUM SERPL-SCNC: 4.6 MMOL/L (ref 3.4–5.3)
RBC # BLD AUTO: 4.9 10E12/L (ref 4.4–5.9)
RBC #/AREA URNS AUTO: 1 /HPF (ref 0–2)
SODIUM SERPL-SCNC: 139 MMOL/L (ref 133–144)
SOURCE: ABNORMAL
SP GR UR STRIP: 1.02 (ref 1–1.03)
SQUAMOUS #/AREA URNS AUTO: 1 /HPF (ref 0–1)
UROBILINOGEN UR STRIP-MCNC: NORMAL MG/DL (ref 0–2)
WBC # BLD AUTO: 11.4 10E9/L (ref 4–11)
WBC #/AREA URNS AUTO: 5 /HPF (ref 0–5)

## 2019-02-18 PROCEDURE — 93005 ELECTROCARDIOGRAM TRACING: CPT

## 2019-02-18 PROCEDURE — 85025 COMPLETE CBC W/AUTO DIFF WBC: CPT | Performed by: EMERGENCY MEDICINE

## 2019-02-18 PROCEDURE — 80048 BASIC METABOLIC PNL TOTAL CA: CPT | Performed by: EMERGENCY MEDICINE

## 2019-02-18 PROCEDURE — 25000128 H RX IP 250 OP 636: Performed by: EMERGENCY MEDICINE

## 2019-02-18 PROCEDURE — 81001 URINALYSIS AUTO W/SCOPE: CPT | Performed by: EMERGENCY MEDICINE

## 2019-02-18 PROCEDURE — 96374 THER/PROPH/DIAG INJ IV PUSH: CPT

## 2019-02-18 PROCEDURE — 99284 EMERGENCY DEPT VISIT MOD MDM: CPT | Mod: 25

## 2019-02-18 PROCEDURE — 96361 HYDRATE IV INFUSION ADD-ON: CPT

## 2019-02-18 RX ORDER — ONDANSETRON 2 MG/ML
4 INJECTION INTRAMUSCULAR; INTRAVENOUS ONCE
Status: COMPLETED | OUTPATIENT
Start: 2019-02-18 | End: 2019-02-18

## 2019-02-18 RX ORDER — LOPERAMIDE HYDROCHLORIDE 2 MG/1
2 TABLET ORAL 4 TIMES DAILY PRN
Qty: 12 TABLET | Refills: 0 | Status: SHIPPED | OUTPATIENT
Start: 2019-02-18 | End: 2019-06-26

## 2019-02-18 RX ORDER — ONDANSETRON 4 MG/1
4 TABLET, ORALLY DISINTEGRATING ORAL EVERY 6 HOURS PRN
Qty: 6 TABLET | Refills: 0 | Status: SHIPPED | OUTPATIENT
Start: 2019-02-18 | End: 2019-06-26

## 2019-02-18 RX ADMIN — ONDANSETRON 4 MG: 2 INJECTION INTRAMUSCULAR; INTRAVENOUS at 16:01

## 2019-02-18 RX ADMIN — SODIUM CHLORIDE 1000 ML: 9 INJECTION, SOLUTION INTRAVENOUS at 16:01

## 2019-02-18 ASSESSMENT — ENCOUNTER SYMPTOMS
WEAKNESS: 1
CHILLS: 0
FEVER: 0
NAUSEA: 1
DIARRHEA: 1
VOMITING: 1

## 2019-02-18 ASSESSMENT — MIFFLIN-ST. JEOR
SCORE: 1422.89
SCORE: 1035.97
SCORE: 1036.25

## 2019-02-18 NOTE — ED AVS SNAPSHOT
Emergency Department  [address] [place] [address]  Phone:  [phone]  Fax:  [phone]                                    Jose E Capps   MRN: [id number]    Department:   Emergency Department   Date of Visit:  2/18/2019           After Visit Summary Signature Page    I have received my discharge instructions, and my questions have been answered. I have discussed any challenges I see with this plan with the nurse or doctor.    ..........................................................................................................................................  Patient/Patient Representative Signature      ..........................................................................................................................................  Patient Representative Print Name and Relationship to Patient    ..................................................               ................................................  Date                                   Time    ..........................................................................................................................................  Reviewed by Signature/Title    ...................................................              ..............................................  Date                                               Time          22EPIC Rev 08/18

## 2019-02-18 NOTE — DISCHARGE INSTRUCTIONS
Push fluids, Zofran as needed for nausea, Imodium as needed for diarrhea.  Diet as tolerated.  Recheck in the clinic if not improving in the next 2-3 days, return sooner if you get worse.

## 2019-02-18 NOTE — TELEPHONE ENCOUNTER
Flores from [place] nursing office.  Vomited 9-11 times last night.  Is not eating or drinking.  Vitals are 161/73, P: 107, T: 98,  R: 19 and O2 96%.  Wondering if pt should be seen in clinic or urgent care?    Advised pt needs iv fluids and should be seen either uc or er.  Flores will contact family and let them know clinic advise.    Denise GILBERT RN  EP Triage

## 2019-02-18 NOTE — PROGRESS NOTES
Clinic Care Coordination Contact  Care Team Conversations    Patient is being seen in Emergency Department    Clinic Care Coordinator RN will review chart on the following business day.

## 2019-02-18 NOTE — ED PROVIDER NOTES
History     Chief Complaint:  Nausea & Vomiting     HPI   Jose E Capps is a 87 year old male with history of dementia, hypertension, hyperlipidemia, CVA, TIA, among others, who presents from his assisted living facility for evaluation of nausea/vomiting and diarrhea yesterday and today has been generally weak. He uses a walker and/or cane for ambulation and was feeling too weak to do so today. The patient does not remember when his last bowel movement was. No recent antibiotic use and he denies having any other sick contacts of late. No urinary symptoms. No fevers or chills.      Allergies:  No Known Drug Allergies       Medications:    apixaban ANTICOAGULANT (ELIQUIS) 5 MG tablet  ASPIRIN   atorvastatin (LIPITOR) 10 MG tablet  leflunomide (ARAVA) 10 MG tablet  lisinopril (PRINIVIL/ZESTRIL) 20 MG tablet  metoprolol tartrate (LOPRESSOR) 25 MG tablet  sennosides (SENOKOT) 8.6 MG tablet    Past Medical History:    CVA  TIA  Atherosclerosis    Malignant melanoma - skin  Hypertension   Hyperlipidemia   Colon polyp   RA  BPH     Past Surgical History:    Lumbar spine fusion   Right hip replacement   Melanoma excised   Knee replacement      Family History:    Diabetes   Alzheimer disease   Cancer   Cardiovascular     Social History:  Patient presents alone.  Smoking Status: former, 30 total years  Smokeless Tobacco: never  Alcohol Use: yes  Drug Use: no    Marital Status:   [2]     Review of Systems   Constitutional: Negative for chills and fever.   Gastrointestinal: Positive for diarrhea, nausea and vomiting.   Genitourinary: Negative.    Neurological: Positive for weakness (general).   All other systems reviewed and are negative.      Physical Exam     Patient Vitals for the past 24 hrs:   BP Temp Temp src Pulse Heart Rate Resp SpO2 Height Weight   02/18/19 1740 158/69 -- -- 81 -- 16 95 % -- --   02/18/19 1704 163/78 -- -- 77 -- 18 92 % -- --   02/18/19 1408 125/87 97.8  F (36.6  C) Temporal -- 71 20 98 %  "1.626 m (5' 4\") 45 kg (99 lb 3.2 oz)   02/18/19 1354 128/54 97.7  F (36.5  C) Oral -- 96 18 94 % 1.803 m (5' 11\") 72.6 kg (160 lb)      Physical Exam  Nursing note and vitals reviewed.    Constitutional:  Appears well-developed and well-nourished, comfortable.    HENT:    Nose normal.  No discharge.      Oropharynx is clear and dry.  Eyes:    Conjunctivae are normal without injection. No lid droop.     No scleral icterus.  Lymph:  No enlarged or tender cervical or submandibular lymph nodes.   Cardiovascular:  Normal rate, regular rhythm with normal S1 and S2.      Normal heart sounds and peripheral pulses 2+ and equal.       No murmur or dayna.  Pulmonary:  Effort normal and breath sounds clear to auscultation bilaterally   No respiratory distress.  No stridor.     No wheezes. No rales.     GI:    Soft. No distension and no mass. No tenderness.      No rebound and no guarding.   Musculoskeletal:  Normal range of motion. No extremity deformity.   Neurological:   Alert. No cranial nerve deficit, no facial droop.     Exhibits good muscle tone. Coordination normal.      GCS eye subscore is 4. GCS verbal subscore is 5.      GCS motor subscore is 6.   Skin:    Skin is warm and dry. No rash noted. No diaphoresis.      No erythema. No pallor.  No lesions.  Psychiatric:   Behavior is normal. Appropriate mood and affect.  Dimension noted.          Emergency Department Course     ECG:  ECG taken at 1550, ECG read at 1605  Sinus tachycardia with occasional PVC's  Right BBB  Abnormal ECG   No significant change from ECG dated 09/28/18, patient previously in atrial fibrillation.    Rate 104 bpm. NC interval 204. QRS duration 128. QT/QTc 374/491. P-R-T axes 46,11,55.     Laboratory:  Laboratory findings were communicated with the patient who voiced understanding of the findings.    UA with micro: trace blood (A), albumin 10 (A), ANNALISA small (A), o/w negative     CBC: WBC 11.4 (H), HGB 15.2,   BMP: Glucose 131 (H) o/w WNL " (Creatinine 1.04)    Interventions:  1601 NS, 1 L, IV   1601 Zofran 4mg IV      Emergency Department Course:  Nursing notes and vitals reviewed.  I entered the room.  I performed an exam of the patient as documented above.     EKG obtained in the ED, see results above.      IV was inserted and blood was drawn for laboratory testing, results above.    The patient provided a urine sample here in the emergency department. This was sent for laboratory testing, findings above.     The patient received the above intervention(s).     1720 the patient was rechecked and family was updated regarding the results of the laboratory studies.      1811 the patient was rechecked.     I discussed the treatment plan with the patient and family. They expressed understanding of this plan and consented to discharge. They will be discharged home with instructions for care and follow up. In addition, the patient will return to the emergency department if their symptoms worsen, if new symptoms arise or if there is any concern.  All questions were answered.     Impression & Plan      Medical Decision Making:  Jose E Capps is a 87 year old male who presents today for evaluation of nausea, vomiting, and diarrhea. He lives in a care facility where the stomach flu is going around. Blood work here shows a white count of 11.4, but is otherwise normal. CBC and basic metabolic panel is normal. Urine is unremarkable. He was given a liter of saline and 4 of Zofran IV. He had no further diarrhea or vomiting here. He was feeling better and tolerating oral well. He has no abdominal tenderness so I do not feel CT or further workup is indicated. This is likely viral. I will send him home with Zofran and Imodium as needed.    Diagnosis:    ICD-10-CM    1. Nausea vomiting and diarrhea R11.2 UA with Microscopic    R19.7    2. Dehydration E86.0      Disposition:   The patient was discharged to home.    Push fluids, Zofran as needed for nausea, Imodium as  needed for diarrhea.  Diet as tolerated.  Recheck in the clinic if not improving in the next 2-3 days, return sooner if you get worse.    Discharge Medications:  Current Discharge Medication List      START taking these medications    Details   ondansetron (ZOFRAN ODT) 4 MG ODT tab Take 1 tablet (4 mg) by mouth every 6 hours as needed for nausea  Qty: 6 tablet, Refills: 0            Scribe Disclosure:  I, Castillo Louie, am serving as a scribe at 4:11 PM on 2/18/2019 to document services personally performed by Latanya Jacobs MD, based on my observations and the provider's statements to me.    EMERGENCY DEPARTMENT       Latanya Jacobs MD  02/19/19 0054

## 2019-02-18 NOTE — ED NOTES
Bed: ED15  Expected date:   Expected time:   Means of arrival:   Comments:  brenda Thacker f weakness eta 0197

## 2019-02-19 ENCOUNTER — TELEPHONE (OUTPATIENT)
Dept: GERIATRICS | Facility: CLINIC | Age: 84
End: 2019-02-19

## 2019-02-19 NOTE — TELEPHONE ENCOUNTER
Prior Authorization Retail Medication Request    Medication/Dose: ONDANSETRON TAB 4MG ODT  ICD code (if different than what is on RX):  Nausea  Previously Tried and Failed:    Rationale:  Nausea, vomiting and diarrhea    Insurance Name:  [facility]  Insurance ID: [id number]      Pharmacy Information (if different than what is on RX)  Name:  [facility]  Phone:  [phone]

## 2019-02-20 ENCOUNTER — ASSISTED LIVING VISIT (OUTPATIENT)
Dept: GERIATRICS | Facility: CLINIC | Age: 84
End: 2019-02-20
Payer: COMMERCIAL

## 2019-02-20 VITALS
HEART RATE: 69 BPM | SYSTOLIC BLOOD PRESSURE: 120 MMHG | RESPIRATION RATE: 16 BRPM | DIASTOLIC BLOOD PRESSURE: 70 MMHG | OXYGEN SATURATION: 97 % | TEMPERATURE: 98.6 F

## 2019-02-20 DIAGNOSIS — I10 HYPERTENSION GOAL BP (BLOOD PRESSURE) < 140/90: ICD-10-CM

## 2019-02-20 DIAGNOSIS — R53.81 PHYSICAL DECONDITIONING: ICD-10-CM

## 2019-02-20 DIAGNOSIS — Z85.828 HISTORY OF SKIN CANCER: ICD-10-CM

## 2019-02-20 DIAGNOSIS — R11.2 NON-INTRACTABLE VOMITING WITH NAUSEA, UNSPECIFIED VOMITING TYPE: Primary | ICD-10-CM

## 2019-02-20 DIAGNOSIS — M06.00 SERONEGATIVE RHEUMATOID ARTHRITIS (H): ICD-10-CM

## 2019-02-20 DIAGNOSIS — I48.0 PAROXYSMAL A-FIB (H): ICD-10-CM

## 2019-02-20 DIAGNOSIS — Z86.73 HISTORY OF TIA (TRANSIENT ISCHEMIC ATTACK) AND STROKE: ICD-10-CM

## 2019-02-20 DIAGNOSIS — R41.89 COGNITIVE IMPAIRMENT: ICD-10-CM

## 2019-02-20 DIAGNOSIS — I25.119 CORONARY ARTERY DISEASE WITH ANGINA PECTORIS, UNSPECIFIED VESSEL OR LESION TYPE, UNSPECIFIED WHETHER NATIVE OR TRANSPLANTED HEART (H): ICD-10-CM

## 2019-02-20 NOTE — TELEPHONE ENCOUNTER
Central Prior Authorization Team   Phone: [phone]    PA Initiation    Medication: ONDANSETRON TAB 4MG ODT  Insurance Company: [facility] - Phone [phone] Fax [phone]  Pharmacy Filling the Rx: [facility] - [place], [place] - [address] [place] [address]  Filling Pharmacy Phone: [phone]  Filling Pharmacy Fax:    Start Date: 2/20/2019

## 2019-02-20 NOTE — PROGRESS NOTES
"[facility]  PRIMARY CARE PROVIDER AND CLINIC:  HarveyJrery torrez Lisa [address] / [place] [address]  Chief Complaint   Patient presents with     Establish Care     [facility] Record Number:  [id number]  Place of Service where encounter took place:  [facility] [[id number]]    HPI:    Jose E Capps is a 87 year old  (9/28/1931),admitted to the above facility  8/15/18. Was in ED [facility] for nausea and vomiting 2/19/19 .Resides in Assisted Living  for  medical management and nursing care.  HPI information obtained from: facility chart records, patient report and [facility] chart review    Mr. Cosme and his wife moved into [facility] in August from the community. Unfortunately his wife has passed (10/10/18) and he is s/p stroke 9/2018. Has been following for primary care at [facility] [place] and is transitioning to onsite services. He is following with [facility] and working with PT for gait transfers, strength and mobility. He has 2 daughters who are very active in helping with his cares (Fang and Amrita).     Current issues are:       Nausea and Vomiting  Was given IV hydration therapy and discharged with prn Zofran. This needed a prior authorization but was denied. Imodium prn. Today he is seen laying in his bed stating \"I don't feel very well\". He is reporting diarrhea and possibly vomiting but is not sure. Given sips of fluid and is able to hold it down. His daughter does not think episodes of diarrhea stating she cleaned up yesterday and if his bathroom is clean then he is not correct. He denies chest pain, pain, shortness of breath, vision changes beyond current baseline, no dysuria, fever or chills. He does sound nasally, no cough, no abdominal pain or discomfort. Also asking for beer and he does have much supply in his refrigerator. No s/s of withdrawal.     History of TIA (transient ischemic " "attack)   Admit to [facility] 9/28-10/1 for acute CVA, (left posterior cerebral infarct & dissecting artery), presumably triggered by fall w/head trauma 2-days prior. Patient was found to have new a-fib when presenting with TIA, although this was not presumed to be the cause of his TIA. Endorses swelling and weakness in the RUE; RLE weakness improved. Eliquis BID.    CAD  Paroxysmal A-fib (H)  Hypertension   Lipid profile of 9/30 is much improved on statin. A-fib diagnosed when hospitalized for TIA. Was in NSR while inpatient. History of hypertension on lisinopril and metoprolol.    Cognitive Impairment  Family has seen ongoing decline and was with acute decline last several months of 2018. SLUMS of 14/30 10/23/18.    Physical deconditioning   Continuing to work with PT. Tylenol prn for pain.    Rheumatoid Arthritis   Seronegative. Is back on leflunomide daily resumed 11/27/18. Follows with Dr. Gustabo Maurer. Has GI side effects from medicine in the past.     History of skin cancer  Malignant melanoma history left upper arm approximately 46 years ago and basal cell carcinoma on right lower leg s/p punch excision 12/2012.  Full body skin cancer screening 9/2017 findings include scattered brown macules most consistent with benign nevi on arms and some scattered \"stuck on\" papules most likely consistent with seborrheic keratoses.     CODE STATUS/ADVANCE DIRECTIVES DISCUSSION:   DNR / DNI  Patient's living condition: lives in an assisted living facility    ALLERGIES:No known drug allergy  PAST MEDICAL HISTORY:  has a past medical history of BPH (benign prostatic hyperplasia), Colonic polyps, CVA (cerebral vascular accident) (H) (2009), Hyperlipidemia LDL goal <100, Hypertension, and Seronegative rheumatoid arthritis (H) (2011). He also has no past medical history of Asymptomatic human immunodeficiency virus (HIV) infection status (H), Blood in semen, Cerebral palsy (H), Complication of anesthesia, " Congenital renal agenesis and dysgenesis, Epididymitis, bilateral, Epididymitis, left, Epididymitis, right, Goiter, Gout, Hernia, abdominal, History of spinal cord injury, History of thrombophlebitis, Horseshoe kidney, Hydrocephalus, Malignant hyperthermia, Mumps, Orchitis, epididymitis, and epididymo-orchitis, with abscess, Palpitations, Parkinsons disease (H), Penile discharge, Prostate infection, Spider veins, Spina bifida (H), STD (sexually transmitted disease), Swelling of testicle, Tethered cord (H), or Tuberculosis.  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  FAMILY HISTORY: family history includes Alzheimer Disease in his mother; Cancer in his sister and sister; Cardiovascular in his father; Diabetes in his mother; Obesity in his father and mother.  SOCIAL HISTORY:  reports that he has quit smoking. His smoking use included cigarettes. He has a 60.00 pack-year smoking history. he has never used smokeless tobacco. He reports that he drinks about 10.8 oz of alcohol per week. He reports that he does not use drugs.     He was born and raised and worked in [place]. Spent 7-8 years of [facility] in [place] and then returned to [place].    Post Discharge Medication Reconciliation Status: patient was not discharged from an inpatient facility.  Current Outpatient Medications   Medication Sig Dispense Refill     acetaminophen (MAPAP) 500 MG tablet Take 1 tablet (500 mg) by mouth every 8 hours as needed for fever or pain 93 tablet 12     apixaban ANTICOAGULANT (ELIQUIS) 5 MG tablet Take 1 tablet (5 mg) by mouth 2 times daily 180 tablet 3     atorvastatin (LIPITOR) 10 MG tablet Take 1 tablet (10 mg) by mouth daily 90 tablet 3     Calcium Carb-Cholecalciferol (CALCIUM + D3) 600-200 MG-UNIT TABS Take 1 tablet by mouth daily 90 tablet 3     fish  oil-omega-3 fatty acids (OMEGA-3 FISH OIL) 1000 MG capsule TAKE 1 CAPSULE BY MOUTH ONCE DAILY 90 capsule 3     leflunomide (ARAVA) 10 MG tablet Take 1 tablet (10 mg) by mouth daily 90 tablet 3     lisinopril (PRINIVIL/ZESTRIL) 20 MG tablet Take 1 tablet (20 mg) by mouth daily 90 tablet 3     loperamide (IMODIUM A-D) 2 MG tablet Take 1 tablet (2 mg) by mouth 4 times daily as needed for diarrhea 12 tablet 0     metoprolol tartrate (LOPRESSOR) 25 MG tablet Take 0.25 tablets (6.25 mg) by mouth 2 times daily 60 tablet 3     Multiple Vitamins-Minerals (CEROVITE SENIOR) TABS Take 1 tablet by mouth daily 90 tablet 3     ondansetron (ZOFRAN ODT) 4 MG ODT tab Take 1 tablet (4 mg) by mouth every 6 hours as needed for nausea 6 tablet 0     polyethylene glycol (MIRALAX/GLYCOLAX) Packet Take 17 g by mouth daily as needed for constipation        sennosides (SENOKOT) 8.6 MG tablet Take 4 tablets by mouth 2 times daily as needed        ASPIRIN NOT PRESCRIBED (INTENTIONAL) Please choose reason not prescribed, below 0 each 0       ROS:  Limited secondary to cognitive impairment but today pt reports not so well    Exam:  /70   Pulse 69   Temp 98.6  F (37  C)   Resp 16   SpO2 97%   GENERAL APPEARANCE:  Alert, laying in bed, in no acute distress  ENT:  Mouth and posterior oropharynx normal, dry mucous membranes  EYES:  EOM, conjunctivae, lids, pupils and irises normal  RESP:  respiratory effort and palpation of chest normal, lungs clear to auscultation , no respiratory distress but diminished   CV:  Palpation and auscultation of heart done , no edema, irregular rhythm w/a-fib, rate-normal, no LE edema  ABDOMEN:  soft, non-tender to palpation- hyperactive BS audible w/o stethoscope   M/S:   laying in bed, move all extremities   SKIN:  no signs of open areas to viewed arms, legs  NEURO:   Cranial nerves 2-12 are normal tested and grossly at patient's baseline  PSYCH:  insight and judgement impaired, memory  impaired    Lab/Diagnostic data:  CBC RESULTS:   Recent Labs   Lab Test 02/18/19  1555 10/10/18  0715   WBC 11.4* 7.6   RBC 4.90 4.23*   HGB 15.2 13.4   HCT 45.5 40.9   MCV 93 97   MCH 31.0 31.7   MCHC 33.4 32.8   RDW 14.3 13.4    295       Last Basic Metabolic Panel:  Recent Labs   Lab Test 02/18/19  1555 11/26/18  0910 10/03/18  0755     --  142   POTASSIUM 4.6  --  3.7   CHLORIDE 106  --  107   GISELA 8.7  --  8.9   CO2 26  --  29   BUN 23  --  15   CR 1.04 1.06 0.92   *  --  94       Liver Function Studies -   Recent Labs   Lab Test 09/28/18  1305 09/11/18  1711   PROTTOTAL 6.8 7.0   ALBUMIN 3.4 3.8   BILITOTAL 1.0 0.6   ALKPHOS 97 94   AST 26 20   ALT 27 33       TSH   Date Value Ref Range Status   09/11/2018 1.81 0.40 - 4.00 mU/L Final       Lab Results   Component Value Date    A1C 5.6 09/28/2018    A1C 6.0 07/01/2015       ASSESSMENT/PLAN:  (R11.2) Non-intractable vomiting with nausea, unspecified vomiting type  (primary encounter diagnosis)  Comment: 2-day history of illness   Plan:   -Prior Authorization submitted for Zofran  -VS BID x 5 days for illness   -Staff for frequent rounding to prevent dehydration. Every 2 hours push fluids during the daytime x 2 days. Encourage BRAT diet choices and assist with ordering  -Monitor for nausea, vomiting, diarrhea. Use prn for diarrhea  -alert NP for worsening s/s of illness    (Z86.73) History of TIA (transient ischemic attack)   Comment: Recent event  Plan:   -Following with Neurology   -Eliquis 5 mg po BID    (I25.119) Coronary artery disease with angina pectoris, unspecified vessel or lesion type, unspecified whether native or transplanted heart (H)  (I48.0) Paroxysmal A-fib (H)  (I10) Hypertension goal BP (blood pressure) < 140/90  Comment: Chronic  Plan:   -Eliquis 5 mg po BID  -atorvastatin 10 mg po daily  -Lisinopril 20 mg po daily  -metoprolol 6.25 mg po bid  -BMP periodically     (R41.89) Cognitive impairment  Comment:  Ongoing  Plan:  -Fish oil 1,000 mg po daily     (R53.81) Physical deconditioning  Comment: Ongoing  Plan:   -PT/OT     (M06.00) Seronegative rheumatoid arthritis (H)  Comment: Concern for GI side effects  Plan:   -leflunomide 10 mg po daily  -DMARD drug level monitoring per Rheumatology   -Follows with Dr. Gustabo Maurer -Rheumatologist     (Z85.626) History of skin cancer  Comment: Due for annual skin check  Plan:  -Will follow up after recovery from acute illness        Total time with an established patient visit in the assisted livin minutes including discussions about the POC and care coordination with the patient and first contact, daughter . Greater than 50% of total time spent with counseling and coordinating care due to new to onsite services and with acute illness    Electronically signed by:  SHIRA Parra CNP

## 2019-02-20 NOTE — LETTER
"    2/20/2019        RE: Jose E Capps  [place]  [address]  [address]  [place] [address]        [facility]  PRIMARY CARE PROVIDER AND CLINIC:  Jerry Ritter [address] / [place] [address]  Chief Complaint   Patient presents with     Establish Care     [facility] Medical Record Number:  [id number]  Place of Service where encounter took place:  [facility] [place] [[id number]]    HPI:    Jose E Capps is a 87 year old  (9/28/1931),admitted to the above facility  8/15/18. Was in ED [facility] for nausea and vomiting 2/19/19 .Resides in Assisted Living  for  medical management and nursing care.  HPI information obtained from: facility chart records, patient report and [facility] chart review    Mr. Cosme and his wife moved into [facility] in August from the community. Unfortunately his wife has passed (10/10/18) and he is s/p stroke 9/2018. Has been following for primary care at [facility] and is transitioning to onsite services. He is following with [facility] and working with PT for gait transfers, strength and mobility. He has 2 daughters who are very active in helping with his cares (Fang and Marita).     Current issues are:       Nausea and Vomiting  Was given IV hydration therapy and discharged with prn Zofran. This needed a prior authorization but was denied. Imodium prn. Today he is seen laying in his bed stating \"I don't feel very well\". He is reporting diarrhea and possibly vomiting but is not sure. Given sips of fluid and is able to hold it down. His daughter does not think episodes of diarrhea stating she cleaned up yesterday and if his bathroom is clean then he is not correct. He denies chest pain, pain, shortness of breath, vision changes beyond current baseline, no dysuria, fever or chills. He does sound nasally, no cough, no abdominal pain or discomfort. Also asking for beer " "and he does have much supply in his refrigerator. No s/s of withdrawal.     History of TIA (transient ischemic attack)   Admit to [facility] 9/28-10/1 for acute CVA, (left posterior cerebral infarct & dissecting artery), presumably triggered by fall w/head trauma 2-days prior. Patient was found to have new a-fib when presenting with TIA, although this was not presumed to be the cause of his TIA. Endorses swelling and weakness in the RUE; RLE weakness improved. Eliquis BID.    CAD  Paroxysmal A-fib (H)  Hypertension   Lipid profile of 9/30 is much improved on statin. A-fib diagnosed when hospitalized for TIA. Was in NSR while inpatient. History of hypertension on lisinopril and metoprolol.    Cognitive Impairment  Family has seen ongoing decline and was with acute decline last several months of 2018. SLUMS of 14/30 10/23/18.    Physical deconditioning   Continuing to work with PT. Tylenol prn for pain.    Rheumatoid Arthritis   Seronegative. Is back on leflunomide daily resumed 11/27/18. Follows with Dr. Gustabo Maurer. Has GI side effects from medicine in the past.     History of skin cancer  Malignant melanoma history left upper arm approximately 46 years ago and basal cell carcinoma on right lower leg s/p punch excision 12/2012.  Full body skin cancer screening 9/2017 findings include scattered brown macules most consistent with benign nevi on arms and some scattered \"stuck on\" papules most likely consistent with seborrheic keratoses.     CODE STATUS/ADVANCE DIRECTIVES DISCUSSION:   DNR / DNI  Patient's living condition: lives in an assisted living facility    ALLERGIES:No known drug allergy  PAST MEDICAL HISTORY:  has a past medical history of BPH (benign prostatic hyperplasia), Colonic polyps, CVA (cerebral vascular accident) (H) (2009), Hyperlipidemia LDL goal <100, Hypertension, and Seronegative rheumatoid arthritis (H) (2011). He also has no past medical history of Asymptomatic human " immunodeficiency virus (HIV) infection status (H), Blood in semen, Cerebral palsy (H), Complication of anesthesia, Congenital renal agenesis and dysgenesis, Epididymitis, bilateral, Epididymitis, left, Epididymitis, right, Goiter, Gout, Hernia, abdominal, History of spinal cord injury, History of thrombophlebitis, Horseshoe kidney, Hydrocephalus, Malignant hyperthermia, Mumps, Orchitis, epididymitis, and epididymo-orchitis, with abscess, Palpitations, Parkinsons disease (H), Penile discharge, Prostate infection, Spider veins, Spina bifida (H), STD (sexually transmitted disease), Swelling of testicle, Tethered cord (H), or Tuberculosis.  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  FAMILY HISTORY: family history includes Alzheimer Disease in his mother; Cancer in his sister and sister; Cardiovascular in his father; Diabetes in his mother; Obesity in his father and mother.  SOCIAL HISTORY:  reports that he has quit smoking. His smoking use included cigarettes. He has a 60.00 pack-year smoking history. he has never used smokeless tobacco. He reports that he drinks about 10.8 oz of alcohol per week. He reports that he does not use drugs.     He was born and raised and worked in [place]. Spent 7-8 years of [employer] in [place] and then returned to [place].    Post Discharge Medication Reconciliation Status: patient was not discharged from an inpatient facility.  Current Outpatient Medications   Medication Sig Dispense Refill     acetaminophen (MAPAP) 500 MG tablet Take 1 tablet (500 mg) by mouth every 8 hours as needed for fever or pain 93 tablet 12     apixaban ANTICOAGULANT (ELIQUIS) 5 MG tablet Take 1 tablet (5 mg) by mouth 2 times daily 180 tablet 3     atorvastatin (LIPITOR) 10 MG tablet Take 1 tablet (10 mg) by mouth daily 90 tablet 3      Calcium Carb-Cholecalciferol (CALCIUM + D3) 600-200 MG-UNIT TABS Take 1 tablet by mouth daily 90 tablet 3     fish oil-omega-3 fatty acids (OMEGA-3 FISH OIL) 1000 MG capsule TAKE 1 CAPSULE BY MOUTH ONCE DAILY 90 capsule 3     leflunomide (ARAVA) 10 MG tablet Take 1 tablet (10 mg) by mouth daily 90 tablet 3     lisinopril (PRINIVIL/ZESTRIL) 20 MG tablet Take 1 tablet (20 mg) by mouth daily 90 tablet 3     loperamide (IMODIUM A-D) 2 MG tablet Take 1 tablet (2 mg) by mouth 4 times daily as needed for diarrhea 12 tablet 0     metoprolol tartrate (LOPRESSOR) 25 MG tablet Take 0.25 tablets (6.25 mg) by mouth 2 times daily 60 tablet 3     Multiple Vitamins-Minerals (CEROVITE SENIOR) TABS Take 1 tablet by mouth daily 90 tablet 3     ondansetron (ZOFRAN ODT) 4 MG ODT tab Take 1 tablet (4 mg) by mouth every 6 hours as needed for nausea 6 tablet 0     polyethylene glycol (MIRALAX/GLYCOLAX) Packet Take 17 g by mouth daily as needed for constipation        sennosides (SENOKOT) 8.6 MG tablet Take 4 tablets by mouth 2 times daily as needed        ASPIRIN NOT PRESCRIBED (INTENTIONAL) Please choose reason not prescribed, below 0 each 0       ROS:  Limited secondary to cognitive impairment but today pt reports not so well    Exam:  /70   Pulse 69   Temp 98.6  F (37  C)   Resp 16   SpO2 97%   GENERAL APPEARANCE:  Alert, laying in bed, in no acute distress  ENT:  Mouth and posterior oropharynx normal, dry mucous membranes  EYES:  EOM, conjunctivae, lids, pupils and irises normal  RESP:  respiratory effort and palpation of chest normal, lungs clear to auscultation , no respiratory distress but diminished   CV:  Palpation and auscultation of heart done , no edema, irregular rhythm w/a-fib, rate-normal, no LE edema  ABDOMEN:  soft, non-tender to palpation- hyperactive BS audible w/o stethoscope   M/S:   laying in bed, move all extremities   SKIN:  no signs of open areas to viewed arms, legs  NEURO:   Cranial nerves 2-12 are  normal tested and grossly at patient's baseline  PSYCH:  insight and judgement impaired, memory impaired    Lab/Diagnostic data:  CBC RESULTS:   Recent Labs   Lab Test 02/18/19  1555 10/10/18  0715   WBC 11.4* 7.6   RBC 4.90 4.23*   HGB 15.2 13.4   HCT 45.5 40.9   MCV 93 97   MCH 31.0 31.7   MCHC 33.4 32.8   RDW 14.3 13.4    295       Last Basic Metabolic Panel:  Recent Labs   Lab Test 02/18/19  1555 11/26/18  0910 10/03/18  0755     --  142   POTASSIUM 4.6  --  3.7   CHLORIDE 106  --  107   GISELA 8.7  --  8.9   CO2 26  --  29   BUN 23  --  15   CR 1.04 1.06 0.92   *  --  94       Liver Function Studies -   Recent Labs   Lab Test 09/28/18  1305 09/11/18  1711   PROTTOTAL 6.8 7.0   ALBUMIN 3.4 3.8   BILITOTAL 1.0 0.6   ALKPHOS 97 94   AST 26 20   ALT 27 33       TSH   Date Value Ref Range Status   09/11/2018 1.81 0.40 - 4.00 mU/L Final       Lab Results   Component Value Date    A1C 5.6 09/28/2018    A1C 6.0 07/01/2015       ASSESSMENT/PLAN:  (R11.2) Non-intractable vomiting with nausea, unspecified vomiting type  (primary encounter diagnosis)  Comment: 2-day history of illness   Plan:   -Prior Authorization submitted for Zofran  -VS BID x 5 days for illness   -Staff for frequent rounding to prevent dehydration. Every 2 hours push fluids during the daytime x 2 days. Encourage BRAT diet choices and assist with ordering  -Monitor for nausea, vomiting, diarrhea. Use prn for diarrhea  -alert NP for worsening s/s of illness    (Z86.73) History of TIA (transient ischemic attack)   Comment: Recent event  Plan:   -Following with Neurology   -Eliquis 5 mg po BID    (I25.119) Coronary artery disease with angina pectoris, unspecified vessel or lesion type, unspecified whether native or transplanted heart (H)  (I48.0) Paroxysmal A-fib (H)  (I10) Hypertension goal BP (blood pressure) < 140/90  Comment: Chronic  Plan:   -Eliquis 5 mg po BID  -atorvastatin 10 mg po daily  -Lisinopril 20 mg po daily  -metoprolol  6.25 mg po bid  -BMP periodically     (R41.89) Cognitive impairment  Comment: Ongoing  Plan:  -Fish oil 1,000 mg po daily     (R53.81) Physical deconditioning  Comment: Ongoing  Plan:   -PT/OT     (M06.00) Seronegative rheumatoid arthritis (H)  Comment: Concern for GI side effects  Plan:   -leflunomide 10 mg po daily  -DMARD drug level monitoring per Rheumatology   -Follows with Dr. Gustabo Maurer -Rheumatologist     (Z85.624) History of skin cancer  Comment: Due for annual skin check  Plan:  -Will follow up after recovery from acute illness        Total time with an established patient visit in the assisted livin minutes including discussions about the POC and care coordination with the patient and first contact, daughter . Greater than 50% of total time spent with counseling and coordinating care due to new to onsite services and with acute illness    Electronically signed by:  SHIRA Parra CNP                    Sincerely,        SHIRA Parra CNP

## 2019-02-22 NOTE — TELEPHONE ENCOUNTER
INFORMED PA WAS DENIED.  WAITING FOR DETERMINATION FAX FOR RATIONAL.    PRIOR AUTHORIZATION DENIED    Medication: ONDANSETRON TAB 4MG ODT-DENIED    Denial Date: 2/21/2019    Denial Rational: PATIENT MUST HAVE AN FDA APPROVED DX - CANCER OR CHEMO RELATED NAUSEA.        Appeal Information:  IF YOU WOULD LIKE TO APPEAL PLEASE SUPPLY PA TEAM WITH A LETTER OF MEDICAL NECESSITY WITH CLINICAL REASON.    [place] BCBS PPO [place] MEDICARE AD[place]  [phone]

## 2019-02-25 ENCOUNTER — PATIENT OUTREACH (OUTPATIENT)
Dept: CARE COORDINATION | Facility: CLINIC | Age: 84
End: 2019-02-25

## 2019-02-25 NOTE — PROGRESS NOTES
Clinic Care Coordination Contact  Care Team Conversations    Patient moved to assisted living.  PCP is [facility].   Patient closed to clinic care coordination

## 2019-02-26 VITALS
HEART RATE: 81 BPM | TEMPERATURE: 97.8 F | BODY MASS INDEX: 16.94 KG/M2 | WEIGHT: 99.21 LBS | HEIGHT: 64 IN | DIASTOLIC BLOOD PRESSURE: 69 MMHG | RESPIRATION RATE: 16 BRPM | SYSTOLIC BLOOD PRESSURE: 158 MMHG | OXYGEN SATURATION: 95 %

## 2019-02-26 RX ORDER — LOPERAMIDE HCL 2 MG
2 CAPSULE ORAL 4 TIMES DAILY PRN
COMMUNITY
End: 2019-08-20

## 2019-02-27 ENCOUNTER — ASSISTED LIVING VISIT (OUTPATIENT)
Dept: GERIATRICS | Facility: CLINIC | Age: 84
End: 2019-02-27
Payer: COMMERCIAL

## 2019-02-27 ENCOUNTER — TELEPHONE (OUTPATIENT)
Dept: FAMILY MEDICINE | Facility: CLINIC | Age: 84
End: 2019-02-27

## 2019-02-27 DIAGNOSIS — I25.119 CORONARY ARTERY DISEASE WITH ANGINA PECTORIS, UNSPECIFIED VESSEL OR LESION TYPE, UNSPECIFIED WHETHER NATIVE OR TRANSPLANTED HEART (H): ICD-10-CM

## 2019-02-27 DIAGNOSIS — M06.00 SERONEGATIVE RHEUMATOID ARTHRITIS (H): ICD-10-CM

## 2019-02-27 DIAGNOSIS — I48.0 PAROXYSMAL ATRIAL FIBRILLATION (H): Primary | ICD-10-CM

## 2019-02-27 DIAGNOSIS — I63.50 CEREBROVASCULAR ACCIDENT INVOLVING POSTERIOR CIRCULATION (H): ICD-10-CM

## 2019-02-27 DIAGNOSIS — F01.50 VASCULAR DEMENTIA WITHOUT BEHAVIORAL DISTURBANCE (H): ICD-10-CM

## 2019-02-27 DIAGNOSIS — I10 HYPERTENSION GOAL BP (BLOOD PRESSURE) < 140/90: ICD-10-CM

## 2019-02-27 DIAGNOSIS — I77.74 VERTEBRAL ARTERY DISSECTION (H): ICD-10-CM

## 2019-02-27 NOTE — TELEPHONE ENCOUNTER
Non detailed message left for pt to return call to clinic and ask to speak with a triage nurse.  Nino is PT with [facility].    Denise GILBERT RN  EP Triage

## 2019-02-27 NOTE — TELEPHONE ENCOUNTER
Reason for Call: Request for an order or referral:    Order or referral being requested: Verbal okay to discontinue home health aide visits per Jose E's request.     Date needed: as soon as possible    Has the patient been seen by the PCP for this problem? YES    Phone number Patient can be reached at:  [phone]    Best Time: Any     Can we leave a detailed message on this number?  YES    Call taken on 2/27/2019 at 3:06 PM by Cece Monte

## 2019-02-27 NOTE — LETTER
2/27/2019        RE: Jose E Capps  [place]  [address]  [address]  [place] [facility] [address]        Jose E Capps is a 87 year old male seen February 27, 2019 at [facility] where he has resided for 6 months (admit 8/2018) recently turned over to [facility] and seen for initial visit.   Pt moved here with his wife, but she passed in October 2018 and he now lives alone, with services.     Pt was seen in the ED on 2/18 for gastroenteritis with weakness, but does not remember that today.  States he feels well, denies any N/V/D or pain of any kind.     He is just getting up late morning, having coffee and OJ without difficulty.   Gets escorts to lunch and supper in the DR.    By chart review, patient was hospitalized in October a couple of days after he took a fall.   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   He went to TCU, regained mobility but has remained cognitively impaired.   He is followed by [facility].    Pt has a long history of seronegative RA, treated with leflunomide per Dr Maurer.   He is ambulatory with [facility] and cuing.       Past Medical History:   Diagnosis Date     BPH (benign prostatic hyperplasia)      Colonic polyps     recurrent     CVA (cerebral vascular accident) (H) 2009    posterior circulation     Hyperlipidemia LDL goal <100     reports being intolerant to      Hypertension      Seronegative rheumatoid arthritis (H) 2011    hands, neck,, shoulders      Past Surgical History:   Procedure Laterality Date     C LAT LUMBAR SPINE FUSION       JOINT REPLACEMENT, HIP RT/LT  2007    R     JOINT REPLACEMTN, KNEE RT/LT  2004     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    excised     Social History     Tobacco Use     Smoking  "status: Former Smoker     Packs/day: 2.00     Years: 30.00     Pack years: 60.00     Types: Cigarettes     Smokeless tobacco: Never Used   Substance Use Topics     Alcohol use: Yes     Alcohol/week: 10.8 oz     Types: 4 Standard drinks or equivalent, 14 Shots of liquor per week     Comment: 2-3 drinks a night      SH:  .   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   He has 4 daughters and one son.      ROS: SLUMS 14/30  Needs assist with ADLs, and directional cuing.         EXAM:  Pleasant, NAD  /69   Pulse 81   Temp 97.8  F (36.6  C)   Resp 16   Ht 1.626 m (5' 4.02\")   Wt 45 kg (99 lb 3.3 oz)   SpO2 95%   BMI 17.02 kg/m      Neck supple without adenopathy  Lungs clear bilaterally with fair air movement  Heart RRR s1s2 with 1/6 PRERNA  Abd soft, NT, no distention, +BS  Ext without edema  Neuro: very limited history, STML.   No focal findings  Psych: affect okay     Last Comprehensive Metabolic Panel:  Sodium   Date Value Ref Range Status   02/18/2019 139 133 - 144 mmol/L Final     Potassium   Date Value Ref Range Status   02/18/2019 4.6 3.4 - 5.3 mmol/L Final     Chloride   Date Value Ref Range Status   02/18/2019 106 94 - 109 mmol/L Final     Carbon Dioxide   Date Value Ref Range Status   02/18/2019 26 20 - 32 mmol/L Final     Anion Gap   Date Value Ref Range Status   02/18/2019 7 3 - 14 mmol/L Final     Glucose   Date Value Ref Range Status   02/18/2019 131 (H) 70 - 99 mg/dL Final     Urea Nitrogen   Date Value Ref Range Status   02/18/2019 23 7 - 30 mg/dL Final     Creatinine   Date Value Ref Range Status   02/18/2019 1.04 0.66 - 1.25 mg/dL Final     GFR Estimate   Date Value Ref Range Status   02/18/2019 64 >60 mL/min/[1.73_m2] Final     Comment:     Non  GFR Calc  Starting 12/18/2018, serum creatinine based estimated GFR (eGFR) will be   calculated using the Chronic Kidney Disease Epidemiology Collaboration   (CKD-EPI) equation.       Calcium   Date Value Ref Range Status   02/18/2019 " 8.7 8.5 - 10.1 mg/dL Final     Lab Results   Component Value Date    WBC 11.4 02/18/2019      HGB 15.2 02/18/2019      MCV 93 02/18/2019       02/18/2019     TSH   Date Value Ref Range Status   09/11/2018 1.81 0.40 - 4.00 mU/L Final      Lab Results   Component Value Date    A1C 5.6 09/28/2018      ECHO 9/28/18  Interpretation Summary  Sinus rhythm was noted.  Left ventricular systolic function is normal.  The visual ejection fraction is estimated at 55-60%.  The left ventricle is normal in size.  There is mild concentric left ventricular hypertrophy.  The right ventricle is normal in structure, function and size.  There is mild biatrial enlargement.  There is mild (1+) aortic regurgitation.      IMP/PLAN:    (I48.0) Paroxysmal atrial fibrillation (H)   Comment:   Pulse Readings from Last 4 Encounters:   02/18/19 81   02/20/19 69   02/18/19 81   10/30/18 61      Plan: continue metoprolol for VR control, and apixaban for stroke prophylaxis.       (I77.74) Vertebral artery dissection (H)  (I63.50) Cerebrovascular accident involving posterior circulation (H)  Comment: residual cognitive impairment, mobility has returned      Plan: statin, anticoagulation and bp meds for secondary prevention.   Follow up in Neurology clinic as scheduled.     (I10) Hypertension goal BP (blood pressure) < 140/90  Comment:   BP Readings from Last 3 Encounters:   02/18/19 158/69   02/20/19 120/70   02/18/19 158/69      Plan: continue metoprolol and lisinopril.        (I25.119) Coronary artery disease with angina pectoris, unspecified vessel or lesion type, unspecified whether native or transplanted heart (H)  Comment: troponin elevation during September hospitalization, ECHO as above.   Stable volume status today.   Plan: as above       (F01.50) Vascular dementia without behavioral disturbance  Comment: scores as above, low functional status   Plan: AL support for med admin, escorts to meals, activity and safety       (M06.00)  Seronegative rheumatoid arthritis (H)  Comment: no current pain   Plan: continue leflunomide; follow up with Dr Maurer.     Acetaminophen prn     Paty Jimenez MD         Sincerely,        Paty Jimenez MD

## 2019-02-28 NOTE — TELEPHONE ENCOUNTER
Left a detailed message to confirm which order she needs if she can please call the [facility] back at [phone].     Bibi REZAN, RN   [facility]

## 2019-02-28 NOTE — TELEPHONE ENCOUNTER
Left a non detailed message for Nino to return call regarding message below.     Bibi REZAN, RN   [facility]

## 2019-02-28 NOTE — TELEPHONE ENCOUNTER
Nino PT from [email or web address] returned call, no nurses available. Would like a detailed message left if she does not answer.    Ph: [phone]    Pamela AVITIA  Patient Representative - [facility]

## 2019-03-01 ENCOUNTER — TELEPHONE (OUTPATIENT)
Dept: FAMILY MEDICINE | Facility: CLINIC | Age: 84
End: 2019-03-01

## 2019-03-01 NOTE — TELEPHONE ENCOUNTER
patient has been refusing home care aides- patient states that he feels he does not needs HHA- he was also discharged from home care nursing services as well- he has reach his plateau   Gave verbal ok to discharge HHA visit- this was stopped already due to the length of time to get the order and was sent to provider      Erica Ryan RN BSN  [facility]  [phone]

## 2019-03-01 NOTE — TELEPHONE ENCOUNTER
Left message asking Nino to call back and ask for a triage nurse- if non available please leave a detailed message of what orders are being discontinued and why.    Thank You,    Erica BAKER,RN  [facility]  [phone]

## 2019-03-02 ENCOUNTER — MEDICAL CORRESPONDENCE (OUTPATIENT)
Dept: HEALTH INFORMATION MANAGEMENT | Facility: CLINIC | Age: 84
End: 2019-03-02

## 2019-03-03 NOTE — PROGRESS NOTES
Jose E Capps is a 87 year old male seen February 27, 2019 at [facility] where he has resided for 6 months (admit 8/2018) recently turned over to [facility] and seen for initial visit.   Pt moved here with his wife, but she passed in October 2018 and he now lives alone, with services.     Pt was seen in the ED on 2/18 for gastroenteritis with weakness, but does not remember that today.  States he feels well, denies any N/V/D or pain of any kind.     He is just getting up late morning, having coffee and OJ without difficulty.   Gets escorts to lunch and supper in the [place].    By chart review, patient was hospitalized in October a couple of days after he took a fall.   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   He went to TCU, regained mobility but has remained cognitively impaired.   [facility]    Pt has a long history of seronegative RA, treated with leflunomide per Dr Maurer.   He is ambulatory with [facility] and cuing.       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   He has 4 daughters and one son.      ROS: SLUMS 14/30  Needs assist with ADLs, and directional cuing.         EXAM:  Pleasant, NAD  /69   Pulse 81   Temp 97.8  F (36.6  C)   Resp 16   Ht 1.626 m (5' 4.02\")   Wt 45 kg (99 lb 3.3 oz)   SpO2 95%   BMI 17.02 kg/m     Neck supple without adenopathy  Lungs clear bilaterally with fair air movement  Heart RRR s1s2 with 1/6 PRERNA  Abd soft, NT, no distention, +BS  Ext without edema  Neuro: very limited history, STML.   No focal findings  Psych: affect okay     Last Comprehensive Metabolic Panel:  Sodium   Date Value Ref Range Status   02/18/2019 139 133 - 144 mmol/L Final     Potassium   Date Value Ref Range Status   02/18/2019 4.6 3.4 - 5.3 mmol/L Final     Chloride   Date Value Ref Range Status   02/18/2019 106 94 - 109 mmol/L Final     Carbon Dioxide   Date Value Ref Range Status   02/18/2019 26 20 - 32 mmol/L Final     Anion Gap   Date Value Ref Range Status   02/18/2019 7 3 - 14 mmol/L Final     Glucose   Date Value Ref Range Status   02/18/2019 131 (H) 70 - 99 mg/dL Final     Urea Nitrogen   Date Value Ref Range Status   02/18/2019 23 7 - 30 mg/dL Final     Creatinine   Date Value Ref Range Status   02/18/2019 1.04 0.66 - 1.25 mg/dL Final     GFR Estimate   Date Value Ref Range Status   02/18/2019 64 >60 mL/min/[1.73_m2] Final     Comment:     Non  GFR Calc  Starting 12/18/2018, serum creatinine based estimated GFR (eGFR) will be   calculated using the Chronic Kidney Disease Epidemiology Collaboration   (CKD-EPI) equation.       Calcium   Date Value Ref Range Status   02/18/2019 8.7 8.5 - 10.1 mg/dL Final     Lab Results   Component Value Date    WBC 11.4 02/18/2019      HGB 15.2 02/18/2019 "      MCV 93 02/18/2019       02/18/2019     TSH   Date Value Ref Range Status   09/11/2018 1.81 0.40 - 4.00 mU/L Final      Lab Results   Component Value Date    A1C 5.6 09/28/2018      ECHO 9/28/18  Interpretation Summary  Sinus rhythm was noted.  Left ventricular systolic function is normal.  The visual ejection fraction is estimated at 55-60%.  The left ventricle is normal in size.  There is mild concentric left ventricular hypertrophy.  The right ventricle is normal in structure, function and size.  There is mild biatrial enlargement.  There is mild (1+) aortic regurgitation.      IMP/PLAN:    (I48.0) Paroxysmal atrial fibrillation (H)   Comment:   Pulse Readings from Last 4 Encounters:   02/18/19 81   02/20/19 69   02/18/19 81   10/30/18 61      Plan: continue metoprolol for VR control, and apixaban for stroke prophylaxis.       (I77.74) Vertebral artery dissection (H)  (I63.50) Cerebrovascular accident involving posterior circulation (H)  Comment: residual cognitive impairment, mobility has returned      Plan: statin, anticoagulation and bp meds for secondary prevention.   Follow up in Neurology clinic as scheduled.     (I10) Hypertension goal BP (blood pressure) < 140/90  Comment:   BP Readings from Last 3 Encounters:   02/18/19 158/69   02/20/19 120/70   02/18/19 158/69      Plan: continue metoprolol and lisinopril.        (I25.119) Coronary artery disease with angina pectoris, unspecified vessel or lesion type, unspecified whether native or transplanted heart (H)  Comment: troponin elevation during September hospitalization, ECHO as above.   Stable volume status today.   Plan: as above       (F01.50) Vascular dementia without behavioral disturbance  Comment: scores as above, low functional status   Plan: [place] support for med admin, escorts to meals, activity and safety       (M06.00) Seronegative rheumatoid arthritis (H)  Comment: no current pain   Plan: continue leflunomide; follow up with Dr Maurer.      Acetaminophen prn     Paty Jimenez MD

## 2019-03-15 ENCOUNTER — TELEPHONE (OUTPATIENT)
Dept: FAMILY MEDICINE | Facility: CLINIC | Age: 84
End: 2019-03-15

## 2019-03-15 NOTE — TELEPHONE ENCOUNTER
From the last visit I recall that the daughter was with the patient and she notified gradual worsening of forgetfulness and confusion.   I would suggest contacting the daughter to see if his symptoms are getting worse and if any assistance in that regards is needed    Thank you for notifying me.     Nehemias Granados MD  [facility], [place]

## 2019-03-15 NOTE — TELEPHONE ENCOUNTER
Cee [facility] case david spoke with Jose E yesterday and felt he was confused and repeated himself often, She was checking in to see if this is normal or if it is something new she should be focusing on. Please advise. Cee can be reached at [phone] (rings directly to her desk and voicemail is confidential) Thank you.  Blanca Kearney,

## 2019-03-15 NOTE — TELEPHONE ENCOUNTER
Left message for daughter Marita at [phone] to call clinic back and ask to s/w a triage nurse about Dr. Granados's message.    Denise GILBERT RN  EP Triage

## 2019-03-19 NOTE — TELEPHONE ENCOUNTER
Spoke with Marita patients daughter.     Marita stated that he is doing better. His short term memory is still not okay but seems to be doing better. She stated that patient has been attending his therapy classes and its helping him a lot too. Marita stated that they are in good shape as of now and doesn't need anything as of now.     Routing to PCP for further review/recommendations/orders.    Bibi REZAN, RN   [facility]

## 2019-04-23 RX ORDER — ONDANSETRON 4 MG/1
4 TABLET, FILM COATED ORAL EVERY 6 HOURS PRN
COMMUNITY
End: 2019-06-26

## 2019-04-23 NOTE — PROGRESS NOTES
"[facility]  [place] Medical Record Number:  [id number]  Place of Service where encounter took place:  [facility] [[id number]]  Chief Complaint   Patient presents with     RECHECK       HPI:    Jose E Capps  is a 87 year old (9/28/1931), who is being seen today for an episodic care visit.  HPI information obtained from: facility chart records, patient report and [facility] chart review.     Seen today prior to lunch. Staff with no concerns. Did have fall 4/14 with no apparent injuries. Reported to spend much time watching TV in community room or reading the paper. Appears to adjusted to [place]. Denies pain, chest pain or SOB. Answers most questions with \"I don't know\". By chart review, patient was hospitalized in October a couple of days after he took a fall.   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   He went to TCU, regained mobility but has remained cognitively impaired.   He is followed by [facility].    Pt has a long history of seronegative RA, treated with leflunomide per Dr Maurer.   He is ambulatory with [facility] and cuing.       Past Medical and Surgical History reviewed in Epic today.    MEDICATIONS:  Current Outpatient Medications   Medication Sig Dispense Refill     acetaminophen (MAPAP) 500 MG tablet Take 1 tablet (500 mg) by mouth every 8 hours as needed for fever or pain 93 tablet 12     apixaban ANTICOAGULANT (ELIQUIS) 5 MG tablet Take 1 tablet (5 mg) by mouth 2 times daily 180 tablet 3     atorvastatin (LIPITOR) 10 MG tablet Take 1 tablet (10 mg) by mouth daily 90 tablet 3     Calcium Carb-Cholecalciferol (CALCIUM + D3) 600-200 MG-UNIT TABS Take 1 tablet by mouth daily 90 tablet 3     fish oil-omega-3 " fatty acids (OMEGA-3 FISH OIL) 1000 MG capsule TAKE 1 CAPSULE BY MOUTH ONCE DAILY 90 capsule 3     leflunomide (ARAVA) 10 MG tablet Take 1 tablet (10 mg) by mouth daily 90 tablet 3     lisinopril (PRINIVIL/ZESTRIL) 20 MG tablet Take 1 tablet (20 mg) by mouth daily 90 tablet 3     loperamide (IMODIUM) 2 MG capsule Take 2 mg by mouth 4 times daily as needed for diarrhea       metoprolol tartrate (LOPRESSOR) 25 MG tablet Take 0.25 tablets (6.25 mg) by mouth 2 times daily 60 tablet 3     Multiple Vitamins-Minerals (CEROVITE SENIOR) TABS Take 1 tablet by mouth daily 90 tablet 3     ondansetron (ZOFRAN) 4 MG tablet Take 4 mg by mouth every 6 hours as needed for nausea       polyethylene glycol (MIRALAX/GLYCOLAX) Packet Take 17 g by mouth daily as needed for constipation        sennosides (SENOKOT) 8.6 MG tablet Take 4 tablets by mouth 2 times daily as needed        ASPIRIN NOT PRESCRIBED (INTENTIONAL) Please choose reason not prescribed, below 0 each 0     REVIEW OF SYSTEMS:  Limited secondary to cognitive impairment but today pt reports ok    Objective:  /69   Pulse 51   Temp 98.6  F (37  C)   Resp 16   SpO2 97%   Exam:  GENERAL APPEARANCE:  alert and pleasant  ENT:  Mouth and posterior oropharynx normal, dry mucous membranes, cerumen in both ears  EYES:  EOM, conjunctivae, lids, pupils and irises normal  RESP:  respiratory effort and palpation of chest normal, lungs clear to auscultation , no respiratory distress but diminished   CV:  Palpation and auscultation of heart done , no edema, irregular rhythm w/a-fib, rate-normal, no LE edema  ABDOMEN:  soft, non-tender to palpation- hyperactive BS audible w/o stethoscope   M/S: mostly use of WC  SKIN:  no signs of open areas to viewed arms, legs  NEURO:   Cranial nerves 2-12 are normal tested and grossly at patient's baseline  PSYCH:  insight and judgement impaired, memory impaired     Labs:   CBC RESULTS:   Recent Labs   Lab Test 02/18/19  1555 10/10/18  0710    WBC 11.4* 7.6   RBC 4.90 4.23*   HGB 15.2 13.4   HCT 45.5 40.9   MCV 93 97   MCH 31.0 31.7   MCHC 33.4 32.8   RDW 14.3 13.4    295       Last Basic Metabolic Panel:  Recent Labs   Lab Test 02/18/19  1555 11/26/18  0910 10/03/18  0755     --  142   POTASSIUM 4.6  --  3.7   CHLORIDE 106  --  107   GISELA 8.7  --  8.9   CO2 26  --  29   BUN 23  --  15   CR 1.04 1.06 0.92   *  --  94       Liver Function Studies -   Recent Labs   Lab Test 09/28/18  1305 09/11/18  1711   PROTTOTAL 6.8 7.0   ALBUMIN 3.4 3.8   BILITOTAL 1.0 0.6   ALKPHOS 97 94   AST 26 20   ALT 27 33       TSH   Date Value Ref Range Status   09/11/2018 1.81 0.40 - 4.00 mU/L Final       Lab Results   Component Value Date    A1C 5.6 09/28/2018    A1C 6.0 07/01/2015       ASSESSMENT/PLAN:  (H61.23) Bilateral impacted cerumen  (primary encounter diagnosis)  Comment: Viewed with otoscope  Plan:   -Debrox 2 drops nightly  -Remove at next visit    (Z86.73) History of TIA (transient ischemic attack)   Comment: With residual cognitive deficits, WC ambulation   Plan:   -Following with Neurology   -Eliquis 5 mg po BID    (I48.0) Paroxysmal atrial fibrillation (H)  (I10) Hypertension   Comment: Lower HR today  Plan:   -Eliquis 5 mg po BID  -atorvastatin 10 mg po daily  -Lisinopril 20 mg po daily  -metoprolol 6.25 mg po bid- consider reduction or discontinue if HR remains low  -BMP periodically     (R41.89) Cognitive impairment  Comment: Now resides in [place]  Plan:   -Monitor for increased new for services  -Fish oil 1,000 mg po daily per family    (M06.00) Seronegative rheumatoid arthritis (H)  Comment: Concern for GI side effects  Plan:   -leflunomide 10 mg po daily  -DMARD drug level monitoring per Rheumatology   -Follows with Dr. Gustabo Maurer -Rheumatologist        Electronically signed by:  SHIRA Parra CNP

## 2019-04-24 ENCOUNTER — ASSISTED LIVING VISIT (OUTPATIENT)
Dept: GERIATRICS | Facility: CLINIC | Age: 84
End: 2019-04-24
Payer: COMMERCIAL

## 2019-04-24 VITALS
OXYGEN SATURATION: 97 % | SYSTOLIC BLOOD PRESSURE: 125 MMHG | RESPIRATION RATE: 16 BRPM | HEART RATE: 51 BPM | TEMPERATURE: 98.6 F | DIASTOLIC BLOOD PRESSURE: 69 MMHG

## 2019-04-24 DIAGNOSIS — R41.89 COGNITIVE IMPAIRMENT: ICD-10-CM

## 2019-04-24 DIAGNOSIS — M06.00 SERONEGATIVE RHEUMATOID ARTHRITIS (H): ICD-10-CM

## 2019-04-24 DIAGNOSIS — Z86.73 HISTORY OF TIA (TRANSIENT ISCHEMIC ATTACK) AND STROKE: ICD-10-CM

## 2019-04-24 DIAGNOSIS — H61.23 BILATERAL IMPACTED CERUMEN: Primary | ICD-10-CM

## 2019-04-24 DIAGNOSIS — I10 HYPERTENSION GOAL BP (BLOOD PRESSURE) < 140/90: ICD-10-CM

## 2019-04-24 DIAGNOSIS — I48.0 PAROXYSMAL ATRIAL FIBRILLATION (H): ICD-10-CM

## 2019-04-24 NOTE — LETTER
"    4/24/2019        RE: Jose E [facility]  [address]  [address]  [place] [address]        [facility]  [place] Medical Record Number:  [id number]  Place of Service where encounter took place:  [facility] [place] [[id number]]  Chief Complaint   Patient presents with     RECHECK       HPI:    Jose E Capps  is a 87 year old (9/28/1931), who is being seen today for an episodic care visit.  HPI information obtained from: facility chart records, patient report and [facility] chart review.     Seen today prior to lunch. Staff with no concerns. Reported to spend much time watching TV in community room or reading the paper. Appears to adjusted to [place]. Denies pain, chest pain or SOB. Answers most questions with \"I don't know\". By chart review, patient was hospitalized in October a couple of days after he took a fall.   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   He went to TCU, regained mobility but has remained cognitively impaired.   He is followed by [facility].    Pt has a long history of seronegative RA, treated with leflunomide per Dr Maurer.   He is ambulatory with [facility] and cuing.       Past Medical and Surgical History reviewed in Epic today.    MEDICATIONS:  Current Outpatient Medications   Medication Sig Dispense Refill     acetaminophen (MAPAP) 500 MG tablet Take 1 tablet (500 mg) by mouth every 8 hours as needed for fever or pain 93 tablet 12     apixaban ANTICOAGULANT (ELIQUIS) 5 MG tablet Take 1 tablet (5 mg) by mouth 2 times daily 180 tablet 3     atorvastatin (LIPITOR) 10 MG tablet Take 1 tablet (10 mg) by mouth daily 90 tablet 3     Calcium Carb-Cholecalciferol (CALCIUM + D3) 600-200 MG-UNIT " TABS Take 1 tablet by mouth daily 90 tablet 3     fish oil-omega-3 fatty acids (OMEGA-3 FISH OIL) 1000 MG capsule TAKE 1 CAPSULE BY MOUTH ONCE DAILY 90 capsule 3     leflunomide (ARAVA) 10 MG tablet Take 1 tablet (10 mg) by mouth daily 90 tablet 3     lisinopril (PRINIVIL/ZESTRIL) 20 MG tablet Take 1 tablet (20 mg) by mouth daily 90 tablet 3     loperamide (IMODIUM) 2 MG capsule Take 2 mg by mouth 4 times daily as needed for diarrhea       metoprolol tartrate (LOPRESSOR) 25 MG tablet Take 0.25 tablets (6.25 mg) by mouth 2 times daily 60 tablet 3     Multiple Vitamins-Minerals (CEROVITE SENIOR) TABS Take 1 tablet by mouth daily 90 tablet 3     ondansetron (ZOFRAN) 4 MG tablet Take 4 mg by mouth every 6 hours as needed for nausea       polyethylene glycol (MIRALAX/GLYCOLAX) Packet Take 17 g by mouth daily as needed for constipation        sennosides (SENOKOT) 8.6 MG tablet Take 4 tablets by mouth 2 times daily as needed        ASPIRIN NOT PRESCRIBED (INTENTIONAL) Please choose reason not prescribed, below 0 each 0     REVIEW OF SYSTEMS:  Limited secondary to cognitive impairment but today pt reports ok    Objective:  /69   Pulse 51   Temp 98.6  F (37  C)   Resp 16   SpO2 97%   Exam:  GENERAL APPEARANCE:   alert and pleasant  ENT:  Mouth and posterior oropharynx normal, dry mucous membranes, cerumen in both ears  EYES:  EOM, conjunctivae, lids, pupils and irises normal  RESP:  respiratory effort and palpation of chest normal, lungs clear to auscultation , no respiratory distress but diminished   CV:  Palpation and auscultation of heart done , no edema, irregular rhythm w/a-fib, rate-normal, no LE edema  ABDOMEN:  soft, non-tender to palpation- hyperactive BS audible w/o stethoscope   M/S:  mostly use of WC  SKIN:  no signs of open areas to viewed arms, legs  NEURO:   Cranial nerves 2-12 are normal tested and grossly at patient's baseline  PSYCH:  insight and judgement impaired, memory impaired     Labs:   CBC  RESULTS:   Recent Labs   Lab Test 02/18/19  1555 10/10/18  0715   WBC 11.4* 7.6   RBC 4.90 4.23*   HGB 15.2 13.4   HCT 45.5 40.9   MCV 93 97   MCH 31.0 31.7   MCHC 33.4 32.8   RDW 14.3 13.4    295       Last Basic Metabolic Panel:  Recent Labs   Lab Test 02/18/19  1555 11/26/18  0910 10/03/18  0755     --  142   POTASSIUM 4.6  --  3.7   CHLORIDE 106  --  107   GISELA 8.7  --  8.9   CO2 26  --  29   BUN 23  --  15   CR 1.04 1.06 0.92   *  --  94       Liver Function Studies -   Recent Labs   Lab Test 09/28/18  1305 09/11/18  1711   PROTTOTAL 6.8 7.0   ALBUMIN 3.4 3.8   BILITOTAL 1.0 0.6   ALKPHOS 97 94   AST 26 20   ALT 27 33       TSH   Date Value Ref Range Status   09/11/2018 1.81 0.40 - 4.00 mU/L Final       Lab Results   Component Value Date    A1C 5.6 09/28/2018    A1C 6.0 07/01/2015       ASSESSMENT/PLAN:  (H61.23) Bilateral impacted cerumen  (primary encounter diagnosis)  Comment: Viewed with otoscope  Plan:   -Debrox 2 drops nightly  -Remove at next visit    (Z86.73) History of TIA (transient ischemic attack)   Comment: With residual cognitive deficits, WC ambulation   Plan:   -Following with Neurology   -Eliquis 5 mg po BID    (I48.0) Paroxysmal atrial fibrillation (H)  (I10) Hypertension   Comment: Lower HR today  Plan:   -Eliquis 5 mg po BID  -atorvastatin 10 mg po daily  -Lisinopril 20 mg po daily  -metoprolol 6.25 mg po bid- consider reduction or discontinue if HR remains low  -BMP periodically     (R41.89) Cognitive impairment  Comment: Now resides in [place]  Plan:   -Monitor for increased new for services  -Fish oil 1,000 mg po daily per family    (M06.00) Seronegative rheumatoid arthritis (H)  Comment: Concern for GI side effects  Plan:   -leflunomide 10 mg po daily  -DMARD drug level monitoring per Rheumatology   -Follows with Dr. Gustabo Maurer -Rheumatologist        Electronically signed by:  SHIRA Parra CNP           Sincerely,        SHIRA Parra CNP

## 2019-04-24 NOTE — LETTER
"    4/24/2019        RE: Jose E Boateng [facility]  [address]  [address]  [place] [address]        [place] [facility]  [place] Medical Record Number:  [id number]  Place of Service where encounter took place:  [facility] [[id number]]  Chief Complaint   Patient presents with     RECHECK       HPI:    Jose E Capps  is a 87 year old (9/28/1931), who is being seen today for an episodic care visit.  HPI information obtained from: facility chart records, patient report and [facility] chart review.     Seen today prior to lunch. Staff with no concerns. Reported to spend much time watching TV in community room or reading the paper. Appears to adjusted to [place]. Denies pain, chest pain or SOB. Answers most questions with \"I don't know\". By chart review, patient was hospitalized in October a couple of days after he took a fall.   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   He went to TCU, regained mobility but has remained cognitively impaired.   He is followed by \[facility]\"" Clinic of Neurology.    Pt has a long history of seronegative RA, treated with leflunomide per Dr Maurer.   He is ambulatory with FWW and cuing.       Past Medical and Surgical History reviewed in Epic today.    MEDICATIONS:  Current Outpatient Medications   Medication Sig Dispense Refill     acetaminophen (MAPAP) 500 MG tablet Take 1 tablet (500 mg) by mouth every 8 hours as needed for fever or pain 93 tablet 12     apixaban ANTICOAGULANT (ELIQUIS) 5 MG tablet Take 1 tablet (5 mg) by mouth 2 times daily 180 tablet 3     atorvastatin (LIPITOR) 10 MG tablet Take 1 tablet (10 mg) by mouth daily 90 tablet 3     Calcium Carb-Cholecalciferol (CALCIUM + D3) 600-200 MG-UNIT " TABS Take 1 tablet by mouth daily 90 tablet 3     fish oil-omega-3 fatty acids (OMEGA-3 FISH OIL) 1000 MG capsule TAKE 1 CAPSULE BY MOUTH ONCE DAILY 90 capsule 3     leflunomide (ARAVA) 10 MG tablet Take 1 tablet (10 mg) by mouth daily 90 tablet 3     lisinopril (PRINIVIL/ZESTRIL) 20 MG tablet Take 1 tablet (20 mg) by mouth daily 90 tablet 3     loperamide (IMODIUM) 2 MG capsule Take 2 mg by mouth 4 times daily as needed for diarrhea       metoprolol tartrate (LOPRESSOR) 25 MG tablet Take 0.25 tablets (6.25 mg) by mouth 2 times daily 60 tablet 3     Multiple Vitamins-Minerals (CEROVITE SENIOR) TABS Take 1 tablet by mouth daily 90 tablet 3     ondansetron (ZOFRAN) 4 MG tablet Take 4 mg by mouth every 6 hours as needed for nausea       polyethylene glycol (MIRALAX/GLYCOLAX) Packet Take 17 g by mouth daily as needed for constipation        sennosides (SENOKOT) 8.6 MG tablet Take 4 tablets by mouth 2 times daily as needed        ASPIRIN NOT PRESCRIBED (INTENTIONAL) Please choose reason not prescribed, below 0 each 0     REVIEW OF SYSTEMS:  Limited secondary to cognitive impairment but today pt reports ok    Objective:  /69   Pulse 51   Temp 98.6  F (37  C)   Resp 16   SpO2 97%   Exam:  GENERAL APPEARANCE:   alert and pleasant  ENT:  Mouth and posterior oropharynx normal, dry mucous membranes, cerumen in both ears  EYES:  EOM, conjunctivae, lids, pupils and irises normal  RESP:  respiratory effort and palpation of chest normal, lungs clear to auscultation , no respiratory distress but diminished   CV:  Palpation and auscultation of heart done , no edema, irregular rhythm w/a-fib, rate-normal, no LE edema  ABDOMEN:  soft, non-tender to palpation- hyperactive BS audible w/o stethoscope   M/S:  mostly use of WC  SKIN:  no signs of open areas to viewed arms, legs  NEURO:   Cranial nerves 2-12 are normal tested and grossly at patient's baseline  PSYCH:  insight and judgement impaired, memory impaired     Labs:   CBC  "RESULTS:   Recent Labs   Lab Test 02/18/19  1555 10/10/18  0715   WBC 11.4* 7.6   RBC 4.90 4.23*   HGB 15.2 13.4   HCT 45.5 40.9   MCV 93 97   MCH 31.0 31.7   MCHC 33.4 32.8   RDW 14.3 13.4    295       Last Basic Metabolic Panel:  Recent Labs   Lab Test 02/18/19  1555 11/26/18  0910 10/03/18  0755     --  142   POTASSIUM 4.6  --  3.7   CHLORIDE 106  --  107   GISELA 8.7  --  8.9   CO2 26  --  29   BUN 23  --  15   CR 1.04 1.06 0.92   *  --  94       Liver Function Studies -   Recent Labs   Lab Test 09/28/18  1305 09/11/18  1711   PROTTOTAL 6.8 7.0   ALBUMIN 3.4 3.8   BILITOTAL 1.0 0.6   ALKPHOS 97 94   AST 26 20   ALT 27 33       TSH   Date Value Ref Range Status   09/11/2018 1.81 0.40 - 4.00 mU/L Final       Lab Results   Component Value Date    A1C 5.6 09/28/2018    A1C 6.0 07/01/2015             ASSESSMENT/PLAN:  {[facility] DX:[id number]}              Electronically signed by:  SHIRA Parra [facility]  [place] Medical Record Number:  [id number]  Place of Service where encounter took place:  [facility] [[id number]]  Chief Complaint   Patient presents with     RECHECK       HPI:    Jose E Capps  is a 87 year old (9/28/1931), who is being seen today for an episodic care visit.  HPI information obtained from: facility chart records, patient report and [place] Epic chart review.     Seen today prior to lunch. Staff with no concerns. Reported to spend much time watching TV in community room or reading the paper. Appears to adjusted to [place]. Denies pain, chest pain or SOB. Answers most questions with \"I don't know\". By chart review, patient was hospitalized in October a couple of days after he took a fall.   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   He went to TCU, regained mobility but has remained cognitively impaired.   He is followed by [facility].    Pt has a long history of seronegative RA, treated with leflunomide per Dr Maurer.   He is ambulatory with FWW and cuing.       Past Medical and Surgical History reviewed in Epic today.    MEDICATIONS:  Current Outpatient Medications   Medication Sig Dispense Refill     acetaminophen (MAPAP) 500 MG tablet Take 1 tablet (500 mg) by mouth every 8 hours as needed for fever or pain 93 tablet 12     apixaban ANTICOAGULANT (ELIQUIS) 5 MG tablet Take 1 tablet (5 mg) by mouth 2 times daily 180 tablet 3     atorvastatin (LIPITOR) 10 MG tablet Take 1 tablet (10 mg) by mouth daily 90 tablet 3     Calcium Carb-Cholecalciferol (CALCIUM + D3) 600-200 MG-UNIT TABS Take 1 tablet by mouth daily 90 tablet 3     fish oil-omega-3 fatty acids (OMEGA-3 FISH OIL) 1000 MG capsule TAKE 1 CAPSULE BY MOUTH ONCE DAILY 90 capsule 3     leflunomide (ARAVA) 10 MG tablet Take 1 tablet (10 mg) by mouth daily 90 tablet 3     lisinopril (PRINIVIL/ZESTRIL) 20 MG tablet Take 1 tablet (20 mg) by mouth daily 90 tablet 3     loperamide (IMODIUM) 2 MG capsule Take 2 mg by mouth 4 times daily as needed for diarrhea       metoprolol tartrate (LOPRESSOR) 25 MG tablet Take 0.25 tablets (6.25 mg) by mouth 2 times daily 60 tablet 3     Multiple Vitamins-Minerals (CEROVITE SENIOR) TABS Take 1 tablet by mouth daily 90 tablet 3     ondansetron (ZOFRAN) 4 MG tablet Take 4 mg by mouth every 6 hours as needed for nausea       polyethylene glycol (MIRALAX/GLYCOLAX) Packet Take 17 g by mouth daily as needed for constipation        sennosides (SENOKOT) 8.6 MG tablet Take 4 tablets by mouth 2 times daily as needed        ASPIRIN NOT PRESCRIBED (INTENTIONAL) Please choose reason not prescribed, below 0 each 0     REVIEW OF SYSTEMS:  Limited secondary to cognitive impairment but today pt reports  ok    Objective:  /69   Pulse 51   Temp 98.6  F (37  C)   Resp 16   SpO2 97%   Exam:  GENERAL APPEARANCE:   alert and pleasant  ENT:  Mouth and posterior oropharynx normal, dry mucous membranes, cerumen in both ears  EYES:  EOM, conjunctivae, lids, pupils and irises normal  RESP:  respiratory effort and palpation of chest normal, lungs clear to auscultation , no respiratory distress but diminished   CV:  Palpation and auscultation of heart done , no edema, irregular rhythm w/a-fib, rate-normal, no LE edema  ABDOMEN:  soft, non-tender to palpation- hyperactive BS audible w/o stethoscope   M/S:  mostly use of WC  SKIN:  no signs of open areas to viewed arms, legs  NEURO:   Cranial nerves 2-12 are normal tested and grossly at patient's baseline  PSYCH:  insight and judgement impaired, memory impaired     Labs:   CBC RESULTS:   Recent Labs   Lab Test 02/18/19  1555 10/10/18  0715   WBC 11.4* 7.6   RBC 4.90 4.23*   HGB 15.2 13.4   HCT 45.5 40.9   MCV 93 97   MCH 31.0 31.7   MCHC 33.4 32.8   RDW 14.3 13.4    295       Last Basic Metabolic Panel:  Recent Labs   Lab Test 02/18/19  1555 11/26/18  0910 10/03/18  0755     --  142   POTASSIUM 4.6  --  3.7   CHLORIDE 106  --  107   GISELA 8.7  --  8.9   CO2 26  --  29   BUN 23  --  15   CR 1.04 1.06 0.92   *  --  94       Liver Function Studies -   Recent Labs   Lab Test 09/28/18  1305 09/11/18  1711   PROTTOTAL 6.8 7.0   ALBUMIN 3.4 3.8   BILITOTAL 1.0 0.6   ALKPHOS 97 94   AST 26 20   ALT 27 33       TSH   Date Value Ref Range Status   09/11/2018 1.81 0.40 - 4.00 mU/L Final       Lab Results   Component Value Date    A1C 5.6 09/28/2018    A1C 6.0 07/01/2015       ASSESSMENT/PLAN:  (H61.23) Bilateral impacted cerumen  (primary encounter diagnosis)  Comment: Viewed with otoscope  Plan:   -Debrox 2 drops nightly  -Remove at next visit    (Z86.73) History of TIA (transient ischemic attack)   Comment: With residual cognitive deficits, WC ambulation    Plan:   -Following with Neurology   -Eliquis 5 mg po BID    (I48.0) Paroxysmal atrial fibrillation (H)  (I10) Hypertension   Comment: Lower HR today  Plan:   -Eliquis 5 mg po BID  -atorvastatin 10 mg po daily  -Lisinopril 20 mg po daily  -metoprolol 6.25 mg po bid- consider reduction or discontinue if HR remains low  -BMP periodically     (R41.89) Cognitive impairment  Comment: Now resides in [place]  Plan:   -Monitor for increased new for services  -Fish oil 1,000 mg po daily per family    (M06.00) Seronegative rheumatoid arthritis (H)  Comment: Concern for GI side effects  Plan:   -leflunomide 10 mg po daily  -DMARD drug level monitoring per Rheumatology   -Follows with Dr. Gustabo Maurer -Rheumatologist        Electronically signed by:  SHIRA Parra CNP           Sincerely,        SHIRA Parra CNP

## 2019-05-08 ENCOUNTER — MEDICAL CORRESPONDENCE (OUTPATIENT)
Dept: HEALTH INFORMATION MANAGEMENT | Facility: CLINIC | Age: 84
End: 2019-05-08

## 2019-05-23 ENCOUNTER — TELEPHONE (OUTPATIENT)
Dept: GERIATRICS | Facility: CLINIC | Age: 84
End: 2019-05-23

## 2019-05-23 NOTE — TELEPHONE ENCOUNTER
Prior Authorization Retail Medication Request    Medication/Dose: ONDANSETRON 4MG ODT TAB  ICD code (if different than what is on RX):    Previously Tried and Failed:    Rationale:      Insurance Name: [employer]     Insurance ID: [id number]  BIN: [id number]  PCN: [id number]      Pharmacy Information (if different than what is on RX)  Name: [facility]  Phone:  [phone]

## 2019-06-06 NOTE — TELEPHONE ENCOUNTER
PRIOR AUTHORIZATION DENIED    Medication: ONDANSETRON 4MG ODT TAB- DENIED    Denial Date: 6/6/2019    Denial Rational: Medicare Part D only covers the medication for cancer or chemo related nausea.    Covered drugs are Metoclopramide and Prochlorperazine.      Appeal Information: If provider would like to appeal we will need a detailed letter of medical necessity to start the process. Then re-route this request back to the PA pool.

## 2019-06-06 NOTE — TELEPHONE ENCOUNTER
PA Initiation    Medication: ONDANSETRON 4MG ODT TAB- INITIATED  Insurance Company: [employer] - Phone [phone] Fax [phone]  Pharmacy Filling the Rx: [facility] - [address]  Filling Pharmacy Phone: [phone]  Filling Pharmacy Fax:    Start Date: 6/6/2019

## 2019-06-25 NOTE — PROGRESS NOTES
"[place] [facility]  [place] Medical Record Number:  [id number]  Place of Service where encounter took place:  [facility] [[id number]]  Chief Complaint   Patient presents with     RECHECK       HPI:    Jose E Capps  is a 87 year old (9/28/1931), who is being seen today for an episodic care visit.  HPI information obtained from: facility chart records, facility staff, patient report and [facility] chart review. Today's concern is:    Seen today for follow up. Staff with no concerns. He does continue to drink beer and has one open this afternoon. No recent falls noted (last fall 4/14 no injuries).Reported to spend much time watching TV in community room or reading the paper. Appears to adjusted to [place]. Denies pain, chest pain or SOB. Answers most questions with \"I don't know\". Is a  of cerumen and now use of debrox, manual removal prn. By chart review, patient was hospitalized in October a couple of days after he took a fall.   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 He went to TCU, regained mobility but has remained cognitively impaired.   He is followed by [facility].    Pt has a long history of seronegative RA, treated with leflunomide per Dr Maurer.   He is ambulatory with FWW and cuing.         Past Medical and Surgical History reviewed in Epic today.    MEDICATIONS:  Current Outpatient Medications   Medication Sig Dispense Refill     apixaban ANTICOAGULANT (ELIQUIS) 5 MG tablet Take 1 tablet (5 mg) by mouth 2 times daily 180 tablet 3     ASPIRIN NOT PRESCRIBED (INTENTIONAL) Please choose reason not prescribed, below 0 each 0     atorvastatin (LIPITOR) 10 MG tablet Take 1 tablet (10 mg) by mouth daily 90 " tablet 3     Calcium Carb-Cholecalciferol (CALCIUM + D3) 600-200 MG-UNIT TABS Take 1 tablet by mouth daily 90 tablet 3     carbamide peroxide (DEBROX) 6.5 % otic solution Place 2 drops into both ears At Bedtime 14.8 mL 3     fish oil-omega-3 fatty acids (OMEGA-3 FISH OIL) 1000 MG capsule TAKE 1 CAPSULE BY MOUTH ONCE DAILY 90 capsule 3     leflunomide (ARAVA) 10 MG tablet Take 1 tablet (10 mg) by mouth daily 90 tablet 3     lisinopril (PRINIVIL/ZESTRIL) 20 MG tablet Take 1 tablet (20 mg) by mouth daily 90 tablet 3     loperamide (IMODIUM) 2 MG capsule Take 2 mg by mouth 4 times daily as needed for diarrhea       metoprolol tartrate (LOPRESSOR) 25 MG tablet Take 0.25 tablets (6.25 mg) by mouth 2 times daily 60 tablet 3     Multiple Vitamins-Minerals (CEROVITE SENIOR) TABS Take 1 tablet by mouth daily 90 tablet 3     ondansetron (ZOFRAN) 4 MG tablet Take 4 mg by mouth every 6 hours as needed for nausea       polyethylene glycol (MIRALAX/GLYCOLAX) Packet Take 17 g by mouth daily as needed for constipation        REVIEW OF SYSTEMS:  Limited secondary to aphasia impairment but today pt reports ok    Objective:  /59   Pulse 72   Temp 99.4  F (37.4  C)   Resp 16   SpO2 97%   Exam:  GENERAL APPEARANCE:  alert and pleasant, forgetful   ENT:  Mouth and posterior oropharynx normal, dry mucous membranes, cerumen in both ears  EYES:  EOM, conjunctivae, lids, pupils and irises normal  RESP:  respiratory effort and palpation of chest normal, lungs clear to auscultation , no respiratory distress but diminished   CV:  Palpation and auscultation of heart done , no edema, irregular rhythm w/a-fib, rate-normal, no LE edema  ABDOMEN:  soft, non-tender to palpation  M/S: mostly use of WC  SKIN:  no signs of open areas to viewed arms, legs  NEURO:   Cranial nerves 2-12 are normal tested and grossly at patient's baseline  PSYCH:  insight and judgement impaired, memory impaired    Labs:   CBC RESULTS:   Recent Labs   Lab Test  02/18/19  1555 10/10/18  0715   WBC 11.4* 7.6   RBC 4.90 4.23*   HGB 15.2 13.4   HCT 45.5 40.9   MCV 93 97   MCH 31.0 31.7   MCHC 33.4 32.8   RDW 14.3 13.4    295       Last Basic Metabolic Panel:  Recent Labs   Lab Test 02/18/19  1555 11/26/18  0910 10/03/18  0755     --  142   POTASSIUM 4.6  --  3.7   CHLORIDE 106  --  107   GISELA 8.7  --  8.9   CO2 26  --  29   BUN 23  --  15   CR 1.04 1.06 0.92   *  --  94       Liver Function Studies -   Recent Labs   Lab Test 09/28/18  1305 09/11/18  1711   PROTTOTAL 6.8 7.0   ALBUMIN 3.4 3.8   BILITOTAL 1.0 0.6   ALKPHOS 97 94   AST 26 20   ALT 27 33       TSH   Date Value Ref Range Status   09/11/2018 1.81 0.40 - 4.00 mU/L Final       Lab Results   Component Value Date    A1C 5.6 09/28/2018    A1C 6.0 07/01/2015      ECHO 9/28/18  Interpretation Summary  Sinus rhythm was noted.  Left ventricular systolic function is normal.  The visual ejection fraction is estimated at 55-60%.  The left ventricle is normal in size.  There is mild concentric left ventricular hypertrophy.  The right ventricle is normal in structure, function and size.  There is mild biatrial enlargement.  There is mild (1+) aortic regurgitation.    ASSESSMENT/PLAN:    (I10) Hypertension goal BP (blood pressure) < 140/90  Comment: hypotensive today and at previous visit  Plan:   -Lisinopril 20 mg po daily reduce to 10 mg po daily  -BMP periodically     BP Readings from Last 3 Encounters:   06/26/19 120/59   04/24/19 125/69   02/18/19 158/69     (I48.0) Paroxysmal A-fib (H)  Comment: Noted with irregular rhythm   Plan:   -continue metoprolol for VR control, and apixaban for stroke prophylaxis     (Z86.73) History of CVA (cerebrovascular accident)  (177.74) Vertebral artery dissection (H)  Comment: Some residual cognitive effects   Plan:  -statin, anticoagulation and bp meds for secondary prevention  -Follow up in Neurology clinic as scheduled   -discontinue fish oil     (F01.50) Vascular  dementia without behavioral disturbance  Comment:Resides in [place]   Plan:  -Monitor for increased need of services    (N40.1,R35.0) Benign prostatic hyperplasia with urinary frequency  Comment: Noted with today's visit urgency and frequency  Plan:  -consider flomax at HS. Does not appear bothered by s/s           Electronically signed by:  SHIRA Parra CNP

## 2019-06-26 ENCOUNTER — ASSISTED LIVING VISIT (OUTPATIENT)
Dept: GERIATRICS | Facility: CLINIC | Age: 84
End: 2019-06-26
Payer: COMMERCIAL

## 2019-06-26 VITALS
TEMPERATURE: 99.4 F | HEART RATE: 72 BPM | OXYGEN SATURATION: 97 % | RESPIRATION RATE: 16 BRPM | DIASTOLIC BLOOD PRESSURE: 59 MMHG | SYSTOLIC BLOOD PRESSURE: 120 MMHG

## 2019-06-26 DIAGNOSIS — F01.50 VASCULAR DEMENTIA WITHOUT BEHAVIORAL DISTURBANCE (H): ICD-10-CM

## 2019-06-26 DIAGNOSIS — R35.0 BENIGN PROSTATIC HYPERPLASIA WITH URINARY FREQUENCY: ICD-10-CM

## 2019-06-26 DIAGNOSIS — I10 HYPERTENSION GOAL BP (BLOOD PRESSURE) < 140/90: Primary | ICD-10-CM

## 2019-06-26 DIAGNOSIS — I77.74 VERTEBRAL ARTERY DISSECTION (H): ICD-10-CM

## 2019-06-26 DIAGNOSIS — Z86.73 HISTORY OF CVA (CEREBROVASCULAR ACCIDENT): ICD-10-CM

## 2019-06-26 DIAGNOSIS — I48.0 PAROXYSMAL A-FIB (H): ICD-10-CM

## 2019-06-26 DIAGNOSIS — N40.1 BENIGN PROSTATIC HYPERPLASIA WITH URINARY FREQUENCY: ICD-10-CM

## 2019-06-26 PROBLEM — I69.351 HEMIPLEGIA AND HEMIPARESIS FOLLOWING CEREBRAL INFARCTION AFFECTING RIGHT DOMINANT SIDE (H): Status: ACTIVE | Noted: 2019-06-26

## 2019-06-26 PROBLEM — I50.32 CHRONIC DIASTOLIC CONGESTIVE HEART FAILURE (H): Status: ACTIVE | Noted: 2019-06-26

## 2019-06-26 RX ORDER — LISINOPRIL 10 MG/1
10 TABLET ORAL DAILY
Qty: 30 TABLET | Refills: 11 | Status: SHIPPED | OUTPATIENT
Start: 2019-06-26 | End: 2020-05-14

## 2019-06-26 NOTE — LETTER
"    6/26/2019        RE: Jose E [facility]  [address]  [address]  [place] [address]        [facility]  [place] Medical Record Number:  [id number]  Place of Service where encounter took place:  [facility] [[id number]]  Chief Complaint   Patient presents with     RECHECK       HPI:    Jose E Capps  is a 87 year old (9/28/1931), who is being seen today for an episodic care visit.  HPI information obtained from: facility chart records, facility staff, patient report and [facility] chart review. Today's concern is:    Seen today for follow up. Staff with no concerns. He does continue to drink beer and has one open this afternoon. No recent falls noted (last fall 4/14 no injuries).Reported to spend much time watching TV in community room or reading the paper. Appears to adjusted to AL. Denies pain, chest pain or SOB. Answers most questions with \"I don't know\". Is a  of cerumen and now use of debrox, manual removal prn. By chart review, patient was hospitalized in October a couple of days after he took a fall.   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 He went to TCU, regained mobility but has remained cognitively impaired.   He is followed by [facility].    Pt has a long history of seronegative RA, treated with leflunomide per Dr Maurer.   He is ambulatory with [facility] and ing.         Past Medical and Surgical History reviewed in Epic today.    MEDICATIONS:  Current Outpatient Medications   Medication Sig Dispense Refill     apixaban ANTICOAGULANT (ELIQUIS) 5 MG tablet Take 1 tablet (5 mg) by mouth 2 times daily 180 tablet 3     ASPIRIN NOT PRESCRIBED (INTENTIONAL) Please choose reason " not prescribed, below 0 each 0     atorvastatin (LIPITOR) 10 MG tablet Take 1 tablet (10 mg) by mouth daily 90 tablet 3     Calcium Carb-Cholecalciferol (CALCIUM + D3) 600-200 MG-UNIT TABS Take 1 tablet by mouth daily 90 tablet 3     carbamide peroxide (DEBROX) 6.5 % otic solution Place 2 drops into both ears At Bedtime 14.8 mL 3     fish oil-omega-3 fatty acids (OMEGA-3 FISH OIL) 1000 MG capsule TAKE 1 CAPSULE BY MOUTH ONCE DAILY 90 capsule 3     leflunomide (ARAVA) 10 MG tablet Take 1 tablet (10 mg) by mouth daily 90 tablet 3     lisinopril (PRINIVIL/ZESTRIL) 20 MG tablet Take 1 tablet (20 mg) by mouth daily 90 tablet 3     loperamide (IMODIUM) 2 MG capsule Take 2 mg by mouth 4 times daily as needed for diarrhea       metoprolol tartrate (LOPRESSOR) 25 MG tablet Take 0.25 tablets (6.25 mg) by mouth 2 times daily 60 tablet 3     Multiple Vitamins-Minerals (CEROVITE SENIOR) TABS Take 1 tablet by mouth daily 90 tablet 3     ondansetron (ZOFRAN) 4 MG tablet Take 4 mg by mouth every 6 hours as needed for nausea       polyethylene glycol (MIRALAX/GLYCOLAX) Packet Take 17 g by mouth daily as needed for constipation        REVIEW OF SYSTEMS:  Limited secondary to aphasia impairment but today pt reports ok    Objective:  /59   Pulse 72   Temp 99.4  F (37.4  C)   Resp 16   SpO2 97%   Exam:  GENERAL APPEARANCE:  alert and pleasant, forgetful   ENT:  Mouth and posterior oropharynx normal, dry mucous membranes, cerumen in both ears  EYES:  EOM, conjunctivae, lids, pupils and irises normal  RESP:  respiratory effort and palpation of chest normal, lungs clear to auscultation , no respiratory distress but diminished   CV:  Palpation and auscultation of heart done , no edema, irregular rhythm w/a-fib, rate-normal, no LE edema  ABDOMEN:  soft, non-tender to palpation  M/S: mostly use of WC  SKIN:  no signs of open areas to viewed arms, legs  NEURO:   Cranial nerves 2-12 are normal tested and grossly at patient's  baseline  PSYCH:  insight and judgement impaired, memory impaired    Labs:   CBC RESULTS:   Recent Labs   Lab Test 02/18/19  1555 10/10/18  0715   WBC 11.4* 7.6   RBC 4.90 4.23*   HGB 15.2 13.4   HCT 45.5 40.9   MCV 93 97   MCH 31.0 31.7   MCHC 33.4 32.8   RDW 14.3 13.4    295       Last Basic Metabolic Panel:  Recent Labs   Lab Test 02/18/19  1555 11/26/18  0910 10/03/18  0755     --  142   POTASSIUM 4.6  --  3.7   CHLORIDE 106  --  107   GISELA 8.7  --  8.9   CO2 26  --  29   BUN 23  --  15   CR 1.04 1.06 0.92   *  --  94       Liver Function Studies -   Recent Labs   Lab Test 09/28/18  1305 09/11/18  1711   PROTTOTAL 6.8 7.0   ALBUMIN 3.4 3.8   BILITOTAL 1.0 0.6   ALKPHOS 97 94   AST 26 20   ALT 27 33       TSH   Date Value Ref Range Status   09/11/2018 1.81 0.40 - 4.00 mU/L Final       Lab Results   Component Value Date    A1C 5.6 09/28/2018    A1C 6.0 07/01/2015      ECHO 9/28/18  Interpretation Summary  Sinus rhythm was noted.  Left ventricular systolic function is normal.  The visual ejection fraction is estimated at 55-60%.  The left ventricle is normal in size.  There is mild concentric left ventricular hypertrophy.  The right ventricle is normal in structure, function and size.  There is mild biatrial enlargement.  There is mild (1+) aortic regurgitation.    ASSESSMENT/PLAN:    (I10) Hypertension goal BP (blood pressure) < 140/90  Comment: hypotensive today and at previous visit  Plan:   -Lisinopril 20 mg po daily reduce to 10 mg po daily  -BMP periodically     BP Readings from Last 3 Encounters:   06/26/19 120/59   04/24/19 125/69   02/18/19 158/69     (I48.0) Paroxysmal A-fib (H)  Comment: Noted with irregular rhythm   Plan:   -continue metoprolol for VR control, and apixaban for stroke prophylaxis     (Z86.73) History of CVA (cerebrovascular accident)  (177.74) Vertebral artery dissection (H)  Comment: Some residual cognitive effects   Plan:  -statin, anticoagulation and bp meds for  secondary prevention  -Follow up in Neurology clinic as scheduled   -discontinue fish oil     (F01.50) Vascular dementia without behavioral disturbance  Comment:Resides in AL   Plan:  -Monitor for increased need of services    (N40.1,R35.0) Benign prostatic hyperplasia with urinary frequency  Comment: Noted with today's visit urgency and frequency  Plan:  -consider flomax at HS. Does not appear bothered by s/s           Electronically signed by:  SHIRA Parra CNP             Sincerely,        SHIRA Parra CNP

## 2019-06-27 ENCOUNTER — DOCUMENTATION ONLY (OUTPATIENT)
Dept: OTHER | Facility: CLINIC | Age: 84
End: 2019-06-27

## 2019-07-03 ENCOUNTER — TRANSFERRED RECORDS (OUTPATIENT)
Dept: HEALTH INFORMATION MANAGEMENT | Facility: CLINIC | Age: 84
End: 2019-07-03

## 2019-07-03 ENCOUNTER — HOSPITAL LABORATORY (OUTPATIENT)
Dept: OTHER | Facility: CLINIC | Age: 84
End: 2019-07-03

## 2019-07-03 LAB
ALBUMIN SERPL-MCNC: 3.6 G/DL (ref 3.4–5)
ALP SERPL-CCNC: 73 U/L (ref 40–150)
ALT SERPL W P-5'-P-CCNC: 26 U/L (ref 0–70)
ANION GAP SERPL CALCULATED.3IONS-SCNC: 4 MMOL/L (ref 3–14)
AST SERPL W P-5'-P-CCNC: 15 U/L (ref 0–45)
BILIRUB DIRECT SERPL-MCNC: 0.1 MG/DL (ref 0–0.2)
BILIRUB SERPL-MCNC: 0.5 MG/DL (ref 0.2–1.3)
BUN SERPL-MCNC: 22 MG/DL (ref 7–30)
CALCIUM SERPL-MCNC: 8.8 MG/DL (ref 8.5–10.1)
CHLORIDE SERPL-SCNC: 110 MMOL/L (ref 94–109)
CO2 SERPL-SCNC: 28 MMOL/L (ref 20–32)
CREAT SERPL-MCNC: 1.07 MG/DL (ref 0.66–1.25)
ERYTHROCYTE [DISTWIDTH] IN BLOOD BY AUTOMATED COUNT: 14 % (ref 10–15)
GFR SERPL CREATININE-BSD FRML MDRD: 62 ML/MIN/{1.73_M2}
GLUCOSE SERPL-MCNC: 103 MG/DL (ref 70–99)
HCT VFR BLD AUTO: 43.8 % (ref 40–53)
HGB BLD-MCNC: 14.6 G/DL (ref 13.3–17.7)
MCH RBC QN AUTO: 30.9 PG (ref 26.5–33)
MCHC RBC AUTO-ENTMCNC: 33.3 G/DL (ref 31.5–36.5)
MCV RBC AUTO: 93 FL (ref 78–100)
PLATELET # BLD AUTO: 235 10E9/L (ref 150–450)
POTASSIUM SERPL-SCNC: 4.5 MMOL/L (ref 3.4–5.3)
PROT SERPL-MCNC: 7 G/DL (ref 6.8–8.8)
RBC # BLD AUTO: 4.72 10E12/L (ref 4.4–5.9)
SODIUM SERPL-SCNC: 142 MMOL/L (ref 133–144)
TSH SERPL DL<=0.005 MIU/L-ACNC: 1.72 MU/L (ref 0.4–4)
WBC # BLD AUTO: 7.5 10E9/L (ref 4–11)

## 2019-07-19 DIAGNOSIS — M05.742 RHEUMATOID ARTHRITIS INVOLVING BOTH HANDS WITH POSITIVE RHEUMATOID FACTOR (H): Primary | ICD-10-CM

## 2019-07-19 DIAGNOSIS — M05.741 RHEUMATOID ARTHRITIS INVOLVING BOTH HANDS WITH POSITIVE RHEUMATOID FACTOR (H): Primary | ICD-10-CM

## 2019-07-19 RX ORDER — LEFLUNOMIDE 10 MG/1
TABLET ORAL
Qty: 31 TABLET | Refills: 98 | Status: SHIPPED | OUTPATIENT
Start: 2019-07-19 | End: 2020-08-27

## 2019-08-20 ENCOUNTER — DOCUMENTATION ONLY (OUTPATIENT)
Dept: OTHER | Facility: CLINIC | Age: 84
End: 2019-08-20

## 2019-08-20 ENCOUNTER — OFFICE VISIT (OUTPATIENT)
Dept: FAMILY MEDICINE | Facility: CLINIC | Age: 84
End: 2019-08-20
Payer: COMMERCIAL

## 2019-08-20 VITALS
BODY MASS INDEX: 30.39 KG/M2 | HEIGHT: 64 IN | SYSTOLIC BLOOD PRESSURE: 112 MMHG | HEART RATE: 64 BPM | WEIGHT: 178 LBS | OXYGEN SATURATION: 95 % | DIASTOLIC BLOOD PRESSURE: 58 MMHG | TEMPERATURE: 98 F

## 2019-08-20 DIAGNOSIS — I69.351 HEMIPLEGIA AND HEMIPARESIS FOLLOWING CEREBRAL INFARCTION AFFECTING RIGHT DOMINANT SIDE (H): ICD-10-CM

## 2019-08-20 PROCEDURE — 99214 OFFICE O/P EST MOD 30 MIN: CPT | Performed by: FAMILY MEDICINE

## 2019-08-20 ASSESSMENT — MIFFLIN-ST. JEOR: SCORE: 1393.65

## 2019-08-20 NOTE — PROGRESS NOTES
Subjective     Jose E Capps is a 87 year old male who presents to clinic today for the following health issues:    HPI   Patient comes in with the daughter today to get [employer] benefit life forms filled out.  Patient had a stroke last year in [birth year] on his birthday.  Since then he has needed assistance.  Currently he is in assisted living with getting supervised constantly with ambulation, dressing, bathing, eating, transferring, medications etc.  Daughter tells that she is very satisfied with all the care provided.  Patient needs continued ongoing care.  He is soon going to follow-up with neurology.  Patient has short-term memory loss.  He has had an episode of fall a few months ago while showering.  He has had an episode or 2 where he wandered off and got lost for a little bit.        Patient Active Problem List   Diagnosis     Seronegative rheumatoid arthritis (H)     Hyperlipidemia LDL goal <100     Colon polyp     Hypertension goal BP (blood pressure) < 140/90     History of malignant melanoma of skin     History of basal cell carcinoma     History of TIA (transient ischemic attack)      RBBB (right bundle branch block)     Atherosclerosis of native coronary artery of native heart without angina pectoris     Falls, initial encounter     Physical deconditioning     Cerebrovascular accident (CVA) due to occlusion of left posterior cerebral artery (H)     Hemiplegia and hemiparesis following cerebral infarction affecting right dominant side (H)     Chronic diastolic congestive heart failure (H)     Past Surgical History:   Procedure Laterality Date     C LAT LUMBAR SPINE FUSION       JOINT REPLACEMENT, HIP RT/LT  2007    R     JOINT REPLACEMTN, KNEE RT/LT  2004     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    excised       Social History     Tobacco Use     Smoking status: Former Smoker     Packs/day: 2.00     Years: 30.00     Pack years: 60.00     Types: Cigarettes     Smokeless tobacco: Never Used  "  Substance Use Topics     Alcohol use: Yes     Alcohol/week: 10.8 oz     Types: 4 Standard drinks or equivalent, 14 Shots of liquor per week     Comment: 2-3 drinks a night     Family History   Problem Relation Age of Onset     Diabetes Mother      Alzheimer Disease Mother      Obesity Mother      Cardiovascular Father      Obesity Father      Cancer Sister      Cancer Sister      Glaucoma No family hx of      Macular Degeneration No family hx of          Current Outpatient Medications   Medication Sig Dispense Refill     apixaban ANTICOAGULANT (ELIQUIS) 5 MG tablet Take 1 tablet (5 mg) by mouth 2 times daily 180 tablet 3     ASPIRIN NOT PRESCRIBED (INTENTIONAL) Please choose reason not prescribed, below 0 each 0     atorvastatin (LIPITOR) 10 MG tablet Take 1 tablet (10 mg) by mouth daily 90 tablet 3     leflunomide (ARAVA) 10 MG tablet TAKE 1 TABLET BY MOUTH ONCE DAILY 31 tablet 98     lisinopril (PRINIVIL/ZESTRIL) 10 MG tablet Take 1 tablet (10 mg) by mouth daily 30 tablet 11     metoprolol tartrate (LOPRESSOR) 25 MG tablet Take 0.25 tablets (6.25 mg) by mouth 2 times daily 60 tablet 3     Multiple Vitamins-Minerals (CEROVITE SENIOR) TABS Take 1 tablet by mouth daily 90 tablet 3     Allergies   Allergen Reactions     No Known Drug Allergy          Reviewed and updated as needed this visit by Provider         Review of Systems   ROS COMP: CONSTITUTIONAL: NEGATIVE for fever, chills, change in weight  ENT/MOUTH: NEGATIVE for ear, mouth and throat problems  RESP: NEGATIVE for significant cough or SOB  CV: NEGATIVE for chest pain, palpitations or peripheral edema      Objective    /58   Pulse 64   Temp 98  F (36.7  C) (Tympanic)   Ht 1.626 m (5' 4.02\")   Wt 80.7 kg (178 lb)   SpO2 95%   BMI 30.54 kg/m    Body mass index is 30.54 kg/m .  Physical Exam   GENERAL: healthy, alert and no distress  NECK: no adenopathy, no asymmetry, masses, or scars and thyroid normal to palpation  RESP: lungs clear to " "auscultation - no rales, rhonchi or wheezes  CV: regular rate and rhythm, normal S1 S2, no S3 or S4, no murmur, click or rub, no peripheral edema and peripheral pulses strong  ABDOMEN: soft, nontender, no hepatosplenomegaly, no masses and bowel sounds normal  MS: no gross musculoskeletal defects noted, no edema            Assessment & Plan     1. 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  Needs constant supervision.  Plan is to have him continue living in an assisted care with ongoing surveillance and assistance.  Daughter is the POA and managing his finances.   Medication list reviewed.  Follow-up with neurology.  Resume current medications.  Form filled out.       BMI:   Estimated body mass index is 30.54 kg/m  as calculated from the following:    Height as of this encounter: 1.626 m (5' 4.02\").    Weight as of this encounter: 80.7 kg (178 lb).       Return in about 2 months (around 11/4/2019) for Annual Physical Exam.    Nehemias Granados MD  [facility]      "

## 2019-08-21 ENCOUNTER — TELEPHONE (OUTPATIENT)
Dept: FAMILY MEDICINE | Facility: CLINIC | Age: 84
End: 2019-08-21

## 2019-08-21 NOTE — TELEPHONE ENCOUNTER
Form has been completed, scanned to chart and mailed with self addressed envelope.      .Ellie GONZALEZ    [facility] [place]

## 2019-08-28 ENCOUNTER — ASSISTED LIVING VISIT (OUTPATIENT)
Dept: GERIATRICS | Facility: CLINIC | Age: 84
End: 2019-08-28
Payer: COMMERCIAL

## 2019-08-28 VITALS
HEART RATE: 64 BPM | SYSTOLIC BLOOD PRESSURE: 177 MMHG | DIASTOLIC BLOOD PRESSURE: 41 MMHG | RESPIRATION RATE: 16 BRPM | TEMPERATURE: 98.6 F

## 2019-08-28 DIAGNOSIS — F01.50 VASCULAR DEMENTIA WITHOUT BEHAVIORAL DISTURBANCE (H): ICD-10-CM

## 2019-08-28 DIAGNOSIS — I10 HYPERTENSION GOAL BP (BLOOD PRESSURE) < 140/90: Primary | ICD-10-CM

## 2019-08-28 DIAGNOSIS — I48.0 PAROXYSMAL A-FIB (H): ICD-10-CM

## 2019-08-28 DIAGNOSIS — I77.74 VERTEBRAL ARTERY DISSECTION (H): ICD-10-CM

## 2019-08-28 RX ORDER — POLYETHYLENE GLYCOL 3350 17 G/17G
1 POWDER, FOR SOLUTION ORAL DAILY PRN
COMMUNITY
End: 2019-10-30

## 2019-08-28 RX ORDER — LOPERAMIDE HCL 2 MG
2 CAPSULE ORAL 4 TIMES DAILY PRN
COMMUNITY
End: 2020-02-14

## 2019-08-28 RX ORDER — ACETAMINOPHEN 500 MG
500 TABLET ORAL EVERY 8 HOURS PRN
COMMUNITY
End: 2020-04-02

## 2019-08-28 NOTE — LETTER
8/28/2019        RE: Jose E Capps  [place]  [address]  [place] [address]        Jsoe E Capps is a 87 year old male seen August 28, 2019 at [facility] where he has resided for one year (admit 8/2018) seen to follow up cognitive decline and RA.    Pt moved here with his wife, but she passed in October 2018 and he now lives alone, with services.     Pt is seen on the unit, up to chair.  He is pleasant and smiling, states he is feeling well    Says no to pain or dyspnea, but not able to give much meaningful history.     By chart review, patient was hospitalized in October 2018 after he took a fall.   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   He went to TCU, regained mobility but has remained cognitively impaired.   He is followed by [facility].    Pt has a long history of seronegative RA, treated with leflunomide per Dr Maurer.   He is ambulatory with [facility] and cuing.       Past Medical History:   Diagnosis Date     BPH (benign prostatic hyperplasia)      Colonic polyps     recurrent     CVA (cerebral vascular accident) (H) 2009    posterior circulation     Hyperlipidemia LDL goal <100     reports being intolerant to      Hypertension      Seronegative rheumatoid arthritis (H) 2011    hands, neck,, shoulders      Past Surgical History:   Procedure Laterality Date     C LAT LUMBAR SPINE FUSION       JOINT REPLACEMENT, HIP RT/LT  2007    R     JOINT REPLACEMTN, KNEE RT/LT  2004     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    excised     SH:  .   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   He has 4 daughters and one son.  Daughter Skylar in [facility]] is first contact.      60 pack year smoking history.      ROS: SLUMS 14/30  Needs  assist with ADLs, and directional cuing.       EXAM:  Pleasant, NAD  BP (!) 177/41   Pulse 64   Temp 98.6  F (37  C)   Resp 16    Neck supple without adenopathy  Lungs clear bilaterally with fair air movement  Heart RRR s1s2 with 1/6 PRERNA  Abd soft, NT, no distention, +BS  Ext without edema  Neuro: very limited history, STML.   No focal findings  Psych: affect okay     Last Comprehensive Metabolic Panel:  Sodium   Date Value Ref Range Status   07/03/2019 142 133 - 144 mmol/L Final     Potassium   Date Value Ref Range Status   07/03/2019 4.5 3.4 - 5.3 mmol/L Final     Chloride   Date Value Ref Range Status   07/03/2019 110 (H) 94 - 109 mmol/L Final     Carbon Dioxide   Date Value Ref Range Status   07/03/2019 28 20 - 32 mmol/L Final     Anion Gap   Date Value Ref Range Status   07/03/2019 4 3 - 14 mmol/L Final     Glucose   Date Value Ref Range Status   07/03/2019 103 (H) 70 - 99 mg/dL Final     Urea Nitrogen   Date Value Ref Range Status   07/03/2019 22 7 - 30 mg/dL Final     Creatinine   Date Value Ref Range Status   07/03/2019 1.07 0.66 - 1.25 mg/dL Final     GFR Estimate   Date Value Ref Range Status   07/03/2019 62 >60 mL/min/[1.73_m2] Final     Comment:     Non  GFR Calc  Starting 12/18/2018, serum creatinine based estimated GFR (eGFR) will be   calculated using the Chronic Kidney Disease Epidemiology Collaboration   (CKD-EPI) equation.       Calcium   Date Value Ref Range Status   07/03/2019 8.8 8.5 - 10.1 mg/dL Final     Lab Results   Component Value Date    WBC 7.5 07/03/2019      HGB 14.6 07/03/2019      MCV 93 07/03/2019       07/03/2019     TSH   Date Value Ref Range Status   07/03/2019 1.72 0.40 - 4.00 mU/L Final       ECHO 9/28/18  Interpretation Summary  Sinus rhythm was noted.  Left ventricular systolic function is normal. The visual ejection fraction is estimated at 55-60%.  The left ventricle is normal in size.   There is mild concentric left ventricular hypertrophy.  The  right ventricle is normal in structure, function and size.  There is mild biatrial enlargement.  There is mild (1+) aortic regurgitation.      IMP/PLAN:    (I48.0) Paroxysmal atrial fibrillation (H)   Comment:   Pulse Readings from Last 4 Encounters:   02/22/19 64   08/20/19 64   06/26/19 72   04/24/19 51      Plan: metoprolol for VR control, and apixaban for stroke prophylaxis.       (I77.74) Vertebral artery dissection (H)  (I63.50) Cerebrovascular accident involving posterior circulation (H)  Comment: residual cognitive impairment, mobility has returned      Plan: statin, anticoagulation and bp meds for secondary prevention.   Follow up in Neurology clinic as scheduled.     (I10) Hypertension goal BP (blood pressure) < 140/90  Comment: variable  BP Readings from Last 3 Encounters:   02/22/19 (!) 177/41   08/20/19 112/58   06/26/19 120/59      Plan: continue metoprolol and lisinopril.        (F01.50) Vascular dementia without behavioral disturbance  Comment: scores as above, low functional status   Plan: AL support for med admin, escorts to meals, activity and safety       (M06.00) Seronegative rheumatoid arthritis (H)  Comment: no current pain   Plan: continue leflunomide; follow up with Dr Maurer.     Acetaminophen prn     Paty Jimenez MD         Sincerely,        Paty Jimenez MD

## 2019-09-05 NOTE — PROGRESS NOTES
Jose E Capps is a 87 year old male seen August 28, 2019 at [facility] where he has resided for one year (admit 8/2018) seen to follow up cognitive decline and RA.    Pt moved here with his wife, but she passed in October 2018 and he now lives alone, with services.     Pt is seen on the unit, up to chair.  He is pleasant and smiling, states he is feeling well    Says no to pain or dyspnea, but not able to give much meaningful history.     By chart review, patient was hospitalized in October 2018 after he took a fall.   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   He went to TCU, regained mobility but has remained cognitively impaired.   He is followed by [facility].    Pt has a long history of seronegative RA, treated with leflunomide per Dr Maurer.   He is ambulatory with [facility] and cuing.       Past Medical History:   Diagnosis Date     BPH (benign prostatic hyperplasia)      Colonic polyps     recurrent     CVA (cerebral vascular accident) (H) 2009    posterior circulation     Hyperlipidemia LDL goal <100     reports being intolerant to      Hypertension      Seronegative rheumatoid arthritis (H) 2011    hands, neck,, shoulders      Past Surgical History:   Procedure Laterality Date     C LAT LUMBAR SPINE FUSION       JOINT REPLACEMENT, HIP RT/LT  2007    R     JOINT REPLACEMTN, KNEE RT/LT  2004     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    excised     SH:  .   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   He has 4 daughters and one son.  Daughter Skylar in [place] is first contact.      60 pack year smoking history.      ROS: SLUMS 14/30  Needs assist with ADLs, and directional cuing.       EXAM:  Pleasant, NAD  BP (!) 177/41   Pulse 64   Temp 98.6  F (37   C)   Resp 16    Neck supple without adenopathy  Lungs clear bilaterally with fair air movement  Heart RRR s1s2 with 1/6 PRERNA  Abd soft, NT, no distention, +BS  Ext without edema  Neuro: very limited history, STML.   No focal findings  Psych: affect okay     Last Comprehensive Metabolic Panel:  Sodium   Date Value Ref Range Status   07/03/2019 142 133 - 144 mmol/L Final     Potassium   Date Value Ref Range Status   07/03/2019 4.5 3.4 - 5.3 mmol/L Final     Chloride   Date Value Ref Range Status   07/03/2019 110 (H) 94 - 109 mmol/L Final     Carbon Dioxide   Date Value Ref Range Status   07/03/2019 28 20 - 32 mmol/L Final     Anion Gap   Date Value Ref Range Status   07/03/2019 4 3 - 14 mmol/L Final     Glucose   Date Value Ref Range Status   07/03/2019 103 (H) 70 - 99 mg/dL Final     Urea Nitrogen   Date Value Ref Range Status   07/03/2019 22 7 - 30 mg/dL Final     Creatinine   Date Value Ref Range Status   07/03/2019 1.07 0.66 - 1.25 mg/dL Final     GFR Estimate   Date Value Ref Range Status   07/03/2019 62 >60 mL/min/[1.73_m2] Final     Comment:     Non  GFR Calc  Starting 12/18/2018, serum creatinine based estimated GFR (eGFR) will be   calculated using the Chronic Kidney Disease Epidemiology Collaboration   (CKD-EPI) equation.       Calcium   Date Value Ref Range Status   07/03/2019 8.8 8.5 - 10.1 mg/dL Final     Lab Results   Component Value Date    WBC 7.5 07/03/2019      HGB 14.6 07/03/2019      MCV 93 07/03/2019       07/03/2019     TSH   Date Value Ref Range Status   07/03/2019 1.72 0.40 - 4.00 mU/L Final       ECHO 9/28/18  Interpretation Summary  Sinus rhythm was noted.  Left ventricular systolic function is normal. The visual ejection fraction is estimated at 55-60%.  The left ventricle is normal in size.   There is mild concentric left ventricular hypertrophy.  The right ventricle is normal in structure, function and size.  There is mild biatrial enlargement.  There is mild (1+)  aortic regurgitation.      IMP/PLAN:    (I48.0) Paroxysmal atrial fibrillation (H)   Comment:   Pulse Readings from Last 4 Encounters:   02/22/19 64   08/20/19 64   06/26/19 72   04/24/19 51      Plan: metoprolol for VR control, and apixaban for stroke prophylaxis.       (I77.74) Vertebral artery dissection (H)  (I63.50) Cerebrovascular accident involving posterior circulation (H)  Comment: residual cognitive impairment, mobility has returned      Plan: statin, anticoagulation and bp meds for secondary prevention.   Follow up in Neurology clinic as scheduled.     (I10) Hypertension goal BP (blood pressure) < 140/90  Comment: variable  BP Readings from Last 3 Encounters:   02/22/19 (!) 177/41   08/20/19 112/58   06/26/19 120/59      Plan: continue metoprolol and lisinopril.        (F01.50) Vascular dementia without behavioral disturbance  Comment: scores as above, low functional status   Plan: AL support for med admin, escorts to meals, activity and safety       (M06.00) Seronegative rheumatoid arthritis (H)  Comment: no current pain   Plan: continue leflunomide; follow up with Dr Maurer.     Acetaminophen prn     Paty Jimenez MD

## 2019-10-03 DIAGNOSIS — K59.01 SLOW TRANSIT CONSTIPATION: Primary | ICD-10-CM

## 2019-10-04 RX ORDER — POLYETHYLENE GLYCOL 3350 17 G/17G
POWDER, FOR SOLUTION ORAL
Qty: 527 G | Refills: 5 | Status: SHIPPED | OUTPATIENT
Start: 2019-10-04 | End: 2021-07-01

## 2019-10-09 DIAGNOSIS — E78.2 MIXED HYPERLIPIDEMIA: ICD-10-CM

## 2019-10-09 DIAGNOSIS — I10 HYPERTENSION GOAL BP (BLOOD PRESSURE) < 140/90: ICD-10-CM

## 2019-10-09 DIAGNOSIS — E63.8 IMBALANCED NUTRITION: ICD-10-CM

## 2019-10-09 DIAGNOSIS — I48.91 ATRIAL FIBRILLATION, UNSPECIFIED TYPE (H): ICD-10-CM

## 2019-10-09 RX ORDER — ATORVASTATIN CALCIUM 10 MG/1
TABLET, FILM COATED ORAL
Qty: 31 TABLET | Refills: 98 | Status: SHIPPED | OUTPATIENT
Start: 2019-10-09 | End: 2020-11-02

## 2019-10-09 RX ORDER — APIXABAN 5 MG/1
TABLET, FILM COATED ORAL
Qty: 62 TABLET | Refills: 98 | Status: SHIPPED | OUTPATIENT
Start: 2019-10-09 | End: 2020-11-02

## 2019-10-09 RX ORDER — MULTIVIT-MIN/FA/LYCOPEN/LUTEIN .4-300-25
TABLET ORAL
Qty: 31 TABLET | Refills: 98 | Status: SHIPPED | OUTPATIENT
Start: 2019-10-09 | End: 2020-11-02

## 2019-10-30 ENCOUNTER — ASSISTED LIVING VISIT (OUTPATIENT)
Dept: GERIATRICS | Facility: CLINIC | Age: 84
End: 2019-10-30
Payer: COMMERCIAL

## 2019-10-30 VITALS
RESPIRATION RATE: 16 BRPM | TEMPERATURE: 98.1 F | SYSTOLIC BLOOD PRESSURE: 140 MMHG | DIASTOLIC BLOOD PRESSURE: 70 MMHG | OXYGEN SATURATION: 93 % | HEART RATE: 59 BPM

## 2019-10-30 DIAGNOSIS — M05.741 RHEUMATOID ARTHRITIS INVOLVING BOTH HANDS WITH POSITIVE RHEUMATOID FACTOR (H): ICD-10-CM

## 2019-10-30 DIAGNOSIS — Z86.73 HISTORY OF CVA (CEREBROVASCULAR ACCIDENT): ICD-10-CM

## 2019-10-30 DIAGNOSIS — Z85.828 HISTORY OF SKIN CANCER: ICD-10-CM

## 2019-10-30 DIAGNOSIS — M05.742 RHEUMATOID ARTHRITIS INVOLVING BOTH HANDS WITH POSITIVE RHEUMATOID FACTOR (H): ICD-10-CM

## 2019-10-30 DIAGNOSIS — M06.00 SERONEGATIVE RHEUMATOID ARTHRITIS (H): Primary | ICD-10-CM

## 2019-10-30 DIAGNOSIS — I10 HYPERTENSION GOAL BP (BLOOD PRESSURE) < 140/90: ICD-10-CM

## 2019-10-30 NOTE — LETTER
"    10/30/2019        RE: Jose E Boateng On [place]  [address]  [place] [address]        [facility]  [facility] Record Number:  [id number]  Place of Service where encounter took place:  [facility] [[id number]]  Chief Complaint   Patient presents with     RECHECK       HPI:    Jose E Capps  is a 88 year old (9/28/1931), who is being seen today for an episodic care visit.  HPI information obtained from: facility chart records, patient report and [facility] chart review. Today's concern is:    Seen today for follow up to chronic conditions. Was also in [facility]. In bed, denies pain, shortness of breath. No acute complaints or concerns. Follows with Dr. Delgado for neurology and started on Vitamin D and B12. Pt has a long history of seronegative RA, treated with leflunomide per Dr Maurer. Unclear when last clinic visit. Hx of BPH not currently on medication. History of TIA (left posterior cerebral infarct & dissecting artery) 2018. HTN, CAD, paroxysmal a-fib. Malignant melanoma history left upper arm approximately 46 years ago and basal cell carcinoma on right lower leg s/p punch excision 12/2012.  Full body skin cancer screening 9/2017 findings include scattered brown macules most consistent with benign nevi on arms and some scattered \"stuck on\" papules most likely consistent with seborrheic keratoses.     Past Medical and Surgical History reviewed in Epic today.    MEDICATIONS:  Current Outpatient Medications   Medication Sig Dispense Refill     acetaminophen (TYLENOL) 500 MG tablet Take 500 mg by mouth every 8 hours as needed for mild pain       ASPIRIN NOT PRESCRIBED (INTENTIONAL) Please choose reason not prescribed, below 0 each 0     atorvastatin (LIPITOR) 10 MG tablet TAKE 1 TABLET BY MOUTH ONCE DAILY 31 tablet 98     Calcium Carb-Cholecalciferol (CALCIUM + D3) 600-200 MG-UNIT TABS TAKE 1 TABLET BY MOUTH ONCE DAILY 31 " tablet 98     carbamide peroxide (DEBROX) 6.5 % otic solution Place 2 drops into both ears At Bedtime       cholecalciferol (VITAMIN D3) 5000 units (125 mcg) capsule Take 5,000 Units by mouth daily       cyanocobalamin (VITAMIN B-12) 500 MCG SUBL sublingual tablet Place 500 mcg under the tongue 2 times daily       ELIQUIS ANTICOAGULANT 5 MG tablet TAKE 1 TABLET BY MOUTH TWICE DAILY 62 tablet 98     leflunomide (ARAVA) 10 MG tablet TAKE 1 TABLET BY MOUTH ONCE DAILY 31 tablet 98     lisinopril (PRINIVIL/ZESTRIL) 10 MG tablet Take 1 tablet (10 mg) by mouth daily 30 tablet 11     loperamide (IMODIUM) 2 MG capsule Take 2 mg by mouth 4 times daily as needed for diarrhea       metoprolol tartrate (LOPRESSOR) 25 MG tablet Take 0.25 tablets (6.25 mg) by mouth 2 times daily 60 tablet 3     Multiple Vitamins-Minerals (CEROVITE SENIOR) TABS TAKE 1 TABLET BY MOUTH ONCE DAILY 31 tablet 98     polyethylene glycol (MIRALAX/GLYCOLAX) powder MIX 17GM OF POWDER IN 8OZ OF WATER UNTIL COMPLETELY DISSOLVED. DRINK SOLUTION BY MOUTH ONCE DAILY AS NEEDED FOR CONSTIPATION 527 g 5     REVIEW OF SYSTEMS:  Limited secondary to cognitive impairment but today pt reports ok    Objective:  BP (!) 140/70   Pulse 59   Temp 98.1  F (36.7  C)   Resp 16   SpO2 93%   Exam:  GENERAL APPEARANCE:  alert and pleasant, forgetful   ENT:  Mouth and posterior oropharynx normal, dry mucous membranes  EYES:  EOM, conjunctivae, lids, pupils and irises normal  RESP:  respiratory effort and palpation of chest normal, lungs clear to auscultation , no respiratory distress but diminished   CV:  Palpation and auscultation of heart done , no edema, RRR, no LE edema  ABDOMEN:  soft, non-tender to palpation  M/S: mostly use of WC  SKIN:  no signs of open areas to viewed arms, legs  NEURO:   Cranial nerves 2-12 are normal tested and grossly at patient's baseline  PSYCH:  insight and judgement impaired, memory impaired    Labs:   CBC RESULTS:   Recent Labs   Lab Test  07/03/19  0845 02/18/19  1555   WBC 7.5 11.4*   RBC 4.72 4.90   HGB 14.6 15.2   HCT 43.8 45.5   MCV 93 93   MCH 30.9 31.0   MCHC 33.3 33.4   RDW 14.0 14.3    191       Last Basic Metabolic Panel:  Recent Labs   Lab Test 07/03/19  0845 02/18/19  1555    139   POTASSIUM 4.5 4.6   CHLORIDE 110* 106   GISELA 8.8 8.7   CO2 28 26   BUN 22 23   CR 1.07 1.04   * 131*       Liver Function Studies -   Recent Labs   Lab Test 07/03/19  0845 09/28/18  1305   PROTTOTAL 7.0 6.8   ALBUMIN 3.6 3.4   BILITOTAL 0.5 1.0   ALKPHOS 73 97   AST 15 26   ALT 26 27       TSH   Date Value Ref Range Status   07/03/2019 1.72 0.40 - 4.00 mU/L Final   09/11/2018 1.81 0.40 - 4.00 mU/L Final       Lab Results   Component Value Date    A1C 5.6 09/28/2018    A1C 6.0 07/01/2015       ASSESSMENT/PLAN:  (M06.00) Seronegative rheumatoid arthritis (H)  (primary encounter diagnosis)  (M05.741,  M05.742) Rheumatoid arthritis involving both hands with positive rheumatoid factor (H)  Comment: no flares recently   Plan:   -leflunomide 10 mg po daily  -discuss f/u with his daughters    (Z86.73) History of CVA (cerebrovascular accident)  (I10) Hypertension goal BP (blood pressure) < 140/90  Comment: stable  Plan:   -statin, Eliquis and BP medications   -follow BMP  -follows with neurology     (Z54.150) History of skin cancer  Comment: many seborrhoic lesions   Plan:   -dermatology prn       Electronically signed by:  SHIRA Parra CNP           Sincerely,        SHIRA Parra CNP

## 2019-10-30 NOTE — PROGRESS NOTES
"[facility]  [facility] Record Number:  [id number]  Place of Service where encounter took place:  [facility] [[id number]]  Chief Complaint   Patient presents with     RECHECK       HPI:    Jose E Capps  is a 88 year old (9/28/1931), who is being seen today for an episodic care visit.  HPI information obtained from: facility chart records, patient report and [facility] chart review. Today's concern is:    Seen today for follow up to chronic conditions. Was also in [facility]. In bed, denies pain, shortness of breath. No acute complaints or concerns. Follows with Dr. Delgado for neurology and started on Vitamin D and B12. Pt has a long history of seronegative RA, treated with leflunomide per Dr Maurer. Unclear when last clinic visit. Hx of BPH not currently on medication. History of TIA (left posterior cerebral infarct & dissecting artery) 2018. HTN, CAD, paroxysmal a-fib. Malignant melanoma history left upper arm approximately 46 years ago and basal cell carcinoma on right lower leg s/p punch excision 12/2012.  Full body skin cancer screening 9/2017 findings include scattered brown macules most consistent with benign nevi on arms and some scattered \"stuck on\" papules most likely consistent with seborrheic keratoses.     Past Medical and Surgical History reviewed in Epic today.    MEDICATIONS:  Current Outpatient Medications   Medication Sig Dispense Refill     acetaminophen (TYLENOL) 500 MG tablet Take 500 mg by mouth every 8 hours as needed for mild pain       ASPIRIN NOT PRESCRIBED (INTENTIONAL) Please choose reason not prescribed, below 0 each 0     atorvastatin (LIPITOR) 10 MG tablet TAKE 1 TABLET BY MOUTH ONCE DAILY 31 tablet 98     Calcium Carb-Cholecalciferol (CALCIUM + D3) 600-200 MG-UNIT TABS TAKE 1 TABLET BY MOUTH ONCE DAILY 31 tablet 98     carbamide peroxide (DEBROX) 6.5 % otic solution Place 2 drops into both ears At Bedtime       " cholecalciferol (VITAMIN D3) 5000 units (125 mcg) capsule Take 5,000 Units by mouth daily       cyanocobalamin (VITAMIN B-12) 500 MCG SUBL sublingual tablet Place 500 mcg under the tongue 2 times daily       ELIQUIS ANTICOAGULANT 5 MG tablet TAKE 1 TABLET BY MOUTH TWICE DAILY 62 tablet 98     leflunomide (ARAVA) 10 MG tablet TAKE 1 TABLET BY MOUTH ONCE DAILY 31 tablet 98     lisinopril (PRINIVIL/ZESTRIL) 10 MG tablet Take 1 tablet (10 mg) by mouth daily 30 tablet 11     loperamide (IMODIUM) 2 MG capsule Take 2 mg by mouth 4 times daily as needed for diarrhea       metoprolol tartrate (LOPRESSOR) 25 MG tablet Take 0.25 tablets (6.25 mg) by mouth 2 times daily 60 tablet 3     Multiple Vitamins-Minerals (CEROVITE SENIOR) TABS TAKE 1 TABLET BY MOUTH ONCE DAILY 31 tablet 98     polyethylene glycol (MIRALAX/GLYCOLAX) powder MIX 17GM OF POWDER IN 8OZ OF WATER UNTIL COMPLETELY DISSOLVED. DRINK SOLUTION BY MOUTH ONCE DAILY AS NEEDED FOR CONSTIPATION 527 g 5     REVIEW OF SYSTEMS:  Limited secondary to cognitive impairment but today pt reports ok    Objective:  BP (!) 140/70   Pulse 59   Temp 98.1  F (36.7  C)   Resp 16   SpO2 93%   Exam:  GENERAL APPEARANCE:  alert and pleasant, forgetful   ENT:  Mouth and posterior oropharynx normal, dry mucous membranes  EYES:  EOM, conjunctivae, lids, pupils and irises normal  RESP:  respiratory effort and palpation of chest normal, lungs clear to auscultation , no respiratory distress but diminished   CV:  Palpation and auscultation of heart done , no edema, RRR, no LE edema  ABDOMEN:  soft, non-tender to palpation  M/S: mostly use of WC  SKIN:  no signs of open areas to viewed arms, legs  NEURO:   Cranial nerves 2-12 are normal tested and grossly at patient's baseline  PSYCH:  insight and judgement impaired, memory impaired    Labs:   CBC RESULTS:   Recent Labs   Lab Test 07/03/19  0845 02/18/19  1555   WBC 7.5 11.4*   RBC 4.72 4.90   HGB 14.6 15.2   HCT 43.8 45.5   MCV 93 93   MCH  30.9 31.0   MCHC 33.3 33.4   RDW 14.0 14.3    191       Last Basic Metabolic Panel:  Recent Labs   Lab Test 07/03/19  0845 02/18/19  1555    139   POTASSIUM 4.5 4.6   CHLORIDE 110* 106   GISELA 8.8 8.7   CO2 28 26   BUN 22 23   CR 1.07 1.04   * 131*       Liver Function Studies -   Recent Labs   Lab Test 07/03/19  0845 09/28/18  1305   PROTTOTAL 7.0 6.8   ALBUMIN 3.6 3.4   BILITOTAL 0.5 1.0   ALKPHOS 73 97   AST 15 26   ALT 26 27       TSH   Date Value Ref Range Status   07/03/2019 1.72 0.40 - 4.00 mU/L Final   09/11/2018 1.81 0.40 - 4.00 mU/L Final       Lab Results   Component Value Date    A1C 5.6 09/28/2018    A1C 6.0 07/01/2015       ASSESSMENT/PLAN:  (M06.00) Seronegative rheumatoid arthritis (H)  (primary encounter diagnosis)  (M05.741,  M05.742) Rheumatoid 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

## 2019-11-21 ENCOUNTER — TRANSFERRED RECORDS (OUTPATIENT)
Dept: HEALTH INFORMATION MANAGEMENT | Facility: CLINIC | Age: 84
End: 2019-11-21

## 2019-12-03 DIAGNOSIS — I48.91 ATRIAL FIBRILLATION, UNSPECIFIED TYPE (H): ICD-10-CM

## 2019-12-04 RX ORDER — METOPROLOL TARTRATE 25 MG/1
TABLET, FILM COATED ORAL
Qty: 16 TABLET | Refills: 98 | Status: SHIPPED | OUTPATIENT
Start: 2019-12-04 | End: 2020-09-09

## 2019-12-05 ENCOUNTER — TRANSFERRED RECORDS (OUTPATIENT)
Dept: HEALTH INFORMATION MANAGEMENT | Facility: CLINIC | Age: 84
End: 2019-12-05

## 2019-12-05 ENCOUNTER — HOSPITAL LABORATORY (OUTPATIENT)
Dept: OTHER | Facility: CLINIC | Age: 84
End: 2019-12-05

## 2019-12-05 LAB
ALBUMIN SERPL-MCNC: 3.4 G/DL (ref 3.4–5)
ALT SERPL W P-5'-P-CCNC: 30 U/L (ref 0–70)
AST SERPL W P-5'-P-CCNC: 16 U/L (ref 0–45)
BASOPHILS # BLD AUTO: 0.1 10E9/L (ref 0–0.2)
BASOPHILS NFR BLD AUTO: 0.7 %
CREAT SERPL-MCNC: 0.98 MG/DL (ref 0.66–1.25)
DIFFERENTIAL METHOD BLD: NORMAL
EOSINOPHIL # BLD AUTO: 0.4 10E9/L (ref 0–0.7)
EOSINOPHIL NFR BLD AUTO: 5.2 %
ERYTHROCYTE [DISTWIDTH] IN BLOOD BY AUTOMATED COUNT: 14.4 % (ref 10–15)
GFR SERPL CREATININE-BSD FRML MDRD: 68 ML/MIN/{1.73_M2}
HCT VFR BLD AUTO: 43.1 % (ref 40–53)
HGB BLD-MCNC: 14 G/DL (ref 13.3–17.7)
IMM GRANULOCYTES # BLD: 0.1 10E9/L (ref 0–0.4)
IMM GRANULOCYTES NFR BLD: 0.7 %
LYMPHOCYTES # BLD AUTO: 2.2 10E9/L (ref 0.8–5.3)
LYMPHOCYTES NFR BLD AUTO: 30.5 %
MCH RBC QN AUTO: 30.7 PG (ref 26.5–33)
MCHC RBC AUTO-ENTMCNC: 32.5 G/DL (ref 31.5–36.5)
MCV RBC AUTO: 95 FL (ref 78–100)
MONOCYTES # BLD AUTO: 0.8 10E9/L (ref 0–1.3)
MONOCYTES NFR BLD AUTO: 11 %
NEUTROPHILS # BLD AUTO: 3.8 10E9/L (ref 1.6–8.3)
NEUTROPHILS NFR BLD AUTO: 51.9 %
NRBC # BLD AUTO: 0 10*3/UL
NRBC BLD AUTO-RTO: 0 /100
PLATELET # BLD AUTO: 236 10E9/L (ref 150–450)
RBC # BLD AUTO: 4.56 10E12/L (ref 4.4–5.9)
WBC # BLD AUTO: 7.3 10E9/L (ref 4–11)

## 2019-12-31 ENCOUNTER — ASSISTED LIVING VISIT (OUTPATIENT)
Dept: GERIATRICS | Facility: CLINIC | Age: 84
End: 2019-12-31
Payer: COMMERCIAL

## 2019-12-31 DIAGNOSIS — M06.00 SERONEGATIVE RHEUMATOID ARTHRITIS (H): Primary | ICD-10-CM

## 2019-12-31 DIAGNOSIS — R41.3 MEMORY LOSS: ICD-10-CM

## 2019-12-31 DIAGNOSIS — I10 HYPERTENSION GOAL BP (BLOOD PRESSURE) < 140/90: ICD-10-CM

## 2019-12-31 DIAGNOSIS — M05.741 RHEUMATOID ARTHRITIS INVOLVING BOTH HANDS WITH POSITIVE RHEUMATOID FACTOR (H): ICD-10-CM

## 2019-12-31 DIAGNOSIS — Z86.73 HISTORY OF CVA (CEREBROVASCULAR ACCIDENT): ICD-10-CM

## 2019-12-31 DIAGNOSIS — M05.742 RHEUMATOID ARTHRITIS INVOLVING BOTH HANDS WITH POSITIVE RHEUMATOID FACTOR (H): ICD-10-CM

## 2019-12-31 RX ORDER — MEMANTINE HYDROCHLORIDE 10 MG/1
10 TABLET ORAL DAILY
COMMUNITY
Start: 2019-12-04 | End: 2020-12-02

## 2019-12-31 NOTE — LETTER
"    12/31/2019        RE: Jose E Boateng On [facility]  [address]  [place] [address]        [facility]  Chief Complaint   Patient presents with     Annual Comprehensive Nursing Home     [facility] Record Number:  [id number]  Place of Service where encounter took place:  [facility] [[id number]]    HPI:    Jose E Capps  is a 88 year old  (9/28/1931), who is being seen today for an annual comprehensive visit. HPI information obtained from: facility chart records, patient report and [facility] Epic chart review.  Today's concerns are:    Seen today for right hand, digits of right hand and right wrist swelling. Ok with ROM slight limitation to thumb, no redness or warmth, denies pain. History of RA in both hands and has not followed up with his rheumatologist. Continues on leflunomide.     Hx of BPH not currently on medication. Denies dysuria, urgency and frequency. History of TIA (left posterior cerebral infarct & dissecting artery) 2018. HTN, CAD, paroxysmal a-fib. Malignant melanoma history left upper arm approximately 46 years ago and basal cell carcinoma on right lower leg s/p punch excision 12/2012.  Full body skin cancer screening 9/2017 findings include scattered brown macules most consistent with benign nevi on arms and some scattered \"stuck on\" papules most likely consistent with seborrheic keratoses.  Started on namenda from Dr. Delgado from neurology and is not clear if helping his memory loss. SLUMS 22/30.    ALLERGIES: No known drug allergy  PAST MEDICAL HISTORY:  has a past medical history of BPH (benign prostatic hyperplasia), Colonic polyps, CVA (cerebral vascular accident) (H) (2009), Hyperlipidemia LDL goal <100, Hypertension, and Seronegative rheumatoid arthritis (H) (2011). He also has no past medical history of Asymptomatic human immunodeficiency virus (HIV) infection status (H), Blood in semen, Cerebral palsy (H), " Complication of anesthesia, Congenital renal agenesis and dysgenesis, Epididymitis, bilateral, Epididymitis, left, Epididymitis, right, Goiter, Gout, Hernia, abdominal, History of spinal cord injury, History of thrombophlebitis, Horseshoe kidney, Hydrocephalus (H), Malignant hyperthermia, Mumps, Orchitis, epididymitis, and epididymo-orchitis, with abscess, Palpitations, Parkinsons disease (H), Penile discharge, Prostate infection, Spider veins, Spina bifida (H), STD (sexually transmitted disease), Swelling of testicle, Tethered cord (H), or Tuberculosis.  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  IMMUNIZATIONS:  Immunization History   Administered Date(s) Administered     Influenza (High Dose) 3 valent vaccine 10/23/2012, 10/23/2015, 10/10/2016, 01/09/2018, 09/11/2018, 09/25/2019     Influenza (IIV3) PF 09/01/2011, 10/23/2012, 11/25/2013, 10/10/2016, 01/09/2018     Pneumo Conj 13-V (2010&after) 01/09/2018     Pneumococcal 23 valent 09/01/2011     TDAP Vaccine (Adacel) 01/09/2018     Zoster vaccine recombinant adjuvanted (SHINGRIX) 10/30/2018     Above immunizations pulled from [facility]. [facility] and facility records also reconciled. Outstanding information sent to  to update [facility].  Future immunizations are not needed at this point as all recommended immunizations are up to date.  Did not receive 2nd dose of shingrix     Current Outpatient Medications   Medication Sig Dispense Refill     acetaminophen (TYLENOL) 500 MG tablet Take 500 mg by mouth every 8 hours as needed for mild pain       ASPIRIN NOT PRESCRIBED (INTENTIONAL) Please choose reason not prescribed, below 0 each 0     atorvastatin (LIPITOR) 10 MG tablet TAKE 1 TABLET BY MOUTH ONCE DAILY 31 tablet 98     Calcium Carb-Cholecalciferol (CALCIUM + D3) 600-200  MG-UNIT TABS TAKE 1 TABLET BY MOUTH ONCE DAILY 31 tablet 98     cholecalciferol (VITAMIN D3) 5000 units (125 mcg) capsule Take 5,000 Units by mouth daily       cyanocobalamin (VITAMIN B-12) 500 MCG SUBL sublingual tablet Place 500 mcg under the tongue 2 times daily       ELIQUIS ANTICOAGULANT 5 MG tablet TAKE 1 TABLET BY MOUTH TWICE DAILY 62 tablet 98     leflunomide (ARAVA) 10 MG tablet TAKE 1 TABLET BY MOUTH ONCE DAILY 31 tablet 98     lisinopril (PRINIVIL/ZESTRIL) 10 MG tablet Take 1 tablet (10 mg) by mouth daily 30 tablet 11     loperamide (IMODIUM) 2 MG capsule Take 2 mg by mouth 4 times daily as needed for diarrhea       memantine (NAMENDA) 10 MG tablet Take 10 mg by mouth daily From 12/4/19 - 01/02/20 then starting 01/03/20 take 10 mg PO BID       metoprolol tartrate (LOPRESSOR) 25 MG tablet TAKE ONE-FOURTH TABLET (6.25MG) BY MOUTH TWICE DAILY 16 tablet 98     Multiple Vitamins-Minerals (CEROVITE SENIOR) TABS TAKE 1 TABLET BY MOUTH ONCE DAILY 31 tablet 98     polyethylene glycol (MIRALAX/GLYCOLAX) powder MIX 17GM OF POWDER IN 8OZ OF WATER UNTIL COMPLETELY DISSOLVED. DRINK SOLUTION BY MOUTH ONCE DAILY AS NEEDED FOR CONSTIPATION 527 g 5     Case Management:  I have reviewed the Assisted Living care plan, current immunizations and preventive care/cancer screening. .Future cancer screening is not clinically indicated secondary to age/goals of care Patient's desire to return to the community is present, but is not able due to care needs . Current Level of Care is appropriate.    Advance Directive Discussion:    I reviewed the current advanced directives as reflected in EPIC, the POLST and the facility chart, and verified the congruency of orders. I contacted the first party daughter Marita and discussed the plan of Care.  I did not due to cognitive impairment review the advance directives with the resident.     Team Discussion:  I communicated with the appropriate disciplines involved with the Plan of Care:    Nursing    Patient's goal is pain control and comfort. Treat reversible conditions   Information reviewed:  Medications, vital signs, orders, and nursing notes.    ROS:  Limited secondary to cognitive impairment but today pt reports ok    Vitals:  /69   Pulse 61   Temp 98.9  F (37.2  C)   Resp 16   SpO2 97%  There is no height or weight on file to calculate BMI.  Exam:  BP (!) 140/70   Pulse 59   Temp 98.1  F (36.7  C)   Resp 16   SpO2 93%   Exam:  GENERAL APPEARANCE:  alert and pleasant, forgetful   ENT:  Mouth and posterior oropharynx normal, dry mucous membranes  EYES:  EOM, conjunctivae, lids, pupils and irises normal-glasses  RESP:  respiratory effort and palpation of chest normal, lungs clear to auscultation , no respiratory distress but diminished   CV:  Palpation and auscultation of heart done , no edema, regular rate, irregular rhythm, no LE edema  ABDOMEN:  soft, non-tender to palpation  M/S: mostly use of WC, see hpi for right hand  SKIN:  no signs of open areas to viewed arms, legs  NEURO:   Cranial nerves 2-12 are normal tested and grossly at patient's baseline  PSYCH:  insight and judgement impaired, memory impaired    Lab/Diagnostic data:   CBC RESULTS:   Recent Labs   Lab Test 12/05/19  0840 07/03/19  0845   WBC 7.3 7.5   RBC 4.56 4.72   HGB 14.0 14.6   HCT 43.1 43.8   MCV 95 93   MCH 30.7 30.9   MCHC 32.5 33.3   RDW 14.4 14.0    235       Last Basic Metabolic Panel:  Recent Labs   Lab Test 12/05/19  0840 07/03/19  0845 02/18/19  1555   NA  --  142 139   POTASSIUM  --  4.5 4.6   CHLORIDE  --  110* 106   GISELA  --  8.8 8.7   CO2  --  28 26   BUN  --  22 23   CR 0.98 1.07 1.04   GLC  --  103* 131*       Liver Function Studies -   Recent Labs   Lab Test 12/05/19  0840 07/03/19  0845 09/28/18  1305   PROTTOTAL  --  7.0 6.8   ALBUMIN 3.4 3.6 3.4   BILITOTAL  --  0.5 1.0   ALKPHOS  --  73 97   AST 16 15 26   ALT 30 26 27       TSH   Date Value Ref Range Status   07/03/2019 1.72 0.40 -  4.00 mU/L Final   09/11/2018 1.81 0.40 - 4.00 mU/L Final       Lab Results   Component Value Date    A1C 5.6 09/28/2018    A1C 6.0 07/01/2015     ASSESSMENT/PLAN  (M06.00) Seronegative rheumatoid arthritis (H)  (primary encounter diagnosis)  (M05.741,  M05.742) Rheumatoid arthritis involving both hands with positive rheumatoid factor (H)  Comment: flare in right hand  Plan:   -follow up with Dr. Maurer-rheumatologist   -leflunomide 10 mg po daily  -short course of prednisone   -consider checking CRP  -follow CBC, LFTs with [facility]     (I10) Hypertension goal BP (blood pressure) < 140/90  (Z86.73) History of CVA (cerebrovascular accident)  Comment: stable  Plan:   -statin, Eliquis and BP medications   -follow BMP     (R41.3) Memory loss  Comment: ongoing  Plan:  -follows with neurology. Unable to start homocysteine supplement from neuro secondary to cost with set-up from [place]. Reviewed with family and neuro clinic.  -labs for homocysteine at neurology but unable to see  -memantine daily          Electronically signed by:  SHIRA Parra CNP           Sincerely,        SHIRA Parra CNP

## 2019-12-31 NOTE — PROGRESS NOTES
"[facility]  Chief Complaint   Patient presents with     Annual Comprehensive Nursing Home     [facility] Medical Record Number:  [id number]  Place of Service where encounter took place:  [facility] [[id number]]    HPI:    Jose E Capps  is a 88 year old  (9/28/1931), who is being seen today for an annual comprehensive visit. HPI information obtained from: facility chart records, patient report and [facility] Epic chart review.  Today's concerns are:    Seen today for right hand, digits of right hand and right wrist swelling. Ok with ROM slight limitation to thumb, no redness or warmth, denies pain. History of RA in both hands and has not followed up with his rheumatologist. Continues on leflunomide.     Hx of BPH not currently on medication. Denies dysuria, urgency and frequency. History of TIA (left posterior cerebral infarct & dissecting artery) 2018. HTN, CAD, paroxysmal a-fib. Malignant melanoma history left upper arm approximately 46 years ago and basal cell carcinoma on right lower leg s/p punch excision 12/2012.  Full body skin cancer screening 9/2017 findings include scattered brown macules most consistent with benign nevi on arms and some scattered \"stuck on\" papules most likely consistent with seborrheic keratoses. Started on namenda from Dr. Delgado from neurology and is not clear if helping his memory loss. SLUMS 22/30.    ALLERGIES: No known drug allergy  PAST MEDICAL HISTORY:  has a past medical history of BPH (benign prostatic hyperplasia), Colonic polyps, CVA (cerebral vascular accident) (H) (2009), Hyperlipidemia LDL goal <100, Hypertension, and Seronegative rheumatoid arthritis (H) (2011). He also has no past medical history of Asymptomatic human immunodeficiency virus (HIV) infection status (H), Blood in semen, Cerebral palsy (H), Complication of anesthesia, Congenital renal agenesis and dysgenesis, Epididymitis, bilateral, Epididymitis, left, " Epididymitis, right, Goiter, Gout, Hernia, abdominal, History of spinal cord injury, History of thrombophlebitis, Horseshoe kidney, Hydrocephalus (H), Malignant hyperthermia, Mumps, Orchitis, epididymitis, and epididymo-orchitis, with abscess, Palpitations, Parkinsons disease (H), Penile discharge, Prostate infection, Spider veins, Spina bifida (H), STD (sexually transmitted disease), Swelling of testicle, Tethered cord (H), or Tuberculosis.  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  IMMUNIZATIONS:  Immunization History   Administered Date(s) Administered     Influenza (High Dose) 3 valent vaccine 10/23/2012, 10/23/2015, 10/10/2016, 01/09/2018, 09/11/2018, 09/25/2019     Influenza (IIV3) PF 09/01/2011, 10/23/2012, 11/25/2013, 10/10/2016, 01/09/2018     Pneumo Conj 13-V (2010&after) 01/09/2018     Pneumococcal 23 valent 09/01/2011     TDAP Vaccine (Adacel) 01/09/2018     Zoster vaccine recombinant adjuvanted (SHINGRIX) 10/30/2018     Above immunizations pulled from [facility]. [facility] and facility records also reconciled. Outstanding information sent to  to update [facility].  Future immunizations are not needed at this point as all recommended immunizations are up to date.  Did not receive 2nd dose of shingrix     Current Outpatient Medications   Medication Sig Dispense Refill     acetaminophen (TYLENOL) 500 MG tablet Take 500 mg by mouth every 8 hours as needed for mild pain       ASPIRIN NOT PRESCRIBED (INTENTIONAL) Please choose reason not prescribed, below 0 each 0     atorvastatin (LIPITOR) 10 MG tablet TAKE 1 TABLET BY MOUTH ONCE DAILY 31 tablet 98     Calcium Carb-Cholecalciferol (CALCIUM + D3) 600-200 MG-UNIT TABS TAKE 1 TABLET BY MOUTH ONCE DAILY 31 tablet 98     cholecalciferol (VITAMIN D3) 5000 units (125 mcg)  capsule Take 5,000 Units by mouth daily       cyanocobalamin (VITAMIN B-12) 500 MCG SUBL sublingual tablet Place 500 mcg under the tongue 2 times daily       ELIQUIS ANTICOAGULANT 5 MG tablet TAKE 1 TABLET BY MOUTH TWICE DAILY 62 tablet 98     leflunomide (ARAVA) 10 MG tablet TAKE 1 TABLET BY MOUTH ONCE DAILY 31 tablet 98     lisinopril (PRINIVIL/ZESTRIL) 10 MG tablet Take 1 tablet (10 mg) by mouth daily 30 tablet 11     loperamide (IMODIUM) 2 MG capsule Take 2 mg by mouth 4 times daily as needed for diarrhea       memantine (NAMENDA) 10 MG tablet Take 10 mg by mouth daily From 12/4/19 - 01/02/20 then starting 01/03/20 take 10 mg PO BID       metoprolol tartrate (LOPRESSOR) 25 MG tablet TAKE ONE-FOURTH TABLET (6.25MG) BY MOUTH TWICE DAILY 16 tablet 98     Multiple Vitamins-Minerals (CEROVITE SENIOR) TABS TAKE 1 TABLET BY MOUTH ONCE DAILY 31 tablet 98     polyethylene glycol (MIRALAX/GLYCOLAX) powder MIX 17GM OF POWDER IN 8OZ OF WATER UNTIL COMPLETELY DISSOLVED. DRINK SOLUTION BY MOUTH ONCE DAILY AS NEEDED FOR CONSTIPATION 527 g 5     Case Management:  I have reviewed the Assisted Living care plan, current immunizations and preventive care/cancer screening. .Future cancer screening is not clinically indicated secondary to age/goals of care Patient's desire to return to the community is present, but is not able due to care needs . Current Level of Care is appropriate.    Advance Directive Discussion:    I reviewed the current advanced directives as reflected in EPIC, the POLST and the facility chart, and verified the congruency of orders. I contacted the first party daughter Marita and discussed the plan of Care.  I did not due to cognitive impairment review the advance directives with the resident.     Team Discussion:  I communicated with the appropriate disciplines involved with the Plan of Care:   Nursing    Patient's goal is pain control and comfort. Treat reversible conditions   Information reviewed:   Medications, vital signs, orders, and nursing notes.    ROS:  Limited secondary to cognitive impairment but today pt reports ok    Vitals:  /69   Pulse 61   Temp 98.9  F (37.2  C)   Resp 16   SpO2 97%  There is no height or weight on file to calculate BMI.  Exam:  BP (!) 140/70   Pulse 59   Temp 98.1  F (36.7  C)   Resp 16   SpO2 93%   Exam:  GENERAL APPEARANCE:  alert and pleasant, forgetful   ENT:  Mouth and posterior oropharynx normal, dry mucous membranes  EYES:  EOM, conjunctivae, lids, pupils and irises normal-glasses  RESP:  respiratory effort and palpation of chest normal, lungs clear to auscultation , no respiratory distress but diminished   CV:  Palpation and auscultation of heart done , no edema, regular rate, irregular rhythm, no LE edema  ABDOMEN:  soft, non-tender to palpation  M/S: mostly use of WC, see hpi for right hand  SKIN:  no signs of open areas to viewed arms, legs  NEURO:   Cranial nerves 2-12 are normal tested and grossly at patient's baseline  PSYCH:  insight and judgement impaired, memory impaired    Lab/Diagnostic data:   CBC RESULTS:   Recent Labs   Lab Test 12/05/19 0840 07/03/19  0845   WBC 7.3 7.5   RBC 4.56 4.72   HGB 14.0 14.6   HCT 43.1 43.8   MCV 95 93   MCH 30.7 30.9   MCHC 32.5 33.3   RDW 14.4 14.0    235       Last Basic Metabolic Panel:  Recent Labs   Lab Test 12/05/19  0840 07/03/19  0845 02/18/19  1555   NA  --  142 139   POTASSIUM  --  4.5 4.6   CHLORIDE  --  110* 106   GISELA  --  8.8 8.7   CO2  --  28 26   BUN  --  22 23   CR 0.98 1.07 1.04   GLC  --  103* 131*       Liver Function Studies -   Recent Labs   Lab Test 12/05/19  0840 07/03/19  0845 09/28/18  1305   PROTTOTAL  --  7.0 6.8   ALBUMIN 3.4 3.6 3.4   BILITOTAL  --  0.5 1.0   ALKPHOS  --  73 97   AST 16 15 26   ALT 30 26 27       TSH   Date Value Ref Range Status   07/03/2019 1.72 0.40 - 4.00 mU/L Final   09/11/2018 1.81 0.40 - 4.00 mU/L Final       Lab Results   Component Value Date    A1C 5.6  09/28/2018    A1C 6.0 07/01/2015     ASSESSMENT/PLAN  (M06.00) Seronegative rheumatoid arthritis (H)  (primary encounter diagnosis)  (M05.741,  M05.742) Rheumatoid arthritis involving both hands with positive rheumatoid factor (H)  Comment: flare in right hand  Plan:   -follow up with Dr. Maurer-rheumatologist   -leflunomide 10 mg po daily  -short course of prednisone   -consider checking CRP  -follow CBC, LFTs with [facility]     (I10) Hypertension goal BP (blood pressure) < 140/90  (Z86.73) History of CVA (cerebrovascular accident)  Comment: stable  Plan:   -statin, Eliquis and BP medications   -follow BMP     (R41.3) Memory loss  Comment: ongoing  Plan:  -follows with neurology. Unable to start homocysteine supplement from neuro secondary to cost with set-up from [place]. Reviewed with family and neuro clinic.  -labs for homocysteine at neurology but unable to see  -memantine daily          Electronically signed by:  SHIRA Parra CNP

## 2020-01-01 VITALS
SYSTOLIC BLOOD PRESSURE: 130 MMHG | DIASTOLIC BLOOD PRESSURE: 69 MMHG | HEART RATE: 61 BPM | RESPIRATION RATE: 16 BRPM | TEMPERATURE: 98.9 F | OXYGEN SATURATION: 97 %

## 2020-01-01 RX ORDER — PREDNISONE 10 MG/1
10 TABLET ORAL DAILY
Qty: 5 TABLET | Refills: 0 | Status: SHIPPED | OUTPATIENT
Start: 2020-01-01 | End: 2020-02-25

## 2020-02-14 DIAGNOSIS — R19.7 DIARRHEA, UNSPECIFIED TYPE: Primary | ICD-10-CM

## 2020-02-14 RX ORDER — LOPERAMIDE HCL 2 MG
CAPSULE ORAL
Qty: 30 CAPSULE | Refills: 11 | Status: SHIPPED | OUTPATIENT
Start: 2020-02-14 | End: 2022-01-27

## 2020-02-25 VITALS
HEART RATE: 64 BPM | WEIGHT: 178 LBS | RESPIRATION RATE: 16 BRPM | SYSTOLIC BLOOD PRESSURE: 177 MMHG | TEMPERATURE: 98.6 F | BODY MASS INDEX: 30.39 KG/M2 | DIASTOLIC BLOOD PRESSURE: 41 MMHG | HEIGHT: 64 IN

## 2020-02-25 PROBLEM — I48.0 PAROXYSMAL A-FIB (H): Status: ACTIVE | Noted: 2020-02-25

## 2020-02-25 PROBLEM — I25.119 CORONARY ARTERY DISEASE WITH ANGINA PECTORIS, UNSPECIFIED VESSEL OR LESION TYPE, UNSPECIFIED WHETHER NATIVE OR TRANSPLANTED HEART (H): Status: ACTIVE | Noted: 2020-02-25

## 2020-02-25 PROBLEM — I77.74 VERTEBRAL ARTERY DISSECTION (H): Status: ACTIVE | Noted: 2020-02-25

## 2020-02-25 ASSESSMENT — MIFFLIN-ST. JEOR: SCORE: 1388.4

## 2020-02-25 NOTE — PROGRESS NOTES
[facility]  [facility] Record Number:  [id number]  Place of Service where encounter took place:  [facility] [[id number]]  Chief Complaint   Patient presents with     RECHECK       HPI:    Jose E Capps  is a 88 year old (9/28/1931), who is being seen today for an episodic care visit.  HPI information obtained from: facility chart records and patient report. Today's concern is:    Seen today for follow up to HTN, cognitive decline, generalized weakness. No acute concerns. In his room having breakfast. Denies pain , chest pain, SOB. Still with some chronic right hand swelling but declines compression hand sleeve or other. Ambulates with 4WW and neurology did want follow up with PT for Hx of CVA.    Past Medical and Surgical History reviewed in Epic today.    MEDICATIONS:  Current Outpatient Medications   Medication Sig Dispense Refill     acetaminophen (TYLENOL) 500 MG tablet Take 500 mg by mouth every 8 hours as needed for mild pain       ASPIRIN NOT PRESCRIBED (INTENTIONAL) Please choose reason not prescribed, below 0 each 0     atorvastatin (LIPITOR) 10 MG tablet TAKE 1 TABLET BY MOUTH ONCE DAILY 31 tablet 98     Calcium Carb-Cholecalciferol (CALCIUM + D3) 600-200 MG-UNIT TABS TAKE 1 TABLET BY MOUTH ONCE DAILY 31 tablet 98     cholecalciferol (VITAMIN D3) 5000 units (125 mcg) capsule Take 5,000 Units by mouth daily       cyanocobalamin (VITAMIN B-12) 500 MCG SUBL sublingual tablet Place 500 mcg under the tongue 2 times daily       ELIQUIS ANTICOAGULANT 5 MG tablet TAKE 1 TABLET BY MOUTH TWICE DAILY 62 tablet 98     leflunomide (ARAVA) 10 MG tablet TAKE 1 TABLET BY MOUTH ONCE DAILY 31 tablet 98     lisinopril (PRINIVIL/ZESTRIL) 10 MG tablet Take 1 tablet (10 mg) by mouth daily 30 tablet 11     loperamide (IMODIUM) 2 MG capsule TAKE 1 CAPSULE BY MOUTH FOUR TIMES DAILY AS NEEDED FOR DIARRHEA 30 capsule 11     memantine (NAMENDA) 10 MG tablet Take 10 mg by mouth  "daily From 12/4/19 - 01/02/20 then starting 01/03/20 take 10 mg PO BID       metoprolol tartrate (LOPRESSOR) 25 MG tablet TAKE ONE-FOURTH TABLET (6.25MG) BY MOUTH TWICE DAILY 16 tablet 98     Multiple Vitamins-Minerals (CEROVITE SENIOR) TABS TAKE 1 TABLET BY MOUTH ONCE DAILY 31 tablet 98     polyethylene glycol (MIRALAX/GLYCOLAX) powder MIX 17GM OF POWDER IN 8OZ OF WATER UNTIL COMPLETELY DISSOLVED. DRINK SOLUTION BY MOUTH ONCE DAILY AS NEEDED FOR CONSTIPATION 527 g 5     REVIEW OF SYSTEMS:  Limited secondary to cognitive impairment but today pt reports ok    Objective:  BP (!) 177/41   Pulse 64   Temp 98.6  F (37  C)   Resp 16   Ht 1.626 m (5' 4\")   Wt 80.7 kg (178 lb)   BMI 30.55 kg/m    Exam:  GENERAL APPEARANCE:  alert and pleasant, forgetful   ENT:  Mouth and posterior oropharynx normal, dry mucous membranes  EYES:  EOM, conjunctivae, lids, pupils and irises normal-glasses  RESP:  respiratory effort and palpation of chest normal, lungs clear to auscultation , no respiratory distress but diminished   CV:  Palpation and auscultation of heart done , no edema, regular rate, irregular rhythm, no LE edema  ABDOMEN:  soft, non-tender to palpation  M/S: mostly use of WC or 4WW. Edema +1 to right hand and digits   SKIN:  no signs of open areas to viewed arms, legs  NEURO:   Cranial nerves 2-12 are normal tested and grossly at patient's baseline  PSYCH:  insight and judgement impaired, memory impaired    Labs:   CBC RESULTS:   Recent Labs   Lab Test 12/05/19 0840 07/03/19  0845   WBC 7.3 7.5   RBC 4.56 4.72   HGB 14.0 14.6   HCT 43.1 43.8   MCV 95 93   MCH 30.7 30.9   MCHC 32.5 33.3   RDW 14.4 14.0    235       Last Basic Metabolic Panel:  Recent Labs   Lab Test 12/05/19  0840 07/03/19  0845 02/18/19  1555   NA  --  142 139   POTASSIUM  --  4.5 4.6   CHLORIDE  --  110* 106   GISELA  --  8.8 8.7   CO2  --  28 26   BUN  --  22 23   CR 0.98 1.07 1.04   GLC  --  103* 131*       Liver Function Studies -   Recent " Labs   Lab Test 12/05/19  0840 07/03/19  0845 09/28/18  1305   PROTTOTAL  --  7.0 6.8   ALBUMIN 3.4 3.6 3.4   BILITOTAL  --  0.5 1.0   ALKPHOS  --  73 97   AST 16 15 26   ALT 30 26 27       TSH   Date Value Ref Range Status   07/03/2019 1.72 0.40 - 4.00 mU/L Final   09/11/2018 1.81 0.40 - 4.00 mU/L Final       Lab Results   Component Value Date    A1C 5.6 09/28/2018    A1C 6.0 07/01/2015     Creatinine Clearance: 59.17    ASSESSMENT/PLAN:  (Z86.73) History of CVA (cerebrovascular accident)  (primary encounter diagnosis)  (I69.351) Hemiplegia and hemiparesis following cerebral infarction affecting right dominant side (H)  Comment: Neurology would like PT follow up  Plan:   -PT outpatient at [facility] for strength training. gait stability and balance now moved to [facility]. With cognitive decline/loss facility requesting homecare for better outcomes     (M06.00) Seronegative rheumatoid arthritis (H)  Comment: recent flare in right hand  Plan:   -follow up with Dr. Maurer-rheumatologist   -leflunomide 10 mg po daily  -short course of prednisone prn  -consider checking CRP  -follow CBC, LFTs with [facility]     (I77.74) Vertebral artery dissection (H)  Comment:residual cognitive impairment, mobility has returned      Plan:   -statin, anticoagulation and bp meds for secondary prevention  -Follow up in Neurology clinic as scheduled     (I50.32) Chronic diastolic congestive heart failure (H)  (I48.0) Paroxysmal A-fib (H)  (I25.119) Coronary artery disease with angina pectoris, unspecified   vessel or lesion type, unspecified whether native or transplanted heart (H)  (I10) Hypertension goal BP (blood pressure) < 140/90   Comment: stable-Echo 9/2018 EF at 55-60%  Plan:   -statin, Eliquis and BP medications   -follow BMP  -VS with visits     (R41.89) Cognitive impairment  Comment: follows with neurology. Unable to start homocysteine supplement from neuro secondary to cost with set-up from [facility]. Reviewed with family and neuro clinic.Labs for  homocysteine at neurology but unable to see  Plan:   -memantine daily        Electronically signed by:  SHIRA Parra CNP

## 2020-02-26 ENCOUNTER — ASSISTED LIVING VISIT (OUTPATIENT)
Dept: GERIATRICS | Facility: CLINIC | Age: 85
End: 2020-02-26
Payer: COMMERCIAL

## 2020-02-26 DIAGNOSIS — Z86.73 HISTORY OF CVA (CEREBROVASCULAR ACCIDENT): Primary | ICD-10-CM

## 2020-02-26 DIAGNOSIS — R41.89 COGNITIVE IMPAIRMENT: ICD-10-CM

## 2020-02-26 DIAGNOSIS — I50.32 CHRONIC DIASTOLIC CONGESTIVE HEART FAILURE (H): ICD-10-CM

## 2020-02-26 DIAGNOSIS — M06.00 SERONEGATIVE RHEUMATOID ARTHRITIS (H): ICD-10-CM

## 2020-02-26 DIAGNOSIS — I69.351 HEMIPLEGIA AND HEMIPARESIS FOLLOWING CEREBRAL INFARCTION AFFECTING RIGHT DOMINANT SIDE (H): ICD-10-CM

## 2020-02-26 DIAGNOSIS — I25.119 CORONARY ARTERY DISEASE WITH ANGINA PECTORIS, UNSPECIFIED VESSEL OR LESION TYPE, UNSPECIFIED WHETHER NATIVE OR TRANSPLANTED HEART (H): ICD-10-CM

## 2020-02-26 DIAGNOSIS — I48.0 PAROXYSMAL A-FIB (H): ICD-10-CM

## 2020-02-26 DIAGNOSIS — I77.74 VERTEBRAL ARTERY DISSECTION (H): ICD-10-CM

## 2020-02-26 NOTE — LETTER
2/26/2020        RE: Jose E [facility]  [address] [facility] [address]  [place] [address]        [facility]  [facility] Record Number:  [id number]  Place of Service where encounter took place:  [facility] [[id number]]  Chief Complaint   Patient presents with     RECHECK       HPI:    Jose E Capps  is a 88 year old (9/28/1931), who is being seen today for an episodic care visit.  HPI information obtained from: facility chart records and patient report. Today's concern is:    Seen today for follow up to HTN, cognitive decline, generalized weakness. No acute concerns. In his room having breakfast. Denies pain , chest pain, SOB. Still with some chronic right hand swelling but declines compression hand sleeve or other. Ambulates with 4WW and neurology did want follow up with PT for Hx of CVA.    Past Medical and Surgical History reviewed in Epic today.    MEDICATIONS:  Current Outpatient Medications   Medication Sig Dispense Refill     acetaminophen (TYLENOL) 500 MG tablet Take 500 mg by mouth every 8 hours as needed for mild pain       ASPIRIN NOT PRESCRIBED (INTENTIONAL) Please choose reason not prescribed, below 0 each 0     atorvastatin (LIPITOR) 10 MG tablet TAKE 1 TABLET BY MOUTH ONCE DAILY 31 tablet 98     Calcium Carb-Cholecalciferol (CALCIUM + D3) 600-200 MG-UNIT TABS TAKE 1 TABLET BY MOUTH ONCE DAILY 31 tablet 98     cholecalciferol (VITAMIN D3) 5000 units (125 mcg) capsule Take 5,000 Units by mouth daily       cyanocobalamin (VITAMIN B-12) 500 MCG SUBL sublingual tablet Place 500 mcg under the tongue 2 times daily       ELIQUIS ANTICOAGULANT 5 MG tablet TAKE 1 TABLET BY MOUTH TWICE DAILY 62 tablet 98     leflunomide (ARAVA) 10 MG tablet TAKE 1 TABLET BY MOUTH ONCE DAILY 31 tablet 98     lisinopril (PRINIVIL/ZESTRIL) 10 MG tablet Take 1 tablet (10 mg) by mouth daily 30 tablet 11     loperamide (IMODIUM) 2 MG capsule TAKE 1 CAPSULE BY  "MOUTH FOUR TIMES DAILY AS NEEDED FOR DIARRHEA 30 capsule 11     memantine (NAMENDA) 10 MG tablet Take 10 mg by mouth daily From 12/4/19 - 01/02/20 then starting 01/03/20 take 10 mg PO BID       metoprolol tartrate (LOPRESSOR) 25 MG tablet TAKE ONE-FOURTH TABLET (6.25MG) BY MOUTH TWICE DAILY 16 tablet 98     Multiple Vitamins-Minerals (CEROVITE SENIOR) TABS TAKE 1 TABLET BY MOUTH ONCE DAILY 31 tablet 98     polyethylene glycol (MIRALAX/GLYCOLAX) powder MIX 17GM OF POWDER IN 8OZ OF WATER UNTIL COMPLETELY DISSOLVED. DRINK SOLUTION BY MOUTH ONCE DAILY AS NEEDED FOR CONSTIPATION 527 g 5     REVIEW OF SYSTEMS:  Limited secondary to cognitive impairment but today pt reports ok    Objective:  BP (!) 177/41   Pulse 64   Temp 98.6  F (37  C)   Resp 16   Ht 1.626 m (5' 4\")   Wt 80.7 kg (178 lb)   BMI 30.55 kg/m     Exam:  GENERAL APPEARANCE:  alert and pleasant, forgetful   ENT:  Mouth and posterior oropharynx normal, dry mucous membranes  EYES:  EOM, conjunctivae, lids, pupils and irises normal-glasses  RESP:  respiratory effort and palpation of chest normal, lungs clear to auscultation , no respiratory distress but diminished   CV:  Palpation and auscultation of heart done , no edema, regular rate, irregular rhythm, no LE edema  ABDOMEN:  soft, non-tender to palpation  M/S: mostly use of WC or 4WW. Edema +1 to right hand and digits   SKIN:  no signs of open areas to viewed arms, legs  NEURO:   Cranial nerves 2-12 are normal tested and grossly at patient's baseline  PSYCH:  insight and judgement impaired, memory impaired    Labs:   CBC RESULTS:   Recent Labs   Lab Test 12/05/19  0840 07/03/19  0845   WBC 7.3 7.5   RBC 4.56 4.72   HGB 14.0 14.6   HCT 43.1 43.8   MCV 95 93   MCH 30.7 30.9   MCHC 32.5 33.3   RDW 14.4 14.0    235       Last Basic Metabolic Panel:  Recent Labs   Lab Test 12/05/19  0840 07/03/19  0845 02/18/19  1555   NA  --  142 139   POTASSIUM  --  4.5 4.6   CHLORIDE  --  110* 106   GISELA  --  8.8 8.7 "   CO2  --  28 26   BUN  --  22 23   CR 0.98 1.07 1.04   GLC  --  103* 131*       Liver Function Studies -   Recent Labs   Lab Test 12/05/19  0840 07/03/19  0845 09/28/18  1305   PROTTOTAL  --  7.0 6.8   ALBUMIN 3.4 3.6 3.4   BILITOTAL  --  0.5 1.0   ALKPHOS  --  73 97   AST 16 15 26   ALT 30 26 27       TSH   Date Value Ref Range Status   07/03/2019 1.72 0.40 - 4.00 mU/L Final   09/11/2018 1.81 0.40 - 4.00 mU/L Final       Lab Results   Component Value Date    A1C 5.6 09/28/2018    A1C 6.0 07/01/2015     Creatinine Clearance: 59.17    ASSESSMENT/PLAN:  (Z86.73) History of CVA (cerebrovascular accident)  (primary encounter diagnosis)  (I69.351) Hemiplegia and hemiparesis following cerebral infarction affecting right dominant side (H)  Comment: Neurology would like PT follow up  Plan:   -PT outpatient at [facility] for strength training. gait stability and balance    (M06.00) Seronegative rheumatoid arthritis (H)  Comment: recent flare in right hand  Plan:   -follow up with Dr. Maurer-rheumatologist   -leflunomide 10 mg po daily  -short course of prednisone prn  -consider checking CRP  -follow CBC, LFTs with DM[facility]     (I77.74) Vertebral artery dissection (H)  Comment:residual cognitive impairment, mobility has returned      Plan:   -statin, anticoagulation and bp meds for secondary prevention  -Follow up in Neurology clinic as scheduled     (I50.32) Chronic diastolic congestive heart failure (H)  (I48.0) Paroxysmal A-fib (H)  (I25.119) Coronary artery disease with angina pectoris, unspecified   vessel or lesion type, unspecified whether native or transplanted heart (H)  (I10) Hypertension goal BP (blood pressure) < 140/90   Comment: stable-Echo 9/2018 EF at 55-60%  Plan:   -statin, Eliquis and BP medications   -follow BMP  -VS with visits     (R41.89) Cognitive impairment  Comment: follows with neurology. Unable to start homocysteine supplement from neuro secondary to cost with set-up from [facility]. Reviewed with family and  neuro clinic.Labs for homocysteine at neurology but unable to see  Plan:   -memantine daily        Electronically signed by:  SHIRA Parra CNP               Sincerely,        SHIRA Parra CNP

## 2020-03-02 ENCOUNTER — HEALTH MAINTENANCE LETTER (OUTPATIENT)
Age: 85
End: 2020-03-02

## 2020-04-02 ENCOUNTER — TELEPHONE (OUTPATIENT)
Dept: GERIATRICS | Facility: CLINIC | Age: 85
End: 2020-04-02

## 2020-04-02 DIAGNOSIS — M54.50 ACUTE BILATERAL LOW BACK PAIN WITHOUT SCIATICA: Primary | ICD-10-CM

## 2020-04-02 RX ORDER — ACETAMINOPHEN 500 MG
TABLET ORAL
COMMUNITY
Start: 2020-04-02 | End: 2020-04-17

## 2020-04-02 NOTE — TELEPHONE ENCOUNTER
[facility] TELEPHONE ENCOUNTER    Chief Complaint   Patient presents with     Back Pain       Jose E Capps is a 88 year old  (9/28/1931),Nurse called today to report: PT from [facility] reporting back pain and no scheduled medications for use. Unclear if radicular  in nature.     ASSESSMENT/PLAN      Beginning scheduled Tylenol 1,000 mg po BID and continue 500 mg po every 8 hours prn. Total dose not to exceed 4,000 mg po daily    Ice with staff assist BID during medication pass    Consider Lidocaine patch overnight     [facility] continue to follow    SHIRA Parra CNP

## 2020-04-17 DIAGNOSIS — R52 GENERALIZED PAIN: Primary | ICD-10-CM

## 2020-04-17 RX ORDER — PSEUDOEPHED/ACETAMINOPH/DIPHEN 30MG-500MG
TABLET ORAL
Qty: 112 TABLET | Refills: 97 | Status: SHIPPED | OUTPATIENT
Start: 2020-04-17 | End: 2021-04-14

## 2020-05-14 DIAGNOSIS — I10 HYPERTENSION GOAL BP (BLOOD PRESSURE) < 140/90: ICD-10-CM

## 2020-05-14 RX ORDER — LISINOPRIL 10 MG/1
TABLET ORAL
Qty: 28 TABLET | Refills: 97 | Status: SHIPPED | OUTPATIENT
Start: 2020-05-14 | End: 2021-05-17

## 2020-05-20 ENCOUNTER — VIRTUAL VISIT (OUTPATIENT)
Dept: GERIATRICS | Facility: CLINIC | Age: 85
End: 2020-05-20
Payer: COMMERCIAL

## 2020-05-20 VITALS
SYSTOLIC BLOOD PRESSURE: 132 MMHG | BODY MASS INDEX: 30.39 KG/M2 | RESPIRATION RATE: 16 BRPM | HEIGHT: 64 IN | WEIGHT: 178 LBS | DIASTOLIC BLOOD PRESSURE: 78 MMHG | HEART RATE: 48 BPM | TEMPERATURE: 98.1 F | OXYGEN SATURATION: 97 %

## 2020-05-20 DIAGNOSIS — Z86.73 HISTORY OF CVA (CEREBROVASCULAR ACCIDENT): ICD-10-CM

## 2020-05-20 DIAGNOSIS — M06.00 SERONEGATIVE RHEUMATOID ARTHRITIS (H): ICD-10-CM

## 2020-05-20 DIAGNOSIS — I25.119 CORONARY ARTERY DISEASE WITH ANGINA PECTORIS, UNSPECIFIED VESSEL OR LESION TYPE, UNSPECIFIED WHETHER NATIVE OR TRANSPLANTED HEART (H): ICD-10-CM

## 2020-05-20 DIAGNOSIS — E04.1 THYROID NODULE: ICD-10-CM

## 2020-05-20 DIAGNOSIS — I50.32 CHRONIC DIASTOLIC CONGESTIVE HEART FAILURE (H): ICD-10-CM

## 2020-05-20 DIAGNOSIS — I10 HYPERTENSION GOAL BP (BLOOD PRESSURE) < 140/90: ICD-10-CM

## 2020-05-20 DIAGNOSIS — F01.50 VASCULAR DEMENTIA WITHOUT BEHAVIORAL DISTURBANCE (H): Primary | ICD-10-CM

## 2020-05-20 DIAGNOSIS — I48.0 PAROXYSMAL A-FIB (H): ICD-10-CM

## 2020-05-20 ASSESSMENT — MIFFLIN-ST. JEOR: SCORE: 1388.4

## 2020-05-20 NOTE — LETTER
"    5/20/2020        RE: Jose E [facility]  [address]  [facility] [address]        [facility]   Jose E Capps is being evaluated via a billable video visit due to the restrictions of the Covid-19 pandemic.   The patient has been notified of following:  This video visit will be conducted via a call between you and your provider. We have found that certain health care needs can be provided without the need for an in-person physical exam.  This service lets us provide the care you need with a video conversation. If during the course of the call the provider feels a video visit is not appropriate, you will not be charged for this service.\"   The provider has received verbal consent for a Video Visit from the patient or first contact? Yes  Patient  or facility staff would like the video invitation sent by: N/A   Video Start Time: 6:01 PM    [facility] Medical Record Number:  [id number]  Place of Location at the time of visit: [facility]   Chief Complaint   Patient presents with     RECHECK     HPI:  Jose E Capps  is a 88 year old (9/28/1931), who is being seen today for a visit.  HPI information obtained from: facility chart records and [facility] chart review. Today's concern is:    Seen today for follow up. In his room playing cards. Does not like sitting in his new chair since no rollers on wheels so makes it difficult to push back from table. Denies lower back pain today. Has continued on scheduled Tylenol.  Area of right toe abrasion has healed. Some swelling and stiffness to right hand.     Past Medical and Surgical History reviewed in Epic today.  MEDICATIONS:    Current Outpatient Medications   Medication Sig Dispense Refill     ACETAMINOPHEN EXTRA STRENGTH 500 MG tablet TAKE TWO TABLETS (1000MG) BY MOUTH TWICE DAILY;MAY ALSO TAKE ONE TABLET EVERY 8 HOURS AS NEEDED FOR MILD PAIN *NOT TO EXCEED 4000MG APAP/24 HRS* 112 tablet 97     " "ASPIRIN NOT PRESCRIBED (INTENTIONAL) Please choose reason not prescribed, below 0 each 0     atorvastatin (LIPITOR) 10 MG tablet TAKE 1 TABLET BY MOUTH ONCE DAILY 31 tablet 98     Calcium Carb-Cholecalciferol (CALCIUM + D3) 600-200 MG-UNIT TABS TAKE 1 TABLET BY MOUTH ONCE DAILY 31 tablet 98     cholecalciferol (VITAMIN D3) 5000 units (125 mcg) capsule Take 5,000 Units by mouth daily       cyanocobalamin (VITAMIN B-12) 500 MCG SUBL sublingual tablet Place 500 mcg under the tongue 2 times daily       ELIQUIS ANTICOAGULANT 5 MG tablet TAKE 1 TABLET BY MOUTH TWICE DAILY 62 tablet 98     leflunomide (ARAVA) 10 MG tablet TAKE 1 TABLET BY MOUTH ONCE DAILY 31 tablet 98     lisinopril (ZESTRIL) 10 MG tablet TAKE ONE TABLET (10MG) BY MOUTH ONCE DAILY 28 tablet 97     loperamide (IMODIUM) 2 MG capsule TAKE 1 CAPSULE BY MOUTH FOUR TIMES DAILY AS NEEDED FOR DIARRHEA 30 capsule 11     memantine (NAMENDA) 10 MG tablet Take 10 mg by mouth daily From 12/4/19 - 01/02/20 then starting 01/03/20 take 10 mg PO BID       metoprolol tartrate (LOPRESSOR) 25 MG tablet TAKE ONE-FOURTH TABLET (6.25MG) BY MOUTH TWICE DAILY 16 tablet 98     Multiple Vitamins-Minerals (CEROVITE SENIOR) TABS TAKE 1 TABLET BY MOUTH ONCE DAILY 31 tablet 98     polyethylene glycol (MIRALAX/GLYCOLAX) powder MIX 17GM OF POWDER IN 8OZ OF WATER UNTIL COMPLETELY DISSOLVED. DRINK SOLUTION BY MOUTH ONCE DAILY AS NEEDED FOR CONSTIPATION 527 g 5     REVIEW OF SYSTEMS: Limited secondary to cognitive impairment but today pt reports ok  Objective: /78   Pulse (!) 48   Temp 98.1  F (36.7  C)   Resp 16   Ht 1.626 m (5' 4\")   Wt 80.7 kg (178 lb)   SpO2 97%   BMI 30.55 kg/m    Limited visit exam done given COVID-19 precautions.   GENERAL APPEARANCE:  alert and pleasant, forgetful   ENT:  Mouth and posterior oropharynx normal, dry mucous membranes  EYES:  EOM, conjunctivae, lids, pupils and irises normal-glasses  RESP:  respiratory effort appears at baseline  CV:  " GARRISON  ABDOMEN: GARRISON  M/S: mostly use of WC or 4WW. Edema +1 to right hand and digits   SKIN:  no signs of open areas to viewed arms, legs  NEURO:   Cranial nerves 2-12 are normal tested and grossly at patient's baseline  PSYCH:  insight and judgement impaired, memory impaired    Labs:   CBC RESULTS:   Recent Labs   Lab Test 12/05/19  0840 07/03/19  0845   WBC 7.3 7.5   RBC 4.56 4.72   HGB 14.0 14.6   HCT 43.1 43.8   MCV 95 93   MCH 30.7 30.9   MCHC 32.5 33.3   RDW 14.4 14.0    235       Last Basic Metabolic Panel:  Recent Labs   Lab Test 12/05/19  0840 07/03/19  0845 02/18/19  1555   NA  --  142 139   POTASSIUM  --  4.5 4.6   CHLORIDE  --  110* 106   GISELA  --  8.8 8.7   CO2  --  28 26   BUN  --  22 23   CR 0.98 1.07 1.04   GLC  --  103* 131*       Liver Function Studies -   Recent Labs   Lab Test 12/05/19  0840 07/03/19  0845 09/28/18  1305   PROTTOTAL  --  7.0 6.8   ALBUMIN 3.4 3.6 3.4   BILITOTAL  --  0.5 1.0   ALKPHOS  --  73 97   AST 16 15 26   ALT 30 26 27       TSH   Date Value Ref Range Status   07/03/2019 1.72 0.40 - 4.00 mU/L Final   09/11/2018 1.81 0.40 - 4.00 mU/L Final       Lab Results   Component Value Date    A1C 5.6 09/28/2018    A1C 6.0 07/01/2015       ASSESSMENT/PLAN:  (F01.50) Vascular dementia without behavioral disturbance (H)  (primary encounter diagnosis)  Z86.73) History of CVA (cerebrovascular accident)    Comment: residual deficits with cognition, mobility has mostly returned  Plan:  -Follows with neurology  -statin, anticoagulation and bp meds for secondary prevention  -memantine BID  -Unable to start homocysteine supplement from neuro secondary to cost with set-up from [place]. Reviewed with family and neuro clinic.Labs for homocysteine at neurology but unable to see     (I50.32) Chronic diastolic congestive heart failure (H)  (I48.0) Paroxysmal A-fib (H)  (I25.119) Coronary artery disease with angina pectoris, unspecified   vessel or lesion type, unspecified whether native or  transplanted heart (H)  (I10) Hypertension goal BP (blood pressure) < 140/90   Comment: stable-Echo 9/2018 EF at 55-60%  Plan:   -statin, Eliquis and BP medications   -follow BMP  -VS with visits (HR bradycardic at times)    (M06.00) Seronegative rheumatoid arthritis (H)  Comment: recent flare in right hand  Plan:   -follow up with Dr. Maurer-rheumatologist   -leflunomide 10 mg po daily  -short course of prednisone prn  -consider checking CRP   -follow CBC, LFTs with DMARD     (E04.1) Thyroid nodule  Comment: noted on imaging   Plan:  -following TSH        Electronically signed by:  SHIRA Parra CNP     Video-Visit Details  Type of service:  Video Visit  Video End Time (time video stopped): 6:05 PM  Distant Location (provider location):  [facility]             Sincerely,        SHIRA Parra CNP

## 2020-05-28 ENCOUNTER — HOSPITAL LABORATORY (OUTPATIENT)
Dept: OTHER | Facility: CLINIC | Age: 85
End: 2020-05-28

## 2020-05-28 LAB
ALBUMIN SERPL-MCNC: 3.4 G/DL (ref 3.4–5)
ALP SERPL-CCNC: 66 U/L (ref 40–150)
ALT SERPL W P-5'-P-CCNC: 29 U/L (ref 0–70)
ANION GAP SERPL CALCULATED.3IONS-SCNC: 4 MMOL/L (ref 3–14)
AST SERPL W P-5'-P-CCNC: 23 U/L (ref 0–45)
BILIRUB DIRECT SERPL-MCNC: <0.1 MG/DL (ref 0–0.2)
BILIRUB SERPL-MCNC: 0.5 MG/DL (ref 0.2–1.3)
BUN SERPL-MCNC: 28 MG/DL (ref 7–30)
CALCIUM SERPL-MCNC: 8.7 MG/DL (ref 8.5–10.1)
CHLORIDE SERPL-SCNC: 106 MMOL/L (ref 94–109)
CO2 SERPL-SCNC: 29 MMOL/L (ref 20–32)
CREAT SERPL-MCNC: 1.22 MG/DL (ref 0.66–1.25)
CRP SERPL-MCNC: <2.9 MG/L (ref 0–8)
ERYTHROCYTE [DISTWIDTH] IN BLOOD BY AUTOMATED COUNT: 14.4 % (ref 10–15)
GFR SERPL CREATININE-BSD FRML MDRD: 52 ML/MIN/{1.73_M2}
GLUCOSE SERPL-MCNC: 150 MG/DL (ref 70–99)
HCT VFR BLD AUTO: 44.8 % (ref 40–53)
HGB BLD-MCNC: 14.5 G/DL (ref 13.3–17.7)
MCH RBC QN AUTO: 31.3 PG (ref 26.5–33)
MCHC RBC AUTO-ENTMCNC: 32.4 G/DL (ref 31.5–36.5)
MCV RBC AUTO: 97 FL (ref 78–100)
PLATELET # BLD AUTO: 282 10E9/L (ref 150–450)
POTASSIUM SERPL-SCNC: 4.5 MMOL/L (ref 3.4–5.3)
PROT SERPL-MCNC: 6.8 G/DL (ref 6.8–8.8)
RBC # BLD AUTO: 4.64 10E12/L (ref 4.4–5.9)
SODIUM SERPL-SCNC: 139 MMOL/L (ref 133–144)
TSH SERPL DL<=0.005 MIU/L-ACNC: 1.63 MU/L (ref 0.4–4)
WBC # BLD AUTO: 8.2 10E9/L (ref 4–11)

## 2020-06-10 ENCOUNTER — HOSPITAL LABORATORY (OUTPATIENT)
Dept: OTHER | Facility: CLINIC | Age: 85
End: 2020-06-10

## 2020-06-13 LAB
COVID-19 VIRUS PCR TO MAYO - RESULT: NORMAL
SPECIMEN SOURCE: NORMAL

## 2020-06-18 ENCOUNTER — HOSPITAL LABORATORY (OUTPATIENT)
Dept: OTHER | Facility: CLINIC | Age: 85
End: 2020-06-18

## 2020-06-18 ENCOUNTER — TRANSFERRED RECORDS (OUTPATIENT)
Dept: HEALTH INFORMATION MANAGEMENT | Facility: CLINIC | Age: 85
End: 2020-06-18

## 2020-06-19 ENCOUNTER — TELEPHONE (OUTPATIENT)
Dept: FAMILY MEDICINE | Facility: CLINIC | Age: 85
End: 2020-06-19

## 2020-06-19 NOTE — TELEPHONE ENCOUNTER
Fax it to me on [employer]ximity please at [phone].    Nehemias Granados MD  [facility] [place]

## 2020-06-19 NOTE — TELEPHONE ENCOUNTER
[place] [employer] - Attending Physician's statement.  Routing to Dr. Granados, would you like me to fax to you [employer]?    .Ellie GONZALEZ    [facility] [place]

## 2020-06-19 NOTE — TELEPHONE ENCOUNTER
Faxed form through [employer].    .Ellie GONZALEZ    [facility] [place]

## 2020-06-25 LAB
COVID-19 VIRUS PCR TO MAYO - RESULT: NORMAL
SPECIMEN SOURCE: NORMAL

## 2020-06-30 NOTE — TELEPHONE ENCOUNTER
Will update, called and left vm.    .Ellie GONZALEZ    [facility] [place]

## 2020-06-30 NOTE — TELEPHONE ENCOUNTER
This form needs to be filled out by her current PCP.  Please forward it to PCP    Nehemias Granados MD  [facility] [place]

## 2020-07-08 ENCOUNTER — ASSISTED LIVING VISIT (OUTPATIENT)
Dept: GERIATRICS | Facility: CLINIC | Age: 85
End: 2020-07-08
Payer: COMMERCIAL

## 2020-07-08 VITALS
DIASTOLIC BLOOD PRESSURE: 65 MMHG | WEIGHT: 178 LBS | TEMPERATURE: 97.4 F | SYSTOLIC BLOOD PRESSURE: 122 MMHG | OXYGEN SATURATION: 91 % | HEIGHT: 64 IN | BODY MASS INDEX: 30.39 KG/M2 | HEART RATE: 57 BPM | RESPIRATION RATE: 16 BRPM

## 2020-07-08 DIAGNOSIS — F01.50 VASCULAR DEMENTIA WITHOUT BEHAVIORAL DISTURBANCE (H): Primary | ICD-10-CM

## 2020-07-08 DIAGNOSIS — Z86.73 HISTORY OF CVA (CEREBROVASCULAR ACCIDENT): ICD-10-CM

## 2020-07-08 DIAGNOSIS — I10 HYPERTENSION GOAL BP (BLOOD PRESSURE) < 140/90: ICD-10-CM

## 2020-07-08 DIAGNOSIS — I50.32 CHRONIC DIASTOLIC CONGESTIVE HEART FAILURE (H): ICD-10-CM

## 2020-07-08 DIAGNOSIS — M06.00 SERONEGATIVE RHEUMATOID ARTHRITIS (H): ICD-10-CM

## 2020-07-08 DIAGNOSIS — E04.1 THYROID NODULE: ICD-10-CM

## 2020-07-08 DIAGNOSIS — I25.119 CORONARY ARTERY DISEASE WITH ANGINA PECTORIS, UNSPECIFIED VESSEL OR LESION TYPE, UNSPECIFIED WHETHER NATIVE OR TRANSPLANTED HEART (H): ICD-10-CM

## 2020-07-08 DIAGNOSIS — I48.0 PAROXYSMAL A-FIB (H): ICD-10-CM

## 2020-07-08 ASSESSMENT — MIFFLIN-ST. JEOR: SCORE: 1388.4

## 2020-07-08 NOTE — PROGRESS NOTES
"[facility]  [facility] Medical Record Number:  [id number]  Place of Service where encounter took place:  [facility] [[id number]]  Chief Complaint   Patient presents with     RECHECK       HPI:    Jose E Capps  is a 88 year old (9/28/1931), who is being seen today for an episodic care visit.  HPI information obtained from: facility chart records, facility staff, patient report and [facility] chart review. Today's concern is:    Seen today for follow up to chronic conditions of dementia, CHF, HTN with BPs/HR on lower side last few visits, a-fib. No noted concerns from staff. Not accurate historian with dementia, STML. Use of walker for ambulation. Seen at his kitchen table. Continues to use swivel chair versus high back stating \"I like it better\". Hard of hearing and cerumen in both ears but refuses cleaning. Denies SOB, abdominal pain, has ongoing discomfort in right hand with some swelling states \"I can live with this\". Hx of RA. Denies lower back pain today.    Past Medical and Surgical History reviewed in Epic today.    MEDICATIONS:  Current Outpatient Medications   Medication Sig Dispense Refill     ACETAMINOPHEN EXTRA STRENGTH 500 MG tablet TAKE TWO TABLETS (1000MG) BY MOUTH TWICE DAILY;MAY ALSO TAKE ONE TABLET EVERY 8 HOURS AS NEEDED FOR MILD PAIN *NOT TO EXCEED 4000MG APAP/24 HRS* 112 tablet 97     ASPIRIN NOT PRESCRIBED (INTENTIONAL) Please choose reason not prescribed, below 0 each 0     atorvastatin (LIPITOR) 10 MG tablet TAKE 1 TABLET BY MOUTH ONCE DAILY 31 tablet 98     Calcium Carb-Cholecalciferol (CALCIUM + D3) 600-200 MG-UNIT TABS TAKE 1 TABLET BY MOUTH ONCE DAILY 31 tablet 98     cholecalciferol (VITAMIN D3) 5000 units (125 mcg) capsule Take 5,000 Units by mouth daily       cyanocobalamin (VITAMIN B-12) 500 MCG SUBL sublingual tablet Place 500 mcg under the tongue 2 times daily       ELIQUIS ANTICOAGULANT 5 MG tablet TAKE 1 TABLET BY MOUTH TWICE " "DAILY 62 tablet 98     leflunomide (ARAVA) 10 MG tablet TAKE 1 TABLET BY MOUTH ONCE DAILY 31 tablet 98     lisinopril (ZESTRIL) 10 MG tablet TAKE ONE TABLET (10MG) BY MOUTH ONCE DAILY 28 tablet 97     loperamide (IMODIUM) 2 MG capsule TAKE 1 CAPSULE BY MOUTH FOUR TIMES DAILY AS NEEDED FOR DIARRHEA 30 capsule 11     memantine (NAMENDA) 10 MG tablet Take 10 mg by mouth daily From 12/4/19 - 01/02/20 then starting 01/03/20 take 10 mg PO BID       metoprolol tartrate (LOPRESSOR) 25 MG tablet TAKE ONE-FOURTH TABLET (6.25MG) BY MOUTH TWICE DAILY 16 tablet 98     Multiple Vitamins-Minerals (CEROVITE SENIOR) TABS TAKE 1 TABLET BY MOUTH ONCE DAILY 31 tablet 98     polyethylene glycol (MIRALAX/GLYCOLAX) powder MIX 17GM OF POWDER IN 8OZ OF WATER UNTIL COMPLETELY DISSOLVED. DRINK SOLUTION BY MOUTH ONCE DAILY AS NEEDED FOR CONSTIPATION 527 g 5     REVIEW OF SYSTEMS:  Limited secondary to cognitive impairment but today pt reports ok    Objective:  /65   Pulse 57   Temp 97.4  F (36.3  C)   Resp 16   Ht 1.626 m (5' 4\")   Wt 80.7 kg (178 lb)   SpO2 91%   BMI 30.55 kg/m    Exam:  GENERAL APPEARANCE:  alert and pleasant, forgetful. [place]  ENT:  Mouth and posterior oropharynx normal, dry mucous membranes  EYES:  EOM, conjunctivae, lids, pupils and irises normal-glasses  RESP:  respiratory effort appears at baseline  CV:  Palpation and auscultation of heart done , no edema, regular rate, irregular rhythm, no LE edema  ABDOMEN: soft nontender   M/S: mostly use of WC or 4WW. Edema +1 to right hand and digits   SKIN:  no signs of open areas to viewed arms, legs  NEURO:   Cranial nerves 2-12 are normal tested and grossly at patient's baseline  PSYCH:  insight and judgement impaired, memory impaired    Pulse Readings from Last 4 Encounters:   07/08/20 57   04/13/20 (!) 48   02/25/20 64   01/01/20 61     BP Readings from Last 3 Encounters:   07/08/20 122/65   04/13/20 132/78   02/25/20 (!) 177/41       Labs:   CBC RESULTS:   Recent " Labs   Lab Test 05/28/20  1300 12/05/19  0840   WBC 8.2 7.3   RBC 4.64 4.56   HGB 14.5 14.0   HCT 44.8 43.1   MCV 97 95   MCH 31.3 30.7   MCHC 32.4 32.5   RDW 14.4 14.4    236       Last Basic Metabolic Panel:  Recent Labs   Lab Test 05/28/20  1300 12/05/19  0840 07/03/19  0845     --  142   POTASSIUM 4.5  --  4.5   CHLORIDE 106  --  110*   GISELA 8.7  --  8.8   CO2 29  --  28   BUN 28  --  22   CR 1.22 0.98 1.07   *  --  103*       Liver Function Studies -   Recent Labs   Lab Test 05/28/20  1300 12/05/19  0840 07/03/19  0845   PROTTOTAL 6.8  --  7.0   ALBUMIN 3.4 3.4 3.6   BILITOTAL 0.5  --  0.5   ALKPHOS 66  --  73   AST 23 16 15   ALT 29 30 26       TSH   Date Value Ref Range Status   05/28/2020 1.63 0.40 - 4.00 mU/L Final   07/03/2019 1.72 0.40 - 4.00 mU/L Final       Lab Results   Component Value Date    A1C 5.6 09/28/2018    A1C 6.0 07/01/2015       ASSESSMENT/PLAN:  (F01.50) Vascular dementia without behavioral disturbance (H)  (primary encounter diagnosis)  Z86.73) History of CVA (cerebrovascular accident)    Comment: residual deficits with cognition, mobility has mostly returned  Plan:  -Follows with neurology  -statin, anticoagulation and bp meds for secondary prevention  -memantine BID  -Unable to start homocysteine supplement from neuro secondary to cost with set-up from [place]. Reviewed with family and neuro clinic.Labs for homocysteine at neurology but unable to see     (M06.00) Seronegative rheumatoid arthritis (H)  Comment: flare in right hand with swelling earlier this year  Plan:   -follow up with Dr. Maurer-rheumatologist? Unclear when next appt.  -leflunomide 10 mg po daily  -short course of prednisone prn  -consider checking CRP   -follow CBC, LFTs with DMARD     (I50.32) Chronic diastolic congestive heart failure (H)  (I48.0) Paroxysmal A-fib (H)  (I25.119) Coronary artery disease with angina pectoris, unspecified   vessel or lesion type, unspecified whether native or transplanted  heart (H)  (I10) Hypertension goal BP (blood pressure) < 140/90   Comment: stable-Echo 9/2018 EF at 55-60% Hypotension with past BPs and bradycardic  Plan:   -statin, Eliquis and BP medications  -follow BMP  -VS with visits (HR bradycardic at times)     (E04.1) Thyroid nodule  Comment: noted on imaging a right sided nodule measuring 2.4cm 9/2018  Plan:  -following TSH  -consider repeat imaging          Electronically signed by:  SHIRA Parra CNP

## 2020-07-08 NOTE — LETTER
"    7/8/2020        RE: [facility]  [address]  [address]  [place] [address]        [place] [facility]  [facility] Record Number:  [id number]  Place of Service where encounter took place:  [facility] [[id number]]  Chief Complaint   Patient presents with     RECHECK       HPI:    Jose E Capps  is a 88 year old (9/28/1931), who is being seen today for an episodic care visit.  HPI information obtained from: facility chart records, facility staff, patient report and [facility] chart review. Today's concern is:    Seen today for follow up to chronic conditions of dementia, CHF, HTN with BPs/HR on lower side last few visits, a-fib. No noted concerns from staff. Not accurate historian with dementia, STML. Use of walker for ambulation. Seen at his kitchen table. Continues to use swivel chair versus high back stating \"I like it better\". Hard of hearing and cerumen in both ears but refuses cleaning. Denies SOB, abdominal pain, has ongoing discomfort in right hand with some swelling states \"I can live with this\". Hx of RA. Denies lower back pain today.    Past Medical and Surgical History reviewed in Epic today.    MEDICATIONS:  Current Outpatient Medications   Medication Sig Dispense Refill     ACETAMINOPHEN EXTRA STRENGTH 500 MG tablet TAKE TWO TABLETS (1000MG) BY MOUTH TWICE DAILY;MAY ALSO TAKE ONE TABLET EVERY 8 HOURS AS NEEDED FOR MILD PAIN *NOT TO EXCEED 4000MG APAP/24 HRS* 112 tablet 97     ASPIRIN NOT PRESCRIBED (INTENTIONAL) Please choose reason not prescribed, below 0 each 0     atorvastatin (LIPITOR) 10 MG tablet TAKE 1 TABLET BY MOUTH ONCE DAILY 31 tablet 98     Calcium Carb-Cholecalciferol (CALCIUM + D3) 600-200 MG-UNIT TABS TAKE 1 TABLET BY MOUTH ONCE DAILY 31 tablet 98     cholecalciferol (VITAMIN D3) 5000 units (125 mcg) capsule Take 5,000 Units by mouth daily       cyanocobalamin (VITAMIN B-12) 500 MCG SUBL sublingual tablet " "Place 500 mcg under the tongue 2 times daily       ELIQUIS ANTICOAGULANT 5 MG tablet TAKE 1 TABLET BY MOUTH TWICE DAILY 62 tablet 98     leflunomide (ARAVA) 10 MG tablet TAKE 1 TABLET BY MOUTH ONCE DAILY 31 tablet 98     lisinopril (ZESTRIL) 10 MG tablet TAKE ONE TABLET (10MG) BY MOUTH ONCE DAILY 28 tablet 97     loperamide (IMODIUM) 2 MG capsule TAKE 1 CAPSULE BY MOUTH FOUR TIMES DAILY AS NEEDED FOR DIARRHEA 30 capsule 11     memantine (NAMENDA) 10 MG tablet Take 10 mg by mouth daily From 12/4/19 - 01/02/20 then starting 01/03/20 take 10 mg PO BID       metoprolol tartrate (LOPRESSOR) 25 MG tablet TAKE ONE-FOURTH TABLET (6.25MG) BY MOUTH TWICE DAILY 16 tablet 98     Multiple Vitamins-Minerals (CEROVITE SENIOR) TABS TAKE 1 TABLET BY MOUTH ONCE DAILY 31 tablet 98     polyethylene glycol (MIRALAX/GLYCOLAX) powder MIX 17GM OF POWDER IN 8OZ OF WATER UNTIL COMPLETELY DISSOLVED. DRINK SOLUTION BY MOUTH ONCE DAILY AS NEEDED FOR CONSTIPATION 527 g 5     REVIEW OF SYSTEMS:  Limited secondary to cognitive impairment but today pt reports ok    Objective:  /65   Pulse 57   Temp 97.4  F (36.3  C)   Resp 16   Ht 1.626 m (5' 4\")   Wt 80.7 kg (178 lb)   SpO2 91%   BMI 30.55 kg/m    Exam:  GENERAL APPEARANCE:  alert and pleasant, forgetful. [place]  ENT:  Mouth and posterior oropharynx normal, dry mucous membranes  EYES:  EOM, conjunctivae, lids, pupils and irises normal-glasses  RESP:  respiratory effort appears at baseline  CV:  Palpation and auscultation of heart done , no edema, regular rate, irregular rhythm, no LE edema  ABDOMEN: soft nontender   M/S: mostly use of WC or 4WW. Edema +1 to right hand and digits   SKIN:  no signs of open areas to viewed arms, legs  NEURO:   Cranial nerves 2-12 are normal tested and grossly at patient's baseline  PSYCH:  insight and judgement impaired, memory impaired    Pulse Readings from Last 4 Encounters:   07/08/20 57   04/13/20 (!) 48   02/25/20 64   01/01/20 61     BP Readings from " Last 3 Encounters:   07/08/20 122/65   04/13/20 132/78   02/25/20 (!) 177/41       Labs:   CBC RESULTS:   Recent Labs   Lab Test 05/28/20  1300 12/05/19  0840   WBC 8.2 7.3   RBC 4.64 4.56   HGB 14.5 14.0   HCT 44.8 43.1   MCV 97 95   MCH 31.3 30.7   MCHC 32.4 32.5   RDW 14.4 14.4    236       Last Basic Metabolic Panel:  Recent Labs   Lab Test 05/28/20  1300 12/05/19  0840 07/03/19  0845     --  142   POTASSIUM 4.5  --  4.5   CHLORIDE 106  --  110*   GISELA 8.7  --  8.8   CO2 29  --  28   BUN 28  --  22   CR 1.22 0.98 1.07   *  --  103*       Liver Function Studies -   Recent Labs   Lab Test 05/28/20  1300 12/05/19  0840 07/03/19  0845   PROTTOTAL 6.8  --  7.0   ALBUMIN 3.4 3.4 3.6   BILITOTAL 0.5  --  0.5   ALKPHOS 66  --  73   AST 23 16 15   ALT 29 30 26       TSH   Date Value Ref Range Status   05/28/2020 1.63 0.40 - 4.00 mU/L Final   07/03/2019 1.72 0.40 - 4.00 mU/L Final       Lab Results   Component Value Date    A1C 5.6 09/28/2018    A1C 6.0 07/01/2015       ASSESSMENT/PLAN:  (F01.50) Vascular dementia without behavioral disturbance (H)  (primary encounter diagnosis)  Z86.73) History of CVA (cerebrovascular accident)    Comment: residual deficits with cognition, mobility has mostly returned  Plan:  -Follows with neurology  -statin, anticoagulation and bp meds for secondary prevention  -memantine BID  -Unable to start homocysteine supplement from neuro secondary to cost with set-up from [place]. Reviewed with family and neuro clinic.Labs for homocysteine at neurology but unable to see     (M06.00) Seronegative rheumatoid arthritis (H)  Comment: flare in right hand with swelling earlier this year  Plan:   -follow up with Dr. Maurer-rheumatologist? Unclear when next appt.  -leflunomide 10 mg po daily  -short course of prednisone prn  -consider checking CRP   -follow CBC, LFTs with DMARD     (I50.32) Chronic diastolic congestive heart failure (H)  (I48.0) Paroxysmal A-fib (H)  (I25.119) Coronary  artery disease with angina pectoris, unspecified   vessel or lesion type, unspecified whether native or transplanted heart (H)  (I10) Hypertension goal BP (blood pressure) < 140/90   Comment: stable-Echo 9/2018 EF at 55-60% Hypotension with past BPs and bradycardic  Plan:   -statin, Eliquis and BP medications  -follow BMP  -VS with visits (HR bradycardic at times)     (E04.1) Thyroid nodule  Comment: noted on imaging a right sided nodule measuring 2.4cm 9/2018  Plan:  -following TSH  -consider repeat imaging          Electronically signed by:  SHIRA Parra CNP               Sincerely,        SHIRA Parra CNP

## 2020-07-15 ENCOUNTER — TRANSFERRED RECORDS (OUTPATIENT)
Dept: HEALTH INFORMATION MANAGEMENT | Facility: CLINIC | Age: 85
End: 2020-07-15

## 2020-07-24 ENCOUNTER — HOSPITAL LABORATORY (OUTPATIENT)
Dept: OTHER | Facility: CLINIC | Age: 85
End: 2020-07-24

## 2020-07-28 LAB
SARS-COV-2 RNA SPEC QL NAA+PROBE: NOT DETECTED
SPECIMEN SOURCE: NORMAL

## 2020-08-06 ENCOUNTER — HOSPITAL LABORATORY (OUTPATIENT)
Dept: OTHER | Facility: CLINIC | Age: 85
End: 2020-08-06

## 2020-08-07 NOTE — PROGRESS NOTES
CT scan of 9/28/2018 with right sided thyroid nodule 2.4 cm  noted. Follow up US 7/15/2020 shows:         Reviewed with his daughter. No s/s noted from resident but is not accurate historian. He was resistant to US and this had to be rescheduled to complete. Given current COIVD-19 precautions, age and increased memory loss she would like to defer CT for now.

## 2020-08-15 LAB
SARS-COV-2 RNA SPEC QL NAA+PROBE: NOT DETECTED
SPECIMEN SOURCE: NORMAL

## 2020-08-26 DIAGNOSIS — M05.741 RHEUMATOID ARTHRITIS INVOLVING BOTH HANDS WITH POSITIVE RHEUMATOID FACTOR (H): ICD-10-CM

## 2020-08-26 DIAGNOSIS — M05.742 RHEUMATOID ARTHRITIS INVOLVING BOTH HANDS WITH POSITIVE RHEUMATOID FACTOR (H): ICD-10-CM

## 2020-08-27 RX ORDER — LEFLUNOMIDE 10 MG/1
TABLET ORAL
Qty: 31 TABLET | Refills: 97 | Status: SHIPPED | OUTPATIENT
Start: 2020-08-27 | End: 2021-09-02

## 2020-08-28 ENCOUNTER — HOSPITAL LABORATORY (OUTPATIENT)
Dept: OTHER | Facility: CLINIC | Age: 85
End: 2020-08-28

## 2020-08-28 LAB
ALBUMIN SERPL-MCNC: 3.5 G/DL (ref 3.4–5)
ALT SERPL W P-5'-P-CCNC: 41 U/L (ref 0–70)
AST SERPL W P-5'-P-CCNC: 19 U/L (ref 0–45)
BASOPHILS # BLD AUTO: 0.1 10E9/L (ref 0–0.2)
BASOPHILS NFR BLD AUTO: 1 %
CREAT SERPL-MCNC: 1.41 MG/DL (ref 0.66–1.25)
DIFFERENTIAL METHOD BLD: NORMAL
EOSINOPHIL # BLD AUTO: 0.3 10E9/L (ref 0–0.7)
EOSINOPHIL NFR BLD AUTO: 4 %
ERYTHROCYTE [DISTWIDTH] IN BLOOD BY AUTOMATED COUNT: 14 % (ref 10–15)
GFR SERPL CREATININE-BSD FRML MDRD: 44 ML/MIN/{1.73_M2}
HCT VFR BLD AUTO: 44 % (ref 40–53)
HGB BLD-MCNC: 14.2 G/DL (ref 13.3–17.7)
LYMPHOCYTES # BLD AUTO: 1.9 10E9/L (ref 0.8–5.3)
LYMPHOCYTES NFR BLD AUTO: 29 %
MCH RBC QN AUTO: 31 PG (ref 26.5–33)
MCHC RBC AUTO-ENTMCNC: 32.3 G/DL (ref 31.5–36.5)
MCV RBC AUTO: 96 FL (ref 78–100)
MONOCYTES # BLD AUTO: 0.5 10E9/L (ref 0–1.3)
MONOCYTES NFR BLD AUTO: 8 %
NEUTROPHILS # BLD AUTO: 3.9 10E9/L (ref 1.6–8.3)
NEUTROPHILS NFR BLD AUTO: 58 %
PLATELET # BLD AUTO: 258 10E9/L (ref 150–450)
RBC # BLD AUTO: 4.58 10E12/L (ref 4.4–5.9)
WBC # BLD AUTO: 6.7 10E9/L (ref 4–11)

## 2020-09-09 ENCOUNTER — ASSISTED LIVING VISIT (OUTPATIENT)
Dept: GERIATRICS | Facility: CLINIC | Age: 85
End: 2020-09-09
Payer: COMMERCIAL

## 2020-09-09 VITALS
HEART RATE: 69 BPM | DIASTOLIC BLOOD PRESSURE: 72 MMHG | SYSTOLIC BLOOD PRESSURE: 112 MMHG | TEMPERATURE: 98.3 F | OXYGEN SATURATION: 97 %

## 2020-09-09 DIAGNOSIS — M06.00 SERONEGATIVE RHEUMATOID ARTHRITIS (H): ICD-10-CM

## 2020-09-09 DIAGNOSIS — I77.74 VERTEBRAL ARTERY DISSECTION (H): ICD-10-CM

## 2020-09-09 DIAGNOSIS — N18.30 BENIGN HYPERTENSION WITH CKD (CHRONIC KIDNEY DISEASE) STAGE III (H): Primary | ICD-10-CM

## 2020-09-09 DIAGNOSIS — I12.9 BENIGN HYPERTENSION WITH CKD (CHRONIC KIDNEY DISEASE) STAGE III (H): Primary | ICD-10-CM

## 2020-09-09 DIAGNOSIS — I48.0 PAROXYSMAL A-FIB (H): ICD-10-CM

## 2020-09-09 DIAGNOSIS — N18.30 CKD (CHRONIC KIDNEY DISEASE) STAGE 3, GFR 30-59 ML/MIN (H): ICD-10-CM

## 2020-09-09 DIAGNOSIS — F01.50 VASCULAR DEMENTIA WITHOUT BEHAVIORAL DISTURBANCE (H): ICD-10-CM

## 2020-09-09 NOTE — LETTER
9/9/2020        RE: Jose E Capps  [place]  [address]  [address]  [facility] [address]         Jose E Capps is a 88 year old male seen September 9, 2020 at  where he has resided for 2 years (admit 8/2018) seen to follow up cognitive decline and RA.         Pt is seen in his apartment up to recliner, dozing under blankets.  Since quarantine he has been much less active, staying in his apartment most all of the time.  AL nursing staff reports he is more forgetful.   States he eats/sleeps well.    By chart review, patient was hospitalized in October 2018 after he took a fall.   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   He went to TCU, regained mobility but has remained cognitively impaired.   He is followed by [facility].    Pt has a long history of seronegative RA, treated with leflunomide per Dr Maurer.   He is ambulatory with FWW and cuing.       Past Medical History:   Diagnosis Date     BPH (benign prostatic hyperplasia)      Colonic polyps     recurrent     CVA (cerebral vascular accident) (H) 2009    posterior circulation     Hyperlipidemia LDL goal <100     reports being intolerant to      Hypertension      Seronegative rheumatoid arthritis (H) 2011    hands, neck,, shoulders    Vascular dementia    Past Surgical History:   Procedure Laterality Date     C LAT LUMBAR SPINE FUSION       JOINT REPLACEMENT, HIP RT/LT  2007    R     JOINT REPLACEMTN, KNEE RT/LT  2004     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    excised     SH:   October 2018.   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   He has 4 daughters and one son.  Daughter Skylar in [facility] is first contact.      60 pack year smoking history.      ROS: SLUMS  14/30  Needs assist with ADLs, and directional cuing.       EXAM:  Pleasant, NAD  /72   Pulse 69   Temp 98.3  F (36.8  C)   SpO2 97%    During visit: HR 59   O2 sat 98% on room air.   Neck supple without adenopathy  Lungs clear bilaterally with fair air movement  Heart RRR s1s2 with 1/6 PRERNA  Abd soft, NT, no distention, +BS  Ext without edema; some joint deformity and swelling right hand  Neuro: very limited history, STML.   No focal findings  Psych: affect okay     LABS:  He has remains COVID negative on several tests through facility surveillance    Creatinine   Date Value Ref Range Status   08/28/2020 1.41 (H) 0.66 - 1.25 mg/dL Final     GFR Estimate   Date Value Ref Range Status   08/28/2020 44 (L) >60 mL/min/[1.73_m2] Final     Lab Results   Component Value Date    WBC 6.7 08/28/2020      HGB 14.2 08/28/2020      MCV 96 08/28/2020       08/28/2020     TSH   Date Value Ref Range Status   05/28/2020 1.63 0.40 - 4.00 mU/L Final       ECHO 9/28/18  Interpretation Summary  Sinus rhythm was noted.  Left ventricular systolic function is normal. The visual ejection fraction is estimated at 55-60%.  The left ventricle is normal in size.   There is mild concentric left ventricular hypertrophy.  The right ventricle is normal in structure, function and size.   There is mild biatrial enlargement.  There is mild (1+) aortic regurgitation.    Brain MRI 9/2018  IMPRESSION:   1. Scattered recent ischemic infarcts in the posterior cerebral artery  distributions bilaterally involving posterior medial temporal lobes bilaterally and left thalamus.  2. Diffuse cerebral volume loss and cerebral white matter changes  consistent with chronic small vessel ischemic disease.       IMP/PLAN:    (I48.0) Paroxysmal atrial fibrillation (H)   Comment:   Pulse Readings from Last 4 Encounters:   09/09/20 69   07/08/20 57   04/13/20 (!) 48   02/25/20 64      Plan: on metoprolol 6.25 mg bid for VR control;  Will discontinue now  secondary to bradycardia  Continue apixaban for stroke prophylaxis.       (I77.74) Vertebral artery dissection (H)  (I63.50) Cerebrovascular accident involving posterior circulation (H)  Comment: residual cognitive impairment now c/w vascular dementia  Plan: statin, anticoagulation and bp meds for secondary prevention.   Follow up in Neurology clinic as scheduled.     (I12.9,  N18.3) Benign hypertension with CKD (chronic kidney disease) stage III (H)    Comment: variable  BP Readings from Last 3 Encounters:   09/09/20 112/72   07/08/20 122/65   04/13/20 132/78      Plan: continue lisinopril 10 mg/day.        (F01.50) Vascular dementia without behavioral disturbance  Comment: scores as above, low functional status   Plan: AL support for med admin, escorts to meals, activity and safety     Remains on memantine 10 mg bid since January.      (M06.00) Seronegative rheumatoid arthritis (H)  Comment: no current pain   Plan: continue leflunomide; follow up with Dr Maurer.     Acetaminophen scheduled bid and prn     Paty Jimenez MD         Sincerely,        Paty Jimenez MD

## 2020-09-14 PROBLEM — N18.30 CKD (CHRONIC KIDNEY DISEASE) STAGE 3, GFR 30-59 ML/MIN (H): Status: ACTIVE | Noted: 2020-09-14

## 2020-09-15 NOTE — PROGRESS NOTES
Jose E Capps is a 88 year old male seen September 9, 2020 at [facility] where he has resided for 2 years (admit 8/2018) seen to follow up cognitive decline and RA.         Pt is seen in his apartment up to recliner, dozing under blankets.  Since quarantine he has been much less active, staying in his apartment most all of the time.  AL nursing staff reports he is more forgetful.   States he eats/sleeps well.    By chart review, patient was hospitalized in October 2018 after he took a fall.   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   He went to TCU, regained mobility but has remained cognitively impaired.   He is followed by [facility].    Pt has a long history of seronegative RA, treated with leflunomide per Dr Maurer.   He is ambulatory with FWW and cuing.       Past Medical History:   Diagnosis Date     BPH (benign prostatic hyperplasia)      Colonic polyps     recurrent     CVA (cerebral vascular accident) (H) 2009    posterior circulation     Hyperlipidemia LDL goal <100     reports being intolerant to      Hypertension      Seronegative rheumatoid arthritis (H) 2011    hands, neck,, shoulders    Vascular dementia    Past Surgical History:   Procedure Laterality Date     C LAT LUMBAR SPINE FUSION       JOINT REPLACEMENT, HIP RT/LT  2007    R     JOINT REPLACEMTN, KNEE RT/LT  2004     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    excised     SH:   October 2018.   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   He has 4 daughters and one son.  Daughter Skylar in [facility] is first contact.      60 pack year smoking history.      ROS: SLUMS 14/30  Needs assist with ADLs, and directional cuing.       EXAM:  Pleasant, NAD  /72   Pulse 69   Temp 98.3  F  (36.8  C)   SpO2 97%    During visit: HR 59   O2 sat 98% on room air.   Neck supple without adenopathy  Lungs clear bilaterally with fair air movement  Heart RRR s1s2 with 1/6 PRERNA  Abd soft, NT, no distention, +BS  Ext without edema; some joint deformity and swelling right hand  Neuro: very limited history, STML.   No focal findings  Psych: affect okay     LABS:  He has remains COVID negative on several tests through facility surveillance    Creatinine   Date Value Ref Range Status   08/28/2020 1.41 (H) 0.66 - 1.25 mg/dL Final     GFR Estimate   Date Value Ref Range Status   08/28/2020 44 (L) >60 mL/min/[1.73_m2] Final     Lab Results   Component Value Date    WBC 6.7 08/28/2020      HGB 14.2 08/28/2020      MCV 96 08/28/2020       08/28/2020     TSH   Date Value Ref Range Status   05/28/2020 1.63 0.40 - 4.00 mU/L Final       ECHO 9/28/18  Interpretation Summary  Sinus rhythm was noted.  Left ventricular systolic function is normal. The visual ejection fraction is estimated at 55-60%.  The left ventricle is normal in size.   There is mild concentric left ventricular hypertrophy.  The right ventricle is normal in structure, function and size.   There is mild biatrial enlargement.  There is mild (1+) aortic regurgitation.    Brain MRI 9/2018  IMPRESSION:   1. Scattered recent ischemic infarcts in the posterior cerebral artery  distributions bilaterally involving posterior medial temporal lobes bilaterally and left thalamus.  2. Diffuse cerebral volume loss and cerebral white matter changes  consistent with chronic small vessel ischemic disease.       IMP/PLAN:    (I48.0) Paroxysmal atrial fibrillation (H)   Comment:   Pulse Readings from Last 4 Encounters:   09/09/20 69   07/08/20 57   04/13/20 (!) 48   02/25/20 64      Plan: on metoprolol 6.25 mg bid for VR control;  Will discontinue now secondary to bradycardia  Continue apixaban for stroke prophylaxis.       (I77.74) Vertebral artery dissection  (H)  (I63.50) Cerebrovascular accident involving posterior circulation (H)  Comment: residual cognitive impairment now c/w vascular dementia  Plan: statin, anticoagulation and bp meds for secondary prevention.   Follow up in Neurology clinic as scheduled.     (I12.9,  N18.3) Benign hypertension with CKD (chronic kidney disease) stage III (H)    Comment: variable  BP Readings from Last 3 Encounters:   09/09/20 112/72   07/08/20 122/65   04/13/20 132/78      Plan: continue lisinopril 10 mg/day.        (F01.50) Vascular dementia without behavioral disturbance  Comment: scores as above, low functional status   Plan: AL support for med admin, escorts to meals, activity and safety     Remains on memantine 10 mg bid since January.      (M06.00) Seronegative rheumatoid arthritis (H)  Comment: no current pain   Plan: continue leflunomide; follow up with Dr Maurer.     Acetaminophen scheduled bid and prn     Paty Jimenez MD

## 2020-10-02 ENCOUNTER — HOSPITAL LABORATORY (OUTPATIENT)
Dept: OTHER | Facility: CLINIC | Age: 85
End: 2020-10-02

## 2020-10-05 DIAGNOSIS — E53.8 VITAMIN B12 DEFICIENCY (NON ANEMIC): Primary | ICD-10-CM

## 2020-10-05 LAB
SARS-COV-2 RNA SPEC QL NAA+PROBE: NOT DETECTED
SPECIMEN SOURCE: NORMAL

## 2020-10-06 RX ORDER — MAGNESIUM 200 MG
TABLET ORAL
Qty: 28 TABLET | Status: SHIPPED | OUTPATIENT
Start: 2020-10-06 | End: 2021-10-04

## 2020-10-08 ENCOUNTER — HOSPITAL LABORATORY (OUTPATIENT)
Dept: OTHER | Facility: CLINIC | Age: 85
End: 2020-10-08

## 2020-10-09 LAB
SARS-COV-2 RNA SPEC QL NAA+PROBE: NOT DETECTED
SPECIMEN SOURCE: NORMAL

## 2020-10-11 NOTE — LETTER
1/15/2018      Nehemias Granados MD  [address]  [place] [address]      RE: Jose E Capsp       Dear Colleague,    I had the pleasure of seeing Jose E Capps in the [facility].    HISTORY OF PRESENT ILLNESS:  I met Jose E Capps today because of concerns that tomorrow he will be having rather extensive low back surgery for severe degenerative disease, virtually L1 to L5.  Decompression and stabilization is anticipated with at least 4 hours of surgery.      Mr. Capps is 86.  I have to admit he actually looks younger than his years.  He gets around slowly, but his speed is mainly related to rather severe and chronic low back pain.      He has never had known heart disease.  He denies any history of angina, myocardial infarction or heart failure.  He has had mild hypertension.      Because of his anticipated surgery, a resting EKG was done dated 01/09/2018.  This study showed sinus rhythm, SC interval of 214 milliseconds--first-degree AV block, a  milliseconds with classic right bundle branch block pattern and no significant ST-T changes otherwise, other than is typical for a right bundle.      With this, a very reasonable cardiac evaluation was done including a stress nuclear study showing an EF that was called 49% with a small area of inferior scar and trivial yobany-infarction ischemia suggesting coronary arteries disease.  The inferior wall hypokinetic according to the nuclear study.      A resting echo was done.  This study showed an EF of 55%-60%.  No obvious wall motion abnormalities.  Right ventricle was normal.  There was mild aortic insufficiency, trivial aortic root dilation at 3.8 cm.  Mitral, aortic and tricuspid valves were normal.  Right heart pressures could not be approximated.      With this a review of systems was done.      REVIEW OF SYSTEMS:  Review of systems was negative for a 10-point review except for his low back pain.  Cardiopulmonary was  negative for chest pain, palpitations, shortness of breath, orthopnea or lower leg edema.      He has been a very healthy man despite his years.      CURRENT MEDICATIONS:   1.  Atorvastatin 10 mg at bedtime.   2.  Lisinopril 20 mg a day.     3.  Arava 10 mg a day.   4.  Fish oil omega 3 fatty acids.   5.  Calcium and vitamin D.   6.  Vitamins.      SOCIAL HISTORY:  He is .  His wife is not with him.  He comes with his daughter.  He was born and raised and worked in [place].  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.  He stopped smoking 40 years ago.  He has 2 or 3 alcoholic beverages at night.      FAMILY HISTORY:  Noncontributory for premature coronary disease.      PHYSICAL EXAMINATION:   GENERAL:  Well-developed, well-nourished male.  I thought he looked younger than his years.  Despite his back pain, he was able to get up on the exam table without help, he did a little slowly carefully, however.   VITAL SIGNS:  Blood pressure was 138/70, heart rate 76 beats a minute.  He weighed 164 pounds with his clothes on.   HEAD AND NECK:  Normal.  Carotids were equal, no bruits heard.  No neck vein distention noted.  No goiter felt.  No adenopathy appreciated.   HEART:  Regular without gallop, murmur, or rub.   LUNGS:  Clear to auscultation.   ABDOMEN:  Soft without organomegaly, mass, pain or guarding.   EXTREMITIES:  Show +2 dorsalis pedis pulses, no edema.      PAST MEDICAL HISTORY:   1.  Hypertension.   2.  Treated hyperlipidemia.   3.  Basal cell skin cancer and malignant melanoma of the skin in the past.   4.  Previous TIA.      DISCUSSION:  This very nice gentleman anticipates needing extensive surgery to his low back.  He has signs by nuclear study of asymptomatic coronary artery disease with a small inferior scar and trace yobany-infarction ischemia suggesting distal vessel RCA disease.  In addition, he has no anginal symptoms.  No  signs of heart failure.  His heart is structurally and functionally normal otherwise.  EF of 49% would be considered borderline by a nuclear study and normal by echo at 55%-60%.      I think it is reasonable for him to proceed with surgery and general anesthesia as needed to provide an excellent and safe surgical results.  I reviewed all these issues including the fact that occasionally problems can occur with surgery and anesthesia interventions, but I think in view of the profound limitation of his low back pain, he can proceed with surgery with reasonable safety.        With the idea that he might have coronary artery disease, asymptomatic and not appreciated, I added metoprolol tartrate 25 mg b.i.d. to his ongoing medication program as listed above including lisinopril 20 mg a day and atorvastatin 10 mg at bedtime.  His beta blocker can be adjusted before and after surgery as needed, but I believe it is reasonable during the operative and perioperative phase to have a little bit of beta blocker on board.      IMPRESSION:   1.  I feel he can be cleared for surgery and anesthesia as noted above.   2.  Possible asymptomatic coronary artery disease with a small inferior scar.  LVEF essentially normal.   3.  No significant valvular disease.   4.  Healthy adult otherwise.   5.  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     6.  Metoprolol 25 mg b.i.d. added to be adjusted before, during and after surgery as need be to control blood pressure and discourage any possible ischemic cardiac events.        I spent over an hour doing this consult, reviewing multiple records, medications, medication side effects, indications and medication adjustments.        I reviewed at length, right bundle branch block and its relatively benign nature, although it is clear now only more in retrospect that he does not have evidence of significant heart disease overall.  He may have a distal vessel coronary artery  disease, but it is asymptomatic associated with the possibility of a small inferior scar.  Long-term of hypertension, hyperlipidemia and possible coronary disease is indicated, which could done through Dr. Granados.       Outpatient Encounter Prescriptions as of 1/15/2018   Medication Sig Dispense Refill     metoprolol tartrate (LOPRESSOR) 25 MG tablet Take 1 tablet (25 mg) by mouth 2 times daily 60 tablet 11     atorvastatin (LIPITOR) 10 MG tablet Take 1 tablet (10 mg) by mouth daily 90 tablet 3     lisinopril (PRINIVIL/ZESTRIL) 20 MG tablet Take 1 tablet (20 mg) by mouth daily 90 tablet 3     Multiple Vitamins-Minerals (MULTI COMPLETE PO) Take  by mouth.       calcium carbonate-vitamin D (CALCIUM + D) 600-200 MG-UNIT TABS Take 1 tablet by mouth daily.       leflunomide (ARAVA) 10 MG tablet Take 1 tablet by mouth daily.       fish oil-omega-3 fatty acids (OMEGA 3) 1000 MG capsule Take 1 capsule by mouth daily.       No facility-administered encounter medications on file as of 1/15/2018.      Again, thank you for allowing me to participate in the care of your patient.      Sincerely,    ANIKA BETTENCOURT MD     [facility]      cc:      Stepan Kwong MD   Orthopedic Surgery   [facility]      No significant past surgical history

## 2020-10-15 ENCOUNTER — HOSPITAL LABORATORY (OUTPATIENT)
Dept: OTHER | Facility: CLINIC | Age: 85
End: 2020-10-15

## 2020-10-16 LAB
SARS-COV-2 RNA SPEC QL NAA+PROBE: NOT DETECTED
SPECIMEN SOURCE: NORMAL

## 2020-10-23 ENCOUNTER — HOSPITAL LABORATORY (OUTPATIENT)
Dept: OTHER | Facility: CLINIC | Age: 85
End: 2020-10-23

## 2020-10-24 LAB
SARS-COV-2 RNA SPEC QL NAA+PROBE: NOT DETECTED
SPECIMEN SOURCE: NORMAL

## 2020-10-29 ENCOUNTER — HOSPITAL LABORATORY (OUTPATIENT)
Dept: OTHER | Facility: CLINIC | Age: 85
End: 2020-10-29

## 2020-10-30 LAB
SARS-COV-2 RNA SPEC QL NAA+PROBE: NOT DETECTED
SPECIMEN SOURCE: NORMAL

## 2020-10-31 DIAGNOSIS — E55.9 VITAMIN D DEFICIENCY: ICD-10-CM

## 2020-10-31 DIAGNOSIS — E78.2 MIXED HYPERLIPIDEMIA: ICD-10-CM

## 2020-10-31 DIAGNOSIS — E63.8 IMBALANCED NUTRITION: ICD-10-CM

## 2020-10-31 DIAGNOSIS — E78.5 HYPERLIPIDEMIA LDL GOAL <100: Primary | ICD-10-CM

## 2020-10-31 DIAGNOSIS — I10 HYPERTENSION GOAL BP (BLOOD PRESSURE) < 140/90: ICD-10-CM

## 2020-10-31 DIAGNOSIS — I48.91 ATRIAL FIBRILLATION, UNSPECIFIED TYPE (H): ICD-10-CM

## 2020-11-02 RX ORDER — RESVER/WINE/BFL/GRPSD/PC/C/POM 200MG-60MG
CAPSULE ORAL
Qty: 28 TABLET | Status: SHIPPED | OUTPATIENT
Start: 2020-11-02 | End: 2021-10-28

## 2020-11-02 RX ORDER — MULTIVIT-MIN/FA/LYCOPEN/LUTEIN .4-300-25
TABLET ORAL
Qty: 28 TABLET | Status: SHIPPED | OUTPATIENT
Start: 2020-11-02 | End: 2021-10-28

## 2020-11-02 RX ORDER — APIXABAN 5 MG/1
TABLET, FILM COATED ORAL
Qty: 56 TABLET | Status: SHIPPED | OUTPATIENT
Start: 2020-11-02 | End: 2021-10-28

## 2020-11-02 RX ORDER — ATORVASTATIN CALCIUM 10 MG/1
TABLET, FILM COATED ORAL
Qty: 28 TABLET | Status: SHIPPED | OUTPATIENT
Start: 2020-11-02 | End: 2021-10-28

## 2020-11-13 ENCOUNTER — HOSPITAL LABORATORY (OUTPATIENT)
Dept: OTHER | Facility: CLINIC | Age: 85
End: 2020-11-13

## 2020-11-15 LAB
SARS-COV-2 RNA SPEC QL NAA+PROBE: NOT DETECTED
SPECIMEN SOURCE: NORMAL

## 2020-11-19 ENCOUNTER — HOSPITAL LABORATORY (OUTPATIENT)
Dept: OTHER | Facility: CLINIC | Age: 85
End: 2020-11-19

## 2020-11-20 LAB
SARS-COV-2 RNA SPEC QL NAA+PROBE: NOT DETECTED
SPECIMEN SOURCE: NORMAL

## 2020-12-02 ENCOUNTER — TRANSFERRED RECORDS (OUTPATIENT)
Dept: HEALTH INFORMATION MANAGEMENT | Facility: CLINIC | Age: 85
End: 2020-12-02

## 2020-12-02 ENCOUNTER — HOSPITAL LABORATORY (OUTPATIENT)
Dept: OTHER | Facility: CLINIC | Age: 85
End: 2020-12-02

## 2020-12-02 DIAGNOSIS — F01.50 VASCULAR DEMENTIA WITHOUT BEHAVIORAL DISTURBANCE (H): Primary | ICD-10-CM

## 2020-12-02 LAB
ALBUMIN SERPL-MCNC: 3.6 G/DL (ref 3.4–5)
ALT SERPL W P-5'-P-CCNC: 27 U/L (ref 0–70)
AST SERPL W P-5'-P-CCNC: 17 U/L (ref 0–45)
BASOPHILS # BLD AUTO: 0 10E9/L (ref 0–0.2)
BASOPHILS NFR BLD AUTO: 0.5 %
CREAT SERPL-MCNC: 1.2 MG/DL (ref 0.66–1.25)
DIFFERENTIAL METHOD BLD: NORMAL
EOSINOPHIL # BLD AUTO: 0.2 10E9/L (ref 0–0.7)
EOSINOPHIL NFR BLD AUTO: 2.7 %
ERYTHROCYTE [DISTWIDTH] IN BLOOD BY AUTOMATED COUNT: 13.8 % (ref 10–15)
GFR SERPL CREATININE-BSD FRML MDRD: 53 ML/MIN/{1.73_M2}
HCT VFR BLD AUTO: 45.2 % (ref 40–53)
HGB BLD-MCNC: 14.8 G/DL (ref 13.3–17.7)
IMM GRANULOCYTES # BLD: 0 10E9/L (ref 0–0.4)
IMM GRANULOCYTES NFR BLD: 0.4 %
LYMPHOCYTES # BLD AUTO: 2.3 10E9/L (ref 0.8–5.3)
LYMPHOCYTES NFR BLD AUTO: 28.6 %
MCH RBC QN AUTO: 31.8 PG (ref 26.5–33)
MCHC RBC AUTO-ENTMCNC: 32.7 G/DL (ref 31.5–36.5)
MCV RBC AUTO: 97 FL (ref 78–100)
MONOCYTES # BLD AUTO: 1 10E9/L (ref 0–1.3)
MONOCYTES NFR BLD AUTO: 12 %
NEUTROPHILS # BLD AUTO: 4.4 10E9/L (ref 1.6–8.3)
NEUTROPHILS NFR BLD AUTO: 55.8 %
NRBC # BLD AUTO: 0 10*3/UL
NRBC BLD AUTO-RTO: 0 /100
PLATELET # BLD AUTO: 263 10E9/L (ref 150–450)
RBC # BLD AUTO: 4.66 10E12/L (ref 4.4–5.9)
WBC # BLD AUTO: 7.9 10E9/L (ref 4–11)

## 2020-12-02 RX ORDER — MEMANTINE HYDROCHLORIDE 10 MG/1
TABLET ORAL
Qty: 56 TABLET | Status: SHIPPED | OUTPATIENT
Start: 2020-12-02 | End: 2021-12-23

## 2020-12-03 ENCOUNTER — HOSPITAL LABORATORY (OUTPATIENT)
Dept: OTHER | Facility: CLINIC | Age: 85
End: 2020-12-03

## 2020-12-04 LAB
SARS-COV-2 RNA SPEC QL NAA+PROBE: NOT DETECTED
SPECIMEN SOURCE: NORMAL

## 2020-12-08 PROBLEM — E78.5 HYPERLIPIDEMIA LDL GOAL <100: Status: ACTIVE | Noted: 2018-01-17

## 2020-12-08 PROBLEM — Z86.73 HISTORY OF TIA (TRANSIENT ISCHEMIC ATTACK) AND STROKE: Status: ACTIVE | Noted: 2018-01-17

## 2020-12-08 PROBLEM — K63.5 COLON POLYP: Status: ACTIVE | Noted: 2018-01-17

## 2020-12-08 PROBLEM — M06.00 SERONEGATIVE RHEUMATOID ARTHRITIS (H): Status: ACTIVE | Noted: 2018-01-17

## 2020-12-08 ASSESSMENT — MIFFLIN-ST. JEOR: SCORE: 1383.4

## 2020-12-09 ENCOUNTER — ASSISTED LIVING VISIT (OUTPATIENT)
Dept: GERIATRICS | Facility: CLINIC | Age: 85
End: 2020-12-09
Payer: COMMERCIAL

## 2020-12-09 VITALS
TEMPERATURE: 98.3 F | OXYGEN SATURATION: 93 % | DIASTOLIC BLOOD PRESSURE: 70 MMHG | HEIGHT: 64 IN | RESPIRATION RATE: 16 BRPM | WEIGHT: 181 LBS | HEART RATE: 77 BPM | SYSTOLIC BLOOD PRESSURE: 130 MMHG | BODY MASS INDEX: 30.9 KG/M2

## 2020-12-09 DIAGNOSIS — E04.1 THYROID NODULE: ICD-10-CM

## 2020-12-09 DIAGNOSIS — I10 HYPERTENSION GOAL BP (BLOOD PRESSURE) < 140/90: ICD-10-CM

## 2020-12-09 DIAGNOSIS — N18.31 STAGE 3A CHRONIC KIDNEY DISEASE (H): ICD-10-CM

## 2020-12-09 DIAGNOSIS — Z86.73 HISTORY OF CVA (CEREBROVASCULAR ACCIDENT): ICD-10-CM

## 2020-12-09 DIAGNOSIS — I77.74 VERTEBRAL ARTERY DISSECTION (H): ICD-10-CM

## 2020-12-09 DIAGNOSIS — I25.119 CORONARY ARTERY DISEASE WITH ANGINA PECTORIS, UNSPECIFIED VESSEL OR LESION TYPE, UNSPECIFIED WHETHER NATIVE OR TRANSPLANTED HEART (H): ICD-10-CM

## 2020-12-09 DIAGNOSIS — I50.32 CHRONIC DIASTOLIC CONGESTIVE HEART FAILURE (H): Primary | ICD-10-CM

## 2020-12-09 DIAGNOSIS — I48.0 PAROXYSMAL A-FIB (H): ICD-10-CM

## 2020-12-09 DIAGNOSIS — M05.741 RHEUMATOID ARTHRITIS INVOLVING BOTH HANDS WITH POSITIVE RHEUMATOID FACTOR (H): ICD-10-CM

## 2020-12-09 DIAGNOSIS — M05.742 RHEUMATOID ARTHRITIS INVOLVING BOTH HANDS WITH POSITIVE RHEUMATOID FACTOR (H): ICD-10-CM

## 2020-12-09 ASSESSMENT — MIFFLIN-ST. JEOR: SCORE: 1397.01

## 2020-12-09 NOTE — LETTER
12/9/2020        RE: Jose E Boateng On [facility]  [address]  [address]  [place] [address]        [facility]  Chief Complaint   Patient presents with     Annual Comprehensive Exam Assisted Living     [facility] Medical Record Number: [id number]  Place of Service where encounter took place: [facility]) [[id number]]    HPI:    Jose E Capps is a 89 year old (9/28/1931), who is being seen today for an annual comprehensive visit. HPI information obtained from: facility chart records, patient report and [facility] Epic chart review. Today's concerns are:    Seen today for follow up to chronic conditions. Sitting at dining table playing solitaire. Uses rolling desk chairs in bathroom and dining table to help move around. Has 2WW also and does use around apartment.[place] but able to make needs known. Essential caregiver is PCA coming 3 times a week 4 hours. No acute concerns.     By chart review from Dr. Jimenez:  patient was hospitalized in October 2018 after he took a fall.   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   He went to TCU, regained mobility but has remained cognitively impaired.   He is followed by [facility].    Pt has a long history of seronegative RA, treated with leflunomide per Dr Maurer.       ALLERGIES: Patient has no known allergies.  PAST MEDICAL HISTORY:  has a past medical history of BPH (benign prostatic hyperplasia), Colonic polyps, CVA (cerebral vascular accident) (H) (2009), Hyperlipidemia LDL goal <100, Hypertension, and Seronegative rheumatoid arthritis (H) (2011). He also has no past medical history of Asymptomatic human immunodeficiency virus (HIV) infection status (H), Blood in semen,  Cerebral palsy (H), Complication of anesthesia, Congenital renal agenesis and dysgenesis, Epididymitis, bilateral, Epididymitis, left, Epididymitis, right, Goiter, Gout, Hernia, abdominal, History of spinal cord injury, History of thrombophlebitis, Horseshoe kidney, Hydrocephalus (H), Malignant hyperthermia, Mumps, Orchitis, epididymitis, and epididymo-orchitis, with abscess, Palpitations, Parkinsons disease (H), Penile discharge, Prostate infection, Spider veins, Spina bifida (H), STD (sexually transmitted disease), Swelling of testicle, Tethered cord (H), or Tuberculosis.  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  IMMUNIZATIONS:  Immunization History   Administered Date(s) Administered     Influenza (High Dose) 3 valent vaccine 10/23/2012, 10/23/2015, 10/10/2016, 01/09/2018, 09/11/2018, 09/25/2019     Influenza (IIV3) PF 09/01/2011, 10/23/2012, 11/25/2013, 10/10/2016, 01/09/2018     Influenza, Quad, High Dose, Pf, 65yr + 09/23/2020     Pneumo Conj 13-V (2010&after) 01/09/2018     Pneumococcal 23 valent 09/01/2011     TDAP Vaccine (Adacel) 01/09/2018     Zoster vaccine recombinant adjuvanted (SHINGRIX) 10/30/2018     Above immunizations pulled from [facility]. [facility] and facility records also reconciled. Outstanding information sent to  to update [facility].  Future immunizations are not needed at this point as all recommended immunizations are up to date.     Current Outpatient Medications   Medication Sig Dispense Refill     ACETAMINOPHEN EXTRA STRENGTH 500 MG tablet TAKE TWO TABLETS (1000MG) BY MOUTH TWICE DAILY;MAY ALSO TAKE ONE TABLET EVERY 8 HOURS AS NEEDED FOR MILD PAIN *NOT TO EXCEED 4000MG APAP/24 HRS* 112 tablet 97     ASPIRIN NOT PRESCRIBED (INTENTIONAL) Please choose reason not prescribed, below 0 each 0      atorvastatin (LIPITOR) 10 MG tablet TAKE 1 TABLET BY MOUTH ONCE DAILY 28 tablet PRN     Calcium Carb-Cholecalciferol (CALCIUM + D3) 600-200 MG-UNIT TABS TAKE 1 TABLET BY MOUTH ONCE DAILY 28 tablet PRN     cholecalciferol (VITAMIN D3) 5000 units (125 mcg) capsule Take 5,000 Units by mouth daily       cyanocobalamin (VITAMIN B-12) 1000 MCG SUBL sublingual tablet DISSOLVE ONE-HALF LOZENGE (500MCG) BY MOUTH TWICE DAILY 28 tablet PRN     cyanocobalamin (VITAMIN B-12) 500 MCG SUBL sublingual tablet Place 500 mcg under the tongue 2 times daily       D-5000 125 MCG (5000 UT) TABS TAKE 1 TABLET BY MOUTH ONCE DAILY 28 tablet PRN     ELIQUIS ANTICOAGULANT 5 MG tablet TAKE 1 TABLET BY MOUTH TWICE DAILY 56 tablet PRN     leflunomide (ARAVA) 10 MG tablet TAKE 1 TABLET BY MOUTH ONCE DAILY 31 tablet 97     lisinopril (ZESTRIL) 10 MG tablet TAKE ONE TABLET (10MG) BY MOUTH ONCE DAILY 28 tablet 97     loperamide (IMODIUM) 2 MG capsule TAKE 1 CAPSULE BY MOUTH FOUR TIMES DAILY AS NEEDED FOR DIARRHEA 30 capsule 11     memantine (NAMENDA) 10 MG tablet TAKE 1 TABLET BY MOUTH TWICE DAILY 56 tablet PRN     Multiple Vitamins-Minerals (CEROVITE SENIOR) TABS TAKE 1 TABLET BY MOUTH ONCE DAILY 28 tablet PRN     polyethylene glycol (MIRALAX/GLYCOLAX) powder MIX 17GM OF POWDER IN 8OZ OF WATER UNTIL COMPLETELY DISSOLVED. DRINK SOLUTION BY MOUTH ONCE DAILY AS NEEDED FOR CONSTIPATION 527 g 5     Case Management:  I have reviewed the Assisted Living care plan, current immunizations and preventive care/cancer screening. .Future cancer screening is not clinically indicated secondary to age/goals of care Patient's desire to return to the community is present, but is not able due to care needs . Current Level of Care is appropriate.    Advance Directive Discussion:    I reviewed the current advanced directives as reflected in EPIC, the POLST and the facility chart, and verified the congruency of orders. I contacted the first party daughter and left a  "message regarding the plan of Care. I did not due to cognitive impairment review the advance directives with the resident.     Team Discussion:  I communicated with the appropriate disciplines involved with the Plan of Care: Nursing    Patient's goal is pain control and comfort. Treat reversible conditions   Information reviewed:  Medications, vital signs, orders, and nursing notes.    ROS:  Limited secondary to cognitive impairment but today pt reports ok    Vitals:  /72   Pulse 69   Temp 96.9  F (36.1  C)   Resp 20   Ht 1.626 m (5' 4\")   Wt 80.7 kg (178 lb)   SpO2 95%   BMI 30.55 kg/m   Body mass index is 30.55 kg/m .  Exam:  GENERAL APPEARANCE:  alert and pleasant, forgetful. [place]  ENT:  Mouth and posterior oropharynx normal, dry mucous membranes  EYES:  EOM, conjunctivae, lids, pupils and irises normal-glasses  RESP:  respiratory effort appears at baseline, LS clear   CV:  Palpation and auscultation of heart done , no edema, regular rate, irregular rhythm, no LE edema  ABDOMEN: soft nontender   M/S: mostly use of WC or 2WW. Edema +1 to right hand and digits chronic  SKIN:  no signs of open areas to viewed arms, legs, dry flaky to scalp  NEURO:   Cranial nerves 2-12 are normal tested and grossly at patient's baseline  PSYCH:  insight and judgement impaired, memory impaired    Lab/Diagnostic data:   CBC RESULTS:   Recent Labs   Lab Test 12/02/20  1101 08/28/20  0925   WBC 7.9 6.7   RBC 4.66 4.58   HGB 14.8 14.2   HCT 45.2 44.0   MCV 97 96   MCH 31.8 31.0   MCHC 32.7 32.3   RDW 13.8 14.0    258       Last Basic Metabolic Panel:  Recent Labs   Lab Test 12/02/20  1101 08/28/20  0925 05/28/20  1300 07/03/19  0845 07/03/19  0845   NA  --   --  139  --  142   POTASSIUM  --   --  4.5  --  4.5   CHLORIDE  --   --  106  --  110*   GISELA  --   --  8.7  --  8.8   CO2  --   --  29  --  28   BUN  --   --  28  --  22   CR 1.20 1.41* 1.22   < > 1.07   GLC  --   --  150*  --  103*    < > = values in this interval " not displayed.       Liver Function Studies -   Recent Labs   Lab Test 12/02/20  1101 08/28/20  0925 05/28/20  1300 07/03/19  0845 07/03/19  0845   PROTTOTAL  --   --  6.8  --  7.0   ALBUMIN 3.6 3.5 3.4   < > 3.6   BILITOTAL  --   --  0.5  --  0.5   ALKPHOS  --   --  66  --  73   AST 17 19 23   < > 15   ALT 27 41 29   < > 26    < > = values in this interval not displayed.       TSH   Date Value Ref Range Status   05/28/2020 1.63 0.40 - 4.00 mU/L Final   07/03/2019 1.72 0.40 - 4.00 mU/L Final       Lab Results   Component Value Date    A1C 5.6 09/28/2018    A1C 6.0 07/01/2015     ASSESSMENT/PLAN  (I50.32) Chronic diastolic congestive heart failure (H)  (I48.0) Paroxysmal A-fib (H)  (I25.119) Coronary artery disease with angina pectoris, unspecified   vessel or lesion type, unspecified whether native or transplanted heart (H)  (I10) Hypertension goal BP (blood pressure) < 140/90   Comment: stable-Echo 9/2018 EF at 55-60% Hypotension with past BPs and bradycardic  Plan:   -statin, Eliquis and BB discontinue with lower HR and BP  -Lisinopril 10 mg po daily   -follow BMP    (E04.1) Thyroid nodule  Comment: noted on imaging a right sided nodule measuring 2.4cm 9/2018  Plan:  -following TSH  -repeat imaging 7/2020 showed 2.2cm solid nodule in the inferior right lobe. Consider US guided fine needle biopsy. Reviewed with his daughter who declined.    (M05.741,  M05.742) Rheumatoid arthritis involving both hands with positive rheumatoid factor (H)  Comment: no current pain   Plan: continue leflunomide; follow up with Dr Maurer.     Acetaminophen scheduled bid and prn    (I77.74) Vertebral artery dissection (H)  (I63.50) Cerebrovascular accident involving posterior circulation (H)  Comment: residual cognitive impairment now c/w vascular dementia  Plan: statin, anticoagulation and bp meds for secondary prevention.   Follow up in Neurology clinic as scheduled.      (N18.31) Stage 3a chronic kidney disease  Comment: chronic    Plan:   -follow labs, renal dose medications     (I77.74) Vertebral artery dissection (H)  (Z86.73) History of CVA (cerebrovascular accident)  Comment: now with dementia   Plan:   -follows with neurology  -B12, memantine, vitamin D            Electronically signed by:  SHIRA Parra CNP             Sincerely,        SHIRA Parra CNP

## 2020-12-10 ENCOUNTER — HOSPITAL LABORATORY (OUTPATIENT)
Dept: OTHER | Facility: CLINIC | Age: 85
End: 2020-12-10

## 2020-12-11 LAB
SARS-COV-2 RNA SPEC QL NAA+PROBE: NOT DETECTED
SPECIMEN SOURCE: NORMAL

## 2020-12-18 ENCOUNTER — HOSPITAL LABORATORY (OUTPATIENT)
Dept: OTHER | Facility: CLINIC | Age: 85
End: 2020-12-18

## 2020-12-19 LAB
SARS-COV-2 RNA SPEC QL NAA+PROBE: NOT DETECTED
SPECIMEN SOURCE: NORMAL

## 2020-12-23 ENCOUNTER — HOSPITAL LABORATORY (OUTPATIENT)
Dept: OTHER | Facility: CLINIC | Age: 85
End: 2020-12-23

## 2020-12-24 LAB
SARS-COV-2 RNA SPEC QL NAA+PROBE: NOT DETECTED
SPECIMEN SOURCE: NORMAL

## 2020-12-31 ENCOUNTER — HOSPITAL LABORATORY (OUTPATIENT)
Dept: OTHER | Facility: CLINIC | Age: 85
End: 2020-12-31

## 2021-01-02 LAB
LABORATORY COMMENT REPORT: NORMAL
SARS-COV-2 RNA SPEC QL NAA+PROBE: NEGATIVE
SARS-COV-2 RNA SPEC QL NAA+PROBE: NORMAL
SPECIMEN SOURCE: NORMAL
SPECIMEN SOURCE: NORMAL

## 2021-01-07 ENCOUNTER — HOSPITAL LABORATORY (OUTPATIENT)
Dept: OTHER | Facility: CLINIC | Age: 86
End: 2021-01-07

## 2021-01-08 LAB
SARS-COV-2 RNA RESP QL NAA+PROBE: NOT DETECTED
SPECIMEN SOURCE: NORMAL

## 2021-01-13 NOTE — TELEPHONE ENCOUNTER
Unable to close encounter. Routing to Jing Jeronimo RN [facility] Directive RN to close. Thank you.  Blanca Kearney,

## 2021-01-14 ENCOUNTER — HOSPITAL LABORATORY (OUTPATIENT)
Dept: OTHER | Facility: CLINIC | Age: 86
End: 2021-01-14

## 2021-01-15 LAB
SARS-COV-2 RNA RESP QL NAA+PROBE: NOT DETECTED
SPECIMEN SOURCE: NORMAL

## 2021-01-29 ENCOUNTER — HOSPITAL LABORATORY (OUTPATIENT)
Dept: OTHER | Facility: CLINIC | Age: 86
End: 2021-01-29

## 2021-01-30 LAB
LABORATORY COMMENT REPORT: NORMAL
SARS-COV-2 RNA RESP QL NAA+PROBE: NEGATIVE
SARS-COV-2 RNA RESP QL NAA+PROBE: NORMAL
SPECIMEN SOURCE: NORMAL
SPECIMEN SOURCE: NORMAL

## 2021-02-02 NOTE — PROGRESS NOTES
[facility]  [facility] Record Number: [id number]  Place of Service where encounter took place: [facility] [[id number]]  Chief Complaint   Patient presents with     Nursing Home Acute       HPI:    Jose E Capps is a 89 year old (9/28/1931), who is being seen today for an episodic care visit. HPI information obtained from: {[facility] HPI:[id number]}. Today's concern is:  {[facility] DX:959629}    Past Medical and Surgical History reviewed in Epic today.    MEDICATIONS:  {Providers Please double check the med list (in the plan section >> meds & orders tab) and Discontinue any of the meds flagged by the TC to be discontinued}  Current Outpatient Medications   Medication Sig Dispense Refill     ACETAMINOPHEN EXTRA STRENGTH 500 MG tablet TAKE TWO TABLETS (1000MG) BY MOUTH TWICE DAILY;MAY ALSO TAKE ONE TABLET EVERY 8 HOURS AS NEEDED FOR MILD PAIN *NOT TO EXCEED 4000MG APAP/24 HRS* 112 tablet 97     ASPIRIN NOT PRESCRIBED (INTENTIONAL) Please choose reason not prescribed, below 0 each 0     atorvastatin (LIPITOR) 10 MG tablet TAKE 1 TABLET BY MOUTH ONCE DAILY 28 tablet PRN     cyanocobalamin (VITAMIN B-12) 1000 MCG SUBL sublingual tablet DISSOLVE ONE-HALF LOZENGE (500MCG) BY MOUTH TWICE DAILY 28 tablet PRN     D-5000 125 MCG (5000 UT) TABS TAKE 1 TABLET BY MOUTH ONCE DAILY 28 tablet PRN     ELIQUIS ANTICOAGULANT 5 MG tablet TAKE 1 TABLET BY MOUTH TWICE DAILY 56 tablet PRN     leflunomide (ARAVA) 10 MG tablet TAKE 1 TABLET BY MOUTH ONCE DAILY 31 tablet 97     lisinopril (ZESTRIL) 10 MG tablet TAKE ONE TABLET (10MG) BY MOUTH ONCE DAILY 28 tablet 97     loperamide (IMODIUM) 2 MG capsule TAKE 1 CAPSULE BY MOUTH FOUR TIMES DAILY AS NEEDED FOR DIARRHEA 30 capsule 11     memantine (NAMENDA) 10 MG tablet TAKE 1 TABLET BY MOUTH TWICE DAILY 56 tablet PRN     Multiple Vitamins-Minerals (CEROVITE SENIOR) TABS TAKE 1 TABLET BY MOUTH ONCE DAILY 28 tablet PRN     polyethylene glycol  "(MIRALAX/GLYCOLAX) powder MIX 17GM OF POWDER IN 8OZ OF WATER UNTIL COMPLETELY DISSOLVED. DRINK SOLUTION BY MOUTH ONCE DAILY AS NEEDED FOR CONSTIPATION 527 g 5     REVIEW OF SYSTEMS:  {ROS [facility]:826861}    Objective:  /72   Pulse 69   Temp 97.6  F (36.4  C)   Resp 20   Ht 1.626 m (5' 4\")   SpO2 94%   BMI 31.07 kg/m    Exam:  {Nursing home physical exam :009012}    Labs:   {fgslab:400060}    ASSESSMENT/PLAN:  {[facility] DX:948405}    {fgsorders:512794}    {fgstime1:699686}  Electronically signed by:  Melissa Umaña ***  {Providers Please double check the med list (in the plan section >> meds & orders tab) and Discontinue any of the meds flagged by the TC to be discontinued}      "

## 2021-02-03 ENCOUNTER — ASSISTED LIVING VISIT (OUTPATIENT)
Dept: GERIATRICS | Facility: CLINIC | Age: 86
End: 2021-02-03
Payer: COMMERCIAL

## 2021-02-03 VITALS
HEART RATE: 87 BPM | SYSTOLIC BLOOD PRESSURE: 145 MMHG | HEIGHT: 64 IN | RESPIRATION RATE: 16 BRPM | DIASTOLIC BLOOD PRESSURE: 70 MMHG | BODY MASS INDEX: 31.07 KG/M2 | TEMPERATURE: 97.5 F | OXYGEN SATURATION: 97 %

## 2021-02-03 DIAGNOSIS — F01.50 VASCULAR DEMENTIA WITHOUT BEHAVIORAL DISTURBANCE (H): ICD-10-CM

## 2021-02-03 DIAGNOSIS — M05.741 RHEUMATOID ARTHRITIS INVOLVING BOTH HANDS WITH POSITIVE RHEUMATOID FACTOR (H): ICD-10-CM

## 2021-02-03 DIAGNOSIS — I48.0 PAROXYSMAL A-FIB (H): ICD-10-CM

## 2021-02-03 DIAGNOSIS — I63.532 CEREBROVASCULAR ACCIDENT (CVA) DUE TO OCCLUSION OF LEFT POSTERIOR CEREBRAL ARTERY (H): ICD-10-CM

## 2021-02-03 DIAGNOSIS — I50.32 CHRONIC DIASTOLIC CONGESTIVE HEART FAILURE (H): ICD-10-CM

## 2021-02-03 DIAGNOSIS — N18.31 STAGE 3A CHRONIC KIDNEY DISEASE (H): ICD-10-CM

## 2021-02-03 DIAGNOSIS — M05.742 RHEUMATOID ARTHRITIS INVOLVING BOTH HANDS WITH POSITIVE RHEUMATOID FACTOR (H): ICD-10-CM

## 2021-02-03 DIAGNOSIS — I77.74 VERTEBRAL ARTERY DISSECTION (H): ICD-10-CM

## 2021-02-03 DIAGNOSIS — I25.119 CORONARY ARTERY DISEASE WITH ANGINA PECTORIS, UNSPECIFIED VESSEL OR LESION TYPE, UNSPECIFIED WHETHER NATIVE OR TRANSPLANTED HEART (H): ICD-10-CM

## 2021-02-03 DIAGNOSIS — S99.922A INJURY OF TOE ON LEFT FOOT, INITIAL ENCOUNTER: Primary | ICD-10-CM

## 2021-02-03 RX ORDER — DOXYCYCLINE HYCLATE 100 MG
100 TABLET ORAL 2 TIMES DAILY
Qty: 10 TABLET | Refills: 0 | Status: SHIPPED | OUTPATIENT
Start: 2021-02-03 | End: 2021-02-08

## 2021-02-03 NOTE — PROGRESS NOTES
[facility]  [facility] Record Number: [id number]  Place of Service where encounter took place: [facility] [[id number]]  Chief Complaint   Patient presents with     Nursing Home Acute       HPI:    Jose E Capps is a 89 year old (9/28/1931), who is being seen today for an episodic care visit. HPI information obtained from: facility chart records, patient report and [facility] chart review. Today's concern is:    Seen today for follow up to chronic conditions. Sitting at dining table playing solitaire. Uses rolling desk chairs in bathroom and dining table to help move around. Has 2WW also and does use around apartment.[place] but able to make needs known. Essential caregiver is PCA coming 3 times a week 4 hours. No acute concerns. Has a new toy electric car. Did noticed injured toe with exam (see exam) and he thinks bumped it or something a few days back but not sure. Denies pain to area..     By chart review from Dr. Jimenez:  patient was hospitalized in October 2018 after he took a fall.   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   He went to TCU, regained mobility but has remained cognitively impaired.   He is followed by [facility].    Pt has a long history of seronegative RA, treated with leflunomide per Dr Maurer.     Past Medical and Surgical History reviewed in Epic today.    MEDICATIONS:    Current Outpatient Medications   Medication Sig Dispense Refill     ACETAMINOPHEN EXTRA STRENGTH 500 MG tablet TAKE TWO TABLETS (1000MG) BY MOUTH TWICE DAILY;MAY ALSO TAKE ONE TABLET EVERY 8 HOURS AS NEEDED FOR MILD PAIN *NOT TO EXCEED 4000MG APAP/24 HRS* 112 tablet 97     ASPIRIN NOT PRESCRIBED (INTENTIONAL) Please  "choose reason not prescribed, below 0 each 0     atorvastatin (LIPITOR) 10 MG tablet TAKE 1 TABLET BY MOUTH ONCE DAILY 28 tablet PRN     cyanocobalamin (VITAMIN B-12) 1000 MCG SUBL sublingual tablet DISSOLVE ONE-HALF LOZENGE (500MCG) BY MOUTH TWICE DAILY 28 tablet PRN     D-5000 125 MCG (5000 UT) TABS TAKE 1 TABLET BY MOUTH ONCE DAILY 28 tablet PRN     ELIQUIS ANTICOAGULANT 5 MG tablet TAKE 1 TABLET BY MOUTH TWICE DAILY 56 tablet PRN     leflunomide (ARAVA) 10 MG tablet TAKE 1 TABLET BY MOUTH ONCE DAILY 31 tablet 97     lisinopril (ZESTRIL) 10 MG tablet TAKE ONE TABLET (10MG) BY MOUTH ONCE DAILY 28 tablet 97     loperamide (IMODIUM) 2 MG capsule TAKE 1 CAPSULE BY MOUTH FOUR TIMES DAILY AS NEEDED FOR DIARRHEA 30 capsule 11     memantine (NAMENDA) 10 MG tablet TAKE 1 TABLET BY MOUTH TWICE DAILY 56 tablet PRN     Multiple Vitamins-Minerals (CEROVITE SENIOR) TABS TAKE 1 TABLET BY MOUTH ONCE DAILY 28 tablet PRN     polyethylene glycol (MIRALAX/GLYCOLAX) powder MIX 17GM OF POWDER IN 8OZ OF WATER UNTIL COMPLETELY DISSOLVED. DRINK SOLUTION BY MOUTH ONCE DAILY AS NEEDED FOR CONSTIPATION 527 g 5     REVIEW OF SYSTEMS:  Limited secondary to cognitive impairment but today pt reports ok    Objective:  /72   Pulse 69   Temp 97.6  F (36.4  C)   Resp 20   Ht 1.626 m (5' 4\")   SpO2 94%   BMI 31.07 kg/m    Exam:  GENERAL APPEARANCE:  alert and pleasant, forgetful. [place]  ENT:  Mouth and posterior oropharynx normal, dry mucous membranes  EYES:  EOM, conjunctivae, lids, pupils and irises normal-glasses  RESP:  respiratory effort appears at baseline, LS clear   CV:  Palpation and auscultation of heart done , no edema, regular rate, irregular rhythm, no LE edema  ABDOMEN: soft nontender   M/S: mostly use of WC or 2WW. Edema +1 to right hand and digits chronic  SKIN: 3rd toe of left foot is red, swollen, no warmth-blood blister x 2 on ventral side intact  NEURO:   Cranial nerves 2-12 are normal tested and grossly at patient's " baseline  PSYCH:  insight and judgement impaired, memory impaired    Labs:   CBC RESULTS:   Recent Labs   Lab Test 12/02/20  1101 08/28/20  0925   WBC 7.9 6.7   RBC 4.66 4.58   HGB 14.8 14.2   HCT 45.2 44.0   MCV 97 96   MCH 31.8 31.0   MCHC 32.7 32.3   RDW 13.8 14.0    258       Last Basic Metabolic Panel:  Recent Labs   Lab Test 12/02/20  1101 08/28/20  0925 05/28/20  1300 07/03/19  0845 07/03/19  0845   NA  --   --  139  --  142   POTASSIUM  --   --  4.5  --  4.5   CHLORIDE  --   --  106  --  110*   GISELA  --   --  8.7  --  8.8   CO2  --   --  29  --  28   BUN  --   --  28  --  22   CR 1.20 1.41* 1.22   < > 1.07   GLC  --   --  150*  --  103*    < > = values in this interval not displayed.       Liver Function Studies -   Recent Labs   Lab Test 12/02/20  1101 08/28/20 0925 05/28/20  1300 07/03/19  0845 07/03/19  0845   PROTTOTAL  --   --  6.8  --  7.0   ALBUMIN 3.6 3.5 3.4   < > 3.6   BILITOTAL  --   --  0.5  --  0.5   ALKPHOS  --   --  66  --  73   AST 17 19 23   < > 15   ALT 27 41 29   < > 26    < > = values in this interval not displayed.       TSH   Date Value Ref Range Status   05/28/2020 1.63 0.40 - 4.00 mU/L Final   07/03/2019 1.72 0.40 - 4.00 mU/L Final       Lab Results   Component Value Date    A1C 5.6 09/28/2018    A1C 6.0 07/01/2015       ASSESSMENT/PLAN:  (S99.922A) Injury of toe on left foot, initial encounter  Comment: noted today with exam  Plan:  -staff to monitor on shower days for improving or worsening condition  -betadine and protection with band-aid until healed  -doxycycline 100 mg po BID x 5 days    (M05.741,  M05.742) Rheumatoid arthritis involving both hands with positive rheumatoid factor (H)  Comment: no current pain   Plan: continue leflunomide; follow up with Dr Maurer.     Acetaminophen scheduled bid and prn    (I77.74) Vertebral artery dissection (H)  (I63.50) Cerebrovascular accident involving posterior circulation (H)  Comment: residual cognitive impairment now c/w vascular  dementia  Plan: statin, anticoagulation and bp meds for secondary prevention.   Follow up in Neurology clinic as scheduled.B12, memantine, vitamin D    (I50.32) Chronic diastolic congestive heart failure (H)  (I48.0) Paroxysmal A-fib (H)  (I25.119) Coronary artery disease with angina pectoris, unspecified   vessel or lesion type, unspecified whether native or transplanted heart (H)  (I10) Hypertension goal BP (blood pressure) < 140/90   Comment: stable-Echo 9/2018 EF at 55-60% Hypotension with past BPs and bradycardic  Plan:   -statin, Eliquis and BB discontinue with lower HR and BP  -Lisinopril 10 mg po daily   -follow BMP    (F01.50) Vascular dementia without behavioral disturbance (H)  Comment: with STML  Plan:  -namenda 10 mg po BID  -PCA and staff assist     (N18.31) Stage 3a chronic kidney disease  Comment: chronic   Plan:  -renal dose and avoid nephrotoxins                 Electronically signed by:  SHIRA Parra CNP

## 2021-02-03 NOTE — LETTER
2/3/2021        RE: Jose E Boateng [facility]  [address]  [address]  [place] [address]        [facility]  [place] Medical Record Number: [id number]  Place of Service where encounter took place: [facility] [[id number]]  Chief Complaint   Patient presents with     Nursing Home Acute       HPI:    Jose E Capps is a 89 year old (9/28/1931), who is being seen today for an episodic care visit. HPI information obtained from: facility chart records, patient report and [facility] chart review. Today's concern is:    Seen today for follow up to chronic conditions. Sitting at dining table playing solitaire. Uses rolling desk chairs in bathroom and dining table to help move around. Has 2WW also and does use around apartment.[place] but able to make needs known. Essential caregiver is PCA coming 3 times a week 4 hours. No acute concerns. Has a new toy electric car. Did noticed injured toe with exam (see exam) and he thinks bumped it or something a few days back but not sure. Denies pain to area..     By chart review from Dr. Jimenez:  patient was hospitalized in October 2018 after he took a fall.   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   He went to TCU, regained mobility but has remained cognitively impaired.   He is followed by [facility].    Pt has a long history of seronegative RA, treated with leflunomide per Dr Maurer.     Past Medical and Surgical History reviewed in Epic today.    MEDICATIONS:    Current Outpatient Medications   Medication Sig Dispense Refill     ACETAMINOPHEN EXTRA STRENGTH 500 MG tablet TAKE TWO TABLETS (1000MG) BY MOUTH TWICE DAILY;MAY ALSO TAKE ONE TABLET EVERY 8 HOURS AS NEEDED  "FOR MILD PAIN *NOT TO EXCEED 4000MG APAP/24 HRS* 112 tablet 97     ASPIRIN NOT PRESCRIBED (INTENTIONAL) Please choose reason not prescribed, below 0 each 0     atorvastatin (LIPITOR) 10 MG tablet TAKE 1 TABLET BY MOUTH ONCE DAILY 28 tablet PRN     cyanocobalamin (VITAMIN B-12) 1000 MCG SUBL sublingual tablet DISSOLVE ONE-HALF LOZENGE (500MCG) BY MOUTH TWICE DAILY 28 tablet PRN     D-5000 125 MCG (5000 UT) TABS TAKE 1 TABLET BY MOUTH ONCE DAILY 28 tablet PRN     ELIQUIS ANTICOAGULANT 5 MG tablet TAKE 1 TABLET BY MOUTH TWICE DAILY 56 tablet PRN     leflunomide (ARAVA) 10 MG tablet TAKE 1 TABLET BY MOUTH ONCE DAILY 31 tablet 97     lisinopril (ZESTRIL) 10 MG tablet TAKE ONE TABLET (10MG) BY MOUTH ONCE DAILY 28 tablet 97     loperamide (IMODIUM) 2 MG capsule TAKE 1 CAPSULE BY MOUTH FOUR TIMES DAILY AS NEEDED FOR DIARRHEA 30 capsule 11     memantine (NAMENDA) 10 MG tablet TAKE 1 TABLET BY MOUTH TWICE DAILY 56 tablet PRN     Multiple Vitamins-Minerals (CEROVITE SENIOR) TABS TAKE 1 TABLET BY MOUTH ONCE DAILY 28 tablet PRN     polyethylene glycol (MIRALAX/GLYCOLAX) powder MIX 17GM OF POWDER IN 8OZ OF WATER UNTIL COMPLETELY DISSOLVED. DRINK SOLUTION BY MOUTH ONCE DAILY AS NEEDED FOR CONSTIPATION 527 g 5     REVIEW OF SYSTEMS:  Limited secondary to cognitive impairment but today pt reports ok    Objective:  /72   Pulse 69   Temp 97.6  F (36.4  C)   Resp 20   Ht 1.626 m (5' 4\")   SpO2 94%   BMI 31.07 kg/m    Exam:  GENERAL APPEARANCE:  alert and pleasant, forgetful. [place]  ENT:  Mouth and posterior oropharynx normal, dry mucous membranes  EYES:  EOM, conjunctivae, lids, pupils and irises normal-glasses  RESP:  respiratory effort appears at baseline, LS clear   CV:  Palpation and auscultation of heart done , no edema, regular rate, irregular rhythm, no LE edema  ABDOMEN: soft nontender   M/S: mostly use of WC or 2WW. Edema +1 to right hand and digits chronic  SKIN: 3rd toe of left foot is red, swollen, no warmth-blood " blister x 2 on ventral side intact  NEURO:   Cranial nerves 2-12 are normal tested and grossly at patient's baseline  PSYCH:  insight and judgement impaired, memory impaired    Labs:   CBC RESULTS:   Recent Labs   Lab Test 12/02/20  1101 08/28/20  0925   WBC 7.9 6.7   RBC 4.66 4.58   HGB 14.8 14.2   HCT 45.2 44.0   MCV 97 96   MCH 31.8 31.0   MCHC 32.7 32.3   RDW 13.8 14.0    258       Last Basic Metabolic Panel:  Recent Labs   Lab Test 12/02/20  1101 08/28/20  0925 05/28/20  1300 07/03/19  0845 07/03/19  0845   NA  --   --  139  --  142   POTASSIUM  --   --  4.5  --  4.5   CHLORIDE  --   --  106  --  110*   GISELA  --   --  8.7  --  8.8   CO2  --   --  29  --  28   BUN  --   --  28  --  22   CR 1.20 1.41* 1.22   < > 1.07   GLC  --   --  150*  --  103*    < > = values in this interval not displayed.       Liver Function Studies -   Recent Labs   Lab Test 12/02/20  1101 08/28/20  0925 05/28/20  1300 07/03/19  0845 07/03/19  0845   PROTTOTAL  --   --  6.8  --  7.0   ALBUMIN 3.6 3.5 3.4   < > 3.6   BILITOTAL  --   --  0.5  --  0.5   ALKPHOS  --   --  66  --  73   AST 17 19 23   < > 15   ALT 27 41 29   < > 26    < > = values in this interval not displayed.       TSH   Date Value Ref Range Status   05/28/2020 1.63 0.40 - 4.00 mU/L Final   07/03/2019 1.72 0.40 - 4.00 mU/L Final       Lab Results   Component Value Date    A1C 5.6 09/28/2018    A1C 6.0 07/01/2015       ASSESSMENT/PLAN:  (S99.922A) Injury of toe on left foot, initial encounter  Comment: noted today with exam  Plan:  -staff to monitor on shower days for improving or worsening condition  -betadine and protection with band-aid until healed  -doxycycline 100 mg po BID x 5 days    (M05.741,  M05.742) Rheumatoid arthritis involving both hands with positive rheumatoid factor (H)  Comment: no current pain   Plan: continue leflunomide; follow up with Dr Maurer.     Acetaminophen scheduled bid and prn    (I77.74) Vertebral artery dissection (H)  (I63.50)  Cerebrovascular accident involving posterior circulation (H)  Comment: residual cognitive impairment now c/w vascular dementia  Plan: statin, anticoagulation and bp meds for secondary prevention.   Follow up in Neurology clinic as scheduled.B12, memantine, vitamin D    (I50.32) Chronic diastolic congestive heart failure (H)  (I48.0) Paroxysmal A-fib (H)  (I25.119) Coronary artery disease with angina pectoris, unspecified   vessel or lesion type, unspecified whether native or transplanted heart (H)  (I10) Hypertension goal BP (blood pressure) < 140/90   Comment: stable-Echo 9/2018 EF at 55-60% Hypotension with past BPs and bradycardic  Plan:   -statin, Eliquis and BB discontinue with lower HR and BP  -Lisinopril 10 mg po daily   -follow BMP    (F01.50) Vascular dementia without behavioral disturbance (H)  Comment: with STML  Plan:  -namenda 10 mg po BID  -PCA and staff assist     (N18.31) Stage 3a chronic kidney disease  Comment: chronic   Plan:  -renal dose and avoid nephrotoxins                 Electronically signed by:  SHIRA Parra CNP               Sincerely,        SHIRA Parra CNP

## 2021-02-05 ENCOUNTER — HOSPITAL LABORATORY (OUTPATIENT)
Dept: OTHER | Facility: CLINIC | Age: 86
End: 2021-02-05

## 2021-02-06 LAB
SARS-COV-2 RNA RESP QL NAA+PROBE: NOT DETECTED
SPECIMEN SOURCE: NORMAL

## 2021-02-22 ENCOUNTER — TRANSFERRED RECORDS (OUTPATIENT)
Dept: HEALTH INFORMATION MANAGEMENT | Facility: CLINIC | Age: 86
End: 2021-02-22

## 2021-02-23 NOTE — PROGRESS NOTES
"[facility]  [facility] Medical Record Number:  [id number]  Place of Service where encounter took place:  [facility] [[id number]]  Chief Complaint   Patient presents with     RECHECK       HPI:    Jose E Capps  is a 89 year old (9/28/1931), who is being seen today for an episodic care visit.  HPI information obtained from: facility chart records, facility staff, [facility] chart review and family/first contact daughter report. Today's concern is:    Seen today for follow up to injury/infection of toe on left foot first noted on 2/3/21. Wound care orders and course of doxycycline which ended 2/11/21. Following up appears wound care orders were not started and received a call from his daughter that with podiatry visit last week she was told the infection was still present. Did x-ray on 2/22 21 and no s/s of bone involvement. Impression was unremarkable  x-ray examination of the left foot. Findings included iram second through fifth metatarsophalangeal joints. Normal interphalangeal joints of lesser toes. Normal phalanges of the lesser toes. Today the toe appears much improved with no redness, swelling or warmth. Healing blister at tip of digit, skin intact. He is protecting toes now by wearing socks so will encourage this. Denies pain to area.    Has cerumen impaction both ears. Viewed today with otoscope and manually removed some with lighted curette but does not tolerate well so could benefit from removal with microsuction at ENT. Is reported \"my plugged ear is better\".     Past Medical and Surgical History reviewed in Epic today.    MEDICATIONS:    Current Outpatient Medications   Medication Sig Dispense Refill     ACETAMINOPHEN EXTRA STRENGTH 500 MG tablet TAKE TWO TABLETS (1000MG) BY MOUTH TWICE DAILY;MAY ALSO TAKE ONE TABLET EVERY 8 HOURS AS NEEDED FOR MILD PAIN *NOT TO EXCEED 4000MG APAP/24 HRS* 112 tablet 97     ASPIRIN NOT PRESCRIBED (INTENTIONAL) Please " "choose reason not prescribed, below 0 each 0     atorvastatin (LIPITOR) 10 MG tablet TAKE 1 TABLET BY MOUTH ONCE DAILY 28 tablet PRN     cyanocobalamin (VITAMIN B-12) 1000 MCG SUBL sublingual tablet DISSOLVE ONE-HALF LOZENGE (500MCG) BY MOUTH TWICE DAILY 28 tablet PRN     D-5000 125 MCG (5000 UT) TABS TAKE 1 TABLET BY MOUTH ONCE DAILY 28 tablet PRN     ELIQUIS ANTICOAGULANT 5 MG tablet TAKE 1 TABLET BY MOUTH TWICE DAILY 56 tablet PRN     leflunomide (ARAVA) 10 MG tablet TAKE 1 TABLET BY MOUTH ONCE DAILY 31 tablet 97     lisinopril (ZESTRIL) 10 MG tablet TAKE ONE TABLET (10MG) BY MOUTH ONCE DAILY 28 tablet 97     loperamide (IMODIUM) 2 MG capsule TAKE 1 CAPSULE BY MOUTH FOUR TIMES DAILY AS NEEDED FOR DIARRHEA 30 capsule 11     memantine (NAMENDA) 10 MG tablet TAKE 1 TABLET BY MOUTH TWICE DAILY 56 tablet PRN     Multiple Vitamins-Minerals (CEROVITE SENIOR) TABS TAKE 1 TABLET BY MOUTH ONCE DAILY 28 tablet PRN     polyethylene glycol (MIRALAX/GLYCOLAX) powder MIX 17GM OF POWDER IN 8OZ OF WATER UNTIL COMPLETELY DISSOLVED. DRINK SOLUTION BY MOUTH ONCE DAILY AS NEEDED FOR CONSTIPATION 527 g 5     REVIEW OF SYSTEMS:  Limited secondary to cognitive impairment but today pt reports fine    Objective:  /61   Pulse (!) 45   Temp 97.7  F (36.5  C)   Resp 16   Ht 1.626 m (5' 4\")   Wt 80.7 kg (178 lb)   SpO2 91%   BMI 30.55 kg/m    Exam:  GENERAL APPEARANCE:  alert and pleasant, forgetful. [place]  ENT:  Mouth and posterior oropharynx normal, dry mucous membranes  EYES:  EOM, conjunctivae, lids, pupils and irises normal-glasses  RESP:  respiratory effort appears at baseline, LS clear   CV:  Palpation and auscultation of heart done , no edema, regular rate, irregular rhythm, no LE edema  ABDOMEN: soft nontender   M/S: mostly use of WC or 2WW. Edema +1 to right hand and digits chronic  SKIN:  no signs of open areas to viewed arms, legs, dry flaky to scalp  NEURO:   Cranial nerves 2-12 are normal tested and grossly at " patient's baseline  PSYCH:  insight and judgement impaired, memory impaired    Labs:   CBC RESULTS:   Recent Labs   Lab Test 12/02/20  1101 08/28/20  0925   WBC 7.9 6.7   RBC 4.66 4.58   HGB 14.8 14.2   HCT 45.2 44.0   MCV 97 96   MCH 31.8 31.0   MCHC 32.7 32.3   RDW 13.8 14.0    258       Last Basic Metabolic Panel:  Recent Labs   Lab Test 12/02/20  1101 08/28/20  0925 05/28/20  1300 07/03/19  0845 07/03/19  0845   NA  --   --  139  --  142   POTASSIUM  --   --  4.5  --  4.5   CHLORIDE  --   --  106  --  110*   GISELA  --   --  8.7  --  8.8   CO2  --   --  29  --  28   BUN  --   --  28  --  22   CR 1.20 1.41* 1.22   < > 1.07   GLC  --   --  150*  --  103*    < > = values in this interval not displayed.       Liver Function Studies -   Recent Labs   Lab Test 12/02/20  1101 08/28/20  0925 05/28/20  1300 07/03/19  0845 07/03/19  0845   PROTTOTAL  --   --  6.8  --  7.0   ALBUMIN 3.6 3.5 3.4   < > 3.6   BILITOTAL  --   --  0.5  --  0.5   ALKPHOS  --   --  66  --  73   AST 17 19 23   < > 15   ALT 27 41 29   < > 26    < > = values in this interval not displayed.       TSH   Date Value Ref Range Status   05/28/2020 1.63 0.40 - 4.00 mU/L Final   07/03/2019 1.72 0.40 - 4.00 mU/L Final       Lab Results   Component Value Date    A1C 5.6 09/28/2018    A1C 6.0 07/01/2015       ASSESSMENT/PLAN:  (S99.922A) Injury of toe on left foot, initial encounter  (primary encounter diagnosis)  Comment: ongoing   Plan:   -x-ray negative for acute findings  -Bactrim DS started 2/19 BID x 5 days  -wound care orders started x 14 days-can discontinue with much improvement     (H61.23) Bilateral impacted cerumen  Comment: ongoing   Plan:  -cerumen removal with lighted curette to both ear canals         Electronically signed by:  SHIRA Parra CNP

## 2021-02-24 ENCOUNTER — ASSISTED LIVING VISIT (OUTPATIENT)
Dept: GERIATRICS | Facility: CLINIC | Age: 86
End: 2021-02-24
Payer: COMMERCIAL

## 2021-02-24 VITALS
TEMPERATURE: 97.7 F | SYSTOLIC BLOOD PRESSURE: 115 MMHG | BODY MASS INDEX: 30.39 KG/M2 | OXYGEN SATURATION: 91 % | DIASTOLIC BLOOD PRESSURE: 61 MMHG | HEART RATE: 45 BPM | HEIGHT: 64 IN | WEIGHT: 178 LBS | RESPIRATION RATE: 16 BRPM

## 2021-02-24 DIAGNOSIS — H61.23 BILATERAL IMPACTED CERUMEN: ICD-10-CM

## 2021-02-24 DIAGNOSIS — S99.922A INJURY OF TOE ON LEFT FOOT, INITIAL ENCOUNTER: Primary | ICD-10-CM

## 2021-02-24 ASSESSMENT — MIFFLIN-ST. JEOR: SCORE: 1383.4

## 2021-02-24 NOTE — LETTER
"    2/24/2021        RE: Jose E [facility]eng [facility]  [address]  [address]  [place] [address]        [facility]  [place] Medical Record Number:  [id number]  Place of Service where encounter took place:  [facility] [[id number]]  Chief Complaint   Patient presents with     RECHECK       HPI:    Jose E Capps  is a 89 year old (9/28/1931), who is being seen today for an episodic care visit.  HPI information obtained from: facility chart records, facility staff, [facility] chart review and family/first contact daughter report. Today's concern is:    Seen today for follow up to injury/infection of toe on left foot first noted on 2/3/21. Wound care orders and course of doxycycline which ended 2/11/21. Following up appears wound care orders were not started and received a call from his daughter that with podiatry visit last week she was told the infection was still present. Did x-ray on 2/22 21 and no s/s of bone involvement. Impression was unremarkable  x-ray examination of the left foot. Findings included iram second through fifth metatarsophalangeal joints. Normal interphalangeal joints of lesser toes. Normal phalanges of the lesser toes. Today the toe appears much improved with no redness, swelling or warmth. Healing blister at tip of digit, skin intact. He is protecting toes now by wearing socks so will encourage this. Denies pain to area.    Has cerumen impaction both ears. Viewed today with otoscope and manually removed some with lighted curette but does not tolerate well so could benefit from removal with microsuction at ENT. Is reported \"my plugged ear is better\".     Past Medical and Surgical History reviewed in Epic today.    MEDICATIONS:    Current Outpatient Medications   Medication Sig Dispense Refill     ACETAMINOPHEN EXTRA STRENGTH 500 MG tablet TAKE TWO TABLETS (1000MG) BY MOUTH TWICE DAILY;MAY ALSO TAKE ONE TABLET EVERY 8 HOURS AS " "NEEDED FOR MILD PAIN *NOT TO EXCEED 4000MG APAP/24 HRS* 112 tablet 97     ASPIRIN NOT PRESCRIBED (INTENTIONAL) Please choose reason not prescribed, below 0 each 0     atorvastatin (LIPITOR) 10 MG tablet TAKE 1 TABLET BY MOUTH ONCE DAILY 28 tablet PRN     cyanocobalamin (VITAMIN B-12) 1000 MCG SUBL sublingual tablet DISSOLVE ONE-HALF LOZENGE (500MCG) BY MOUTH TWICE DAILY 28 tablet PRN     D-5000 125 MCG (5000 UT) TABS TAKE 1 TABLET BY MOUTH ONCE DAILY 28 tablet PRN     ELIQUIS ANTICOAGULANT 5 MG tablet TAKE 1 TABLET BY MOUTH TWICE DAILY 56 tablet PRN     leflunomide (ARAVA) 10 MG tablet TAKE 1 TABLET BY MOUTH ONCE DAILY 31 tablet 97     lisinopril (ZESTRIL) 10 MG tablet TAKE ONE TABLET (10MG) BY MOUTH ONCE DAILY 28 tablet 97     loperamide (IMODIUM) 2 MG capsule TAKE 1 CAPSULE BY MOUTH FOUR TIMES DAILY AS NEEDED FOR DIARRHEA 30 capsule 11     memantine (NAMENDA) 10 MG tablet TAKE 1 TABLET BY MOUTH TWICE DAILY 56 tablet PRN     Multiple Vitamins-Minerals (CEROVITE SENIOR) TABS TAKE 1 TABLET BY MOUTH ONCE DAILY 28 tablet PRN     polyethylene glycol (MIRALAX/GLYCOLAX) powder MIX 17GM OF POWDER IN 8OZ OF WATER UNTIL COMPLETELY DISSOLVED. DRINK SOLUTION BY MOUTH ONCE DAILY AS NEEDED FOR CONSTIPATION 527 g 5     REVIEW OF SYSTEMS:  Limited secondary to cognitive impairment but today pt reports fine    Objective:  /61   Pulse (!) 45   Temp 97.7  F (36.5  C)   Resp 16   Ht 1.626 m (5' 4\")   Wt 80.7 kg (178 lb)   SpO2 91%   BMI 30.55 kg/m    Exam:  GENERAL APPEARANCE:  alert and pleasant, forgetful. [place]  ENT:  Mouth and posterior oropharynx normal, dry mucous membranes  EYES:  EOM, conjunctivae, lids, pupils and irises normal-glasses  RESP:  respiratory effort appears at baseline, LS clear   CV:  Palpation and auscultation of heart done , no edema, regular rate, irregular rhythm, no LE edema  ABDOMEN: soft nontender   M/S: mostly use of WC or 2WW. Edema +1 to right hand and digits chronic  SKIN:  no signs of open " areas to viewed arms, legs, dry flaky to scalp  NEURO:   Cranial nerves 2-12 are normal tested and grossly at patient's baseline  PSYCH:  insight and judgement impaired, memory impaired    Labs:   CBC RESULTS:   Recent Labs   Lab Test 12/02/20  1101 08/28/20  0925   WBC 7.9 6.7   RBC 4.66 4.58   HGB 14.8 14.2   HCT 45.2 44.0   MCV 97 96   MCH 31.8 31.0   MCHC 32.7 32.3   RDW 13.8 14.0    258       Last Basic Metabolic Panel:  Recent Labs   Lab Test 12/02/20  1101 08/28/20  0925 05/28/20  1300 07/03/19  0845 07/03/19  0845   NA  --   --  139  --  142   POTASSIUM  --   --  4.5  --  4.5   CHLORIDE  --   --  106  --  110*   GISELA  --   --  8.7  --  8.8   CO2  --   --  29  --  28   BUN  --   --  28  --  22   CR 1.20 1.41* 1.22   < > 1.07   GLC  --   --  150*  --  103*    < > = values in this interval not displayed.       Liver Function Studies -   Recent Labs   Lab Test 12/02/20  1101 08/28/20  0925 05/28/20  1300 07/03/19  0845 07/03/19  0845   PROTTOTAL  --   --  6.8  --  7.0   ALBUMIN 3.6 3.5 3.4   < > 3.6   BILITOTAL  --   --  0.5  --  0.5   ALKPHOS  --   --  66  --  73   AST 17 19 23   < > 15   ALT 27 41 29   < > 26    < > = values in this interval not displayed.       TSH   Date Value Ref Range Status   05/28/2020 1.63 0.40 - 4.00 mU/L Final   07/03/2019 1.72 0.40 - 4.00 mU/L Final       Lab Results   Component Value Date    A1C 5.6 09/28/2018    A1C 6.0 07/01/2015       ASSESSMENT/PLAN:  (S99.922A) Injury of toe on left foot, initial encounter  (primary encounter diagnosis)  Comment: ongoing   Plan:   -x-ray negative for acute findings  -Bactrim DS started 2/19 BID x 5 days  -wound care orders started x 14 days-can discontinue with much improvement     (H61.23) Bilateral impacted cerumen  Comment: ongoing   Plan:  -cerumen removal with lighted curette to both ear canals         Electronically signed by:  SHIRA Parra CNP                 Sincerely,        SHIRA Parra CNP

## 2021-02-25 ENCOUNTER — HOSPITAL LABORATORY (OUTPATIENT)
Dept: OTHER | Facility: CLINIC | Age: 86
End: 2021-02-25

## 2021-02-25 LAB
SARS-COV-2 RNA RESP QL NAA+PROBE: NORMAL
SPECIMEN SOURCE: NORMAL

## 2021-02-26 LAB
LABORATORY COMMENT REPORT: NORMAL
SARS-COV-2 RNA RESP QL NAA+PROBE: NEGATIVE
SPECIMEN SOURCE: NORMAL

## 2021-03-03 ENCOUNTER — TRANSFERRED RECORDS (OUTPATIENT)
Dept: HEALTH INFORMATION MANAGEMENT | Facility: CLINIC | Age: 86
End: 2021-03-03

## 2021-03-03 ENCOUNTER — HOSPITAL LABORATORY (OUTPATIENT)
Dept: OTHER | Facility: CLINIC | Age: 86
End: 2021-03-03

## 2021-03-03 LAB
ALBUMIN SERPL-MCNC: 3.9 G/DL (ref 3.4–5)
ALT SERPL W P-5'-P-CCNC: 25 U/L (ref 0–70)
AST SERPL W P-5'-P-CCNC: 15 U/L (ref 0–45)
BASOPHILS # BLD AUTO: 0.1 10E9/L (ref 0–0.2)
BASOPHILS NFR BLD AUTO: 1.3 %
CREAT SERPL-MCNC: 1.26 MG/DL (ref 0.66–1.25)
DIFFERENTIAL METHOD BLD: NORMAL
EOSINOPHIL # BLD AUTO: 0.2 10E9/L (ref 0–0.7)
EOSINOPHIL NFR BLD AUTO: 3.2 %
ERYTHROCYTE [DISTWIDTH] IN BLOOD BY AUTOMATED COUNT: 13.5 % (ref 10–15)
GFR SERPL CREATININE-BSD FRML MDRD: 50 ML/MIN/{1.73_M2}
HCT VFR BLD AUTO: 46.1 % (ref 40–53)
HGB BLD-MCNC: 14.6 G/DL (ref 13.3–17.7)
IMM GRANULOCYTES # BLD: 0 10E9/L (ref 0–0.4)
IMM GRANULOCYTES NFR BLD: 0.4 %
LYMPHOCYTES # BLD AUTO: 2.4 10E9/L (ref 0.8–5.3)
LYMPHOCYTES NFR BLD AUTO: 33.5 %
MCH RBC QN AUTO: 30.9 PG (ref 26.5–33)
MCHC RBC AUTO-ENTMCNC: 31.7 G/DL (ref 31.5–36.5)
MCV RBC AUTO: 98 FL (ref 78–100)
MONOCYTES # BLD AUTO: 0.7 10E9/L (ref 0–1.3)
MONOCYTES NFR BLD AUTO: 9.2 %
NEUTROPHILS # BLD AUTO: 3.7 10E9/L (ref 1.6–8.3)
NEUTROPHILS NFR BLD AUTO: 52.4 %
NRBC # BLD AUTO: 0 10*3/UL
NRBC BLD AUTO-RTO: 0 /100
PLATELET # BLD AUTO: 279 10E9/L (ref 150–450)
RBC # BLD AUTO: 4.72 10E12/L (ref 4.4–5.9)
WBC # BLD AUTO: 7.1 10E9/L (ref 4–11)

## 2021-03-24 ENCOUNTER — HOSPITAL LABORATORY (OUTPATIENT)
Dept: OTHER | Facility: CLINIC | Age: 86
End: 2021-03-24

## 2021-03-24 LAB
SARS-COV-2 RNA RESP QL NAA+PROBE: NORMAL
SPECIMEN SOURCE: NORMAL

## 2021-04-13 VITALS — OXYGEN SATURATION: 97 % | WEIGHT: 178 LBS | HEIGHT: 64 IN | BODY MASS INDEX: 30.39 KG/M2 | TEMPERATURE: 96.5 F

## 2021-04-13 RX ORDER — POVIDONE-IODINE 10 MG/ML
SOLUTION TOPICAL DAILY PRN
COMMUNITY
End: 2022-10-07

## 2021-04-13 ASSESSMENT — MIFFLIN-ST. JEOR: SCORE: 1383.4

## 2021-04-13 NOTE — PROGRESS NOTES
[facility]  [place] Medical Record Number:  [id number]  Place of Service where encounter took place:  [facility] [[id number]]  Chief Complaint   Patient presents with     RECHECK     Routine       HPI:    Jose E Capps  is a 89 year old (9/28/1931), who is being seen today for an episodic care visit.  HPI information obtained from: facility chart records, patient report, [facility] chart review and family/first contact daughter and caregiver  report. Today's concern is:    Flare of toe injury/infection. Had similar presentation in February that resolved but has returned. Rubbing of great toe medial to seconded is probable contributor. Toe is swollen, red, no warmth, some discomfort with exam. Pressure injury at tip.    Daughter also states flare of arthritis with weather changes affecting his comfort with ambulating.     Past Medical and Surgical History reviewed in Epic today.    MEDICATIONS:    Current Outpatient Medications   Medication Sig Dispense Refill     acetaminophen (ACETAMINOPHEN EXTRA STRENGTH) 500 MG tablet Take 2 tablets (1,000 mg) by mouth 3 times daily 112 tablet 97     ASPIRIN NOT PRESCRIBED (INTENTIONAL) Please choose reason not prescribed, below 0 each 0     atorvastatin (LIPITOR) 10 MG tablet TAKE 1 TABLET BY MOUTH ONCE DAILY 28 tablet PRN     cyanocobalamin (VITAMIN B-12) 1000 MCG SUBL sublingual tablet DISSOLVE ONE-HALF LOZENGE (500MCG) BY MOUTH TWICE DAILY 28 tablet PRN     D-5000 125 MCG (5000 UT) TABS TAKE 1 TABLET BY MOUTH ONCE DAILY 28 tablet PRN     diclofenac (VOLTAREN) 1 % topical gel Apply 4 g topically 2 times daily And twice daily to knees 100 g 11     ELIQUIS ANTICOAGULANT 5 MG tablet TAKE 1 TABLET BY MOUTH TWICE DAILY 56 tablet PRN     leflunomide (ARAVA) 10 MG tablet TAKE 1 TABLET BY MOUTH ONCE DAILY 31 tablet 97     lisinopril (ZESTRIL) 10 MG tablet TAKE ONE TABLET (10MG) BY MOUTH ONCE DAILY 28 tablet 97     loperamide  "(IMODIUM) 2 MG capsule TAKE 1 CAPSULE BY MOUTH FOUR TIMES DAILY AS NEEDED FOR DIARRHEA 30 capsule 11     memantine (NAMENDA) 10 MG tablet TAKE 1 TABLET BY MOUTH TWICE DAILY 56 tablet PRN     Multiple Vitamins-Minerals (CEROVITE SENIOR) TABS TAKE 1 TABLET BY MOUTH ONCE DAILY 28 tablet PRN     polyethylene glycol (MIRALAX/GLYCOLAX) powder MIX 17GM OF POWDER IN 8OZ OF WATER UNTIL COMPLETELY DISSOLVED. DRINK SOLUTION BY MOUTH ONCE DAILY AS NEEDED FOR CONSTIPATION 527 g 5     povidone-iodine 10 % swab Apply topically three times a week And as needed       sulfamethoxazole-trimethoprim (BACTRIM DS) 800-160 MG tablet Take 1 tablet by mouth 2 times daily for 5 days 10 tablet 0     REVIEW OF SYSTEMS:  Limited secondary to cognitive impairment but today pt reports ok    Objective:  Temp 96.5  F (35.8  C)   Ht 1.626 m (5' 4\")   Wt 80.7 kg (178 lb)   SpO2 97%   BMI 30.55 kg/m    Exam:  GENERAL APPEARANCE:  alert and pleasant, forgetful. [place]  ENT:  Mouth and posterior oropharynx normal, dry mucous membranes  EYES:  EOM, conjunctivae, lids, pupils and irises normal-glasses  RESP:  respiratory effort appears at baseline, LS clear   CV:  Palpation and auscultation of heart done , edema to digits of left foot, regular rate, irregular rhythm, no LE edema  ABDOMEN: soft nontender   M/S: mostly use of WC or 2WW. Edema +1 to right hand and digits chronic  SKIN:  no signs of open areas to viewed arms, legs, dry flaky to scalp, seconded toe of left foot, swollen, red, no warmth, pressure injury at tip, dry flaky    NEURO:   Cranial nerves 2-12 are normal tested and grossly at patient's baseline  PSYCH:  insight and judgement impaired, memory impaired    Labs:   CBC RESULTS:   Recent Labs   Lab Test 03/03/21  0802 12/02/20  1101   WBC 7.1 7.9   RBC 4.72 4.66   HGB 14.6 14.8   HCT 46.1 45.2   MCV 98 97   MCH 30.9 31.8   MCHC 31.7 32.7   RDW 13.5 13.8    263       Last Basic Metabolic Panel:  Recent Labs   Lab Test 03/03/21  0802 " 12/02/20 1101 05/28/20 1300 05/28/20 1300 07/03/19  0845 07/03/19  0845   NA  --   --   --  139  --  142   POTASSIUM  --   --   --  4.5  --  4.5   CHLORIDE  --   --   --  106  --  110*   GISELA  --   --   --  8.7  --  8.8   CO2  --   --   --  29  --  28   BUN  --   --   --  28  --  22   CR 1.26* 1.20   < > 1.22   < > 1.07   GLC  --   --   --  150*  --  103*    < > = values in this interval not displayed.       Liver Function Studies -   Recent Labs   Lab Test 03/03/21  0802 12/02/20 1101 05/28/20 1300 05/28/20 1300 07/03/19  0845 07/03/19  0845   PROTTOTAL  --   --   --  6.8  --  7.0   ALBUMIN 3.9 3.6   < > 3.4   < > 3.6   BILITOTAL  --   --   --  0.5  --  0.5   ALKPHOS  --   --   --  66  --  73   AST 15 17   < > 23   < > 15   ALT 25 27   < > 29   < > 26    < > = values in this interval not displayed.       TSH   Date Value Ref Range Status   05/28/2020 1.63 0.40 - 4.00 mU/L Final   07/03/2019 1.72 0.40 - 4.00 mU/L Final       Lab Results   Component Value Date    A1C 5.6 09/28/2018    A1C 6.0 07/01/2015     ASSESSMENT/PLAN:  (L08.9) Local infection of skin and subcutaneous tissue  (S99.922A) Injury of toe on left foot, initial encounter  (primary encounter diagnosis)  Comment: ongoing   Plan:   -Bactrim DS started 2/19 BID x 5 days  -wound care orders started     (R52) Generalized Pain  Comment: flare with weather change  Plan:  -increase to Tylenol from 1,000 mg po BID to 1,000 mg po TID  -Voltaren gel BID to bilateral knees               Electronically signed by:  SHIRA Parra CNP

## 2021-04-14 ENCOUNTER — ASSISTED LIVING VISIT (OUTPATIENT)
Dept: GERIATRICS | Facility: CLINIC | Age: 86
End: 2021-04-14
Payer: COMMERCIAL

## 2021-04-14 ENCOUNTER — TELEPHONE (OUTPATIENT)
Dept: GERIATRICS | Facility: CLINIC | Age: 86
End: 2021-04-14

## 2021-04-14 DIAGNOSIS — S99.922A INJURY OF TOE ON LEFT FOOT, INITIAL ENCOUNTER: ICD-10-CM

## 2021-04-14 DIAGNOSIS — R52 GENERALIZED PAIN: ICD-10-CM

## 2021-04-14 DIAGNOSIS — L08.9 LOCAL INFECTION OF SKIN AND SUBCUTANEOUS TISSUE: Primary | ICD-10-CM

## 2021-04-14 RX ORDER — SULFAMETHOXAZOLE/TRIMETHOPRIM 800-160 MG
1 TABLET ORAL 2 TIMES DAILY
Qty: 10 TABLET | Refills: 0 | Status: SHIPPED | OUTPATIENT
Start: 2021-04-14 | End: 2021-04-19

## 2021-04-14 RX ORDER — ACETAMINOPHEN 500 MG
1000 TABLET ORAL 3 TIMES DAILY
Qty: 112 TABLET | Refills: 97 | Status: SHIPPED | OUTPATIENT
Start: 2021-04-14 | End: 2022-04-21

## 2021-04-14 NOTE — TELEPHONE ENCOUNTER
This is a request for a PRIOR AUTHORIZATION    * Please SUBMIT PRIOR AUTHORIZATION (if appropriate) and UPDATE Counts include 234 beds at the [facility] once approved / denied. *    * Patient's Drug Insurance will not pay for this medication without a Prior Authorization in place. *    * Thank you! *    Medication:    DICLOFENAC 1% GEL    Reason for Request:   PRIOR AUTHORIZATION REQUIRED    Quantity:    #100 ; 1 TUBE  Directions:    4 GRAMS TOP TO AA BID    Plan Name:    [id number] [place]  Insurance Phone #:   [phone]    BIN:     [id number]  PCN:     [id number]  ID:     [id number]  GROUP:    [id number]    Additional Notes:   NEEDS PRIOR AUTH

## 2021-04-14 NOTE — LETTER
4/14/2021        RE: Jose E Boateng On [facility]  [address]  [address]  [place] [address]        [facility]  [place] Medical Record Number:  [id number]  Place of Service where encounter took place:  [facility] [[id number]]  Chief Complaint   Patient presents with     RECHECK     Routine       HPI:    Jose E Capps  is a 89 year old (9/28/1931), who is being seen today for an episodic care visit.  HPI information obtained from: facility chart records, patient report, [facility] chart review and family/first contact daughter and caregiver  report. Today's concern is:    Flare of toe injury/infection. Had similar presentation in February that resolved but has returned. Rubbing of great toe medial to seconded is probable contributor. Toe is swollen, red, no warmth, some discomfort with exam. Pressure injury at tip.    Daughter also states flare of arthritis with weather changes affecting his comfort with ambulating.     Past Medical and Surgical History reviewed in Epic today.    MEDICATIONS:    Current Outpatient Medications   Medication Sig Dispense Refill     acetaminophen (ACETAMINOPHEN EXTRA STRENGTH) 500 MG tablet Take 2 tablets (1,000 mg) by mouth 3 times daily 112 tablet 97     ASPIRIN NOT PRESCRIBED (INTENTIONAL) Please choose reason not prescribed, below 0 each 0     atorvastatin (LIPITOR) 10 MG tablet TAKE 1 TABLET BY MOUTH ONCE DAILY 28 tablet PRN     cyanocobalamin (VITAMIN B-12) 1000 MCG SUBL sublingual tablet DISSOLVE ONE-HALF LOZENGE (500MCG) BY MOUTH TWICE DAILY 28 tablet PRN     D-5000 125 MCG (5000 UT) TABS TAKE 1 TABLET BY MOUTH ONCE DAILY 28 tablet PRN     diclofenac (VOLTAREN) 1 % topical gel Apply 4 g topically 2 times daily And twice daily to knees 100 g 11     ELIQUIS ANTICOAGULANT 5 MG tablet TAKE 1 TABLET BY MOUTH TWICE DAILY 56 tablet PRN     leflunomide (ARAVA) 10 MG tablet TAKE 1 TABLET BY MOUTH ONCE DAILY 31 tablet 97  "    lisinopril (ZESTRIL) 10 MG tablet TAKE ONE TABLET (10MG) BY MOUTH ONCE DAILY 28 tablet 97     loperamide (IMODIUM) 2 MG capsule TAKE 1 CAPSULE BY MOUTH FOUR TIMES DAILY AS NEEDED FOR DIARRHEA 30 capsule 11     memantine (NAMENDA) 10 MG tablet TAKE 1 TABLET BY MOUTH TWICE DAILY 56 tablet PRN     Multiple Vitamins-Minerals (CEROVITE SENIOR) TABS TAKE 1 TABLET BY MOUTH ONCE DAILY 28 tablet PRN     polyethylene glycol (MIRALAX/GLYCOLAX) powder MIX 17GM OF POWDER IN 8OZ OF WATER UNTIL COMPLETELY DISSOLVED. DRINK SOLUTION BY MOUTH ONCE DAILY AS NEEDED FOR CONSTIPATION 527 g 5     povidone-iodine 10 % swab Apply topically three times a week And as needed       sulfamethoxazole-trimethoprim (BACTRIM DS) 800-160 MG tablet Take 1 tablet by mouth 2 times daily for 5 days 10 tablet 0     REVIEW OF SYSTEMS:  Limited secondary to cognitive impairment but today pt reports ok    Objective:  Temp 96.5  F (35.8  C)   Ht 1.626 m (5' 4\")   Wt 80.7 kg (178 lb)   SpO2 97%   BMI 30.55 kg/m    Exam:  GENERAL APPEARANCE:  alert and pleasant, forgetful. [place]  ENT:  Mouth and posterior oropharynx normal, dry mucous membranes  EYES:  EOM, conjunctivae, lids, pupils and irises normal-glasses  RESP:  respiratory effort appears at baseline, LS clear   CV:  Palpation and auscultation of heart done , edema to digits of left foot, regular rate, irregular rhythm, no LE edema  ABDOMEN: soft nontender   M/S: mostly use of WC or 2WW. Edema +1 to right hand and digits chronic  SKIN:  no signs of open areas to viewed arms, legs, dry flaky to scalp, seconded toe of left foot, swollen, red, no warmth, pressure injury at tip, dry flaky    NEURO:   Cranial nerves 2-12 are normal tested and grossly at patient's baseline  PSYCH:  insight and judgement impaired, memory impaired    Labs:   CBC RESULTS:   Recent Labs   Lab Test 03/03/21  0802 12/02/20  1101   WBC 7.1 7.9   RBC 4.72 4.66   HGB 14.6 14.8   HCT 46.1 45.2   MCV 98 97   MCH 30.9 31.8   MCHC 31.7 " 32.7   RDW 13.5 13.8    263       Last Basic Metabolic Panel:  Recent Labs   Lab Test 03/03/21  0802 12/02/20  1101 05/28/20  1300 05/28/20  1300 07/03/19  0845 07/03/19  0845   NA  --   --   --  139  --  142   POTASSIUM  --   --   --  4.5  --  4.5   CHLORIDE  --   --   --  106  --  110*   GISELA  --   --   --  8.7  --  8.8   CO2  --   --   --  29  --  28   BUN  --   --   --  28  --  22   CR 1.26* 1.20   < > 1.22   < > 1.07   GLC  --   --   --  150*  --  103*    < > = values in this interval not displayed.       Liver Function Studies -   Recent Labs   Lab Test 03/03/21  0802 12/02/20  1101 05/28/20 1300 05/28/20  1300 07/03/19  0845 07/03/19  0845   PROTTOTAL  --   --   --  6.8  --  7.0   ALBUMIN 3.9 3.6   < > 3.4   < > 3.6   BILITOTAL  --   --   --  0.5  --  0.5   ALKPHOS  --   --   --  66  --  73   AST 15 17   < > 23   < > 15   ALT 25 27   < > 29   < > 26    < > = values in this interval not displayed.       TSH   Date Value Ref Range Status   05/28/2020 1.63 0.40 - 4.00 mU/L Final   07/03/2019 1.72 0.40 - 4.00 mU/L Final       Lab Results   Component Value Date    A1C 5.6 09/28/2018    A1C 6.0 07/01/2015     ASSESSMENT/PLAN:  (L08.9) Local infection of skin and subcutaneous tissue  (S99.922A) Injury of toe on left foot, initial encounter  (primary encounter diagnosis)  Comment: ongoing   Plan:   -Bactrim DS started 2/19 BID x 5 days  -wound care orders started     (R52) Generalized Pain  Comment: flare with weather change  Plan:  -increase to Tylenol from 1,000 mg po BID to 1,000 mg po TID  -Voltaren gel BID to bilateral knees               Electronically signed by:  SHIRA Parra CNP                 Sincerely,        SHIRA Parra CNP

## 2021-04-15 NOTE — TELEPHONE ENCOUNTER
PRIOR AUTHORIZATION DENIED    Medication: P/A NEEDED - DICLOFENAC 1% GEL- DENIED     Denial Date: 4/15/2021    Denial Rational: Patient must have a FDA approved indication for this medication.    Appeal Information: If provider would like to appeal please provide a letter of medical necessity.

## 2021-04-15 NOTE — TELEPHONE ENCOUNTER
Central Prior Authorization Team  Phone: [phone]    PA Initiation    Medication: P/A NEEDED - DICLOFENAC 1% GEL  Insurance Company: [employer] [place] - Phone [phone] Fax [phone]  Pharmacy Filling the Rx: [facility] - [place], [place] - [address] [place] [address]  Filling Pharmacy Phone: [phone]  Filling Pharmacy Fax:    Start Date: 4/15/2021

## 2021-04-18 ENCOUNTER — HEALTH MAINTENANCE LETTER (OUTPATIENT)
Age: 86
End: 2021-04-18

## 2021-04-22 ENCOUNTER — HOSPITAL LABORATORY (OUTPATIENT)
Dept: OTHER | Facility: CLINIC | Age: 86
End: 2021-04-22

## 2021-04-23 LAB
SARS-COV-2 RNA RESP QL NAA+PROBE: NOT DETECTED
SPECIMEN SOURCE: NORMAL

## 2021-05-02 NOTE — TELEPHONE ENCOUNTER
"Requested Prescriptions   Pending Prescriptions Disp Refills     Calcium Carb-Cholecalciferol (CALCIUM + D3) 600-200 MG-UNIT TABS [Pharmacy Med Name: CALCIUM 600MG/VIT D 200IU]  11    Last Written Prescription Date:  04/03/2012  Last Fill Quantity: n/a,  # refills: n/a   Last office visit: 9/11/2018 with prescribing provider:  Nehemias Granados   Future Office Visit:     Sig: TAKE 1 TABLET BY MOUTH ONCE DAILY    Vitamin Supplements (Adult) Protocol Passed    9/14/2018  1:09 PM       Passed - High dose Vitamin D not ordered       Passed - Recent (12 mo) or future (30 days) visit within the authorizing provider's specialty    Patient had office visit in the last 12 months or has a visit in the next 30 days with authorizing provider or within the authorizing provider's specialty.  See \"Patient Info\" tab in inbasket, or \"Choose Columns\" in Meds & Orders section of the refill encounter.              "
Routing refill request to provider for review/approval because:  Medication is reported/historical    Olivia HOLGUIN RN  [employer] Triage            
95

## 2021-05-05 ENCOUNTER — ASSISTED LIVING VISIT (OUTPATIENT)
Dept: GERIATRICS | Facility: CLINIC | Age: 86
End: 2021-05-05
Payer: COMMERCIAL

## 2021-05-05 VITALS
WEIGHT: 178 LBS | HEART RATE: 69 BPM | RESPIRATION RATE: 16 BRPM | SYSTOLIC BLOOD PRESSURE: 130 MMHG | HEIGHT: 64 IN | DIASTOLIC BLOOD PRESSURE: 62 MMHG | TEMPERATURE: 98 F | BODY MASS INDEX: 30.39 KG/M2 | OXYGEN SATURATION: 96 %

## 2021-05-05 DIAGNOSIS — S99.922D INJURY OF TOE ON LEFT FOOT, SUBSEQUENT ENCOUNTER: Primary | ICD-10-CM

## 2021-05-05 ASSESSMENT — MIFFLIN-ST. JEOR: SCORE: 1383.4

## 2021-05-05 NOTE — LETTER
5/5/2021        RE: Jose E Boateng On [facility]  [address]  [address]  [place] [address]        [facility]  [place] Medical Record Number:  [id number]  Place of Service where encounter took place:  [facility] [[id number]]  Chief Complaint   Patient presents with     RECHECK       HPI:    Jose E Capps  is a 89 year old (9/28/1931), who is being seen today for an episodic care visit.  HPI information obtained from: PCA. Today's concern is:    Seen today with his PCA in attendance. Follow up to intermittent toe irritation/infection. His daughter called last week reporting that the toe was red and swollen again. Wound care started in addition to PCA with foot soaks, lambs wool between affected and adjacent toe. Was on antibiotics in February and again last month.  Denies pain today, no redness, trace swelling, no breaks in skin. Imaging 2/2021 was negative for osteomyelitis.     Past Medical and Surgical History reviewed in Epic today.    MEDICATIONS:    Current Outpatient Medications   Medication Sig Dispense Refill     acetaminophen (ACETAMINOPHEN EXTRA STRENGTH) 500 MG tablet Take 2 tablets (1,000 mg) by mouth 3 times daily 112 tablet 97     ASPIRIN NOT PRESCRIBED (INTENTIONAL) Please choose reason not prescribed, below 0 each 0     atorvastatin (LIPITOR) 10 MG tablet TAKE 1 TABLET BY MOUTH ONCE DAILY 28 tablet PRN     cyanocobalamin (VITAMIN B-12) 1000 MCG SUBL sublingual tablet DISSOLVE ONE-HALF LOZENGE (500MCG) BY MOUTH TWICE DAILY 28 tablet PRN     D-5000 125 MCG (5000 UT) TABS TAKE 1 TABLET BY MOUTH ONCE DAILY 28 tablet PRN     diclofenac (VOLTAREN) 1 % topical gel Apply 4 g topically 2 times daily And twice daily to knees 100 g 11     ELIQUIS ANTICOAGULANT 5 MG tablet TAKE 1 TABLET BY MOUTH TWICE DAILY 56 tablet PRN     leflunomide (ARAVA) 10 MG tablet TAKE 1 TABLET BY MOUTH ONCE DAILY 31 tablet 97     lisinopril (ZESTRIL) 10 MG tablet TAKE  "ONE TABLET (10MG) BY MOUTH ONCE DAILY 28 tablet 97     loperamide (IMODIUM) 2 MG capsule TAKE 1 CAPSULE BY MOUTH FOUR TIMES DAILY AS NEEDED FOR DIARRHEA 30 capsule 11     memantine (NAMENDA) 10 MG tablet TAKE 1 TABLET BY MOUTH TWICE DAILY 56 tablet PRN     Multiple Vitamins-Minerals (CEROVITE SENIOR) TABS TAKE 1 TABLET BY MOUTH ONCE DAILY 28 tablet PRN     polyethylene glycol (MIRALAX/GLYCOLAX) powder MIX 17GM OF POWDER IN 8OZ OF WATER UNTIL COMPLETELY DISSOLVED. DRINK SOLUTION BY MOUTH ONCE DAILY AS NEEDED FOR CONSTIPATION 527 g 5     povidone-iodine 10 % swab Apply topically three times a week And as needed       REVIEW OF SYSTEMS:  Limited secondary to cognitive impairment but today pt reports ok    Objective:  /62   Pulse 69   Temp 98  F (36.7  C)   Resp 16   Ht 1.626 m (5' 4\")   Wt 80.7 kg (178 lb)   SpO2 96%   BMI 30.55 kg/m    Exam:  GENERAL APPEARANCE:  alert and pleasant, forgetful. [place]  ENT:  Mouth and posterior oropharynx normal, dry mucous membranes  EYES:  EOM, conjunctivae, lids, pupils and irises normal-glasses  M/S: mostly use of WC or 2WW. Edema +1 to right hand and digits chronic  SKIN: seconded toe of left foot swelling resolved, no redness, no warmth  NEURO:   Cranial nerves 2-12 are normal tested and grossly at patient's baseline  PSYCH:  insight and judgement impaired, memory impaired    Labs:   Recent labs in [facility] reviewed by me today.     ASSESSMENT/PLAN:  (M99.952U) Injury of toe on left foot, subsequent encounter  (primary encounter diagnosis)  Comment: resolved  Plan:   -discharge wound care orders  -PCA continue to soak feet on days with resident  -lambs wool between affected and adjacent digit     -need updated weight and will ask family if PCA able to obtain        Electronically signed by  SHIRA Parra CNP              Sincerely,        SHIRA Parra CNP      "

## 2021-05-05 NOTE — PROGRESS NOTES
[facility]  [place] Medical Record Number:  [id number]  Place of Service where encounter took place:  [facility] [[id number]]  Chief Complaint   Patient presents with     RECHECK       HPI:    Jose E Capps  is a 89 year old (9/28/1931), who is being seen today for an episodic care visit.  HPI information obtained from: PCA. Today's concern is:    Seen today with his PCA in attendance. Follow up to intermittent toe irritation/infection. His daughter called last week reporting that the toe was red and swollen again. Wound care started in addition to PCA with foot soaks, lambs wool between affected and adjacent toe. Was on antibiotics in February and again last month.  Denies pain today, no redness, trace swelling, no breaks in skin. Imaging 2/2021 was negative for osteomyelitis.     Past Medical and Surgical History reviewed in Epic today.    MEDICATIONS:    Current Outpatient Medications   Medication Sig Dispense Refill     acetaminophen (ACETAMINOPHEN EXTRA STRENGTH) 500 MG tablet Take 2 tablets (1,000 mg) by mouth 3 times daily 112 tablet 97     ASPIRIN NOT PRESCRIBED (INTENTIONAL) Please choose reason not prescribed, below 0 each 0     atorvastatin (LIPITOR) 10 MG tablet TAKE 1 TABLET BY MOUTH ONCE DAILY 28 tablet PRN     cyanocobalamin (VITAMIN B-12) 1000 MCG SUBL sublingual tablet DISSOLVE ONE-HALF LOZENGE (500MCG) BY MOUTH TWICE DAILY 28 tablet PRN     D-5000 125 MCG (5000 UT) TABS TAKE 1 TABLET BY MOUTH ONCE DAILY 28 tablet PRN     diclofenac (VOLTAREN) 1 % topical gel Apply 4 g topically 2 times daily And twice daily to knees 100 g 11     ELIQUIS ANTICOAGULANT 5 MG tablet TAKE 1 TABLET BY MOUTH TWICE DAILY 56 tablet PRN     leflunomide (ARAVA) 10 MG tablet TAKE 1 TABLET BY MOUTH ONCE DAILY 31 tablet 97     lisinopril (ZESTRIL) 10 MG tablet TAKE ONE TABLET (10MG) BY MOUTH ONCE DAILY 28 tablet 97     loperamide (IMODIUM) 2 MG capsule TAKE 1 CAPSULE BY MOUTH  "FOUR TIMES DAILY AS NEEDED FOR DIARRHEA 30 capsule 11     memantine (NAMENDA) 10 MG tablet TAKE 1 TABLET BY MOUTH TWICE DAILY 56 tablet PRN     Multiple Vitamins-Minerals (CEROVITE SENIOR) TABS TAKE 1 TABLET BY MOUTH ONCE DAILY 28 tablet PRN     polyethylene glycol (MIRALAX/GLYCOLAX) powder MIX 17GM OF POWDER IN 8OZ OF WATER UNTIL COMPLETELY DISSOLVED. DRINK SOLUTION BY MOUTH ONCE DAILY AS NEEDED FOR CONSTIPATION 527 g 5     povidone-iodine 10 % swab Apply topically three times a week And as needed       REVIEW OF SYSTEMS:  Limited secondary to cognitive impairment but today pt reports ok    Objective:  /62   Pulse 69   Temp 98  F (36.7  C)   Resp 16   Ht 1.626 m (5' 4\")   Wt 80.7 kg (178 lb)   SpO2 96%   BMI 30.55 kg/m    Exam:  GENERAL APPEARANCE:  alert and pleasant, forgetful. [place]  ENT:  Mouth and posterior oropharynx normal, dry mucous membranes  EYES:  EOM, conjunctivae, lids, pupils and irises normal-glasses  M/S: mostly use of WC or 2WW. Edema +1 to right hand and digits chronic  SKIN: seconded toe of left foot swelling resolved, no redness, no warmth  NEURO:   Cranial nerves 2-12 are normal tested and grossly at patient's baseline  PSYCH:  insight and judgement impaired, memory impaired    Labs:   Recent labs in [facility] reviewed by me today.     ASSESSMENT/PLAN:  (A87.345S) Injury of toe on left foot, subsequent encounter  (primary encounter diagnosis)  Comment: resolved  Plan:   -discharge wound care orders  -PCA continue to soak feet on days with resident  -lambs wool between affected and adjacent digit     -need updated weight and will ask family if PCA able to obtain        Electronically signed by  Jerry Ritter, APRN CNP        "

## 2021-05-14 DIAGNOSIS — I10 HYPERTENSION GOAL BP (BLOOD PRESSURE) < 140/90: ICD-10-CM

## 2021-05-17 RX ORDER — LISINOPRIL 10 MG/1
TABLET ORAL
Qty: 28 TABLET | Refills: 97 | Status: SHIPPED | OUTPATIENT
Start: 2021-05-17 | End: 2022-02-23

## 2021-05-26 ENCOUNTER — ASSISTED LIVING VISIT (OUTPATIENT)
Dept: GERIATRICS | Facility: CLINIC | Age: 86
End: 2021-05-26
Payer: COMMERCIAL

## 2021-05-26 VITALS
RESPIRATION RATE: 16 BRPM | HEART RATE: 62 BPM | HEIGHT: 64 IN | TEMPERATURE: 95.5 F | BODY MASS INDEX: 30.39 KG/M2 | WEIGHT: 178 LBS | SYSTOLIC BLOOD PRESSURE: 115 MMHG | OXYGEN SATURATION: 93 % | DIASTOLIC BLOOD PRESSURE: 60 MMHG

## 2021-05-26 DIAGNOSIS — S99.922D INJURY OF TOE ON LEFT FOOT, SUBSEQUENT ENCOUNTER: Primary | ICD-10-CM

## 2021-05-26 ASSESSMENT — MIFFLIN-ST. JEOR: SCORE: 1383.4

## 2021-05-26 NOTE — LETTER
5/26/2021        RE: Jose E Boateng On [facility]  [address]  [address]  [place] [address]        [facility]  [place] Medical Record Number:  [id number]  Place of Service where encounter took place:  [facility] [[id number]]  Chief Complaint   Patient presents with     RECHECK       HPI:    Jose E Capps  is a 89 year old (9/28/1931), who is being seen today for an episodic care visit.  HPI information obtained from: family/first contact daughter report. Today's concern is:    Asked to see resident while onsite for flare of toe. History of intermittent toe infection/irritation. The left third toe with intermittent swelling, redness, blistering leading to open skin. Was on antibiotics in February and again last month. X-ray was negative for osteomyelitis. Wound care from facility discontinued at last visit with healed presentation. Today appears with blister appearance. He says it doesn't hurt. No drainage noted, slight swelling, no warmth.     Past Medical and Surgical History reviewed in Epic today.    MEDICATIONS:    Current Outpatient Medications   Medication Sig Dispense Refill     acetaminophen (ACETAMINOPHEN EXTRA STRENGTH) 500 MG tablet Take 2 tablets (1,000 mg) by mouth 3 times daily 112 tablet 97     ASPIRIN NOT PRESCRIBED (INTENTIONAL) Please choose reason not prescribed, below 0 each 0     atorvastatin (LIPITOR) 10 MG tablet TAKE 1 TABLET BY MOUTH ONCE DAILY 28 tablet PRN     cyanocobalamin (VITAMIN B-12) 1000 MCG SUBL sublingual tablet DISSOLVE ONE-HALF LOZENGE (500MCG) BY MOUTH TWICE DAILY 28 tablet PRN     D-5000 125 MCG (5000 UT) TABS TAKE 1 TABLET BY MOUTH ONCE DAILY 28 tablet PRN     diclofenac (VOLTAREN) 1 % topical gel Apply 4 g topically 2 times daily And twice daily to knees 100 g 11     ELIQUIS ANTICOAGULANT 5 MG tablet TAKE 1 TABLET BY MOUTH TWICE DAILY 56 tablet PRN     leflunomide (ARAVA) 10 MG tablet TAKE 1 TABLET BY MOUTH  "ONCE DAILY 31 tablet 97     lisinopril (ZESTRIL) 10 MG tablet TAKE 1 TABLET BY MOUTH ONCE DAILY 28 tablet 97     loperamide (IMODIUM) 2 MG capsule TAKE 1 CAPSULE BY MOUTH FOUR TIMES DAILY AS NEEDED FOR DIARRHEA 30 capsule 11     memantine (NAMENDA) 10 MG tablet TAKE 1 TABLET BY MOUTH TWICE DAILY 56 tablet PRN     Multiple Vitamins-Minerals (CEROVITE SENIOR) TABS TAKE 1 TABLET BY MOUTH ONCE DAILY 28 tablet PRN     polyethylene glycol (MIRALAX/GLYCOLAX) powder MIX 17GM OF POWDER IN 8OZ OF WATER UNTIL COMPLETELY DISSOLVED. DRINK SOLUTION BY MOUTH ONCE DAILY AS NEEDED FOR CONSTIPATION 527 g 5     povidone-iodine 10 % swab Apply topically daily as needed       REVIEW OF SYSTEMS:  Limited secondary to cognitive impairment but today pt reports fine    Objective:  /60   Pulse 62   Temp 95.5  F (35.3  C)   Resp 16   Ht 1.626 m (5' 4\")   Wt 80.7 kg (178 lb)   SpO2 93%   BMI 30.55 kg/m    Exam:  GENERAL APPEARANCE:  alert and pleasant, forgetful. [place]  ENT:  Mouth and posterior oropharynx normal, dry mucous membranes  EYES:  EOM, conjunctivae, lids, pupils and irises normal-glasses  M/S: mostly use of WC or 2WW. Edema +1 to right hand and digits chronic  SKIN: third toe of left foot swelling some redness, no warmth, drainage  NEURO:   Cranial nerves 2-12 are normal tested and grossly at patient's baseline  PSYCH:  insight and judgement impaired, memory impaired    Labs:   CBC RESULTS:   Recent Labs   Lab Test 03/03/21  0802 12/02/20  1101   WBC 7.1 7.9   RBC 4.72 4.66   HGB 14.6 14.8   HCT 46.1 45.2   MCV 98 97   MCH 30.9 31.8   MCHC 31.7 32.7   RDW 13.5 13.8    263       Last Basic Metabolic Panel:  Recent Labs   Lab Test 03/03/21  0802 12/02/20  1101 05/28/20  1300 05/28/20  1300 07/03/19  0845 07/03/19  0845   NA  --   --   --  139  --  142   POTASSIUM  --   --   --  4.5  --  4.5   CHLORIDE  --   --   --  106  --  110*   GISELA  --   --   --  8.7  --  8.8   CO2  --   --   --  29  --  28   BUN  --   --   --  " 28  --  22   CR 1.26* 1.20   < > 1.22   < > 1.07   GLC  --   --   --  150*  --  103*    < > = values in this interval not displayed.       Liver Function Studies -   Recent Labs   Lab Test 03/03/21  0802 12/02/20  1101 05/28/20  1300 05/28/20  1300 07/03/19  0845 07/03/19  0845   PROTTOTAL  --   --   --  6.8  --  7.0   ALBUMIN 3.9 3.6   < > 3.4   < > 3.6   BILITOTAL  --   --   --  0.5  --  0.5   ALKPHOS  --   --   --  66  --  73   AST 15 17   < > 23   < > 15   ALT 25 27   < > 29   < > 26    < > = values in this interval not displayed.       TSH   Date Value Ref Range Status   05/28/2020 1.63 0.40 - 4.00 mU/L Final   07/03/2019 1.72 0.40 - 4.00 mU/L Final       Lab Results   Component Value Date    A1C 5.6 09/28/2018    A1C 6.0 07/01/2015     ASSESSMENT/PLAN:  (S99.922D) Injury of toe on left foot, subsequent encounter  (primary encounter diagnosis)  Comment: resolved then flare with blister, injury?   Plan:   -wound care orders: wash/pat dry/apply povidone swab daily x 14 days then lambs wool between affected and adjacent digit. Ok to cover if drainage noticed               Electronically signed by:  SHIRA Parra CNP                               Sincerely,        SHIRA Parra CNP

## 2021-05-26 NOTE — LETTER
5/26/2021        RE: Jose E Capps  [facility]  [address]  [address]  [place] [address]        [place] [facility]  [place] Medical Record Number:  [id number]  Place of Service where encounter took place:  No question data found.  No chief complaint on file.      HPI:    Jose E Capps  is a 89 year old (9/28/1931), who is being seen today for an episodic care visit.  HPI information obtained from: family/first contact daughter report. Today's concern is:    Asked to see resident while onsite for flare of toe. History of intermittent toe infection/irritation. The left third toe with intermittent swelling, redness, blistering leading to open skin. Was on antibiotics in February and again last month. X-ray was negative for osteomyelitis. Wound care from facility discontinued at last visit with healed presentation.     Past Medical and Surgical History reviewed in Epic today.    MEDICATIONS:    Current Outpatient Medications   Medication Sig Dispense Refill     acetaminophen (ACETAMINOPHEN EXTRA STRENGTH) 500 MG tablet Take 2 tablets (1,000 mg) by mouth 3 times daily 112 tablet 97     ASPIRIN NOT PRESCRIBED (INTENTIONAL) Please choose reason not prescribed, below 0 each 0     atorvastatin (LIPITOR) 10 MG tablet TAKE 1 TABLET BY MOUTH ONCE DAILY 28 tablet PRN     cyanocobalamin (VITAMIN B-12) 1000 MCG SUBL sublingual tablet DISSOLVE ONE-HALF LOZENGE (500MCG) BY MOUTH TWICE DAILY 28 tablet PRN     D-5000 125 MCG (5000 UT) TABS TAKE 1 TABLET BY MOUTH ONCE DAILY 28 tablet PRN     diclofenac (VOLTAREN) 1 % topical gel Apply 4 g topically 2 times daily And twice daily to knees 100 g 11     ELIQUIS ANTICOAGULANT 5 MG tablet TAKE 1 TABLET BY MOUTH TWICE DAILY 56 tablet PRN     leflunomide (ARAVA) 10 MG tablet TAKE 1 TABLET BY MOUTH ONCE DAILY 31 tablet 97     lisinopril (ZESTRIL) 10 MG tablet TAKE 1 TABLET BY MOUTH ONCE DAILY 28 tablet 97     loperamide (IMODIUM) 2 MG capsule TAKE 1 CAPSULE BY  MOUTH FOUR TIMES DAILY AS NEEDED FOR DIARRHEA 30 capsule 11     memantine (NAMENDA) 10 MG tablet TAKE 1 TABLET BY MOUTH TWICE DAILY 56 tablet PRN     Multiple Vitamins-Minerals (CEROVITE SENIOR) TABS TAKE 1 TABLET BY MOUTH ONCE DAILY 28 tablet PRN     polyethylene glycol (MIRALAX/GLYCOLAX) powder MIX 17GM OF POWDER IN 8OZ OF WATER UNTIL COMPLETELY DISSOLVED. DRINK SOLUTION BY MOUTH ONCE DAILY AS NEEDED FOR CONSTIPATION 527 g 5     povidone-iodine 10 % swab Apply topically daily as needed       REVIEW OF SYSTEMS:  Limited secondary to cognitive impairment but today pt reports fine    Objective:  There were no vitals taken for this visit.  Exam:  GENERAL APPEARANCE:  alert and pleasant, forgetful. [place]  ENT:  Mouth and posterior oropharynx normal, dry mucous membranes  EYES:  EOM, conjunctivae, lids, pupils and irises normal-glasses  M/S: mostly use of WC or 2WW. Edema +1 to right hand and digits chronic  SKIN: seconded toe of left foot swelling resolved, no redness, no warmth  NEURO:   Cranial nerves 2-12 are normal tested and grossly at patient's baseline  PSYCH:  insight and judgement impaired, memory impaired    Labs:   CBC RESULTS:   Recent Labs   Lab Test 03/03/21  0802 12/02/20  1101   WBC 7.1 7.9   RBC 4.72 4.66   HGB 14.6 14.8   HCT 46.1 45.2   MCV 98 97   MCH 30.9 31.8   MCHC 31.7 32.7   RDW 13.5 13.8    263       Last Basic Metabolic Panel:  Recent Labs   Lab Test 03/03/21  0802 12/02/20  1101 05/28/20  1300 05/28/20  1300 07/03/19  0845 07/03/19  0845   NA  --   --   --  139  --  142   POTASSIUM  --   --   --  4.5  --  4.5   CHLORIDE  --   --   --  106  --  110*   GISELA  --   --   --  8.7  --  8.8   CO2  --   --   --  29  --  28   BUN  --   --   --  28  --  22   CR 1.26* 1.20   < > 1.22   < > 1.07   GLC  --   --   --  150*  --  103*    < > = values in this interval not displayed.       Liver Function Studies -   Recent Labs   Lab Test 03/03/21  0802 12/02/20  1101 05/28/20  1300 05/28/20  1300  07/03/19  0845 07/03/19  0845   PROTTOTAL  --   --   --  6.8  --  7.0   ALBUMIN 3.9 3.6   < > 3.4   < > 3.6   BILITOTAL  --   --   --  0.5  --  0.5   ALKPHOS  --   --   --  66  --  73   AST 15 17   < > 23   < > 15   ALT 25 27   < > 29   < > 26    < > = values in this interval not displayed.       TSH   Date Value Ref Range Status   05/28/2020 1.63 0.40 - 4.00 mU/L Final   07/03/2019 1.72 0.40 - 4.00 mU/L Final       Lab Results   Component Value Date    A1C 5.6 09/28/2018    A1C 6.0 07/01/2015     ASSESSMENT/PLAN:  {FGS DX:[id number]}      Electronically signed by:  SHIRA Parra CNP   {Providers Please double check the med list (in the plan section >> meds & orders tab) and Discontinue any of the meds flagged by the TC to be discontinued}         Third toe on left foot: wash/pat dry/apply povidone swab 3 times weekly and prn. Cover with band-aid until healed.                      Sincerely,        SHIRA Parra CNP

## 2021-05-26 NOTE — PROGRESS NOTES
[place] [facility]  [place] Medical Record Number:  [id number]  Place of Service where encounter took place:  [facility] [[id number]]  Chief Complaint   Patient presents with     RECHECK       HPI:    Jose E Capps  is a 89 year old (9/28/1931), who is being seen today for an episodic care visit.  HPI information obtained from: family/first contact daughter report. Today's concern is:    Asked to see resident while onsite for flare of toe. History of intermittent toe infection/irritation. The left third toe with intermittent swelling, redness, blistering leading to open skin. Was on antibiotics in February and again last month. X-ray was negative for osteomyelitis. Wound care from facility discontinued at last visit with healed presentation. Today appears with blister appearance. He says it doesn't hurt. No drainage noted, slight swelling, no warmth.     Past Medical and Surgical History reviewed in Epic today.    MEDICATIONS:    Current Outpatient Medications   Medication Sig Dispense Refill     acetaminophen (ACETAMINOPHEN EXTRA STRENGTH) 500 MG tablet Take 2 tablets (1,000 mg) by mouth 3 times daily 112 tablet 97     ASPIRIN NOT PRESCRIBED (INTENTIONAL) Please choose reason not prescribed, below 0 each 0     atorvastatin (LIPITOR) 10 MG tablet TAKE 1 TABLET BY MOUTH ONCE DAILY 28 tablet PRN     cyanocobalamin (VITAMIN B-12) 1000 MCG SUBL sublingual tablet DISSOLVE ONE-HALF LOZENGE (500MCG) BY MOUTH TWICE DAILY 28 tablet PRN     D-5000 125 MCG (5000 UT) TABS TAKE 1 TABLET BY MOUTH ONCE DAILY 28 tablet PRN     diclofenac (VOLTAREN) 1 % topical gel Apply 4 g topically 2 times daily And twice daily to knees 100 g 11     ELIQUIS ANTICOAGULANT 5 MG tablet TAKE 1 TABLET BY MOUTH TWICE DAILY 56 tablet PRN     leflunomide (ARAVA) 10 MG tablet TAKE 1 TABLET BY MOUTH ONCE DAILY 31 tablet 97     lisinopril (ZESTRIL) 10 MG tablet TAKE 1 TABLET BY MOUTH ONCE DAILY 28 tablet 97      "loperamide (IMODIUM) 2 MG capsule TAKE 1 CAPSULE BY MOUTH FOUR TIMES DAILY AS NEEDED FOR DIARRHEA 30 capsule 11     memantine (NAMENDA) 10 MG tablet TAKE 1 TABLET BY MOUTH TWICE DAILY 56 tablet PRN     Multiple Vitamins-Minerals (CEROVITE SENIOR) TABS TAKE 1 TABLET BY MOUTH ONCE DAILY 28 tablet PRN     polyethylene glycol (MIRALAX/GLYCOLAX) powder MIX 17GM OF POWDER IN 8OZ OF WATER UNTIL COMPLETELY DISSOLVED. DRINK SOLUTION BY MOUTH ONCE DAILY AS NEEDED FOR CONSTIPATION 527 g 5     povidone-iodine 10 % swab Apply topically daily as needed       REVIEW OF SYSTEMS:  Limited secondary to cognitive impairment but today pt reports fine    Objective:  /60   Pulse 62   Temp 95.5  F (35.3  C)   Resp 16   Ht 1.626 m (5' 4\")   Wt 80.7 kg (178 lb)   SpO2 93%   BMI 30.55 kg/m    Exam:  GENERAL APPEARANCE:  alert and pleasant, forgetful. [place]  ENT:  Mouth and posterior oropharynx normal, dry mucous membranes  EYES:  EOM, conjunctivae, lids, pupils and irises normal-glasses  M/S: mostly use of WC or 2WW. Edema +1 to right hand and digits chronic  SKIN: third toe of left foot swelling some redness, no warmth, drainage  NEURO:   Cranial nerves 2-12 are normal tested and grossly at patient's baseline  PSYCH:  insight and judgement impaired, memory impaired    Labs:   CBC RESULTS:   Recent Labs   Lab Test 03/03/21  0802 12/02/20  1101   WBC 7.1 7.9   RBC 4.72 4.66   HGB 14.6 14.8   HCT 46.1 45.2   MCV 98 97   MCH 30.9 31.8   MCHC 31.7 32.7   RDW 13.5 13.8    263       Last Basic Metabolic Panel:  Recent Labs   Lab Test 03/03/21  0802 12/02/20  1101 05/28/20  1300 05/28/20  1300 07/03/19  0845 07/03/19  0845   NA  --   --   --  139  --  142   POTASSIUM  --   --   --  4.5  --  4.5   CHLORIDE  --   --   --  106  --  110*   GISELA  --   --   --  8.7  --  8.8   CO2  --   --   --  29  --  28   BUN  --   --   --  28  --  22   CR 1.26* 1.20   < > 1.22   < > 1.07   GLC  --   --   --  150*  --  103*    < > = values in this " interval not displayed.       Liver Function Studies -   Recent Labs   Lab Test 03/03/21  0802 12/02/20  1101 05/28/20  1300 05/28/20  1300 07/03/19  0845 07/03/19  0845   PROTTOTAL  --   --   --  6.8  --  7.0   ALBUMIN 3.9 3.6   < > 3.4   < > 3.6   BILITOTAL  --   --   --  0.5  --  0.5   ALKPHOS  --   --   --  66  --  73   AST 15 17   < > 23   < > 15   ALT 25 27   < > 29   < > 26    < > = values in this interval not displayed.       TSH   Date Value Ref Range Status   05/28/2020 1.63 0.40 - 4.00 mU/L Final   07/03/2019 1.72 0.40 - 4.00 mU/L Final       Lab Results   Component Value Date    A1C 5.6 09/28/2018    A1C 6.0 07/01/2015     ASSESSMENT/PLAN:  (S92.953G) Injury of toe on left foot, subsequent encounter  (primary encounter diagnosis)  Comment: resolved then flare with blister, injury?   Plan:   -wound care orders: wash/pat dry/apply povidone swab daily x 14 days then lambs wool between affected and adjacent digit. Ok to cover if drainage noticed               Electronically signed by:  SHIRA Parra CNP

## 2021-05-26 NOTE — LETTER
5/26/2021        RE: Jose E Garcia [facility]  [address]  [address]  [place] [address]        [place] [facility]  [place] Medical Record Number:  [id number]  Place of Service where encounter took place:  [facility] [[id number]]  Chief Complaint   Patient presents with     RECHECK       HPI:    Jose E Capps  is a 89 year old (9/28/1931), who is being seen today for an episodic care visit.  HPI information obtained from: family/first contact daughter report. Today's concern is:    Asked to see resident while onsite for flare of toe. History of intermittent toe infection/irritation. The left third toe with intermittent swelling, redness, blistering leading to open skin. Was on antibiotics in February and again last month. X-ray was negative for osteomyelitis. Wound care from facility discontinued at last visit with healed presentation.     Past Medical and Surgical History reviewed in Epic today.    MEDICATIONS:    Current Outpatient Medications   Medication Sig Dispense Refill     acetaminophen (ACETAMINOPHEN EXTRA STRENGTH) 500 MG tablet Take 2 tablets (1,000 mg) by mouth 3 times daily 112 tablet 97     ASPIRIN NOT PRESCRIBED (INTENTIONAL) Please choose reason not prescribed, below 0 each 0     atorvastatin (LIPITOR) 10 MG tablet TAKE 1 TABLET BY MOUTH ONCE DAILY 28 tablet PRN     cyanocobalamin (VITAMIN B-12) 1000 MCG SUBL sublingual tablet DISSOLVE ONE-HALF LOZENGE (500MCG) BY MOUTH TWICE DAILY 28 tablet PRN     D-5000 125 MCG (5000 UT) TABS TAKE 1 TABLET BY MOUTH ONCE DAILY 28 tablet PRN     diclofenac (VOLTAREN) 1 % topical gel Apply 4 g topically 2 times daily And twice daily to knees 100 g 11     ELIQUIS ANTICOAGULANT 5 MG tablet TAKE 1 TABLET BY MOUTH TWICE DAILY 56 tablet PRN     leflunomide (ARAVA) 10 MG tablet TAKE 1 TABLET BY MOUTH ONCE DAILY 31 tablet 97     lisinopril (ZESTRIL) 10 MG tablet TAKE 1 TABLET BY MOUTH ONCE DAILY 28 tablet 97  "    loperamide (IMODIUM) 2 MG capsule TAKE 1 CAPSULE BY MOUTH FOUR TIMES DAILY AS NEEDED FOR DIARRHEA 30 capsule 11     memantine (NAMENDA) 10 MG tablet TAKE 1 TABLET BY MOUTH TWICE DAILY 56 tablet PRN     Multiple Vitamins-Minerals (CEROVITE SENIOR) TABS TAKE 1 TABLET BY MOUTH ONCE DAILY 28 tablet PRN     polyethylene glycol (MIRALAX/GLYCOLAX) powder MIX 17GM OF POWDER IN 8OZ OF WATER UNTIL COMPLETELY DISSOLVED. DRINK SOLUTION BY MOUTH ONCE DAILY AS NEEDED FOR CONSTIPATION 527 g 5     povidone-iodine 10 % swab Apply topically daily as needed       REVIEW OF SYSTEMS:  Limited secondary to cognitive impairment but today pt reports fine    Objective:  Temp 95.5  F (35.3  C)   Ht 1.626 m (5' 4\")   Wt 80.7 kg (178 lb)   SpO2 93%   BMI 30.55 kg/m    Exam:  GENERAL APPEARANCE:  alert and pleasant, forgetful. [place]  ENT:  Mouth and posterior oropharynx normal, dry mucous membranes  EYES:  EOM, conjunctivae, lids, pupils and irises normal-glasses  M/S: mostly use of WC or 2WW. Edema +1 to right hand and digits chronic  SKIN: seconded toe of left foot swelling resolved, no redness, no warmth  NEURO:   Cranial nerves 2-12 are normal tested and grossly at patient's baseline  PSYCH:  insight and judgement impaired, memory impaired    Labs:   CBC RESULTS:   Recent Labs   Lab Test 03/03/21  0802 12/02/20  1101   WBC 7.1 7.9   RBC 4.72 4.66   HGB 14.6 14.8   HCT 46.1 45.2   MCV 98 97   MCH 30.9 31.8   MCHC 31.7 32.7   RDW 13.5 13.8    263       Last Basic Metabolic Panel:  Recent Labs   Lab Test 03/03/21  0802 12/02/20  1101 05/28/20  1300 05/28/20  1300 07/03/19  0845 07/03/19  0845   NA  --   --   --  139  --  142   POTASSIUM  --   --   --  4.5  --  4.5   CHLORIDE  --   --   --  106  --  110*   GISELA  --   --   --  8.7  --  8.8   CO2  --   --   --  29  --  28   BUN  --   --   --  28  --  22   CR 1.26* 1.20   < > 1.22   < > 1.07   GLC  --   --   --  150*  --  103*    < > = values in this interval not displayed.       Liver " Function Studies -   Recent Labs   Lab Test 03/03/21  0802 12/02/20  1101 05/28/20  1300 05/28/20  1300 07/03/19  0845 07/03/19  0845   PROTTOTAL  --   --   --  6.8  --  7.0   ALBUMIN 3.9 3.6   < > 3.4   < > 3.6   BILITOTAL  --   --   --  0.5  --  0.5   ALKPHOS  --   --   --  66  --  73   AST 15 17   < > 23   < > 15   ALT 25 27   < > 29   < > 26    < > = values in this interval not displayed.       TSH   Date Value Ref Range Status   05/28/2020 1.63 0.40 - 4.00 mU/L Final   07/03/2019 1.72 0.40 - 4.00 mU/L Final       Lab Results   Component Value Date    A1C 5.6 09/28/2018    A1C 6.0 07/01/2015     ASSESSMENT/PLAN:  {FGS DX:[id number]}      Electronically signed by:  Elena Campos   {Providers Please double check the med list (in the plan section >> meds & orders tab) and Discontinue any of the meds flagged by the TC to be discontinued}         Third toe on left foot: wash/pat dry/apply povidone swab 3 times weekly and prn. Cover with band-aid until healed.                  [place] [facility]  [place] Medical Record Number:  [id number]  Place of Service where encounter took place:  [facility] [[id number]]  Chief Complaint   Patient presents with     RECHECK       HPI:    Jose E Capps  is a 89 year old (9/28/1931), who is being seen today for an episodic care visit.  HPI information obtained from: family/first contact daughter report. Today's concern is:    Asked to see resident while onsite for flare of toe. History of intermittent toe infection/irritation. The left third toe with intermittent swelling, redness, blistering leading to open skin. Was on antibiotics in February and again last month. X-ray was negative for osteomyelitis. Wound care from facility discontinued at last visit with healed presentation. Today appears with blister appearance. He says it doesn't hurt. No drainage noted, slight swelling.     Past Medical and Surgical History reviewed in Epic  "today.    MEDICATIONS:    Current Outpatient Medications   Medication Sig Dispense Refill     acetaminophen (ACETAMINOPHEN EXTRA STRENGTH) 500 MG tablet Take 2 tablets (1,000 mg) by mouth 3 times daily 112 tablet 97     ASPIRIN NOT PRESCRIBED (INTENTIONAL) Please choose reason not prescribed, below 0 each 0     atorvastatin (LIPITOR) 10 MG tablet TAKE 1 TABLET BY MOUTH ONCE DAILY 28 tablet PRN     cyanocobalamin (VITAMIN B-12) 1000 MCG SUBL sublingual tablet DISSOLVE ONE-HALF LOZENGE (500MCG) BY MOUTH TWICE DAILY 28 tablet PRN     D-5000 125 MCG (5000 UT) TABS TAKE 1 TABLET BY MOUTH ONCE DAILY 28 tablet PRN     diclofenac (VOLTAREN) 1 % topical gel Apply 4 g topically 2 times daily And twice daily to knees 100 g 11     ELIQUIS ANTICOAGULANT 5 MG tablet TAKE 1 TABLET BY MOUTH TWICE DAILY 56 tablet PRN     leflunomide (ARAVA) 10 MG tablet TAKE 1 TABLET BY MOUTH ONCE DAILY 31 tablet 97     lisinopril (ZESTRIL) 10 MG tablet TAKE 1 TABLET BY MOUTH ONCE DAILY 28 tablet 97     loperamide (IMODIUM) 2 MG capsule TAKE 1 CAPSULE BY MOUTH FOUR TIMES DAILY AS NEEDED FOR DIARRHEA 30 capsule 11     memantine (NAMENDA) 10 MG tablet TAKE 1 TABLET BY MOUTH TWICE DAILY 56 tablet PRN     Multiple Vitamins-Minerals (CEROVITE SENIOR) TABS TAKE 1 TABLET BY MOUTH ONCE DAILY 28 tablet PRN     polyethylene glycol (MIRALAX/GLYCOLAX) powder MIX 17GM OF POWDER IN 8OZ OF WATER UNTIL COMPLETELY DISSOLVED. DRINK SOLUTION BY MOUTH ONCE DAILY AS NEEDED FOR CONSTIPATION 527 g 5     povidone-iodine 10 % swab Apply topically daily as needed       REVIEW OF SYSTEMS:  Limited secondary to cognitive impairment but today pt reports fine    Objective:  /60   Pulse 62   Temp 95.5  F (35.3  C)   Resp 16   Ht 1.626 m (5' 4\")   Wt 80.7 kg (178 lb)   SpO2 93%   BMI 30.55 kg/m    Exam:  GENERAL APPEARANCE:  alert and pleasant, forgetful. [place]  ENT:  Mouth and posterior oropharynx normal, dry mucous membranes  EYES:  EOM, conjunctivae, lids, pupils and " irises normal-glasses  M/S: mostly use of WC or 2WW. Edema +1 to right hand and digits chronic  SKIN: third toe of left foot swelling some redness, no warmth, drainage  NEURO:   Cranial nerves 2-12 are normal tested and grossly at patient's baseline  PSYCH:  insight and judgement impaired, memory impaired    Labs:   CBC RESULTS:   Recent Labs   Lab Test 03/03/21 0802 12/02/20  1101   WBC 7.1 7.9   RBC 4.72 4.66   HGB 14.6 14.8   HCT 46.1 45.2   MCV 98 97   MCH 30.9 31.8   MCHC 31.7 32.7   RDW 13.5 13.8    263       Last Basic Metabolic Panel:  Recent Labs   Lab Test 03/03/21 0802 12/02/20  1101 05/28/20 1300 05/28/20 1300 07/03/19  0845 07/03/19  0845   NA  --   --   --  139  --  142   POTASSIUM  --   --   --  4.5  --  4.5   CHLORIDE  --   --   --  106  --  110*   GISELA  --   --   --  8.7  --  8.8   CO2  --   --   --  29  --  28   BUN  --   --   --  28  --  22   CR 1.26* 1.20   < > 1.22   < > 1.07   GLC  --   --   --  150*  --  103*    < > = values in this interval not displayed.       Liver Function Studies -   Recent Labs   Lab Test 03/03/21 0802 12/02/20  1101 05/28/20 1300 05/28/20 1300 07/03/19  0845 07/03/19  0845   PROTTOTAL  --   --   --  6.8  --  7.0   ALBUMIN 3.9 3.6   < > 3.4   < > 3.6   BILITOTAL  --   --   --  0.5  --  0.5   ALKPHOS  --   --   --  66  --  73   AST 15 17   < > 23   < > 15   ALT 25 27   < > 29   < > 26    < > = values in this interval not displayed.       TSH   Date Value Ref Range Status   05/28/2020 1.63 0.40 - 4.00 mU/L Final   07/03/2019 1.72 0.40 - 4.00 mU/L Final       Lab Results   Component Value Date    A1C 5.6 09/28/2018    A1C 6.0 07/01/2015     ASSESSMENT/PLAN:  (S99.923C) Injury of toe on left foot, subsequent encounter  (primary encounter diagnosis)  Comment: resolved then flare with blister, injury?   Plan:   -wound care orders: wash/pat dry/apply povidone swab daily x 14 days then lambs wool between affected and adjacent digit. Ok to cover if drainage noticed                Electronically signed by:  SHIRA Parra CNP                               Sincerely,        SHIRA Parra CNP

## 2021-06-03 ENCOUNTER — HOSPITAL LABORATORY (OUTPATIENT)
Dept: OTHER | Facility: CLINIC | Age: 86
End: 2021-06-03

## 2021-06-03 ENCOUNTER — TELEPHONE (OUTPATIENT)
Dept: GERIATRICS | Facility: CLINIC | Age: 86
End: 2021-06-03

## 2021-06-03 DIAGNOSIS — S99.922D INJURY OF TOE ON LEFT FOOT, SUBSEQUENT ENCOUNTER: Primary | ICD-10-CM

## 2021-06-03 LAB
ALBUMIN SERPL-MCNC: 3.4 G/DL (ref 3.4–5)
ALT SERPL W P-5'-P-CCNC: 26 U/L (ref 0–70)
AST SERPL W P-5'-P-CCNC: 17 U/L (ref 0–45)
BASOPHILS # BLD AUTO: 0.1 10E9/L (ref 0–0.2)
BASOPHILS NFR BLD AUTO: 0.9 %
CREAT SERPL-MCNC: 1.22 MG/DL (ref 0.66–1.25)
DIFFERENTIAL METHOD BLD: NORMAL
EOSINOPHIL # BLD AUTO: 0.2 10E9/L (ref 0–0.7)
EOSINOPHIL NFR BLD AUTO: 2.6 %
ERYTHROCYTE [DISTWIDTH] IN BLOOD BY AUTOMATED COUNT: 13.8 % (ref 10–15)
GFR SERPL CREATININE-BSD FRML MDRD: 52 ML/MIN/{1.73_M2}
HCT VFR BLD AUTO: 45.9 % (ref 40–53)
HGB BLD-MCNC: 14.6 G/DL (ref 13.3–17.7)
IMM GRANULOCYTES # BLD: 0.1 10E9/L (ref 0–0.4)
IMM GRANULOCYTES NFR BLD: 0.9 %
LYMPHOCYTES # BLD AUTO: 1.9 10E9/L (ref 0.8–5.3)
LYMPHOCYTES NFR BLD AUTO: 27.2 %
MCH RBC QN AUTO: 30.5 PG (ref 26.5–33)
MCHC RBC AUTO-ENTMCNC: 31.8 G/DL (ref 31.5–36.5)
MCV RBC AUTO: 96 FL (ref 78–100)
MONOCYTES # BLD AUTO: 0.8 10E9/L (ref 0–1.3)
MONOCYTES NFR BLD AUTO: 11.6 %
NEUTROPHILS # BLD AUTO: 3.9 10E9/L (ref 1.6–8.3)
NEUTROPHILS NFR BLD AUTO: 56.8 %
NRBC # BLD AUTO: 0 10*3/UL
NRBC BLD AUTO-RTO: 0 /100
PLATELET # BLD AUTO: 270 10E9/L (ref 150–450)
RBC # BLD AUTO: 4.78 10E12/L (ref 4.4–5.9)
WBC # BLD AUTO: 6.9 10E9/L (ref 4–11)

## 2021-06-03 RX ORDER — SULFAMETHOXAZOLE/TRIMETHOPRIM 800-160 MG
1 TABLET ORAL 2 TIMES DAILY
Qty: 14 TABLET | Refills: 0 | Status: SHIPPED | OUTPATIENT
Start: 2021-06-03 | End: 2021-06-10

## 2021-06-03 NOTE — TELEPHONE ENCOUNTER
[facility] TELEPHONE ENCOUNTER    Chief Complaint   Patient presents with     toe infection       Jose E Capps is a 89 year old  (9/28/1931),Nurse called today to report: History of intermittent toe infection/irritation. The left third toe with intermittent swelling, redness, blistering leading to open skin. Was on antibiotics in February and again in April. X-ray was negative for osteomyelitis. Wound care from facility discontinued at last visit with healed presentation. He says it doesn't hurt but change in presentation from yesterday according to nurse with increased redness, swelling, pus under skin.    ASSESSMENT/PLAN      Wound care orders    sulfamethoxazole-trimethoprim (BACTRIM DS) 800-160 MG tablet Take 1 tablet by mouth 2 times daily for 7 days for skin/soft tissue infection    Referral to [facility] for debridement       Electronically signed by:   SHIRA Parra CNP

## 2021-06-04 ENCOUNTER — TELEPHONE (OUTPATIENT)
Dept: WOUND CARE | Facility: CLINIC | Age: 86
End: 2021-06-04

## 2021-06-04 NOTE — TELEPHONE ENCOUNTER
Spoke with dtr. Marita Capps and she wanted to set an appt in several weeks as the wound appears to be healed, but if not she wanted an appt.   Appt set for 7/16/21 with Dr. Grajeda.

## 2021-06-04 NOTE — TELEPHONE ENCOUNTER
Referral received from the work queue from Jerry Ritter CNP for a nonhealing Left 3rd toe wound. Ok to schedule with Dr. Grajeda, Dr. Parham, or Elena COBOS

## 2021-06-09 ENCOUNTER — ASSISTED LIVING VISIT (OUTPATIENT)
Dept: GERIATRICS | Facility: CLINIC | Age: 86
End: 2021-06-09
Payer: COMMERCIAL

## 2021-06-09 VITALS — WEIGHT: 178 LBS | TEMPERATURE: 98 F | HEIGHT: 64 IN | OXYGEN SATURATION: 91 % | BODY MASS INDEX: 30.39 KG/M2

## 2021-06-09 DIAGNOSIS — S99.922D INJURY OF TOE ON LEFT FOOT, SUBSEQUENT ENCOUNTER: Primary | ICD-10-CM

## 2021-06-09 ASSESSMENT — MIFFLIN-ST. JEOR: SCORE: 1383.4

## 2021-06-09 NOTE — LETTER
6/9/2021        RE: Jose E Boateng On [facility]  [address] [facility] [address]  [address]  [place] [address]        [place] [facility]  [place] Medical Record Number:  [id number]  Place of Service where encounter took place:  [facility] [[id number]]  Chief Complaint   Patient presents with     RECHECK       HPI:    Jose E Capps  is a 89 year old (9/28/1931), who is being seen today for an episodic care visit.  HPI information obtained from: facility chart records and [facility] chart review. Today's concern is:    History of intermittent toe infection/irritation. The left third toe with intermittent swelling, redness, blistering leading to open skin. This time with pus under skin. Was on antibiotics in February and again in April and now this month as this appears to clear toe for awhile. Bactrim DS with end date of 6/11/21 and concern toe infection will flare again without good I & D to area. Previous X-ray was negative for osteomyelitis. Wound care from facility.  He says it doesn't hurt. Looks like family has pushed back follow up with wound clinic for debridement to 7/16/21 since presentation is once again improving with systemic antibiotics.    Past Medical and Surgical History reviewed in Epic today.    MEDICATIONS:    Current Outpatient Medications   Medication Sig Dispense Refill     acetaminophen (ACETAMINOPHEN EXTRA STRENGTH) 500 MG tablet Take 2 tablets (1,000 mg) by mouth 3 times daily 112 tablet 97     ASPIRIN NOT PRESCRIBED (INTENTIONAL) Please choose reason not prescribed, below 0 each 0     atorvastatin (LIPITOR) 10 MG tablet TAKE 1 TABLET BY MOUTH ONCE DAILY 28 tablet PRN     cyanocobalamin (VITAMIN B-12) 1000 MCG SUBL sublingual tablet DISSOLVE ONE-HALF LOZENGE (500MCG) BY MOUTH TWICE DAILY 28 tablet PRN     D-5000 125 MCG (5000 UT) TABS TAKE 1 TABLET BY MOUTH ONCE DAILY 28 tablet PRN     diclofenac (VOLTAREN) 1 % topical gel Apply 4 g topically 2 times  "daily And twice daily to knees 100 g 11     ELIQUIS ANTICOAGULANT 5 MG tablet TAKE 1 TABLET BY MOUTH TWICE DAILY 56 tablet PRN     leflunomide (ARAVA) 10 MG tablet TAKE 1 TABLET BY MOUTH ONCE DAILY 31 tablet 97     lisinopril (ZESTRIL) 10 MG tablet TAKE 1 TABLET BY MOUTH ONCE DAILY 28 tablet 97     loperamide (IMODIUM) 2 MG capsule TAKE 1 CAPSULE BY MOUTH FOUR TIMES DAILY AS NEEDED FOR DIARRHEA 30 capsule 11     memantine (NAMENDA) 10 MG tablet TAKE 1 TABLET BY MOUTH TWICE DAILY 56 tablet PRN     Multiple Vitamins-Minerals (CEROVITE SENIOR) TABS TAKE 1 TABLET BY MOUTH ONCE DAILY 28 tablet PRN     polyethylene glycol (MIRALAX/GLYCOLAX) powder MIX 17GM OF POWDER IN 8OZ OF WATER UNTIL COMPLETELY DISSOLVED. DRINK SOLUTION BY MOUTH ONCE DAILY AS NEEDED FOR CONSTIPATION 527 g 5     povidone-iodine 10 % swab Apply topically daily as needed       sulfamethoxazole-trimethoprim (BACTRIM DS) 800-160 MG tablet Take 1 tablet by mouth 2 times daily for 7 days 14 tablet 0     REVIEW OF SYSTEMS:  Limited secondary to cognitive impairment but today pt reports ok    Objective:  Temp 98  F (36.7  C)   Ht 1.626 m (5' 4\")   Wt 80.7 kg (178 lb)   SpO2 91%   BMI 30.55 kg/m    Exam:  GENERAL APPEARANCE:  alert and pleasant, forgetful. [place]  ENT:  Mouth and posterior oropharynx normal, dry mucous membranes  EYES:  EOM, conjunctivae, lids, pupils and irises normal-glasses  M/S: mostly use of WC or 2WW. Edema +1 to right hand and digits chronic  SKIN: third toe of left foot with less swelling and redness, no warmth, drainage, no pus under skin  NEURO:   Cranial nerves 2-12 are normal tested and grossly at patient's baseline  PSYCH:  insight and judgement impaired, memory impaired    Labs:   Recent labs in [facility] reviewed by me today.     ASSESSMENT/PLAN:  (E66.080N) Injury of toe on left foot, subsequent encounter  (primary encounter diagnosis)  Comment: resolved then flare with infection-antibiotics will clear it for some time but then " reappears   Plan:   -wound care orders: wash/pat dry/apply povidone swab three times weekly and lambs wool between affected and adjacent digit daily Ok to cover if drainage noticed   -follow up with wound care clinic 7/16/21                     Electronically signed by:  SHIRA Parra CNP                 Sincerely,        SHIRA Parra CNP

## 2021-06-09 NOTE — PROGRESS NOTES
[place] [facility]  [place] Medical Record Number:  [id number]  Place of Service where encounter took place:  [facility] [[id number]]  Chief Complaint   Patient presents with     RECHECK       HPI:    Jose E Capps  is a 89 year old (9/28/1931), who is being seen today for an episodic care visit.  HPI information obtained from: facility chart records and [facility] chart review. Today's concern is:    History of intermittent toe infection/irritation. The left third toe with intermittent swelling, redness, blistering leading to open skin. This time with pus under skin. Was on antibiotics in February and again in April and now this month as this appears to clear toe for awhile. Bactrim DS with end date of 6/11/21 and concern toe infection will flare again without good I & D to area. Previous X-ray was negative for osteomyelitis. Wound care from facility.  He says it doesn't hurt. Looks like family has pushed back follow up with wound clinic for debridement to 7/16/21 since presentation is once again improving with systemic antibiotics.    Past Medical and Surgical History reviewed in Epic today.    MEDICATIONS:    Current Outpatient Medications   Medication Sig Dispense Refill     acetaminophen (ACETAMINOPHEN EXTRA STRENGTH) 500 MG tablet Take 2 tablets (1,000 mg) by mouth 3 times daily 112 tablet 97     ASPIRIN NOT PRESCRIBED (INTENTIONAL) Please choose reason not prescribed, below 0 each 0     atorvastatin (LIPITOR) 10 MG tablet TAKE 1 TABLET BY MOUTH ONCE DAILY 28 tablet PRN     cyanocobalamin (VITAMIN B-12) 1000 MCG SUBL sublingual tablet DISSOLVE ONE-HALF LOZENGE (500MCG) BY MOUTH TWICE DAILY 28 tablet PRN     D-5000 125 MCG (5000 UT) TABS TAKE 1 TABLET BY MOUTH ONCE DAILY 28 tablet PRN     diclofenac (VOLTAREN) 1 % topical gel Apply 4 g topically 2 times daily And twice daily to knees 100 g 11     ELIQUIS ANTICOAGULANT 5 MG tablet TAKE 1 TABLET BY MOUTH TWICE DAILY  "56 tablet PRN     leflunomide (ARAVA) 10 MG tablet TAKE 1 TABLET BY MOUTH ONCE DAILY 31 tablet 97     lisinopril (ZESTRIL) 10 MG tablet TAKE 1 TABLET BY MOUTH ONCE DAILY 28 tablet 97     loperamide (IMODIUM) 2 MG capsule TAKE 1 CAPSULE BY MOUTH FOUR TIMES DAILY AS NEEDED FOR DIARRHEA 30 capsule 11     memantine (NAMENDA) 10 MG tablet TAKE 1 TABLET BY MOUTH TWICE DAILY 56 tablet PRN     Multiple Vitamins-Minerals (CEROVITE SENIOR) TABS TAKE 1 TABLET BY MOUTH ONCE DAILY 28 tablet PRN     polyethylene glycol (MIRALAX/GLYCOLAX) powder MIX 17GM OF POWDER IN 8OZ OF WATER UNTIL COMPLETELY DISSOLVED. DRINK SOLUTION BY MOUTH ONCE DAILY AS NEEDED FOR CONSTIPATION 527 g 5     povidone-iodine 10 % swab Apply topically daily as needed       sulfamethoxazole-trimethoprim (BACTRIM DS) 800-160 MG tablet Take 1 tablet by mouth 2 times daily for 7 days 14 tablet 0     REVIEW OF SYSTEMS:  Limited secondary to cognitive impairment but today pt reports ok    Objective:  Temp 98  F (36.7  C)   Ht 1.626 m (5' 4\")   Wt 80.7 kg (178 lb)   SpO2 91%   BMI 30.55 kg/m    Exam:  GENERAL APPEARANCE:  alert and pleasant, forgetful. [place]  ENT:  Mouth and posterior oropharynx normal, dry mucous membranes  EYES:  EOM, conjunctivae, lids, pupils and irises normal-glasses  M/S: mostly use of WC or 2WW. Edema +1 to right hand and digits chronic  SKIN: third toe of left foot with less swelling and redness, no warmth, drainage, no pus under skin  NEURO:   Cranial nerves 2-12 are normal tested and grossly at patient's baseline  PSYCH:  insight and judgement impaired, memory impaired    Labs:   Recent labs in [facility] reviewed by me today.     ASSESSMENT/PLAN:  (T60.874X) Injury of toe on left foot, subsequent encounter  (primary encounter diagnosis)  Comment: resolved then flare with infection-antibiotics will clear it for some time but then reappears   Plan:   -wound care orders: wash/pat dry/apply povidone swab three times weekly and lambs wool between " affected and adjacent digit daily Ok to cover if drainage noticed   -follow up with wound care clinic 7/16/21                     Electronically signed by:  SHIRA Parra CNP

## 2021-06-14 ENCOUNTER — TELEPHONE (OUTPATIENT)
Dept: WOUND CARE | Facility: CLINIC | Age: 86
End: 2021-06-14

## 2021-06-14 NOTE — TELEPHONE ENCOUNTER
Returned call to Marita. No earlier appt is available. Will add pt to cancellation list. Marita requests she be called with that information. No further questions or concerns.

## 2021-06-14 NOTE — TELEPHONE ENCOUNTER
[facility] Wound    Who is the name of the provider?:  Taras      What is the location you see this provider at?: [place]    Reason for call:  Has appt for 7/16.  States provider at [place] wants him seen sooner if possible.     Can we leave a detailed message on this number?  YES

## 2021-07-01 DIAGNOSIS — K59.01 SLOW TRANSIT CONSTIPATION: ICD-10-CM

## 2021-07-01 RX ORDER — POLYETHYLENE GLYCOL 3350 17 G/17G
POWDER, FOR SOLUTION ORAL
Qty: 510 G | Refills: 97 | Status: SHIPPED | OUTPATIENT
Start: 2021-07-01

## 2021-07-09 ENCOUNTER — HOSPITAL ENCOUNTER (OUTPATIENT)
Dept: WOUND CARE | Facility: CLINIC | Age: 86
Discharge: HOME | End: 2021-07-09
Attending: PODIATRIST | Admitting: PODIATRIST
Payer: COMMERCIAL

## 2021-07-09 VITALS
HEART RATE: 79 BPM | RESPIRATION RATE: 16 BRPM | SYSTOLIC BLOOD PRESSURE: 133 MMHG | DIASTOLIC BLOOD PRESSURE: 76 MMHG | TEMPERATURE: 97.1 F

## 2021-07-09 DIAGNOSIS — S99.922D INJURY OF TOE ON LEFT FOOT, SUBSEQUENT ENCOUNTER: ICD-10-CM

## 2021-07-09 DIAGNOSIS — Z87.2 HEALED FOOT ULCER: ICD-10-CM

## 2021-07-09 DIAGNOSIS — I87.2 EDEMA OF BOTH LOWER EXTREMITIES DUE TO PERIPHERAL VENOUS INSUFFICIENCY: ICD-10-CM

## 2021-07-09 DIAGNOSIS — S99.922A INJURY OF TOE, LEFT, INITIAL ENCOUNTER: ICD-10-CM

## 2021-07-09 PROCEDURE — 99203 OFFICE O/P NEW LOW 30 MIN: CPT | Performed by: PODIATRIST

## 2021-07-09 PROCEDURE — G0463 HOSPITAL OUTPT CLINIC VISIT: HCPCS

## 2021-07-09 NOTE — PROGRESS NOTES
[facility] [place] Progress Note    Subject: Patient was seen at wound center.  Patient presents the clinic for the first time at the request of Dr. Ritter for left foot wound.  Here with family members.  They note that it was open about 4 to 5 months ago.  We will kind of open up and then heal over and open up again.  They have tried wider shoes.  When it does open the air soaking the feet in tea tree oil and warm water and applying Betadine to the area.  They are wondering if the place where he is out is wrapping his foot too much causing the issue.  Wondering what can be done to prevent it from coming back.    PMH:   Past Medical History:   Diagnosis Date     BPH (benign prostatic hyperplasia)      Colonic polyps     recurrent     CVA (cerebral vascular accident) (H) 2009    posterior circulation     Hyperlipidemia LDL goal <100     reports being intolerant to      Hypertension      Seronegative rheumatoid arthritis (H) 2011    hands, neck,, shoulders       Social Hx:   Social History     Socioeconomic History     Marital status:      Spouse name: wife - 2nd Silva     Number of children: 5     Years of education: Not on file     Highest education level: Not on file   Occupational History     Occupation: retired   Social Needs     Financial resource strain: Not on file     Food insecurity     Worry: Not on file     Inability: Not on file     Transportation needs     Medical: Not on file     Non-medical: Not on file   Tobacco Use     Smoking status: Former Smoker     Packs/day: 2.00     Years: 30.00     Pack years: 60.00     Types: Cigarettes     Smokeless tobacco: Never Used   Substance and Sexual Activity     Alcohol use: Yes     Alcohol/week: 18.0 standard drinks     Types: 4 Standard drinks or equivalent, 14 Shots of liquor per week     Comment: 2-3 drinks a night     Drug use: No     Sexual activity: Yes   Lifestyle     Physical activity     Days per week: Not on file     Minutes per  session: Not on file     Stress: Not on file   Relationships     Social connections     Talks on phone: Not on file     Gets together: Not on file     Attends Confucianist service: Not on file     Active member of club or organization: Not on file     Attends meetings of clubs or organizations: Not on file     Relationship status: Not on file     Intimate partner violence     Fear of current or ex partner: Not on file     Emotionally abused: Not on file     Physically abused: Not on file     Forced sexual activity: Not on file   Other Topics Concern     Parent/sibling w/ CABG, MI or angioplasty before 65F 55M? Not Asked      Service Not Asked     Blood Transfusions Not Asked     Caffeine Concern Not Asked     Occupational Exposure Not Asked     Hobby Hazards Not Asked     Sleep Concern Not Asked     Stress Concern Not Asked     Weight Concern Not Asked     Special Diet Not Asked     Back Care Not Asked     Exercise Yes     Bike Helmet Not Asked     Seat Belt Not Asked     Self-Exams Not Asked   Social History Narrative    ** Merged History Encounter **         ** Data from: 6/27/18 Enc Dept: [facility]    ** Merged History Encounter **              ** Data from: 6/12/13 Enc Dept:  F[facility]ILY PRACTICE    [facility] 6/12/2013       Surgical Hx:   Past Surgical History:   Procedure Laterality Date     C LAT LUMBAR SPINE FUSION       JOINT REPLACEMENT, HIP RT/LT  2007    R     JOINT REPLACEMTN, KNEE RT/LT  2004     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    excised       Allergies:  No Known Allergies    Medications:   Current Outpatient Medications   Medication     acetaminophen (ACETAMINOPHEN EXTRA STRENGTH) 500 MG tablet     ASPIRIN NOT PRESCRIBED (INTENTIONAL)     atorvastatin (LIPITOR) 10 MG tablet     cyanocobalamin (VITAMIN B-12) 1000 MCG SUBL sublingual tablet     D-5000 125 MCG (5000 UT) TABS     diclofenac (VOLTAREN) 1 % topical gel     ELIQUIS ANTICOAGULANT 5 MG tablet     leflunomide  (ARAVA) 10 MG tablet     lisinopril (ZESTRIL) 10 MG tablet     loperamide (IMODIUM) 2 MG capsule     memantine (NAMENDA) 10 MG tablet     Multiple Vitamins-Minerals (CEROVITE SENIOR) TABS     polyethylene glycol (MIRALAX) 17 GM/Dose powder     povidone-iodine 10 % swab     No current facility-administered medications for this encounter.      Objective:  Vitals:  /76   Pulse 79   Temp 97.1  F (36.2  C) (Temporal)   Resp 16     General:  Patient is alert and orientated.  NAD.  Does not hear well.     Dermatologic: no ulcerations noted to left foot. Family notes it occurs to the 3rd toe when it occurs.      Vascular: DP & PT pulses are intact & regular bilaterally.  Minimal edema and varicosities noted.  CFT and skin temperature is normal to both lower extremities.     Neurologic: Lower extremity sensation is intact to light touch.  No evidence of weakness or contracture in the lower extremities.  No evidence of neuropathy.     Musculoskeletal: Patient is ambulatory without assistive device or brace.  No gross ankle deformity noted.  No foot or ankle joint effusion is noted.    Imaging:  Left foot xray (2/22/2021):      Assessment:     Healed foot ulcer  Edema of both lower extremities due to peripheral venous insufficiency    Plan:  At this time, there is no wound. So not dressings are needed.  Discussed that he could get swelling to the area and the fourth toe sits under the third toe it could rub and cause a callus which could lead to blistering.  Recommend that he elevate his feet above the heart 20 minutes a day to help with edema.  Make sure his shoes are wide enough so there is no pressure.  We will have them continue the lambswool between the third and fourth toe to help with rubbing and pressure.  All questions were answered to patient and patient's family satisfaction and they will call further questions or concerns.  No follow-up needed at this time as there is no wound.    Candelaria Grajeda DPM,  Podiatry/Foot and Ankle Surgery

## 2021-07-09 NOTE — DISCHARGE INSTRUCTIONS
[facility]  [address] [address]    Call us at [phone] if you have any questions about your wounds, have redness or swelling around your wound, have a fever of 101 or greater or if you have any other problems or concerns. We answer the phone Monday through Friday 8 am to 4 pm, please leave a message as we check the voicemail frequently throughout the day.     Jose E Capps      9/28/1931    Recommendations to Left 3rd toe:  No dressing is needed if no wound is present.   Elevate the leg for at least 1 hour twice a day ankles above the hips.       Candelaria Grajeda, JUDIT.P.M. July 9, 2021      If you had a positive experience please indicate on your patient satisfaction survey form that [facility] will be sending you.    If you have any billing related questions please call the [facility] Business office at [phone]. The clinic staff does not handle billing related matters.

## 2021-08-02 ENCOUNTER — LAB REQUISITION (OUTPATIENT)
Dept: LAB | Facility: CLINIC | Age: 86
End: 2021-08-02
Payer: COMMERCIAL

## 2021-08-02 DIAGNOSIS — U07.1 COVID-19: ICD-10-CM

## 2021-08-03 PROCEDURE — U0003 INFECTIOUS AGENT DETECTION BY NUCLEIC ACID (DNA OR RNA); SEVERE ACUTE RESPIRATORY SYNDROME CORONAVIRUS 2 (SARS-COV-2) (CORONAVIRUS DISEASE [COVID-19]), AMPLIFIED PROBE TECHNIQUE, MAKING USE OF HIGH THROUGHPUT TECHNOLOGIES AS DESCRIBED BY CMS-2020-01-R: HCPCS | Mod: ORL | Performed by: FAMILY MEDICINE

## 2021-08-04 LAB — SARS-COV-2 RNA RESP QL NAA+PROBE: NEGATIVE

## 2021-09-01 ENCOUNTER — LAB REQUISITION (OUTPATIENT)
Dept: LAB | Facility: CLINIC | Age: 86
End: 2021-09-01
Payer: COMMERCIAL

## 2021-09-01 DIAGNOSIS — Z79.899 OTHER LONG TERM (CURRENT) DRUG THERAPY: ICD-10-CM

## 2021-09-02 DIAGNOSIS — M05.742 RHEUMATOID ARTHRITIS INVOLVING BOTH HANDS WITH POSITIVE RHEUMATOID FACTOR (H): ICD-10-CM

## 2021-09-02 DIAGNOSIS — M05.741 RHEUMATOID ARTHRITIS INVOLVING BOTH HANDS WITH POSITIVE RHEUMATOID FACTOR (H): ICD-10-CM

## 2021-09-02 LAB
ALBUMIN SERPL-MCNC: 3.3 G/DL (ref 3.4–5)
ALT SERPL W P-5'-P-CCNC: 21 U/L (ref 0–70)
AST SERPL W P-5'-P-CCNC: 15 U/L (ref 0–45)
BASOPHILS # BLD AUTO: 0.1 10E3/UL (ref 0–0.2)
BASOPHILS NFR BLD AUTO: 1 %
CREAT SERPL-MCNC: 1.23 MG/DL (ref 0.66–1.25)
EOSINOPHIL # BLD AUTO: 0.2 10E3/UL (ref 0–0.7)
EOSINOPHIL NFR BLD AUTO: 4 %
ERYTHROCYTE [DISTWIDTH] IN BLOOD BY AUTOMATED COUNT: 13.6 % (ref 10–15)
GFR SERPL CREATININE-BSD FRML MDRD: 52 ML/MIN/1.73M2
HCT VFR BLD AUTO: 44.4 % (ref 40–53)
HGB BLD-MCNC: 14 G/DL (ref 13.3–17.7)
IMM GRANULOCYTES # BLD: 0 10E3/UL
IMM GRANULOCYTES NFR BLD: 1 %
LYMPHOCYTES # BLD AUTO: 1.9 10E3/UL (ref 0.8–5.3)
LYMPHOCYTES NFR BLD AUTO: 31 %
MCH RBC QN AUTO: 30.3 PG (ref 26.5–33)
MCHC RBC AUTO-ENTMCNC: 31.5 G/DL (ref 31.5–36.5)
MCV RBC AUTO: 96 FL (ref 78–100)
MONOCYTES # BLD AUTO: 0.8 10E3/UL (ref 0–1.3)
MONOCYTES NFR BLD AUTO: 12 %
NEUTROPHILS # BLD AUTO: 3.1 10E3/UL (ref 1.6–8.3)
NEUTROPHILS NFR BLD AUTO: 51 %
NRBC # BLD AUTO: 0 10E3/UL
NRBC BLD AUTO-RTO: 0 /100
PLATELET # BLD AUTO: 236 10E3/UL (ref 150–450)
RBC # BLD AUTO: 4.62 10E6/UL (ref 4.4–5.9)
WBC # BLD AUTO: 6.1 10E3/UL (ref 4–11)

## 2021-09-02 PROCEDURE — 85025 COMPLETE CBC W/AUTO DIFF WBC: CPT | Mod: ORL | Performed by: NURSE PRACTITIONER

## 2021-09-02 PROCEDURE — 82565 ASSAY OF CREATININE: CPT | Mod: ORL | Performed by: NURSE PRACTITIONER

## 2021-09-02 PROCEDURE — 82040 ASSAY OF SERUM ALBUMIN: CPT | Mod: ORL | Performed by: NURSE PRACTITIONER

## 2021-09-02 PROCEDURE — 84450 TRANSFERASE (AST) (SGOT): CPT | Mod: ORL | Performed by: NURSE PRACTITIONER

## 2021-09-02 PROCEDURE — P9603 ONE-WAY ALLOW PRORATED MILES: HCPCS | Mod: ORL | Performed by: NURSE PRACTITIONER

## 2021-09-02 PROCEDURE — 36415 COLL VENOUS BLD VENIPUNCTURE: CPT | Mod: ORL | Performed by: NURSE PRACTITIONER

## 2021-09-02 PROCEDURE — 84460 ALANINE AMINO (ALT) (SGPT): CPT | Mod: ORL | Performed by: NURSE PRACTITIONER

## 2021-09-02 RX ORDER — LEFLUNOMIDE 10 MG/1
TABLET ORAL
Qty: 28 TABLET | Status: SHIPPED | OUTPATIENT
Start: 2021-09-02 | End: 2022-09-01

## 2021-10-02 ENCOUNTER — HEALTH MAINTENANCE LETTER (OUTPATIENT)
Age: 86
End: 2021-10-02

## 2021-10-02 DIAGNOSIS — E53.8 VITAMIN B12 DEFICIENCY (NON ANEMIC): ICD-10-CM

## 2021-10-04 RX ORDER — MAGNESIUM 200 MG
TABLET ORAL
Qty: 28 TABLET | Refills: 11 | Status: SHIPPED | OUTPATIENT
Start: 2021-10-04 | End: 2022-09-01

## 2021-10-13 ENCOUNTER — TELEPHONE (OUTPATIENT)
Dept: GERIATRICS | Facility: CLINIC | Age: 86
End: 2021-10-13

## 2021-10-13 DIAGNOSIS — R23.8 SKIN IRRITATION: Primary | ICD-10-CM

## 2021-10-13 NOTE — TELEPHONE ENCOUNTER
[facility] Nurse Triage Telephone Note    Provider: SHIRA Ann CNP  Facility: [facility] Facility Type:  AL    Caller: Radha  Call Back Number: [phone]    Allergies:  No Known Allergies     Reason for call:     Pt has a red and raw bottom as he has had loose stools today, pt does have a prn imodium but nursing is requesting a barrier cream.     Verbal Order/Direction given by Provider: Leslie Protect to buttocks bid and bid prn     Provider giving Order:  SHIRA Schmidt CNP    Verbal Order given to: Lyndsay Osborn RN

## 2021-10-20 ENCOUNTER — ASSISTED LIVING VISIT (OUTPATIENT)
Dept: GERIATRICS | Facility: CLINIC | Age: 86
End: 2021-10-20
Payer: COMMERCIAL

## 2021-10-20 VITALS — HEIGHT: 64 IN | TEMPERATURE: 97.8 F | WEIGHT: 178 LBS | BODY MASS INDEX: 30.39 KG/M2 | OXYGEN SATURATION: 95 %

## 2021-10-20 DIAGNOSIS — I48.0 PAROXYSMAL A-FIB (H): ICD-10-CM

## 2021-10-20 DIAGNOSIS — N18.30 BENIGN HYPERTENSION WITH CKD (CHRONIC KIDNEY DISEASE) STAGE III (H): Primary | ICD-10-CM

## 2021-10-20 DIAGNOSIS — F01.50 VASCULAR DEMENTIA WITHOUT BEHAVIORAL DISTURBANCE (H): ICD-10-CM

## 2021-10-20 DIAGNOSIS — I12.9 BENIGN HYPERTENSION WITH CKD (CHRONIC KIDNEY DISEASE) STAGE III (H): Primary | ICD-10-CM

## 2021-10-20 DIAGNOSIS — M06.00 SERONEGATIVE RHEUMATOID ARTHRITIS (H): ICD-10-CM

## 2021-10-20 DIAGNOSIS — E53.8 VITAMIN B12 DEFICIENCY (NON ANEMIC): ICD-10-CM

## 2021-10-20 ASSESSMENT — MIFFLIN-ST. JEOR: SCORE: 1378.4

## 2021-10-20 NOTE — LETTER
10/20/2021        RE: Jose E Capps  [place] [address]  [address]  [address]  [place] [address]        Jose E Capps is a 89 year old male seen October 20, 2021 at [facility] where he has resided for 3 years (admit 8/2018) seen to follow up cognitive decline and RA.         Pt is seen in his apartment up ambulating with FWW independently.  Gets slowly down the kyle to his apartment, no pain or dyspnea with activity.  States he is feeling okay, eats /sleeps well.   His meds are brought to him and he feels the Tylenol capsule gets stuck in his throat.   Takes some time to clear that.    By chart review, patient was hospitalized in October 2018 after he took a fall.   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   He went to TCU, regained mobility but has remained cognitively impaired.   He is followed by [facility].    Pt has a long history of seronegative RA, treated with leflunomide per Dr Maurer.   He is ambulatory with FWW and cuing.       Past Medical History:   Diagnosis Date     BPH (benign prostatic hyperplasia)      Colonic polyps     recurrent     CVA (cerebral vascular accident) (H) 2009    posterior circulation     Hyperlipidemia LDL goal <100     reports being intolerant to      Hypertension      Seronegative rheumatoid arthritis (H) 2011    hands, neck,, shoulders    Vascular dementia    Past Surgical History:   Procedure Laterality Date     C LAT LUMBAR SPINE FUSION       JOINT REPLACEMENT, HIP RT/LT  2007    R     JOINT REPLACEMTN, KNEE RT/LT  2004     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    excised     SH:   October 2018.   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   He has 4 daughters and one son.  Daughter Skylar in [facility] is  "first contact.      60 pack year smoking history.      ROS: SLUMS 14/30  Weight is stable     EXAM:  Pleasant, NAD  Temp 97.8  F (36.6  C)   Ht 1.626 m (5' 4\")   Wt 80.7 kg (178 lb)   SpO2 95%   BMI 30.55 kg/m     During visit: HR 59   O2 sat 98% on room air.   Neck supple without adenopathy  Lungs clear bilaterally with fair air movement  Heart RRR s1s2 with 1/6 PRERNA  Abd soft, NT, no distention, +BS  Ext without edema; some joint deformity and swelling right hand  Neuro: very limited history, STML.   No focal findings  Psych: affect okay     Creatinine   Date Value Ref Range Status   09/02/2021 1.23 0.66 - 1.25 mg/dL Final   06/03/2021 1.22 0.66 - 1.25 mg/dL Final     GFR Estimate   Date Value Ref Range Status   09/02/2021 52 (L) >60 mL/min/1.73m2 Final     Lab Results   Component Value Date    AST 15 09/02/2021      ALT 21 09/02/2021      ALBUMIN 3.3 09/02/2021    ALBUMIN 3.4 06/03/2021     Lab Results   Component Value Date    WBC 6.1 09/02/2021      HGB 14.0 09/02/2021      MCV 96 09/02/2021       09/02/2021        ECHO 9/28/18  Interpretation Summary  Sinus rhythm was noted.  Left ventricular systolic function is normal. The visual ejection fraction is estimated at 55-60%.  The left ventricle is normal in size.   There is mild concentric left ventricular hypertrophy.  The right ventricle is normal in structure, function and size.   There is mild biatrial enlargement.  There is mild (1+) aortic regurgitation.    Brain MRI 9/2018  IMPRESSION:   1. Scattered recent ischemic infarcts in the posterior cerebral artery  distributions bilaterally involving posterior medial temporal lobes bilaterally and left thalamus.  2. Diffuse cerebral volume loss and cerebral white matter changes  consistent with chronic small vessel ischemic disease.       IMP/PLAN:    (I48.0) Paroxysmal atrial fibrillation (H)   Comment: stable HRs after discontinuation of metoprolol   Pulse Readings from Last 4 Encounters:   07/09/21 " 79   05/26/21 62   05/05/21 69   02/24/21 (!) 45      Plan: Continue apixaban 5mg bid for stroke prophylaxis.       (I77.74) Vertebral artery dissection (H)  (I63.50) Cerebrovascular accident involving posterior circulation (H)  Comment: residual cognitive impairment now c/w vascular dementia  Plan: atorvastatin 10 mg/day, anticoagulation and bp meds for secondary prevention.   Follow up in Neurology clinic     (I12.9,  N18.3) Benign hypertension with CKD (chronic kidney disease) stage III (H)    Comment: variable  BP Readings from Last 3 Encounters:   07/09/21 133/76   05/26/21 115/60   05/05/21 130/62      Plan: continue lisinopril 10 mg/day.        (F01.50) Vascular dementia without behavioral disturbance  Comment: scores as above, low functional status   Plan: [place] support for med admin, escorts to meals, activity and safety     Remains on memantine 10 mg bid     (M06.00) Seronegative rheumatoid arthritis (H)  Comment: no current pain   Plan: continue leflunomide 10 mg/day; follow up with Dr Maurer.     Acetaminophen scheduled tid and topical diclofenac gel     (E53.8) Vitamin B12 deficiency (non anemic)  Plan: continue po B12 1000 units/day     Paty Jimenez MD           Sincerely,        Paty Jimenez MD

## 2021-10-28 DIAGNOSIS — E78.2 MIXED HYPERLIPIDEMIA: ICD-10-CM

## 2021-10-28 DIAGNOSIS — I48.91 ATRIAL FIBRILLATION, UNSPECIFIED TYPE (H): ICD-10-CM

## 2021-10-28 DIAGNOSIS — E55.9 VITAMIN D DEFICIENCY: ICD-10-CM

## 2021-10-28 DIAGNOSIS — I10 HYPERTENSION GOAL BP (BLOOD PRESSURE) < 140/90: ICD-10-CM

## 2021-10-28 RX ORDER — APIXABAN 5 MG/1
TABLET, FILM COATED ORAL
Qty: 56 TABLET | Refills: 11 | Status: SHIPPED | OUTPATIENT
Start: 2021-10-28 | End: 2022-09-29

## 2021-10-28 RX ORDER — MULTIVIT-MIN/FA/LYCOPEN/LUTEIN .4-300-25
TABLET ORAL
Qty: 28 TABLET | Refills: 11 | Status: SHIPPED | OUTPATIENT
Start: 2021-10-28 | End: 2022-09-29

## 2021-10-28 RX ORDER — ATORVASTATIN CALCIUM 10 MG/1
TABLET, FILM COATED ORAL
Qty: 28 TABLET | Refills: 11 | Status: SHIPPED | OUTPATIENT
Start: 2021-10-28 | End: 2022-10-27

## 2021-10-28 RX ORDER — RESVER/WINE/BFL/GRPSD/PC/C/POM 200MG-60MG
CAPSULE ORAL
Qty: 28 TABLET | Refills: 11 | Status: SHIPPED | OUTPATIENT
Start: 2021-10-28 | End: 2022-09-29

## 2021-11-01 NOTE — PROGRESS NOTES
Jose E Capps is a 89 year old male seen October 20, 2021 at [facility] where he has resided for 3 years (admit 8/2018) seen to follow up cognitive decline and RA.         Pt is seen in his apartment up ambulating with FWW independently.  Gets slowly down the kyle to his apartment, no pain or dyspnea with activity.  States he is feeling okay, eats /sleeps well.   His meds are brought to him and he feels the Tylenol capsule gets stuck in his throat.   Takes some time to clear that.    By chart review, patient was hospitalized in October 2018 after he took a fall.   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   He went to TCU, regained mobility but has remained cognitively impaired.   He is followed by [facility].    Pt has a long history of seronegative RA, treated with leflunomide per Dr Maurer.   He is ambulatory with FWW and cuing.       Past Medical History:   Diagnosis Date     BPH (benign prostatic hyperplasia)      Colonic polyps     recurrent     CVA (cerebral vascular accident) (H) 2009    posterior circulation     Hyperlipidemia LDL goal <100     reports being intolerant to      Hypertension      Seronegative rheumatoid arthritis (H) 2011    hands, neck,, shoulders    Vascular dementia    Past Surgical History:   Procedure Laterality Date     C LAT LUMBAR SPINE FUSION       JOINT REPLACEMENT, HIP RT/LT  2007    R     JOINT REPLACEMTN, KNEE RT/LT  2004     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    excised     SH:   October 2018.   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   He has 4 daughters and one son.  Daughter Skylar in [facility] is first contact.      60 pack year smoking history.      ROS: SLUMS 14/30  Weight is stable     EXAM:  Pleasant,  "NAD  Temp 97.8  F (36.6  C)   Ht 1.626 m (5' 4\")   Wt 80.7 kg (178 lb)   SpO2 95%   BMI 30.55 kg/m     During visit: HR 59   O2 sat 98% on room air.   Neck supple without adenopathy  Lungs clear bilaterally with fair air movement  Heart RRR s1s2 with 1/6 PRERNA  Abd soft, NT, no distention, +BS  Ext without edema; some joint deformity and swelling right hand  Neuro: very limited history, STML.   No focal findings  Psych: affect okay     Creatinine   Date Value Ref Range Status   09/02/2021 1.23 0.66 - 1.25 mg/dL Final   06/03/2021 1.22 0.66 - 1.25 mg/dL Final     GFR Estimate   Date Value Ref Range Status   09/02/2021 52 (L) >60 mL/min/1.73m2 Final     Lab Results   Component Value Date    AST 15 09/02/2021      ALT 21 09/02/2021      ALBUMIN 3.3 09/02/2021    ALBUMIN 3.4 06/03/2021     Lab Results   Component Value Date    WBC 6.1 09/02/2021      HGB 14.0 09/02/2021      MCV 96 09/02/2021       09/02/2021        ECHO 9/28/18  Interpretation Summary  Sinus rhythm was noted.  Left ventricular systolic function is normal. The visual ejection fraction is estimated at 55-60%.  The left ventricle is normal in size.   There is mild concentric left ventricular hypertrophy.  The right ventricle is normal in structure, function and size.   There is mild biatrial enlargement.  There is mild (1+) aortic regurgitation.    Brain MRI 9/2018  IMPRESSION:   1. Scattered recent ischemic infarcts in the posterior cerebral artery  distributions bilaterally involving posterior medial temporal lobes bilaterally and left thalamus.  2. Diffuse cerebral volume loss and cerebral white matter changes  consistent with chronic small vessel ischemic disease.       IMP/PLAN:    (I48.0) Paroxysmal atrial fibrillation (H)   Comment: stable HRs after discontinuation of metoprolol   Pulse Readings from Last 4 Encounters:   07/09/21 79   05/26/21 62   05/05/21 69   02/24/21 (!) 45      Plan: Continue apixaban 5mg bid for stroke prophylaxis.   "     (I77.74) Vertebral artery dissection (H)  (I63.50) Cerebrovascular accident involving posterior circulation (H)  Comment: residual cognitive impairment now c/w vascular dementia  Plan: atorvastatin 10 mg/day, anticoagulation and bp meds for secondary prevention.   Follow up in Neurology clinic     (I12.9,  N18.3) Benign hypertension with CKD (chronic kidney disease) stage III (H)    Comment: variable  BP Readings from Last 3 Encounters:   07/09/21 133/76   05/26/21 115/60   05/05/21 130/62      Plan: continue lisinopril 10 mg/day.        (F01.50) Vascular dementia without behavioral disturbance  Comment: scores as above, low functional status   Plan: AL support for med admin, escorts to meals, activity and safety     Remains on memantine 10 mg bid     (M06.00) Seronegative rheumatoid arthritis (H)  Comment: no current pain   Plan: continue leflunomide 10 mg/day; follow up with Dr Maurer.     Acetaminophen scheduled tid and topical diclofenac gel     (E53.8) Vitamin B12 deficiency (non anemic)  Plan: continue po B12 1000 units/day     Paty Jimenez MD

## 2021-12-01 ENCOUNTER — LAB REQUISITION (OUTPATIENT)
Dept: LAB | Facility: CLINIC | Age: 86
End: 2021-12-01
Payer: COMMERCIAL

## 2021-12-01 DIAGNOSIS — Z79.899 OTHER LONG TERM (CURRENT) DRUG THERAPY: ICD-10-CM

## 2021-12-02 LAB
ALBUMIN SERPL-MCNC: 3.6 G/DL (ref 3.4–5)
ALT SERPL W P-5'-P-CCNC: 24 U/L (ref 0–70)
AST SERPL W P-5'-P-CCNC: 13 U/L (ref 0–45)
BASOPHILS # BLD AUTO: 0.1 10E3/UL (ref 0–0.2)
BASOPHILS NFR BLD AUTO: 1 %
CREAT SERPL-MCNC: 1.09 MG/DL (ref 0.66–1.25)
EOSINOPHIL # BLD AUTO: 0.3 10E3/UL (ref 0–0.7)
EOSINOPHIL NFR BLD AUTO: 4 %
ERYTHROCYTE [DISTWIDTH] IN BLOOD BY AUTOMATED COUNT: 14.2 % (ref 10–15)
GFR SERPL CREATININE-BSD FRML MDRD: 59 ML/MIN/1.73M2
HCT VFR BLD AUTO: 50.3 % (ref 40–53)
HGB BLD-MCNC: 15.6 G/DL (ref 13.3–17.7)
IMM GRANULOCYTES # BLD: 0.1 10E3/UL
IMM GRANULOCYTES NFR BLD: 1 %
LYMPHOCYTES # BLD AUTO: 2.5 10E3/UL (ref 0.8–5.3)
LYMPHOCYTES NFR BLD AUTO: 33 %
MCH RBC QN AUTO: 30.3 PG (ref 26.5–33)
MCHC RBC AUTO-ENTMCNC: 31 G/DL (ref 31.5–36.5)
MCV RBC AUTO: 98 FL (ref 78–100)
MONOCYTES # BLD AUTO: 0.8 10E3/UL (ref 0–1.3)
MONOCYTES NFR BLD AUTO: 10 %
NEUTROPHILS # BLD AUTO: 4 10E3/UL (ref 1.6–8.3)
NEUTROPHILS NFR BLD AUTO: 51 %
NRBC # BLD AUTO: 0 10E3/UL
NRBC BLD AUTO-RTO: 0 /100
PLATELET # BLD AUTO: 281 10E3/UL (ref 150–450)
RBC # BLD AUTO: 5.15 10E6/UL (ref 4.4–5.9)
WBC # BLD AUTO: 7.7 10E3/UL (ref 4–11)

## 2021-12-02 PROCEDURE — 36415 COLL VENOUS BLD VENIPUNCTURE: CPT | Mod: ORL | Performed by: NURSE PRACTITIONER

## 2021-12-02 PROCEDURE — 85025 COMPLETE CBC W/AUTO DIFF WBC: CPT | Mod: ORL | Performed by: NURSE PRACTITIONER

## 2021-12-02 PROCEDURE — 82040 ASSAY OF SERUM ALBUMIN: CPT | Mod: ORL | Performed by: NURSE PRACTITIONER

## 2021-12-02 PROCEDURE — 84460 ALANINE AMINO (ALT) (SGPT): CPT | Mod: ORL | Performed by: NURSE PRACTITIONER

## 2021-12-02 PROCEDURE — 84450 TRANSFERASE (AST) (SGOT): CPT | Mod: ORL | Performed by: NURSE PRACTITIONER

## 2021-12-02 PROCEDURE — 82565 ASSAY OF CREATININE: CPT | Mod: ORL | Performed by: NURSE PRACTITIONER

## 2021-12-02 PROCEDURE — P9604 ONE-WAY ALLOW PRORATED TRIP: HCPCS | Mod: ORL | Performed by: NURSE PRACTITIONER

## 2021-12-22 ENCOUNTER — DOCUMENTATION ONLY (OUTPATIENT)
Dept: OTHER | Facility: CLINIC | Age: 86
End: 2021-12-22

## 2021-12-22 ENCOUNTER — ASSISTED LIVING VISIT (OUTPATIENT)
Dept: GERIATRICS | Facility: CLINIC | Age: 86
End: 2021-12-22
Payer: COMMERCIAL

## 2021-12-22 VITALS
HEIGHT: 64 IN | RESPIRATION RATE: 16 BRPM | SYSTOLIC BLOOD PRESSURE: 139 MMHG | DIASTOLIC BLOOD PRESSURE: 69 MMHG | BODY MASS INDEX: 30.39 KG/M2 | OXYGEN SATURATION: 96 % | WEIGHT: 178 LBS | TEMPERATURE: 97.3 F | HEART RATE: 50 BPM

## 2021-12-22 DIAGNOSIS — I77.74 VERTEBRAL ARTERY DISSECTION (H): ICD-10-CM

## 2021-12-22 DIAGNOSIS — I10 HYPERTENSION GOAL BP (BLOOD PRESSURE) < 140/90: ICD-10-CM

## 2021-12-22 DIAGNOSIS — I50.32 CHRONIC DIASTOLIC CONGESTIVE HEART FAILURE (H): Primary | ICD-10-CM

## 2021-12-22 DIAGNOSIS — L84 CALLUS: ICD-10-CM

## 2021-12-22 DIAGNOSIS — N18.31 STAGE 3A CHRONIC KIDNEY DISEASE (H): ICD-10-CM

## 2021-12-22 DIAGNOSIS — I48.0 PAROXYSMAL A-FIB (H): ICD-10-CM

## 2021-12-22 DIAGNOSIS — M05.742 RHEUMATOID ARTHRITIS INVOLVING BOTH HANDS WITH POSITIVE RHEUMATOID FACTOR (H): ICD-10-CM

## 2021-12-22 DIAGNOSIS — I25.119 CORONARY ARTERY DISEASE WITH ANGINA PECTORIS, UNSPECIFIED VESSEL OR LESION TYPE, UNSPECIFIED WHETHER NATIVE OR TRANSPLANTED HEART (H): ICD-10-CM

## 2021-12-22 DIAGNOSIS — I63.532 CEREBROVASCULAR ACCIDENT (CVA) DUE TO OCCLUSION OF LEFT POSTERIOR CEREBRAL ARTERY (H): ICD-10-CM

## 2021-12-22 DIAGNOSIS — M05.741 RHEUMATOID ARTHRITIS INVOLVING BOTH HANDS WITH POSITIVE RHEUMATOID FACTOR (H): ICD-10-CM

## 2021-12-22 DIAGNOSIS — E04.1 THYROID NODULE: ICD-10-CM

## 2021-12-22 ASSESSMENT — MIFFLIN-ST. JEOR: SCORE: 1378.4

## 2021-12-22 NOTE — LETTER
Orders for Jose E Capps    1. BMP next week on lab day if BMP of 12/2/21 only shows creatinine. Dx: HTN       Jerry Ritter, APRN CNP on 12/22/2021 at 2:51 PM

## 2021-12-22 NOTE — PROGRESS NOTES
[facility]  Chief Complaint   Patient presents with     Annual Comprehensive Exam Assisted Living     [facility] Record Number:  [id number]  Place of Service where encounter took place:  [facility] [[id number]]    HPI:    Jose E Capps  is a 89 year old  (9/28/1931), who is being seen today for an annual comprehensive visit. HPI information obtained from: facility chart records and [facility] chart review.  Today's concerns are:    Seen today for follow up to chronic conditions. He has no concerns, denies pain, SOB, abdominal discomfort. In facility chart review noted with blister on 3rd toe which appears now as a callus. Notes also says he is resistant to showers and his daughter says this is improving. Has a PCA that has been very helpful in AL setting. Also coming to community areas much more in past 6 months. STML, dementia, likes to joke.      By chart review from Dr. Jimenez:  patient was hospitalized in October 2018 after he took a fall.   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   He went to TCU, regained mobility but has remained cognitively impaired.   He is followed by [facility].    Pt has a long history of seronegative RA, treated with leflunomide per Dr Maurer.        ALLERGIES: Patient has no known allergies.  PAST MEDICAL HISTORY:  has a past medical history of Atherosclerosis of native coronary artery of native heart without angina pectoris (9/11/2018), BPH (benign prostatic hyperplasia), Cerebrovascular accident (CVA) due to occlusion of left posterior cerebral artery (H) (10/30/2018), Chronic diastolic congestive heart failure (H) (6/26/2019), CKD (chronic kidney disease) stage 3, GFR 30-59  ml/min (H) (9/14/2020), Colon polyp (1/17/2018), Colonic polyps, Coronary artery disease with angina pectoris, unspecified vessel or lesion type, unspecified whether native or transplanted heart (H) (2/25/2020), CVA (cerebral vascular accident) (H) (2009), Falls, initial encounter (9/28/2018), Hemiplegia and hemiparesis following cerebral infarction affecting right dominant side (H) (6/26/2019), History of basal cell carcinoma (9/6/2017), History of malignant melanoma of skin (9/6/2017), History of TIA (transient ischemic attack)  (1/17/2018), Hyperlipidemia LDL goal <100, Hypertension, Hypertension goal BP (blood pressure) < 140/90 (5/7/2012), Injury of toe, left, initial encounter (7/9/2021), Paroxysmal A-fib (H) (2/25/2020), Physical deconditioning (10/22/2018), RBBB (right bundle branch block) (1/9/2018), Seronegative rheumatoid arthritis (H) (2011), Thyroid nodule (5/20/2020), Vascular dementia without behavioral disturbance (H) (5/20/2020), and Vertebral artery dissection (H) (2/25/2020).    He has no past medical history of Asymptomatic human immunodeficiency virus (HIV) infection status (H), Blood in semen, Cerebral palsy (H), Complication of anesthesia, Congenital renal agenesis and dysgenesis, Epididymitis, bilateral, Epididymitis, left, Epididymitis, right, Goiter, Gout, Hernia, abdominal, History of spinal cord injury, History of thrombophlebitis, Horseshoe kidney, Hydrocephalus (H), Malignant hyperthermia, Mumps, Orchitis, epididymitis, and epididymo-orchitis, with abscess, Palpitations, Parkinsons disease (H), Penile discharge, Prostate infection, Spider veins, Spina bifida (H), STD (sexually transmitted disease), Swelling of testicle, Tethered cord (H), or Tuberculosis.  PAST SURGICAL HISTORY:  has a past surgical history that includes joint replacement, hip rt/lt (2007); joint replacemtn, knee rt/lt (2004); melanoma greater than ajcc stage 0  or 1a (1978); and LAT LUMBAR SPINE  FUSION.  IMMUNIZATIONS:  Immunization History   Administered Date(s) Administered     COVID-19,PF,Moderna 01/15/2021, 02/12/2021, 11/23/2021     Influenza (High Dose) 3 valent vaccine 10/23/2012, 10/23/2015, 10/10/2016, 01/09/2018, 09/11/2018, 09/25/2019     Influenza (IIV3) PF 09/01/2011, 10/23/2012, 11/25/2013, 10/10/2016, 01/09/2018     Influenza Quad, Recombinant, pf(RIV4) (Flublok) 09/23/2021     Influenza, Quad, High Dose, Pf, 65yr+ (Fluzone HD) 09/23/2020     Pneumo Conj 13-V (2010&after) 01/09/2018     Pneumococcal 23 valent 09/01/2011     TDAP Vaccine (Adacel) 01/09/2018     Zoster vaccine recombinant adjuvanted (SHINGRIX) 10/30/2018     Above immunizations pulled from [facility]. [facility] and facility records also reconciled. Outstanding information sent to  to update [facility] .  Future immunizations are not needed at this point as all recommended immunizations are up to date.     Current Outpatient Medications   Medication Sig Dispense Refill     acetaminophen (ACETAMINOPHEN EXTRA STRENGTH) 500 MG tablet Take 2 tablets (1,000 mg) by mouth 3 times daily 112 tablet 97     ASPIRIN NOT PRESCRIBED (INTENTIONAL) Please choose reason not prescribed, below 0 each 0     atorvastatin (LIPITOR) 10 MG tablet TAKE 1 TABLET BY MOUTH ONCE DAILY 28 tablet 11     Leslie Protect (EUCERIN) external cream Apply topically 2 times daily & bid prn to buttocks 57 g 11     cyanocobalamin (VITAMIN B-12) 1000 MCG SUBL sublingual tablet DISSOLVE ONE-HALF LOZENGE (500MCG) BY MOUTH TWICE DAILY 28 tablet 11     D-5000 125 MCG (5000 UT) tablet TAKE 1 TABLET BY MOUTH ONCE DAILY 28 tablet 11     ELIQUIS ANTICOAGULANT 5 MG tablet TAKE 1 TABLET BY MOUTH TWICE DAILY 56 tablet 11     leflunomide (ARAVA) 10 MG tablet TAKE 1 TABLET BY MOUTH ONCE DAILY 28 tablet PRN     lisinopril (ZESTRIL) 10 MG tablet TAKE 1 TABLET BY MOUTH ONCE DAILY 28 tablet 97     loperamide (IMODIUM) 2 MG capsule TAKE 1 CAPSULE BY MOUTH FOUR TIMES DAILY  "AS NEEDED FOR DIARRHEA 30 capsule 11     memantine (NAMENDA) 10 MG tablet TAKE 1 TABLET BY MOUTH TWICE DAILY 56 tablet PRN     Multiple Vitamins-Minerals (CEROVITE SENIOR) TABS TAKE 1 TABLET BY MOUTH ONCE DAILY 28 tablet 11     polyethylene glycol (MIRALAX) 17 GM/Dose powder MIX 17GM OF POWDER IN 8OZ OF WATER UNTIL COMPLETELY DISSOLVED. DRINK SOLUTION BY MOUTH ONCE DAILY AS NEEDED FOR CONSTIPATION 510 g 97     povidone-iodine 10 % swab Apply topically daily as needed       diclofenac (VOLTAREN) 1 % topical gel Apply 4 g topically 2 times daily And twice daily to knees 100 g 11     Case Management:  I have reviewed the Assisted Living care plan, current immunizations and preventive care/cancer screening. .Future cancer screening is not clinically indicated secondary to age/goals of care Patient's desire to return to the community is present, but is not able due to care needs . Current Level of Care is appropriate.    Advance Directive Discussion:    I reviewed the current advanced directives as reflected in EPIC, the POLST and the facility chart, and verified the congruency of orders. I contacted the first party daughter Skylar and discussed the plan of Care.  I did not due to cognitive impairment review the advance directives with the resident.     Team Discussion:  I communicated with the appropriate disciplines involved with the Plan of Care:   Nursing    Patient's goal is pain control and comfort. Treat reversible conditions   Information reviewed:  Medications, vital signs, orders, and nursing notes.    ROS:  Limited secondary to cognitive impairment but today pt reports fine    Vitals:  /69   Pulse 50   Temp 97.3  F (36.3  C)   Resp 16   Ht 1.626 m (5' 4\")   Wt 80.7 kg (178 lb)   SpO2 96%   BMI 30.55 kg/m   Body mass index is 30.55 kg/m .  Exam:  GENERAL: sleeping in bed  ENT:  Mouth and posterior oropharynx normal, dry mucous membranes  EYES:  EOM, conjunctivae, lids, pupils and irises " normal-glasses  RESP:  respiratory effort appears at baseline, LS clear   CV:  regular rate, irregular rhythm, no LE edema  ABDOMEN: soft nontender, BS present   M/S: mostly use of WC or 2WW. Edema +1 to right hand and digits chronic  SKIN:  no signs of open areas to viewed areas, no redness to yobany area   NEURO:   Cranial nerves 2-12 are normal tested and grossly at patient's baseline  PSYCH:  insight and judgement impaired, memory impaired    Lab/Diagnostic data:   CBC RESULTS: Recent Labs   Lab Test 12/02/21 0816 09/02/21 0807   WBC 7.7 6.1   RBC 5.15 4.62   HGB 15.6 14.0   HCT 50.3 44.4   MCV 98 96   MCH 30.3 30.3   MCHC 31.0* 31.5   RDW 14.2 13.6    236       Last Basic Metabolic Panel:  Recent Labs   Lab Test 12/02/21 0816 09/02/21 0807 08/28/20 0925 05/28/20  1300 12/05/19  0840 07/03/19  0845   NA  --   --   --  139  --  142   POTASSIUM  --   --   --  4.5  --  4.5   CHLORIDE  --   --   --  106  --  110*   GISELA  --   --   --  8.7  --  8.8   CO2  --   --   --  29  --  28   BUN  --   --   --  28  --  22   CR 1.09 1.23   < > 1.22   < > 1.07   GLC  --   --   --  150*  --  103*    < > = values in this interval not displayed.       Liver Function Studies -   Recent Labs   Lab Test 12/02/21 0816 09/02/21 0807 08/28/20 0925 05/28/20  1300 12/05/19  0840 07/03/19  0845   PROTTOTAL  --   --   --  6.8  --  7.0   ALBUMIN 3.6 3.3*   < > 3.4   < > 3.6   BILITOTAL  --   --   --  0.5  --  0.5   ALKPHOS  --   --   --  66  --  73   AST 13 15   < > 23   < > 15   ALT 24 21   < > 29   < > 26    < > = values in this interval not displayed.       TSH   Date Value Ref Range Status   05/28/2020 1.63 0.40 - 4.00 mU/L Final   07/03/2019 1.72 0.40 - 4.00 mU/L Final       Lab Results   Component Value Date    A1C 5.6 09/28/2018    A1C 6.0 07/01/2015     ASSESSMENT/PLAN  (I50.32) Chronic diastolic congestive heart failure (H)  (I48.0) Paroxysmal A-fib (H)  (I25.119) Coronary artery disease with angina pectoris,  unspecified   vessel or lesion type, unspecified whether native or transplanted heart (H)  (I10) Hypertension goal BP (blood pressure) < 140/90   Comment: stable-Echo 9/2018 EF at 55-60% Hypotension with past BPs and bradycardic  Plan:   -statin, Eliquis and BB discontinue with lower HR and BP  -Lisinopril 10 mg po daily   -follow BMP     (E04.1) Thyroid nodule  Comment: noted on imaging a right sided nodule measuring 2.4cm 9/2018  Plan:  -following TSH  -repeat imaging 7/2020 showed 2.2cm solid nodule in the inferior right lobe. Consider US guided fine needle biopsy. Reviewed with his daughter who declined.     (M05.741,  M05.742) Rheumatoid arthritis involving both hands with positive rheumatoid factor (H)  Comment: no current pain   Plan: continue leflunomide; follow up with Dr Maurer.     Acetaminophen scheduled bid and prn     (I77.74) Vertebral artery dissection (H)  (I63.50) Cerebrovascular accident involving posterior circulation (H)  Comment: residual cognitive impairment now c/w vascular dementia  Plan: statin, anticoagulation and bp meds for secondary prevention.   Follow up in Neurology clinic as scheduled. Chronic right hand swelling.     (N18.31) Stage 3a chronic kidney disease  Comment: chronic   Plan:   -follow labs, renal dose medications      (L84) Callus  Comment: 3rd toe left foot  Plan:  -seeing onsite podiatry  -foot soaks with PCA and moisturizing, newer shoes                      Electronically signed by:  SHIRA Parra CNP

## 2021-12-22 NOTE — LETTER
12/22/2021        RE: Jose E Boateng [facility]  [address]  [address]  [place] [address]        [facility]  Chief Complaint   Patient presents with     Annual Comprehensive Exam Assisted Living     [facility] Medical Record Number:  [id number]  Place of Service where encounter took place:  [facility] [[id number]]    HPI:    Jose E Capps  is a 89 year old  (9/28/1931), who is being seen today for an annual comprehensive visit. HPI information obtained from: facility chart records and [facility] Epic chart review.  Today's concerns are:    Seen today for follow up to chronic conditions. He has no concerns, denies pain, SOB, abdominal discomfort. In facility chart review noted with blister on 3rd toe which appears now as a callus. Notes also says he is resistant to showers and his daughter says this is improving. Has a PCA that has been very helpful in AL setting. Also coming to community areas much more in past 6 months. STML, dementia, likes to joke.      By chart review from Dr. Jimenez:  patient was hospitalized in October 2018 after he took a fall.   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   He went to TCU, regained mobility but has remained cognitively impaired.   He is followed by [facility].    Pt has a long history of seronegative RA, treated with leflunomide per Dr Maurer.        ALLERGIES: Patient has no known allergies.  PAST MEDICAL HISTORY:  has a past medical history of Atherosclerosis of native coronary artery of native heart without angina pectoris (9/11/2018), BPH (benign prostatic hyperplasia), Cerebrovascular accident (CVA) due to occlusion of left posterior cerebral artery (H)  (10/30/2018), Chronic diastolic congestive heart failure (H) (6/26/2019), CKD (chronic kidney disease) stage 3, GFR 30-59 ml/min (H) (9/14/2020), Colon polyp (1/17/2018), Colonic polyps, Coronary artery disease with angina pectoris, unspecified vessel or lesion type, unspecified whether native or transplanted heart (H) (2/25/2020), CVA (cerebral vascular accident) (H) (2009), Falls, initial encounter (9/28/2018), Hemiplegia and hemiparesis following cerebral infarction affecting right dominant side (H) (6/26/2019), History of basal cell carcinoma (9/6/2017), History of malignant melanoma of skin (9/6/2017), History of TIA (transient ischemic attack)  (1/17/2018), Hyperlipidemia LDL goal <100, Hypertension, Hypertension goal BP (blood pressure) < 140/90 (5/7/2012), Injury of toe, left, initial encounter (7/9/2021), Paroxysmal A-fib (H) (2/25/2020), Physical deconditioning (10/22/2018), RBBB (right bundle branch block) (1/9/2018), Seronegative rheumatoid arthritis (H) (2011), Thyroid nodule (5/20/2020), Vascular dementia without behavioral disturbance (H) (5/20/2020), and Vertebral artery dissection (H) (2/25/2020).    He has no past medical history of Asymptomatic human immunodeficiency virus (HIV) infection status (H), Blood in semen, Cerebral palsy (H), Complication of anesthesia, Congenital renal agenesis and dysgenesis, Epididymitis, bilateral, Epididymitis, left, Epididymitis, right, Goiter, Gout, Hernia, abdominal, History of spinal cord injury, History of thrombophlebitis, Horseshoe kidney, Hydrocephalus (H), Malignant hyperthermia, Mumps, Orchitis, epididymitis, and epididymo-orchitis, with abscess, Palpitations, Parkinsons disease (H), Penile discharge, Prostate infection, Spider veins, Spina bifida (H), STD (sexually transmitted disease), Swelling of testicle, Tethered cord (H), or Tuberculosis.  PAST SURGICAL HISTORY:  has a past surgical history that includes joint replacement, hip rt/lt (2007); joint  replacemtn, knee rt/lt (2004); melanoma greater than ajcc stage 0  or 1a (1978); and LAT LUMBAR SPINE FUSION.  IMMUNIZATIONS:  Immunization History   Administered Date(s) Administered     COVID-19,PF,Moderna 01/15/2021, 02/12/2021, 11/23/2021     Influenza (High Dose) 3 valent vaccine 10/23/2012, 10/23/2015, 10/10/2016, 01/09/2018, 09/11/2018, 09/25/2019     Influenza (IIV3) PF 09/01/2011, 10/23/2012, 11/25/2013, 10/10/2016, 01/09/2018     Influenza Quad, Recombinant, pf(RIV4) (Flublok) 09/23/2021     Influenza, Quad, High Dose, Pf, 65yr+ (Fluzone HD) 09/23/2020     Pneumo Conj 13-V (2010&after) 01/09/2018     Pneumococcal 23 valent 09/01/2011     TDAP Vaccine (Adacel) 01/09/2018     Zoster vaccine recombinant adjuvanted (SHINGRIX) 10/30/2018     Above immunizations pulled from [employer]. [facility] and facility records also reconciled. Outstanding information sent to  to update [facility] .  Future immunizations are not needed at this point as all recommended immunizations are up to date.     Current Outpatient Medications   Medication Sig Dispense Refill     acetaminophen (ACETAMINOPHEN EXTRA STRENGTH) 500 MG tablet Take 2 tablets (1,000 mg) by mouth 3 times daily 112 tablet 97     ASPIRIN NOT PRESCRIBED (INTENTIONAL) Please choose reason not prescribed, below 0 each 0     atorvastatin (LIPITOR) 10 MG tablet TAKE 1 TABLET BY MOUTH ONCE DAILY 28 tablet 11     Leslie Protect (EUCERIN) external cream Apply topically 2 times daily & bid prn to buttocks 57 g 11     cyanocobalamin (VITAMIN B-12) 1000 MCG SUBL sublingual tablet DISSOLVE ONE-HALF LOZENGE (500MCG) BY MOUTH TWICE DAILY 28 tablet 11     D-5000 125 MCG (5000 UT) tablet TAKE 1 TABLET BY MOUTH ONCE DAILY 28 tablet 11     ELIQUIS ANTICOAGULANT 5 MG tablet TAKE 1 TABLET BY MOUTH TWICE DAILY 56 tablet 11     leflunomide (ARAVA) 10 MG tablet TAKE 1 TABLET BY MOUTH ONCE DAILY 28 tablet PRN     lisinopril (ZESTRIL) 10 MG tablet TAKE 1 TABLET BY MOUTH  "ONCE DAILY 28 tablet 97     loperamide (IMODIUM) 2 MG capsule TAKE 1 CAPSULE BY MOUTH FOUR TIMES DAILY AS NEEDED FOR DIARRHEA 30 capsule 11     memantine (NAMENDA) 10 MG tablet TAKE 1 TABLET BY MOUTH TWICE DAILY 56 tablet PRN     Multiple Vitamins-Minerals (CEROVITE SENIOR) TABS TAKE 1 TABLET BY MOUTH ONCE DAILY 28 tablet 11     polyethylene glycol (MIRALAX) 17 GM/Dose powder MIX 17GM OF POWDER IN 8OZ OF WATER UNTIL COMPLETELY DISSOLVED. DRINK SOLUTION BY MOUTH ONCE DAILY AS NEEDED FOR CONSTIPATION 510 g 97     povidone-iodine 10 % swab Apply topically daily as needed       diclofenac (VOLTAREN) 1 % topical gel Apply 4 g topically 2 times daily And twice daily to knees 100 g 11     Case Management:  I have reviewed the Assisted Living care plan, current immunizations and preventive care/cancer screening. .Future cancer screening is not clinically indicated secondary to age/goals of care Patient's desire to return to the community is present, but is not able due to care needs . Current Level of Care is appropriate.    Advance Directive Discussion:    I reviewed the current advanced directives as reflected in EPIC, the POLST and the facility chart, and verified the congruency of orders. I contacted the first party daughter Skylar and discussed the plan of Care.  I did not due to cognitive impairment review the advance directives with the resident.     Team Discussion:  I communicated with the appropriate disciplines involved with the Plan of Care:   Nursing    Patient's goal is pain control and comfort. Treat reversible conditions   Information reviewed:  Medications, vital signs, orders, and nursing notes.    ROS:  Limited secondary to cognitive impairment but today pt reports fine    Vitals:  /69   Pulse 50   Temp 97.3  F (36.3  C)   Resp 16   Ht 1.626 m (5' 4\")   Wt 80.7 kg (178 lb)   SpO2 96%   BMI 30.55 kg/m   Body mass index is 30.55 kg/m .  Exam:  GENERAL: sleeping in bed  ENT:  Mouth and posterior " oropharynx normal, dry mucous membranes  EYES:  EOM, conjunctivae, lids, pupils and irises normal-glasses  RESP:  respiratory effort appears at baseline, LS clear   CV:  regular rate, irregular rhythm, no LE edema  ABDOMEN: soft nontender, BS present   M/S: mostly use of WC or 2WW. Edema +1 to right hand and digits chronic  SKIN:  no signs of open areas to viewed areas, no redness to [place]ob[place] area   NEURO:   Cranial nerves 2-12 are normal tested and grossly at patient's baseline  PSYCH:  insight and judgement impaired, memory impaired    Lab/Diagnostic data:   CBC RESULTS: Recent Labs   Lab Test 12/02/21 0816 09/02/21 0807   WBC 7.7 6.1   RBC 5.15 4.62   HGB 15.6 14.0   HCT 50.3 44.4   MCV 98 96   MCH 30.3 30.3   MCHC 31.0* 31.5   RDW 14.2 13.6    236       Last Basic Metabolic Panel:  Recent Labs   Lab Test 12/02/21 0816 09/02/21 0807 08/28/20 0925 05/28/20  1300 12/05/19  0840 07/03/19  0845   NA  --   --   --  139  --  142   POTASSIUM  --   --   --  4.5  --  4.5   CHLORIDE  --   --   --  106  --  110*   GISELA  --   --   --  8.7  --  8.8   CO2  --   --   --  29  --  28   BUN  --   --   --  28  --  22   CR 1.09 1.23   < > 1.22   < > 1.07   GLC  --   --   --  150*  --  103*    < > = values in this interval not displayed.       Liver Function Studies -   Recent Labs   Lab Test 12/02/21 0816 09/02/21 0807 08/28/20 0925 05/28/20  1300 12/05/19  0840 07/03/19  0845   PROTTOTAL  --   --   --  6.8  --  7.0   ALBUMIN 3.6 3.3*   < > 3.4   < > 3.6   BILITOTAL  --   --   --  0.5  --  0.5   ALKPHOS  --   --   --  66  --  73   AST 13 15   < > 23   < > 15   ALT 24 21   < > 29   < > 26    < > = values in this interval not displayed.       TSH   Date Value Ref Range Status   05/28/2020 1.63 0.40 - 4.00 mU/L Final   07/03/2019 1.72 0.40 - 4.00 mU/L Final       Lab Results   Component Value Date    A1C 5.6 09/28/2018    A1C 6.0 07/01/2015     ASSESSMENT/PLAN  (I50.32) Chronic diastolic congestive heart failure (H)  (I48.0)  Paroxysmal A-fib (H)  (I25.119) Coronary artery disease with angina pectoris, unspecified   vessel or lesion type, unspecified whether native or transplanted heart (H)  (I10) Hypertension goal BP (blood pressure) < 140/90   Comment: stable-Echo 9/2018 EF at 55-60% Hypotension with past BPs and bradycardic  Plan:   -statin, Eliquis and BB discontinue with lower HR and BP  -Lisinopril 10 mg po daily   -follow BMP     (E04.1) Thyroid nodule  Comment: noted on imaging a right sided nodule measuring 2.4cm 9/2018  Plan:  -following TSH  -repeat imaging 7/2020 showed 2.2cm solid nodule in the inferior right lobe. Consider US guided fine needle biopsy. Reviewed with his daughter who declined.     (M05.741,  M05.742) Rheumatoid arthritis involving both hands with positive rheumatoid factor (H)  Comment: no current pain   Plan: continue leflunomide; follow up with Dr Maurer.     Acetaminophen scheduled bid and prn     (I77.74) Vertebral artery dissection (H)  (I63.50) Cerebrovascular accident involving posterior circulation (H)  Comment: residual cognitive impairment now c/w vascular dementia  Plan: statin, anticoagulation and bp meds for secondary prevention.   Follow up in Neurology clinic as scheduled. Chronic right hand swelling.     (N18.31) Stage 3a chronic kidney disease  Comment: chronic   Plan:   -follow labs, renal dose medications      (L84) Callus  Comment: 3rd toe left foot  Plan:  -seeing onsite podiatry  -foot soaks with PCA and moisturizing, newer shoes                      Electronically signed by:  SHIRA Parra CNP               Sincerely,        SHIRA Parra CNP

## 2021-12-23 DIAGNOSIS — F01.50 VASCULAR DEMENTIA WITHOUT BEHAVIORAL DISTURBANCE (H): ICD-10-CM

## 2021-12-23 RX ORDER — MEMANTINE HYDROCHLORIDE 10 MG/1
TABLET ORAL
Qty: 56 TABLET | Refills: 97 | Status: SHIPPED | OUTPATIENT
Start: 2021-12-23

## 2021-12-29 ENCOUNTER — LAB REQUISITION (OUTPATIENT)
Dept: LAB | Facility: CLINIC | Age: 86
End: 2021-12-29
Payer: COMMERCIAL

## 2021-12-29 DIAGNOSIS — I10 ESSENTIAL (PRIMARY) HYPERTENSION: ICD-10-CM

## 2022-01-05 ENCOUNTER — LAB REQUISITION (OUTPATIENT)
Dept: LAB | Facility: CLINIC | Age: 87
End: 2022-01-05
Payer: COMMERCIAL

## 2022-01-05 DIAGNOSIS — I10 ESSENTIAL (PRIMARY) HYPERTENSION: ICD-10-CM

## 2022-01-19 ENCOUNTER — LAB REQUISITION (OUTPATIENT)
Dept: LAB | Facility: CLINIC | Age: 87
End: 2022-01-19
Payer: COMMERCIAL

## 2022-01-19 DIAGNOSIS — U07.1 COVID-19: ICD-10-CM

## 2022-01-19 PROCEDURE — U0005 INFEC AGEN DETEC AMPLI PROBE: HCPCS | Mod: ORL | Performed by: INTERNAL MEDICINE

## 2022-01-20 LAB — SARS-COV-2 RNA RESP QL NAA+PROBE: NEGATIVE

## 2022-01-25 PROCEDURE — U0005 INFEC AGEN DETEC AMPLI PROBE: HCPCS | Mod: ORL | Performed by: INTERNAL MEDICINE

## 2022-01-26 ENCOUNTER — LAB REQUISITION (OUTPATIENT)
Dept: LAB | Facility: CLINIC | Age: 87
End: 2022-01-26
Payer: COMMERCIAL

## 2022-01-27 DIAGNOSIS — R19.7 DIARRHEA, UNSPECIFIED TYPE: ICD-10-CM

## 2022-01-27 LAB — SARS-COV-2 RNA RESP QL NAA+PROBE: NEGATIVE

## 2022-01-27 RX ORDER — LOPERAMIDE HCL 2 MG
CAPSULE ORAL
Qty: 30 CAPSULE | Refills: 10 | Status: SHIPPED | OUTPATIENT
Start: 2022-01-27

## 2022-02-01 ENCOUNTER — LAB REQUISITION (OUTPATIENT)
Dept: LAB | Facility: CLINIC | Age: 87
End: 2022-02-01
Payer: COMMERCIAL

## 2022-02-01 DIAGNOSIS — U07.1 COVID-19: ICD-10-CM

## 2022-02-01 LAB — SARS-COV-2 RNA RESP QL NAA+PROBE: NORMAL

## 2022-02-01 PROCEDURE — U0005 INFEC AGEN DETEC AMPLI PROBE: HCPCS | Mod: ORL | Performed by: INTERNAL MEDICINE

## 2022-02-02 LAB — SARS-COV-2 RNA RESP QL NAA+PROBE: NOT DETECTED

## 2022-02-08 ENCOUNTER — LAB REQUISITION (OUTPATIENT)
Dept: LAB | Facility: CLINIC | Age: 87
End: 2022-02-08
Payer: COMMERCIAL

## 2022-02-08 DIAGNOSIS — U07.1 COVID-19: ICD-10-CM

## 2022-02-23 ENCOUNTER — ASSISTED LIVING VISIT (OUTPATIENT)
Dept: GERIATRICS | Facility: CLINIC | Age: 87
End: 2022-02-23
Payer: COMMERCIAL

## 2022-02-23 VITALS
BODY MASS INDEX: 32.2 KG/M2 | SYSTOLIC BLOOD PRESSURE: 123 MMHG | DIASTOLIC BLOOD PRESSURE: 74 MMHG | TEMPERATURE: 97.3 F | OXYGEN SATURATION: 94 % | HEIGHT: 64 IN | WEIGHT: 188.6 LBS | HEART RATE: 51 BPM | RESPIRATION RATE: 16 BRPM

## 2022-02-23 DIAGNOSIS — I69.351 HEMIPLEGIA AND HEMIPARESIS FOLLOWING CEREBRAL INFARCTION AFFECTING RIGHT DOMINANT SIDE (H): ICD-10-CM

## 2022-02-23 DIAGNOSIS — I48.0 PAROXYSMAL A-FIB (H): ICD-10-CM

## 2022-02-23 DIAGNOSIS — N18.31 STAGE 3A CHRONIC KIDNEY DISEASE (H): ICD-10-CM

## 2022-02-23 DIAGNOSIS — E04.1 THYROID NODULE: ICD-10-CM

## 2022-02-23 DIAGNOSIS — M05.742 RHEUMATOID ARTHRITIS INVOLVING BOTH HANDS WITH POSITIVE RHEUMATOID FACTOR (H): ICD-10-CM

## 2022-02-23 DIAGNOSIS — I50.32 CHRONIC DIASTOLIC CONGESTIVE HEART FAILURE (H): Primary | ICD-10-CM

## 2022-02-23 DIAGNOSIS — M05.741 RHEUMATOID ARTHRITIS INVOLVING BOTH HANDS WITH POSITIVE RHEUMATOID FACTOR (H): ICD-10-CM

## 2022-02-23 DIAGNOSIS — I77.74 VERTEBRAL ARTERY DISSECTION (H): ICD-10-CM

## 2022-02-23 DIAGNOSIS — I25.119 CORONARY ARTERY DISEASE WITH ANGINA PECTORIS, UNSPECIFIED VESSEL OR LESION TYPE, UNSPECIFIED WHETHER NATIVE OR TRANSPLANTED HEART (H): ICD-10-CM

## 2022-02-23 DIAGNOSIS — F01.50 VASCULAR DEMENTIA WITHOUT BEHAVIORAL DISTURBANCE (H): ICD-10-CM

## 2022-02-23 DIAGNOSIS — I10 HYPERTENSION GOAL BP (BLOOD PRESSURE) < 140/90: ICD-10-CM

## 2022-02-23 RX ORDER — LISINOPRIL 10 MG/1
5 TABLET ORAL DAILY
Qty: 28 TABLET | Refills: 97 | Status: SHIPPED | OUTPATIENT
Start: 2022-02-23

## 2022-02-23 NOTE — NURSING NOTE
[facility]  [facility] Record Number:  [id number]  Place of Service where encounter took place:  [facility] [[id number]]      Chief Complaint   Patient presents with     RECHECK         HPI:    Jose E Capps  is a 89 year old (9/28/1931), who is being seen today for an episodic care visit.  HPI information obtained from: facility chart records, facility staff, patient report and [facility] chart review. Today's concern is:    Resident seen for follow up of chronic conditions. He is seen today in bed with his PCA present. He is pleasant and cooperative and states he is doing well hoarseness bradycardic, healing scab on L 3rd toe.      Past Medical and Surgical History reviewed in Epic today.     MEDICATIONS:    Current Outpatient Prescriptions          Current Outpatient Medications   Medication Sig Dispense Refill     acetaminophen (ACETAMINOPHEN EXTRA STRENGTH) 500 MG tablet Take 2 tablets (1,000 mg) by mouth 3 times daily 112 tablet 97     ASPIRIN NOT PRESCRIBED (INTENTIONAL) Please choose reason not prescribed, below 0 each 0     atorvastatin (LIPITOR) 10 MG tablet TAKE 1 TABLET BY MOUTH ONCE DAILY 28 tablet 11     Leslie Protect (EUCERIN) external cream Apply topically 2 times daily & bid prn to buttocks 57 g 11     cyanocobalamin (VITAMIN B-12) 1000 MCG SUBL sublingual tablet DISSOLVE ONE-HALF LOZENGE (500MCG) BY MOUTH TWICE DAILY 28 tablet 11     D-5000 125 MCG (5000 UT) tablet TAKE 1 TABLET BY MOUTH ONCE DAILY 28 tablet 11     ELIQUIS ANTICOAGULANT 5 MG tablet TAKE 1 TABLET BY MOUTH TWICE DAILY 56 tablet 11     leflunomide (ARAVA) 10 MG tablet TAKE 1 TABLET BY MOUTH ONCE DAILY 28 tablet PRN     lisinopril (ZESTRIL) 10 MG tablet TAKE 1 TABLET BY MOUTH ONCE DAILY 28 tablet 97     loperamide (IMODIUM) 2 MG capsule TAKE 1 CAPSULE BY MOUTH FOUR TIMES DAILY AS NEEDED FOR DIARRHEA 30 capsule 10     memantine (NAMENDA) 10 MG tablet TAKE 1 TABLET BY MOUTH  "TWICE DAILY 56 tablet 97     Multiple Vitamins-Minerals (CEROVITE SENIOR) TABS TAKE 1 TABLET BY MOUTH ONCE DAILY 28 tablet 11     polyethylene glycol (MIRALAX) 17 GM/Dose powder MIX 17GM OF POWDER IN 8OZ OF WATER UNTIL COMPLETELY DISSOLVED. DRINK SOLUTION BY MOUTH ONCE DAILY AS NEEDED FOR CONSTIPATION 510 g 97     povidone-iodine 10 % swab Apply topically daily as needed                  REVIEW OF SYSTEMS:  4 point ROS including Respiratory, CV, GI and , other than that noted in the HPI,  is negative     Objective:  Temp 97.3  F (36.3  C)   Ht 1.626 m (5' 4\")   Wt 85.5 kg (188 lb 9.6 oz)   SpO2 94%   BMI 32.37 kg/m    Exam:  GENERAL APPEARANCE:  Alert, oriented, cooperative  ENT:  Mouth and posterior oropharynx normal, moist mucous membranes, [place]  RESP:  respiratory effort and palpation of chest normal, lungs clear to auscultation   CV:  Palpation and auscultation of heart done , rate irregular, no murmur, rub, or gallop  ABDOMEN:  normal bowel sounds, soft, nontender, no hepatosplenomegaly or other masses  M/S:   Gait and station at  Baseline, uses 2WW or wheelchair  SKIN:  erythema - bilateral gluteal folds  NEURO:   Cranial nerves 2-12 are normal tested and grossly at patient's baseline  PSYCH:  insight and judgement impaired, memory impaired      Labs:        CBC RESULTS: Recent Labs   Lab Test 12/02/21  0816 09/02/21  0807   WBC 7.7 6.1   RBC 5.15 4.62   HGB 15.6 14.0   HCT 50.3 44.4   MCV 98 96   MCH 30.3 30.3   MCHC 31.0* 31.5   RDW 14.2 13.6    236         Last Basic Metabolic Panel:           Recent Labs   Lab Test 12/02/21  0816 09/02/21  0807 08/28/20  0925 05/28/20  1300 12/05/19  0840 07/03/19  0845   NA  --   --   --  139  --  142   POTASSIUM  --   --   --  4.5  --  4.5   CHLORIDE  --   --   --  106  --  110*   GISELA  --   --   --  8.7  --  8.8   CO2  --   --   --  29  --  28   BUN  --   --   --  28  --  22   CR 1.09 1.23   < > 1.22   < > 1.07   GLC  --   --   --  150*  --  103*    < > = " values in this interval not displayed.         Liver Function Studies -            Recent Labs   Lab Test 12/02/21  0816 09/02/21  0807 08/28/20  0925 05/28/20  1300 12/05/19  0840 07/03/19  0845   PROTTOTAL  --   --   --  6.8  --  7.0   ALBUMIN 3.6 3.3*   < > 3.4   < > 3.6   BILITOTAL  --   --   --  0.5  --  0.5   ALKPHOS  --   --   --  66  --  73   AST 13 15   < > 23   < > 15   ALT 24 21   < > 29   < > 26    < > = values in this interval not displayed.               TSH   Date Value Ref Range Status   05/28/2020 1.63 0.40 - 4.00 mU/L Final   07/03/2019 1.72 0.40 - 4.00 mU/L Final   ]           Lab Results   Component Value Date     A1C 5.6 09/28/2018     A1C 6.0 07/01/2015          BP Readings from Last 6 Encounters:   12/22/21 139/69   07/09/21 133/76   05/26/21 115/60   05/05/21 130/62   02/24/21 115/61   02/03/21 (!) 145/70          Pulse Readings from Last 4 Encounters:   12/22/21 50   07/09/21 79   05/26/21 62   05/05/21 69                  ASSESSMENT/PLAN:  (I50.32) Chronic diastolic congestive heart failure (H)  (I48.0) Paroxysmal A-fib (H)  (I25.119) Coronary artery disease with angina pectoris, unspecified   vessel or lesion type, unspecified whether native or transplanted heart (H)  (I10) Hypertension goal BP (blood pressure) < 140/90   Comment: stable-Echo 9/2018 EF at 55-60% Hypotension with past BPs and bradycardic  Plan:   -statin, Eliquis and BB discontinue with lower HR and BP  -Decrease Lisinopril to 5 mg po daily   -follow BMP     (E04.1) Thyroid nodule  Comment: noted on imaging a right sided nodule measuring 2.4cm 9/2018  Plan:  -following TSH  -repeat imaging 7/2020 showed 2.2cm solid nodule in the inferior right lobe. Consider US guided fine needle biopsy. Reviewed with his daughter who declined.     (M05.741,  M05.742) Rheumatoid arthritis involving both hands with positive rheumatoid factor (H)  Comment: no current pain   Plan: continue leflunomide; follow up with Dr Maurer.      Acetaminophen scheduled bid and prn     (I77.74) Vertebral artery dissection (H)  (I63.50) Cerebrovascular accident involving posterior circulation (H)  Comment: residual cognitive impairment now c/w vascular dementia  Plan: statin, anticoagulation and bp meds for secondary prevention.   Follow up in Neurology clinic as scheduled. Chronic right hand swelling.     (N18.31) Stage 3a chronic kidney disease  Comment: chronic   Plan:   -follow labs, renal dose medications      (L84) Callus  Comment: 3rd toe left foot  Plan:  -seeing onsite podiatry  -foot soaks with PCA and moisturizing, newer shoes                  Electronically signed by:  No Avendaño RN, NP Student on 2/23/2022 at 5:13 PM

## 2022-02-23 NOTE — PROGRESS NOTES
[place] [facility]  [facility] [facility] Record Number:  [id number]  Place of Service where encounter took place:  [facility] [[id number]]  Chief Complaint   Patient presents with     RECHECK       HPI:    Jose E Capps  is a 89 year old (9/28/1931), who is being seen today for an episodic care visit.  HPI information obtained from: facility chart records, [facility] chart review and PCA.     Today's concern is:  Follow up to chronic disease management. With advanced dementia and STML unreliable historian but his PCA is there and able to give update. No new concerns. Need assist for personal hygiene including cleaning up after bowel movement. Does get tried with ambulation of distance per report. He is scheduled to see onsite podiatry but doesn't appeared happened when they were last at [place]. 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     By chart review from Dr. Jimenez:  patient was hospitalized in October 2018 after he took a fall.   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   He went to TCU, regained mobility but has remained cognitively impaired.   He is followed by [facility].    Pt has a long history of seronegative RA, treated with leflunomide per Dr Maurer.       Past Medical and Surgical History reviewed in Epic today.    MEDICATIONS:    Current Outpatient Medications   Medication Sig Dispense Refill     lisinopril (ZESTRIL) 10 MG tablet Take 0.5 tablets (5 mg) by mouth daily 28 tablet 97     acetaminophen (ACETAMINOPHEN EXTRA STRENGTH) 500 MG tablet Take 2 tablets (1,000 mg) by mouth 3 times daily 112 tablet 97     ASPIRIN NOT PRESCRIBED (INTENTIONAL) Please choose reason not prescribed, below 0 each 0     atorvastatin  "(LIPITOR) 10 MG tablet TAKE 1 TABLET BY MOUTH ONCE DAILY 28 tablet 11     Leslie Protect (EUCERIN) external cream Apply topically 2 times daily & bid prn to buttocks 57 g 11     cyanocobalamin (VITAMIN B-12) 1000 MCG SUBL sublingual tablet DISSOLVE ONE-HALF LOZENGE (500MCG) BY MOUTH TWICE DAILY 28 tablet 11     D-5000 125 MCG (5000 UT) tablet TAKE 1 TABLET BY MOUTH ONCE DAILY 28 tablet 11     ELIQUIS ANTICOAGULANT 5 MG tablet TAKE 1 TABLET BY MOUTH TWICE DAILY 56 tablet 11     leflunomide (ARAVA) 10 MG tablet TAKE 1 TABLET BY MOUTH ONCE DAILY 28 tablet PRN     loperamide (IMODIUM) 2 MG capsule TAKE 1 CAPSULE BY MOUTH FOUR TIMES DAILY AS NEEDED FOR DIARRHEA 30 capsule 10     memantine (NAMENDA) 10 MG tablet TAKE 1 TABLET BY MOUTH TWICE DAILY 56 tablet 97     Multiple Vitamins-Minerals (CEROVITE SENIOR) TABS TAKE 1 TABLET BY MOUTH ONCE DAILY 28 tablet 11     polyethylene glycol (MIRALAX) 17 GM/Dose powder MIX 17GM OF POWDER IN 8OZ OF WATER UNTIL COMPLETELY DISSOLVED. DRINK SOLUTION BY MOUTH ONCE DAILY AS NEEDED FOR CONSTIPATION 510 g 97     povidone-iodine 10 % swab Apply topically daily as needed       REVIEW OF SYSTEMS:  Limited secondary to cognitive impairment but today pt reports ok    Objective:  /74   Pulse 51   Temp 97.3  F (36.3  C)   Resp 16   Ht 1.626 m (5' 4\")   Wt 85.5 kg (188 lb 9.6 oz)   SpO2 94%   BMI 32.37 kg/m    Exam:  GENERAL: awake in bed  ENT:  Mouth and posterior oropharynx normal, dry mucous membranes  EYES:  EOM, conjunctivae, lids, pupils and irises normal-glasses  RESP:  respiratory effort appears at baseline, LS clear   CV:  regular rate, irregular rhythm, no LE edema  ABDOMEN: soft nontender, BS present   M/S: mostly use of WC or 2WW. Edema +1 to right hand and digits chronic  SKIN:  no signs of open areas to viewed areas, slight discoloration to coccyx, stool pieces in gluteal fold, callus  of 2nd toe-no redness, drainage   NEURO:   Cranial nerves 2-12 are normal tested and " grossly at patient's baseline  PSYCH:  insight and judgement impaired, memory impaired    Labs:   CBC RESULTS: Recent Labs   Lab Test 12/02/21  0816 09/02/21  0807   WBC 7.7 6.1   RBC 5.15 4.62   HGB 15.6 14.0   HCT 50.3 44.4   MCV 98 96   MCH 30.3 30.3   MCHC 31.0* 31.5   RDW 14.2 13.6    236       Last Basic Metabolic Panel:  Recent Labs   Lab Test 12/02/21  0816 09/02/21  0807 08/28/20 0925 05/28/20  1300 12/05/19  0840 07/03/19  0845   NA  --   --   --  139  --  142   POTASSIUM  --   --   --  4.5  --  4.5   CHLORIDE  --   --   --  106  --  110*   GISELA  --   --   --  8.7  --  8.8   CO2  --   --   --  29  --  28   BUN  --   --   --  28  --  22   CR 1.09 1.23   < > 1.22   < > 1.07   GLC  --   --   --  150*  --  103*    < > = values in this interval not displayed.       Liver Function Studies -   Recent Labs   Lab Test 12/02/21  0816 09/02/21 0807 08/28/20 0925 05/28/20  1300 12/05/19  0840 07/03/19  0845   PROTTOTAL  --   --   --  6.8  --  7.0   ALBUMIN 3.6 3.3*   < > 3.4   < > 3.6   BILITOTAL  --   --   --  0.5  --  0.5   ALKPHOS  --   --   --  66  --  73   AST 13 15   < > 23   < > 15   ALT 24 21   < > 29   < > 26    < > = values in this interval not displayed.       TSH   Date Value Ref Range Status   05/28/2020 1.63 0.40 - 4.00 mU/L Final   07/03/2019 1.72 0.40 - 4.00 mU/L Final   ]    Lab Results   Component Value Date    A1C 5.6 09/28/2018    A1C 6.0 07/01/2015     BP Readings from Last 6 Encounters:   02/23/22 123/74   12/22/21 139/69   07/09/21 133/76   05/26/21 115/60   05/05/21 130/62   02/24/21 115/61       Pulse Readings from Last 4 Encounters:   02/23/22 51   12/22/21 50   07/09/21 79   05/26/21 62     ASSESSMENT/PLAN:  (I50.32) Chronic diastolic congestive heart failure (H)  (I48.0) Paroxysmal A-fib (H)  (I25.119) Coronary artery disease with angina pectoris, unspecified   vessel or lesion type, unspecified whether native or transplanted heart (H)  (I10) Hypertension goal BP (blood pressure) <  140/90   Comment: stable-Echo 9/2018 EF at 55-60% Hypotension with past BPs and bradycardic  Plan:   -statin, Eliquis and BB discontinue with lower HR and BP at previous visit   -Lisinopril 10 mg po daily will reduce to 5 mg po daily and follow  -follow BMP-ordered today and was ordered at previous visit      (E04.1) Thyroid nodule  Comment: noted on imaging a right sided nodule measuring 2.4cm 9/2018  Plan:  -following TSH  -repeat imaging 7/2020 showed 2.2cm solid nodule in the inferior right lobe. Consider US guided fine needle biopsy. Reviewed with his daughter who declined.     (M05.741,  M05.742) Rheumatoid arthritis involving both hands with positive rheumatoid factor (H)  Comment: no current pain   Plan:   -continue leflunomide; follow up with Dr Maurer prn   -Acetaminophen scheduled bid and prn     (I77.74) Vertebral artery dissection (H)  (I69.351) Hemiplegia and hemiparesis following cerebral infarction affecting right dominant side (H)  Comment: residual cognitive impairment now c/w vascular dementia  Plan:   -statin, anticoagulation and bp meds for secondary prevention.   -Follow up in Neurology clinic as scheduled    (N18.31) Stage 3a chronic kidney disease (H)  Comment: creatinine 1.09-1.26  Plan:   -renal dose medications and avoid nephrotoxins   -lisinopril 5 mg po daily  -BMP and CBC periodically     (F01.50) Vascular dementia without behavioral disturbance (H)  Comment: improved since family employed a PCA 3x weekly  Plan:  -continue current plan of care        Electronically signed by:  SHIRA Parra CNP

## 2022-02-23 NOTE — LETTER
2/23/2022        RE: Jose E Capps  [place] [facility] [address]  [address]  [address]  [place] [address]        [place] [facility]  [place] [facility] Record Number:  [id number]  Place of Service where encounter took place:  [facility] [[id number]]  Chief Complaint   Patient presents with     RECHECK       HPI:    Jose E Capps  is a 89 year old (9/28/1931), who is being seen today for an episodic care visit.  HPI information obtained from: facility chart records, [facility] chart review and PCA.     Today's concern is:  Follow up to chronic disease management. With advanced dementia and STML unreliable historian but his PCA is there and able to give update. No new concerns. Need assist for personal hygiene including cleaning up after bowel movement. Does get tried with ambulation of distance per report. He is scheduled to see onsite podiatry but doesn't appeared happened when they were last at [place]. 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     By chart review from Dr. Jimenez:  patient was hospitalized in October 2018 after he took a fall.   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   He went to TCU, regained mobility but has remained cognitively impaired.   He is followed by [facility].    Pt has a long history of seronegative RA, treated with leflunomide per Dr Maurer.       Past Medical and Surgical History reviewed in Epic today.    MEDICATIONS:    Current Outpatient Medications   Medication Sig Dispense Refill     lisinopril (ZESTRIL) 10 MG tablet Take 0.5 tablets (5 mg) by mouth daily 28 tablet 97     acetaminophen (ACETAMINOPHEN EXTRA STRENGTH) 500 MG tablet Take 2 tablets (1,000 mg) by mouth 3 times daily 112 tablet 97  "    ASPIRIN NOT PRESCRIBED (INTENTIONAL) Please choose reason not prescribed, below 0 each 0     atorvastatin (LIPITOR) 10 MG tablet TAKE 1 TABLET BY MOUTH ONCE DAILY 28 tablet 11     Leslie Protect (EUCERIN) external cream Apply topically 2 times daily & bid prn to buttocks 57 g 11     cyanocobalamin (VITAMIN B-12) 1000 MCG SUBL sublingual tablet DISSOLVE ONE-HALF LOZENGE (500MCG) BY MOUTH TWICE DAILY 28 tablet 11     D-5000 125 MCG (5000 UT) tablet TAKE 1 TABLET BY MOUTH ONCE DAILY 28 tablet 11     ELIQUIS ANTICOAGULANT 5 MG tablet TAKE 1 TABLET BY MOUTH TWICE DAILY 56 tablet 11     leflunomide (ARAVA) 10 MG tablet TAKE 1 TABLET BY MOUTH ONCE DAILY 28 tablet PRN     loperamide (IMODIUM) 2 MG capsule TAKE 1 CAPSULE BY MOUTH FOUR TIMES DAILY AS NEEDED FOR DIARRHEA 30 capsule 10     memantine (NAMENDA) 10 MG tablet TAKE 1 TABLET BY MOUTH TWICE DAILY 56 tablet 97     Multiple Vitamins-Minerals (CEROVITE SENIOR) TABS TAKE 1 TABLET BY MOUTH ONCE DAILY 28 tablet 11     polyethylene glycol (MIRALAX) 17 GM/Dose powder MIX 17GM OF POWDER IN 8OZ OF WATER UNTIL COMPLETELY DISSOLVED. DRINK SOLUTION BY MOUTH ONCE DAILY AS NEEDED FOR CONSTIPATION 510 g 97     povidone-iodine 10 % swab Apply topically daily as needed       REVIEW OF SYSTEMS:  Limited secondary to cognitive impairment but today pt reports ok    Objective:  /74   Pulse 51   Temp 97.3  F (36.3  C)   Resp 16   Ht 1.626 m (5' 4\")   Wt 85.5 kg (188 lb 9.6 oz)   SpO2 94%   BMI 32.37 kg/m    Exam:  GENERAL: awake in bed  ENT:  Mouth and posterior oropharynx normal, dry mucous membranes  EYES:  EOM, conjunctivae, lids, pupils and irises normal-glasses  RESP:  respiratory effort appears at baseline, LS clear   CV:  regular rate, irregular rhythm, no LE edema  ABDOMEN: soft nontender, BS present   M/S: mostly use of WC or 2WW. Edema +1 to right hand and digits chronic  SKIN:  no signs of open areas to viewed areas, slight discoloration to coccyx, stool pieces in " gluteal fold, callus  of 2nd toe-no redness, drainage   NEURO:   Cranial nerves 2-12 are normal tested and grossly at patient's baseline  PSYCH:  insight and judgement impaired, memory impaired    Labs:   CBC RESULTS: Recent Labs   Lab Test 12/02/21  0816 09/02/21  0807   WBC 7.7 6.1   RBC 5.15 4.62   HGB 15.6 14.0   HCT 50.3 44.4   MCV 98 96   MCH 30.3 30.3   MCHC 31.0* 31.5   RDW 14.2 13.6    236       Last Basic Metabolic Panel:  Recent Labs   Lab Test 12/02/21  0816 09/02/21  0807 08/28/20  0925 05/28/20  1300 12/05/19  0840 07/03/19  0845   NA  --   --   --  139  --  142   POTASSIUM  --   --   --  4.5  --  4.5   CHLORIDE  --   --   --  106  --  110*   GISELA  --   --   --  8.7  --  8.8   CO2  --   --   --  29  --  28   BUN  --   --   --  28  --  22   CR 1.09 1.23   < > 1.22   < > 1.07   GLC  --   --   --  150*  --  103*    < > = values in this interval not displayed.       Liver Function Studies -   Recent Labs   Lab Test 12/02/21  0816 09/02/21  0807 08/28/20  0925 05/28/20  1300 12/05/19  0840 07/03/19  0845   PROTTOTAL  --   --   --  6.8  --  7.0   ALBUMIN 3.6 3.3*   < > 3.4   < > 3.6   BILITOTAL  --   --   --  0.5  --  0.5   ALKPHOS  --   --   --  66  --  73   AST 13 15   < > 23   < > 15   ALT 24 21   < > 29   < > 26    < > = values in this interval not displayed.       TSH   Date Value Ref Range Status   05/28/2020 1.63 0.40 - 4.00 mU/L Final   07/03/2019 1.72 0.40 - 4.00 mU/L Final   ]    Lab Results   Component Value Date    A1C 5.6 09/28/2018    A1C 6.0 07/01/2015     BP Readings from Last 6 Encounters:   02/23/22 123/74   12/22/21 139/69   07/09/21 133/76   05/26/21 115/60   05/05/21 130/62   02/24/21 115/61       Pulse Readings from Last 4 Encounters:   02/23/22 51   12/22/21 50   07/09/21 79   05/26/21 62     ASSESSMENT/PLAN:  (I50.32) Chronic diastolic congestive heart failure (H)  (I48.0) Paroxysmal A-fib (H)  (I25.119) Coronary artery disease with angina pectoris, unspecified   vessel or  lesion type, unspecified whether native or transplanted heart (H)  (I10) Hypertension goal BP (blood pressure) < 140/90   Comment: stable-Echo 9/2018 EF at 55-60% Hypotension with past BPs and bradycardic  Plan:   -statin, Eliquis and BB discontinue with lower HR and BP at previous visit   -Lisinopril 10 mg po daily will reduce to 5 mg po daily and follow  -follow BMP-ordered today and was ordered at previous visit      (E04.1) Thyroid nodule  Comment: noted on imaging a right sided nodule measuring 2.4cm 9/2018  Plan:  -following TSH  -repeat imaging 7/2020 showed 2.2cm solid nodule in the inferior right lobe. Consider US guided fine needle biopsy. Reviewed with his daughter who declined.     (M05.741,  M05.742) Rheumatoid arthritis involving both hands with positive rheumatoid factor (H)  Comment: no current pain   Plan:   -continue leflunomide; follow up with Dr Maurer prn   -Acetaminophen scheduled bid and prn     (I77.74) Vertebral artery dissection (H)  (I69.351) Hemiplegia and hemiparesis following cerebral infarction affecting right dominant side (H)  Comment: residual cognitive impairment now c/w vascular dementia  Plan:   -statin, anticoagulation and bp meds for secondary prevention.   -Follow up in Neurology clinic as scheduled    (N18.31) Stage 3a chronic kidney disease (H)  Comment: creatinine 1.09-1.26  Plan:   -renal dose medications and avoid nephrotoxins   -lisinopril 5 mg po daily  -BMP and CBC periodically     (F01.50) Vascular dementia without behavioral disturbance (H)  Comment: improved since family employed a PCA 3x weekly  Plan:  -continue current plan of care        Electronically signed by:  SHIRA Parra CNP                 Sincerely,        SHIRA Parra CNP

## 2022-02-23 NOTE — LETTER
New orders for Jose E Capps with electronic signature from provider    1. Discontinue current Lisinopril dose and begin  2.    lisinopril (ZESTRIL) 10 MG tablet Take 0.5 tablets (5 mg) by mouth daily      3. Check labs on 3/3/22 BMP for hypertension, TSH for hypothyroid          Electronically signed by:  SHIRA Parra CNP

## 2022-02-23 NOTE — LETTER
New orders for Jose E Capps    1. Discontinue current Lisinopril dose and begin  2.    lisinopril (ZESTRIL) 10 MG tablet Take 0.5 tablets (5 mg) by mouth daily      3. Check labs on 3/3/22 KEVIN for hypertension, TSH for hypothyroid    Jerry Ritter, SHIRA CNP on 2/23/2022 at 12:08 PM

## 2022-03-02 ENCOUNTER — LAB REQUISITION (OUTPATIENT)
Dept: LAB | Facility: CLINIC | Age: 87
End: 2022-03-02
Payer: COMMERCIAL

## 2022-03-02 DIAGNOSIS — E03.9 HYPOTHYROIDISM, UNSPECIFIED: ICD-10-CM

## 2022-03-02 DIAGNOSIS — Z79.899 OTHER LONG TERM (CURRENT) DRUG THERAPY: ICD-10-CM

## 2022-03-02 DIAGNOSIS — I10 ESSENTIAL (PRIMARY) HYPERTENSION: ICD-10-CM

## 2022-03-03 LAB
BASOPHILS # BLD AUTO: 0.1 10E3/UL (ref 0–0.2)
BASOPHILS NFR BLD AUTO: 1 %
EOSINOPHIL # BLD AUTO: 0.2 10E3/UL (ref 0–0.7)
EOSINOPHIL NFR BLD AUTO: 3 %
ERYTHROCYTE [DISTWIDTH] IN BLOOD BY AUTOMATED COUNT: 14.1 % (ref 10–15)
HCT VFR BLD AUTO: 44.5 % (ref 40–53)
HGB BLD-MCNC: 14 G/DL (ref 13.3–17.7)
IMM GRANULOCYTES # BLD: 0 10E3/UL
IMM GRANULOCYTES NFR BLD: 1 %
LYMPHOCYTES # BLD AUTO: 1.9 10E3/UL (ref 0.8–5.3)
LYMPHOCYTES NFR BLD AUTO: 29 %
MCH RBC QN AUTO: 31.5 PG (ref 26.5–33)
MCHC RBC AUTO-ENTMCNC: 31.5 G/DL (ref 31.5–36.5)
MCV RBC AUTO: 100 FL (ref 78–100)
MONOCYTES # BLD AUTO: 0.8 10E3/UL (ref 0–1.3)
MONOCYTES NFR BLD AUTO: 13 %
NEUTROPHILS # BLD AUTO: 3.4 10E3/UL (ref 1.6–8.3)
NEUTROPHILS NFR BLD AUTO: 53 %
NRBC # BLD AUTO: 0 10E3/UL
NRBC BLD AUTO-RTO: 0 /100
PLATELET # BLD AUTO: 246 10E3/UL (ref 150–450)
RBC # BLD AUTO: 4.44 10E6/UL (ref 4.4–5.9)
WBC # BLD AUTO: 6.3 10E3/UL (ref 4–11)

## 2022-03-03 PROCEDURE — 85025 COMPLETE CBC W/AUTO DIFF WBC: CPT | Mod: ORL | Performed by: NURSE PRACTITIONER

## 2022-03-03 PROCEDURE — 36415 COLL VENOUS BLD VENIPUNCTURE: CPT | Mod: ORL | Performed by: NURSE PRACTITIONER

## 2022-04-20 DIAGNOSIS — R52 GENERALIZED PAIN: ICD-10-CM

## 2022-04-21 RX ORDER — PSEUDOEPHED/ACETAMINOPH/DIPHEN 30MG-500MG
TABLET ORAL
Qty: 168 TABLET | Refills: 97 | Status: SHIPPED | OUTPATIENT
Start: 2022-04-21

## 2022-05-04 ENCOUNTER — LAB REQUISITION (OUTPATIENT)
Dept: LAB | Facility: CLINIC | Age: 87
End: 2022-05-04
Payer: COMMERCIAL

## 2022-05-04 ENCOUNTER — ASSISTED LIVING VISIT (OUTPATIENT)
Dept: GERIATRICS | Facility: CLINIC | Age: 87
End: 2022-05-04
Payer: COMMERCIAL

## 2022-05-04 VITALS
BODY MASS INDEX: 32.2 KG/M2 | HEART RATE: 60 BPM | WEIGHT: 188.6 LBS | HEIGHT: 64 IN | TEMPERATURE: 97.6 F | SYSTOLIC BLOOD PRESSURE: 120 MMHG | RESPIRATION RATE: 16 BRPM | DIASTOLIC BLOOD PRESSURE: 58 MMHG | OXYGEN SATURATION: 96 %

## 2022-05-04 DIAGNOSIS — M05.741 RHEUMATOID ARTHRITIS INVOLVING BOTH HANDS WITH POSITIVE RHEUMATOID FACTOR (H): ICD-10-CM

## 2022-05-04 DIAGNOSIS — I25.119 CORONARY ARTERY DISEASE WITH ANGINA PECTORIS, UNSPECIFIED VESSEL OR LESION TYPE, UNSPECIFIED WHETHER NATIVE OR TRANSPLANTED HEART (H): ICD-10-CM

## 2022-05-04 DIAGNOSIS — M05.742 RHEUMATOID ARTHRITIS INVOLVING BOTH HANDS WITH POSITIVE RHEUMATOID FACTOR (H): ICD-10-CM

## 2022-05-04 DIAGNOSIS — I48.0 PAROXYSMAL A-FIB (H): ICD-10-CM

## 2022-05-04 DIAGNOSIS — E03.9 HYPOTHYROIDISM, UNSPECIFIED: ICD-10-CM

## 2022-05-04 DIAGNOSIS — I10 ESSENTIAL (PRIMARY) HYPERTENSION: ICD-10-CM

## 2022-05-04 DIAGNOSIS — I69.351 HEMIPLEGIA AND HEMIPARESIS FOLLOWING CEREBRAL INFARCTION AFFECTING RIGHT DOMINANT SIDE (H): ICD-10-CM

## 2022-05-04 DIAGNOSIS — E04.1 THYROID NODULE: ICD-10-CM

## 2022-05-04 DIAGNOSIS — F01.50 VASCULAR DEMENTIA WITHOUT BEHAVIORAL DISTURBANCE (H): Primary | ICD-10-CM

## 2022-05-04 DIAGNOSIS — I10 HYPERTENSION GOAL BP (BLOOD PRESSURE) < 140/90: ICD-10-CM

## 2022-05-04 DIAGNOSIS — R23.8 SKIN IRRITATION: ICD-10-CM

## 2022-05-04 DIAGNOSIS — I77.74 VERTEBRAL ARTERY DISSECTION (H): ICD-10-CM

## 2022-05-04 DIAGNOSIS — N18.31 STAGE 3A CHRONIC KIDNEY DISEASE (H): ICD-10-CM

## 2022-05-04 DIAGNOSIS — I50.32 CHRONIC DIASTOLIC CONGESTIVE HEART FAILURE (H): ICD-10-CM

## 2022-05-04 NOTE — LETTER
5/4/2022        RE: Jose E Boateng [facility]  [address]  [address]  [place] [address]        [place] [facility]  [place] Medical Record Number:  [id number]  Place of Service where encounter took place:  [facility] [[id number]]  Chief Complaint   Patient presents with     RECHECK     Routine       HPI:    Jose E Capps  is a 89 year old (9/28/1931), who is being seen today for an episodic care visit.  HPI information obtained from: facility chart records, facility staff, patient report and [facility] chart review. Today's concern is:    Follow up to chronic disease management. With advanced dementia and STML. No new concerns. Need assist for personal hygiene including cleaning up after bowel movement. Staff would like leslie to prn. Does have a chronic ulcer of toe that is resolved today. He has no complaints with today's visit.      By chart review from Dr. Jimenez:  patient was hospitalized in October 2018 after he took a fall. 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 He went to TCU, regained mobility but has remained cognitively impaired.  He is followed by [facility].    Pt has a long history of seronegative RA, treated with leflunomide per Dr Maurer.     Past Medical and Surgical History reviewed in Epic today.    MEDICATIONS:    Current Outpatient Medications   Medication Sig Dispense Refill     ACETAMINOPHEN EXTRA STRENGTH 500 MG tablet TAKE TWO TABLETS (1000MG) BY MOUTH THREE TIMES DAILY 168 tablet 97     ASPIRIN NOT PRESCRIBED (INTENTIONAL) Please choose reason not prescribed, below 0 each 0     atorvastatin (LIPITOR) 10 MG tablet TAKE 1 TABLET BY MOUTH ONCE DAILY 28 tablet 11     Leslie Protect  "(EUCERIN) external cream Apply topically 2 times daily & bid prn to buttocks 57 g 11     cyanocobalamin (VITAMIN B-12) 1000 MCG SUBL sublingual tablet DISSOLVE ONE-HALF LOZENGE (500MCG) BY MOUTH TWICE DAILY 28 tablet 11     D-5000 125 MCG (5000 UT) tablet TAKE 1 TABLET BY MOUTH ONCE DAILY 28 tablet 11     ELIQUIS ANTICOAGULANT 5 MG tablet TAKE 1 TABLET BY MOUTH TWICE DAILY 56 tablet 11     leflunomide (ARAVA) 10 MG tablet TAKE 1 TABLET BY MOUTH ONCE DAILY 28 tablet PRN     lisinopril (ZESTRIL) 10 MG tablet Take 0.5 tablets (5 mg) by mouth daily 28 tablet 97     loperamide (IMODIUM) 2 MG capsule TAKE 1 CAPSULE BY MOUTH FOUR TIMES DAILY AS NEEDED FOR DIARRHEA 30 capsule 10     memantine (NAMENDA) 10 MG tablet TAKE 1 TABLET BY MOUTH TWICE DAILY 56 tablet 97     Multiple Vitamins-Minerals (CEROVITE SENIOR) TABS TAKE 1 TABLET BY MOUTH ONCE DAILY 28 tablet 11     polyethylene glycol (MIRALAX) 17 GM/Dose powder MIX 17GM OF POWDER IN 8OZ OF WATER UNTIL COMPLETELY DISSOLVED. DRINK SOLUTION BY MOUTH ONCE DAILY AS NEEDED FOR CONSTIPATION 510 g 97     povidone-iodine 10 % swab Apply topically daily as needed       REVIEW OF SYSTEMS:  Limited secondary to cognitive impairment but today pt reports ok    Objective:  /58   Pulse 60   Temp 97.6  F (36.4  C)   Resp 16   Ht 1.626 m (5' 4\")   Wt 85.5 kg (188 lb 9.6 oz)   SpO2 96%   BMI 32.37 kg/m    Exam:  GENERAL: awake in bed  ENT:  Mouth and posterior oropharynx normal, dry mucous membranes  EYES:  EOM, conjunctivae, lids, pupils and irises normal-glasses  RESP:  respiratory effort appears at baseline, LS clear   CV:  regular rate, irregular rhythm (regular today), no LE edema  ABDOMEN: soft nontender, BS present   M/S: mostly use of WC or 2WW. Edema +1 to right hand and digits chronic  SKIN:  no signs of open areas to viewed areas, slight discoloration to coccyx, scabs in right groin fold  NEURO:   Cranial nerves 2-12 are normal tested and grossly at patient's " baseline  PSYCH:  insight and judgement impaired, memory impaired    Labs:   CBC RESULTS: Recent Labs   Lab Test 03/03/22  1010 12/02/21  0816   WBC 6.3 7.7   RBC 4.44 5.15   HGB 14.0 15.6   HCT 44.5 50.3    98   MCH 31.5 30.3   MCHC 31.5 31.0*   RDW 14.1 14.2    281       Last Basic Metabolic Panel:  Recent Labs   Lab Test 12/02/21  0816 09/02/21  0807 08/28/20  0925 05/28/20  1300 12/05/19  0840 07/03/19  0845   NA  --   --   --  139  --  142   POTASSIUM  --   --   --  4.5  --  4.5   CHLORIDE  --   --   --  106  --  110*   GISELA  --   --   --  8.7  --  8.8   CO2  --   --   --  29  --  28   BUN  --   --   --  28  --  22   CR 1.09 1.23   < > 1.22   < > 1.07   GLC  --   --   --  150*  --  103*    < > = values in this interval not displayed.       Liver Function Studies -   Recent Labs   Lab Test 12/02/21  0816 09/02/21  0807 08/28/20 0925 05/28/20  1300 12/05/19  0840 07/03/19  0845   PROTTOTAL  --   --   --  6.8  --  7.0   ALBUMIN 3.6 3.3*   < > 3.4   < > 3.6   BILITOTAL  --   --   --  0.5  --  0.5   ALKPHOS  --   --   --  66  --  73   AST 13 15   < > 23   < > 15   ALT 24 21   < > 29   < > 26    < > = values in this interval not displayed.       TSH   Date Value Ref Range Status   05/28/2020 1.63 0.40 - 4.00 mU/L Final   07/03/2019 1.72 0.40 - 4.00 mU/L Final       Lab Results   Component Value Date    A1C 5.6 09/28/2018    A1C 6.0 07/01/2015     ASSESSMENT/PLAN:  (F01.50) Vascular dementia without behavioral disturbance (H)  Comment: improved since family employed a PCA 3x weekly  Plan:  -continue current plan of care    (N18.31) Stage 3a chronic kidney disease (H)  Comment: creatinine 1.09-1.26  Plan:   -renal dose medications and avoid nephrotoxins   -lisinopril 5 mg po daily  -BMP and CBC periodically     (I77.74) Vertebral artery dissection (H)  (I69.351) Hemiplegia and hemiparesis following cerebral infarction affecting right dominant side (H)  Comment: residual cognitive impairment now c/w  vascular dementia  Plan:   -statin, anticoagulation and bp meds for secondary prevention.   -Follow up in Neurology clinic as scheduled (not seen recently)     (M05.741,  M05.742) Rheumatoid arthritis involving both hands with positive rheumatoid factor (H)  Comment: no current pain   Plan:   -continue leflunomide; follow up with Dr Maurer prn   -Acetaminophen scheduled bid and prn  -CBC, LFTs every 3 months with DMARD    (E04.1) Thyroid nodule  Comment: noted on imaging a right sided nodule measuring 2.4cm 9/2018  Plan:  -following TSH  -repeat imaging 7/2020 showed 2.2cm solid nodule in the inferior right lobe. Consider US guided fine needle biopsy. Reviewed with his daughter who declined.    (I50.32) Chronic diastolic congestive heart failure (H)  (I48.0) Paroxysmal A-fib (H)  (I25.119) Coronary artery disease with angina pectoris, unspecified   vessel or lesion type, unspecified whether native or transplanted heart (H)  (I10) Hypertension goal BP (blood pressure) < 140/90   Comment: stable-Echo 9/2018 EF at 55-60%   Plan:   -statin, Eliquis and BB discontinue with lower HR and BP at previous visit   -Lisinopril at  5 mg po daily and follow  -follow BMP/CBC and LFTs-ordered today and was ordered at previous visit? Not done    (R23.8) Skin irritation  Comment: noted today in right gluteal fold   Plan:  -continue with current gordon orders           Electronically signed by:  SHIRA Parra CNP                 Sincerely,        SHIRA Parra CNP

## 2022-05-04 NOTE — LETTER
New orders for Jose E Capps    1. CBC, BMP, LFT for HTN on next lab day    2. TSH for thyroid screening on next lab day      SHIRA Parra CNP on 5/4/2022 at 11:03 AM

## 2022-05-04 NOTE — PROGRESS NOTES
[place] [facility]  [place] Medical Record Number:  [id number]  Place of Service where encounter took place:  [facility] [[id number]]  Chief Complaint   Patient presents with     RECHECK     Routine       HPI:    Jose E Capps  is a 89 year old (9/28/1931), who is being seen today for an episodic care visit.  HPI information obtained from: facility chart records, facility staff, patient report and [facility] chart review. Today's concern is:    Follow up to chronic disease management. With advanced dementia and STML. No new concerns. Need assist for personal hygiene including cleaning up after bowel movement. Staff would like leslie to prn. Does have a chronic ulcer of toe that is resolved today. He has no complaints with today's visit.      By chart review from Dr. Jimenez:  patient was hospitalized in October 2018 after he took a fall. 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 He went to TCU, regained mobility but has remained cognitively impaired.  He is followed by [facility].    Pt has a long history of seronegative RA, treated with leflunomide per Dr aMurer.     Past Medical and Surgical History reviewed in Epic today.    MEDICATIONS:    Current Outpatient Medications   Medication Sig Dispense Refill     ACETAMINOPHEN EXTRA STRENGTH 500 MG tablet TAKE TWO TABLETS (1000MG) BY MOUTH THREE TIMES DAILY 168 tablet 97     ASPIRIN NOT PRESCRIBED (INTENTIONAL) Please choose reason not prescribed, below 0 each 0     atorvastatin (LIPITOR) 10 MG tablet TAKE 1 TABLET BY MOUTH ONCE DAILY 28 tablet 11     Leslie Protect (EUCERIN) external cream Apply topically 2 times daily & bid prn to buttocks 57 g 11     cyanocobalamin (VITAMIN  "B-12) 1000 MCG SUBL sublingual tablet DISSOLVE ONE-HALF LOZENGE (500MCG) BY MOUTH TWICE DAILY 28 tablet 11     D-5000 125 MCG (5000 UT) tablet TAKE 1 TABLET BY MOUTH ONCE DAILY 28 tablet 11     ELIQUIS ANTICOAGULANT 5 MG tablet TAKE 1 TABLET BY MOUTH TWICE DAILY 56 tablet 11     leflunomide (ARAVA) 10 MG tablet TAKE 1 TABLET BY MOUTH ONCE DAILY 28 tablet PRN     lisinopril (ZESTRIL) 10 MG tablet Take 0.5 tablets (5 mg) by mouth daily 28 tablet 97     loperamide (IMODIUM) 2 MG capsule TAKE 1 CAPSULE BY MOUTH FOUR TIMES DAILY AS NEEDED FOR DIARRHEA 30 capsule 10     memantine (NAMENDA) 10 MG tablet TAKE 1 TABLET BY MOUTH TWICE DAILY 56 tablet 97     Multiple Vitamins-Minerals (CEROVITE SENIOR) TABS TAKE 1 TABLET BY MOUTH ONCE DAILY 28 tablet 11     polyethylene glycol (MIRALAX) 17 GM/Dose powder MIX 17GM OF POWDER IN 8OZ OF WATER UNTIL COMPLETELY DISSOLVED. DRINK SOLUTION BY MOUTH ONCE DAILY AS NEEDED FOR CONSTIPATION 510 g 97     povidone-iodine 10 % swab Apply topically daily as needed       REVIEW OF SYSTEMS:  Limited secondary to cognitive impairment but today pt reports ok    Objective:  /58   Pulse 60   Temp 97.6  F (36.4  C)   Resp 16   Ht 1.626 m (5' 4\")   Wt 85.5 kg (188 lb 9.6 oz)   SpO2 96%   BMI 32.37 kg/m    Exam:  GENERAL: awake in bed  ENT:  Mouth and posterior oropharynx normal, dry mucous membranes  EYES:  EOM, conjunctivae, lids, pupils and irises normal-glasses  RESP:  respiratory effort appears at baseline, LS clear   CV:  regular rate, irregular rhythm (regular today), no LE edema  ABDOMEN: soft nontender, BS present   M/S: mostly use of WC or 2WW. Edema +1 to right hand and digits chronic  SKIN:  no signs of open areas to viewed areas, slight discoloration to coccyx, scabs in right groin fold  NEURO:   Cranial nerves 2-12 are normal tested and grossly at patient's baseline  PSYCH:  insight and judgement impaired, memory impaired    Labs:   CBC RESULTS: Recent Labs   Lab Test " 03/03/22  1010 12/02/21  0816   WBC 6.3 7.7   RBC 4.44 5.15   HGB 14.0 15.6   HCT 44.5 50.3    98   MCH 31.5 30.3   MCHC 31.5 31.0*   RDW 14.1 14.2    281       Last Basic Metabolic Panel:  Recent Labs   Lab Test 12/02/21  0816 09/02/21  0807 08/28/20  0925 05/28/20  1300 12/05/19  0840 07/03/19  0845   NA  --   --   --  139  --  142   POTASSIUM  --   --   --  4.5  --  4.5   CHLORIDE  --   --   --  106  --  110*   GISELA  --   --   --  8.7  --  8.8   CO2  --   --   --  29  --  28   BUN  --   --   --  28  --  22   CR 1.09 1.23   < > 1.22   < > 1.07   GLC  --   --   --  150*  --  103*    < > = values in this interval not displayed.       Liver Function Studies -   Recent Labs   Lab Test 12/02/21  0816 09/02/21  0807 08/28/20  0925 05/28/20  1300 12/05/19  0840 07/03/19  0845   PROTTOTAL  --   --   --  6.8  --  7.0   ALBUMIN 3.6 3.3*   < > 3.4   < > 3.6   BILITOTAL  --   --   --  0.5  --  0.5   ALKPHOS  --   --   --  66  --  73   AST 13 15   < > 23   < > 15   ALT 24 21   < > 29   < > 26    < > = values in this interval not displayed.       TSH   Date Value Ref Range Status   05/28/2020 1.63 0.40 - 4.00 mU/L Final   07/03/2019 1.72 0.40 - 4.00 mU/L Final       Lab Results   Component Value Date    A1C 5.6 09/28/2018    A1C 6.0 07/01/2015     ASSESSMENT/PLAN:  (F01.50) Vascular dementia without behavioral disturbance (H)  Comment: improved since family employed a PCA 3x weekly  Plan:  -continue current plan of care    (N18.31) Stage 3a chronic kidney disease (H)  Comment: creatinine 1.09-1.26  Plan:   -renal dose medications and avoid nephrotoxins   -lisinopril 5 mg po daily  -BMP and CBC periodically     (I77.74) Vertebral artery dissection (H)  (I69.351) Hemiplegia and hemiparesis following cerebral infarction affecting right dominant side (H)  Comment: residual cognitive impairment now c/w vascular dementia  Plan:   -statin, anticoagulation and bp meds for secondary prevention.   -Follow up in Neurology  clinic as scheduled (not seen recently)     (M05.741,  M05.742) Rheumatoid arthritis involving both hands with positive rheumatoid factor (H)  Comment: no current pain   Plan:   -continue leflunomide; follow up with Dr Maurer prn   -Acetaminophen scheduled bid and prn  -CBC, LFTs every 3 months with DMARD    (E04.1) Thyroid nodule  Comment: noted on imaging a right sided nodule measuring 2.4cm 9/2018  Plan:  -following TSH  -repeat imaging 7/2020 showed 2.2cm solid nodule in the inferior right lobe. Consider US guided fine needle biopsy. Reviewed with his daughter who declined.    (I50.32) Chronic diastolic congestive heart failure (H)  (I48.0) Paroxysmal A-fib (H)  (I25.119) Coronary artery disease with angina pectoris, unspecified   vessel or lesion type, unspecified whether native or transplanted heart (H)  (I10) Hypertension goal BP (blood pressure) < 140/90   Comment: stable-Echo 9/2018 EF at 55-60%   Plan:   -statin, Eliquis and BB discontinue with lower HR and BP at previous visit   -Lisinopril at  5 mg po daily and follow  -follow BMP/CBC and LFTs-ordered today and was ordered at previous visit? Not done    (R23.8) Skin irritation  Comment: noted today in right gluteal fold   Plan:  -continue with current [facility]ordon orders           Electronically signed by:  SHIRA Parra CNP

## 2022-05-05 LAB
ALBUMIN SERPL-MCNC: 3.2 G/DL (ref 3.4–5)
ALP SERPL-CCNC: 58 U/L (ref 40–150)
ALT SERPL W P-5'-P-CCNC: 24 U/L (ref 0–70)
ANION GAP SERPL CALCULATED.3IONS-SCNC: 5 MMOL/L (ref 3–14)
AST SERPL W P-5'-P-CCNC: 11 U/L (ref 0–45)
BILIRUB DIRECT SERPL-MCNC: 0.1 MG/DL (ref 0–0.2)
BILIRUB SERPL-MCNC: 0.4 MG/DL (ref 0.2–1.3)
BUN SERPL-MCNC: 26 MG/DL (ref 7–30)
CALCIUM SERPL-MCNC: 8.8 MG/DL (ref 8.5–10.1)
CHLORIDE BLD-SCNC: 107 MMOL/L (ref 94–109)
CO2 SERPL-SCNC: 26 MMOL/L (ref 20–32)
CREAT SERPL-MCNC: 1.14 MG/DL (ref 0.66–1.25)
ERYTHROCYTE [DISTWIDTH] IN BLOOD BY AUTOMATED COUNT: 13.6 % (ref 10–15)
GFR SERPL CREATININE-BSD FRML MDRD: 61 ML/MIN/1.73M2
GLUCOSE BLD-MCNC: 120 MG/DL (ref 70–99)
HCT VFR BLD AUTO: 43.1 % (ref 40–53)
HGB BLD-MCNC: 13.9 G/DL (ref 13.3–17.7)
MCH RBC QN AUTO: 30.8 PG (ref 26.5–33)
MCHC RBC AUTO-ENTMCNC: 32.3 G/DL (ref 31.5–36.5)
MCV RBC AUTO: 95 FL (ref 78–100)
PLATELET # BLD AUTO: 256 10E3/UL (ref 150–450)
POTASSIUM BLD-SCNC: 4 MMOL/L (ref 3.4–5.3)
PROT SERPL-MCNC: 6.1 G/DL (ref 6.8–8.8)
RBC # BLD AUTO: 4.52 10E6/UL (ref 4.4–5.9)
SODIUM SERPL-SCNC: 138 MMOL/L (ref 133–144)
TSH SERPL DL<=0.005 MIU/L-ACNC: 1.46 MU/L (ref 0.4–4)
WBC # BLD AUTO: 7 10E3/UL (ref 4–11)

## 2022-05-05 PROCEDURE — 84443 ASSAY THYROID STIM HORMONE: CPT | Mod: ORL | Performed by: NURSE PRACTITIONER

## 2022-05-05 PROCEDURE — 80053 COMPREHEN METABOLIC PANEL: CPT | Mod: ORL | Performed by: NURSE PRACTITIONER

## 2022-05-05 PROCEDURE — 36415 COLL VENOUS BLD VENIPUNCTURE: CPT | Mod: ORL | Performed by: NURSE PRACTITIONER

## 2022-05-05 PROCEDURE — P9604 ONE-WAY ALLOW PRORATED TRIP: HCPCS | Mod: ORL | Performed by: NURSE PRACTITIONER

## 2022-05-05 PROCEDURE — 85027 COMPLETE CBC AUTOMATED: CPT | Mod: ORL | Performed by: NURSE PRACTITIONER

## 2022-05-05 PROCEDURE — 82248 BILIRUBIN DIRECT: CPT | Mod: ORL | Performed by: NURSE PRACTITIONER

## 2022-05-14 ENCOUNTER — HEALTH MAINTENANCE LETTER (OUTPATIENT)
Age: 87
End: 2022-05-14

## 2022-06-01 ENCOUNTER — LAB REQUISITION (OUTPATIENT)
Dept: LAB | Facility: CLINIC | Age: 87
End: 2022-06-01
Payer: COMMERCIAL

## 2022-06-01 DIAGNOSIS — Z79.899 OTHER LONG TERM (CURRENT) DRUG THERAPY: ICD-10-CM

## 2022-06-02 LAB
ALBUMIN SERPL-MCNC: 3.1 G/DL (ref 3.4–5)
ALT SERPL W P-5'-P-CCNC: 21 U/L (ref 0–70)
AST SERPL W P-5'-P-CCNC: 18 U/L (ref 0–45)
BASOPHILS # BLD AUTO: 0.1 10E3/UL (ref 0–0.2)
BASOPHILS NFR BLD AUTO: 1 %
CREAT SERPL-MCNC: 1.1 MG/DL (ref 0.66–1.25)
EOSINOPHIL # BLD AUTO: 0.2 10E3/UL (ref 0–0.7)
EOSINOPHIL NFR BLD AUTO: 3 %
ERYTHROCYTE [DISTWIDTH] IN BLOOD BY AUTOMATED COUNT: 13.5 % (ref 10–15)
GFR SERPL CREATININE-BSD FRML MDRD: 64 ML/MIN/1.73M2
HCT VFR BLD AUTO: 43.6 % (ref 40–53)
HGB BLD-MCNC: 14.4 G/DL (ref 13.3–17.7)
IMM GRANULOCYTES # BLD: 0.1 10E3/UL
IMM GRANULOCYTES NFR BLD: 1 %
LYMPHOCYTES # BLD AUTO: 2.2 10E3/UL (ref 0.8–5.3)
LYMPHOCYTES NFR BLD AUTO: 32 %
MCH RBC QN AUTO: 31.5 PG (ref 26.5–33)
MCHC RBC AUTO-ENTMCNC: 33 G/DL (ref 31.5–36.5)
MCV RBC AUTO: 95 FL (ref 78–100)
MONOCYTES # BLD AUTO: 0.7 10E3/UL (ref 0–1.3)
MONOCYTES NFR BLD AUTO: 10 %
NEUTROPHILS # BLD AUTO: 3.7 10E3/UL (ref 1.6–8.3)
NEUTROPHILS NFR BLD AUTO: 53 %
NRBC # BLD AUTO: 0 10E3/UL
NRBC BLD AUTO-RTO: 0 /100
PLATELET # BLD AUTO: 254 10E3/UL (ref 150–450)
RBC # BLD AUTO: 4.57 10E6/UL (ref 4.4–5.9)
WBC # BLD AUTO: 6.9 10E3/UL (ref 4–11)

## 2022-06-02 PROCEDURE — 85025 COMPLETE CBC W/AUTO DIFF WBC: CPT | Mod: ORL | Performed by: NURSE PRACTITIONER

## 2022-06-02 PROCEDURE — 36415 COLL VENOUS BLD VENIPUNCTURE: CPT | Mod: ORL | Performed by: NURSE PRACTITIONER

## 2022-06-02 PROCEDURE — 84460 ALANINE AMINO (ALT) (SGPT): CPT | Mod: ORL | Performed by: NURSE PRACTITIONER

## 2022-06-02 PROCEDURE — 84450 TRANSFERASE (AST) (SGOT): CPT | Mod: ORL | Performed by: NURSE PRACTITIONER

## 2022-06-02 PROCEDURE — P9604 ONE-WAY ALLOW PRORATED TRIP: HCPCS | Mod: ORL | Performed by: NURSE PRACTITIONER

## 2022-06-02 PROCEDURE — 82565 ASSAY OF CREATININE: CPT | Mod: ORL | Performed by: NURSE PRACTITIONER

## 2022-06-02 PROCEDURE — 82040 ASSAY OF SERUM ALBUMIN: CPT | Mod: ORL | Performed by: NURSE PRACTITIONER

## 2022-07-13 ENCOUNTER — ASSISTED LIVING VISIT (OUTPATIENT)
Dept: GERIATRICS | Facility: CLINIC | Age: 87
End: 2022-07-13
Payer: COMMERCIAL

## 2022-07-13 DIAGNOSIS — I77.74 VERTEBRAL ARTERY DISSECTION (H): ICD-10-CM

## 2022-07-13 DIAGNOSIS — E04.1 THYROID NODULE: ICD-10-CM

## 2022-07-13 DIAGNOSIS — I50.32 CHRONIC DIASTOLIC CONGESTIVE HEART FAILURE (H): ICD-10-CM

## 2022-07-13 DIAGNOSIS — I69.351 HEMIPLEGIA AND HEMIPARESIS FOLLOWING CEREBRAL INFARCTION AFFECTING RIGHT DOMINANT SIDE (H): ICD-10-CM

## 2022-07-13 DIAGNOSIS — I48.0 PAROXYSMAL A-FIB (H): ICD-10-CM

## 2022-07-13 DIAGNOSIS — M05.741 RHEUMATOID ARTHRITIS INVOLVING BOTH HANDS WITH POSITIVE RHEUMATOID FACTOR (H): ICD-10-CM

## 2022-07-13 DIAGNOSIS — F01.50 VASCULAR DEMENTIA WITHOUT BEHAVIORAL DISTURBANCE (H): Primary | ICD-10-CM

## 2022-07-13 DIAGNOSIS — I10 HYPERTENSION GOAL BP (BLOOD PRESSURE) < 140/90: ICD-10-CM

## 2022-07-13 DIAGNOSIS — M05.742 RHEUMATOID ARTHRITIS INVOLVING BOTH HANDS WITH POSITIVE RHEUMATOID FACTOR (H): ICD-10-CM

## 2022-07-13 DIAGNOSIS — I25.119 CORONARY ARTERY DISEASE WITH ANGINA PECTORIS, UNSPECIFIED VESSEL OR LESION TYPE, UNSPECIFIED WHETHER NATIVE OR TRANSPLANTED HEART (H): ICD-10-CM

## 2022-07-13 NOTE — LETTER
7/13/2022        RE: Jose E Boateng [address]  [address]  [address]  [place] [address]        [place] [facility]  [place] Medical Record Number:  [id number]  Place of Service where encounter took place:  [facility] [[id number]]  Chief Complaint   Patient presents with     RECHECK       HPI:    Jose E Capps  is a 89 year old (9/28/1931), who is being seen today for an episodic care visit.  HPI information obtained from: facility chart records, [facility] chart review and family/first contact daughter Marita report. Today's concern is:    Follow up to chronic disease management. With advanced dementia and STML slowing with more confusion. Daughter says he is also sleeping more. Facility notes say refusal OOB on 6/21 for no reason. Need assist for personal hygiene including cleaning up after bowel movement. Has a personal aid twice weekly in addition to support from daughter Marita. He has no complaints with today's visit.      By chart review from Dr. Jimenez:  patient was hospitalized in October 2018 after he took a fall. 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 He went to TCU, regained mobility but has remained cognitively impaired.  He is followed by [facility].    Pt has a long history of seronegative RA, treated with leflunomide per Dr Maurer.     Past Medical and Surgical History reviewed in Epic today.    MEDICATIONS:    Current Outpatient Medications   Medication Sig Dispense Refill     ACETAMINOPHEN EXTRA STRENGTH 500 MG tablet TAKE TWO TABLETS (1000MG) BY MOUTH THREE TIMES DAILY 168 tablet 97     ASPIRIN NOT PRESCRIBED (INTENTIONAL) Please choose reason not prescribed, below 0 each 0      "atorvastatin (LIPITOR) 10 MG tablet TAKE 1 TABLET BY MOUTH ONCE DAILY 28 tablet 11     Leslie Protect (EUCERIN) external cream Apply topically 2 times daily & bid prn to buttocks 57 g 11     cyanocobalamin (VITAMIN B-12) 1000 MCG SUBL sublingual tablet DISSOLVE ONE-HALF LOZENGE (500MCG) BY MOUTH TWICE DAILY 28 tablet 11     D-5000 125 MCG (5000 UT) tablet TAKE 1 TABLET BY MOUTH ONCE DAILY 28 tablet 11     ELIQUIS ANTICOAGULANT 5 MG tablet TAKE 1 TABLET BY MOUTH TWICE DAILY 56 tablet 11     leflunomide (ARAVA) 10 MG tablet TAKE 1 TABLET BY MOUTH ONCE DAILY 28 tablet PRN     lisinopril (ZESTRIL) 10 MG tablet Take 0.5 tablets (5 mg) by mouth daily 28 tablet 97     loperamide (IMODIUM) 2 MG capsule TAKE 1 CAPSULE BY MOUTH FOUR TIMES DAILY AS NEEDED FOR DIARRHEA 30 capsule 10     memantine (NAMENDA) 10 MG tablet TAKE 1 TABLET BY MOUTH TWICE DAILY 56 tablet 97     Multiple Vitamins-Minerals (CEROVITE SENIOR) TABS TAKE 1 TABLET BY MOUTH ONCE DAILY 28 tablet 11     polyethylene glycol (MIRALAX) 17 GM/Dose powder MIX 17GM OF POWDER IN 8OZ OF WATER UNTIL COMPLETELY DISSOLVED. DRINK SOLUTION BY MOUTH ONCE DAILY AS NEEDED FOR CONSTIPATION 510 g 97     povidone-iodine 10 % swab Apply topically daily as needed       REVIEW OF SYSTEMS:  Limited secondary to cognitive impairment but today pt reports fine    Objective:  /62   Pulse 52   Temp 97.6  F (36.4  C)   Resp 16   Ht 1.626 m (5' 4\")   Wt 85.5 kg (188 lb 9.6 oz)   SpO2 95%   BMI 32.37 kg/m    Exam:  GENERAL: calm, pleasantly confused   ENT:  Mouth and posterior oropharynx normal, dry mucous membranes  EYES:  EOM, conjunctivae, lids, pupils and irises normal-glasses  RESP:  respiratory effort appears at baseline, LS clear   CV:  regular rate, irregular rhythm, no LE edema  ABDOMEN: soft nontender, BS present   M/S: mostly use of WC or 2WW. Edema +1 to right hand and digits chronic  SKIN:  no signs of open areas to viewed areas-various skin " tags/moles  NEURO:   Cranial nerves 2-12 are normal tested and grossly at patient's baseline  PSYCH:  insight and judgement impaired, memory impaired    Labs:   CBC RESULTS: Recent Labs   Lab Test 06/02/22  0735 05/05/22  0840   WBC 6.9 7.0   RBC 4.57 4.52   HGB 14.4 13.9   HCT 43.6 43.1   MCV 95 95   MCH 31.5 30.8   MCHC 33.0 32.3   RDW 13.5 13.6    256       Last Basic Metabolic Panel:  Recent Labs   Lab Test 06/02/22 0735 05/05/22  0840 08/28/20  0925 05/28/20  1300   NA  --  138  --  139   POTASSIUM  --  4.0  --  4.5   CHLORIDE  --  107  --  106   GISELA  --  8.8  --  8.7   CO2  --  26  --  29   BUN  --  26  --  28   CR 1.10 1.14   < > 1.22   GLC  --  120*  --  150*    < > = values in this interval not displayed.       Liver Function Studies -   Recent Labs   Lab Test 06/02/22 0735 05/05/22  0840 08/28/20  0925 05/28/20  1300   PROTTOTAL  --  6.1*  --  6.8   ALBUMIN 3.1* 3.2*   < > 3.4   BILITOTAL  --  0.4  --  0.5   ALKPHOS  --  58  --  66   AST 18 11   < > 23   ALT 21 24   < > 29    < > = values in this interval not displayed.       TSH   Date Value Ref Range Status   05/05/2022 1.46 0.40 - 4.00 mU/L Final   05/28/2020 1.63 0.40 - 4.00 mU/L Final   07/03/2019 1.72 0.40 - 4.00 mU/L Final       Lab Results   Component Value Date    A1C 5.6 09/28/2018    A1C 6.0 07/01/2015     ASSESSMENT/PLAN:  (F01.50) Vascular dementia without behavioral disturbance (H)  Comment: improved since family employed a PCA 3x weekly reduced to twice weekly  Plan:  -continue current plan of care    (I77.74) Vertebral artery dissection (H)  (I69.351) Hemiplegia and hemiparesis following cerebral infarction affecting right dominant side (H)  Comment: residual cognitive impairment now c/w vascular dementia  Plan:   -statin, anticoagulation and bp meds for secondary prevention.   -Follow up in Neurology clinic as scheduled (not seen recently)      (M05.741,  M05.742) Rheumatoid arthritis involving both hands with positive rheumatoid  factor (H)  Comment: no current pain   Plan:   -continue leflunomide; follow up with Dr Maurer prn   -Acetaminophen scheduled bid and prn  -CBC, LFTs every 3 months with DMARD    (I50.32) Chronic diastolic congestive heart failure (H)  (I48.0) Paroxysmal A-fib (H)  (I25.119) Coronary artery disease with angina pectoris, unspecified   vessel or lesion type, unspecified whether native or transplanted heart (H)  (I10) Hypertension goal BP (blood pressure) < 140/90   Comment: stable-Echo 9/2018 EF at 55-60%   Plan:   -statin, Eliquis, BB discontinue with lower HR and BP at previous visit   -Lisinopril at  5 mg po daily and follow  -follow BMP/CBC and LFTs periodically   -staff to obtain updated weights     (E04.1) Thyroid nodule  Comment: noted on imaging a right sided nodule measuring 2.4cm 9/2018  Plan:  -following TSH  -repeat imaging 7/2020 showed 2.2cm solid nodule in the inferior right lobe. Consider US guided fine needle biopsy. Reviewed with his daughter who declined.               Electronically signed by:  SHIRA Parra CNP                 Sincerely,        SHIRA Parra CNP

## 2022-07-13 NOTE — PROGRESS NOTES
[place] [facility]  [place] [facility] Record Number:  [id number]  Place of Service where encounter took place:  [facility] [[id number]]  Chief Complaint   Patient presents with     RECHECK       HPI:    Jose E Capps  is a 89 year old (9/28/1931), who is being seen today for an episodic care visit.  HPI information obtained from: facility chart records, [facility] chart review and family/first contact daughter Marita report. Today's concern is:    Follow up to chronic disease management. With advanced dementia and STML slowing with more confusion. Daughter says he is also sleeping more. Facility notes say refusal OOB on 6/21 for no reason. Need assist for personal hygiene including cleaning up after bowel movement. Has a personal aid twice weekly in addition to support from daughter Marita. He has no complaints with today's visit.      By chart review from Dr. Jimenez:  patient was hospitalized in October 2018 after he took a fall. 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 He went to TCU, regained mobility but has remained cognitively impaired.  He is followed by [facility].    Pt has a long history of seronegative RA, treated with leflunomide per Dr Maurer.     Past Medical and Surgical History reviewed in Epic today.    MEDICATIONS:    Current Outpatient Medications   Medication Sig Dispense Refill     ACETAMINOPHEN EXTRA STRENGTH 500 MG tablet TAKE TWO TABLETS (1000MG) BY MOUTH THREE TIMES DAILY 168 tablet 97     ASPIRIN NOT PRESCRIBED (INTENTIONAL) Please choose reason not prescribed, below 0 each 0     atorvastatin (LIPITOR) 10 MG tablet TAKE 1 TABLET BY MOUTH ONCE DAILY 28 tablet 11     Leslie Protect (EUCERIN) external  "cream Apply topically 2 times daily & bid prn to buttocks 57 g 11     cyanocobalamin (VITAMIN B-12) 1000 MCG SUBL sublingual tablet DISSOLVE ONE-HALF LOZENGE (500MCG) BY MOUTH TWICE DAILY 28 tablet 11     D-5000 125 MCG (5000 UT) tablet TAKE 1 TABLET BY MOUTH ONCE DAILY 28 tablet 11     ELIQUIS ANTICOAGULANT 5 MG tablet TAKE 1 TABLET BY MOUTH TWICE DAILY 56 tablet 11     leflunomide (ARAVA) 10 MG tablet TAKE 1 TABLET BY MOUTH ONCE DAILY 28 tablet PRN     lisinopril (ZESTRIL) 10 MG tablet Take 0.5 tablets (5 mg) by mouth daily 28 tablet 97     loperamide (IMODIUM) 2 MG capsule TAKE 1 CAPSULE BY MOUTH FOUR TIMES DAILY AS NEEDED FOR DIARRHEA 30 capsule 10     memantine (NAMENDA) 10 MG tablet TAKE 1 TABLET BY MOUTH TWICE DAILY 56 tablet 97     Multiple Vitamins-Minerals (CEROVITE SENIOR) TABS TAKE 1 TABLET BY MOUTH ONCE DAILY 28 tablet 11     polyethylene glycol (MIRALAX) 17 GM/Dose powder MIX 17GM OF POWDER IN 8OZ OF WATER UNTIL COMPLETELY DISSOLVED. DRINK SOLUTION BY MOUTH ONCE DAILY AS NEEDED FOR CONSTIPATION 510 g 97     povidone-iodine 10 % swab Apply topically daily as needed       REVIEW OF SYSTEMS:  Limited secondary to cognitive impairment but today pt reports fine    Objective:  /62   Pulse 52   Temp 97.6  F (36.4  C)   Resp 16   Ht 1.626 m (5' 4\")   Wt 85.5 kg (188 lb 9.6 oz)   SpO2 95%   BMI 32.37 kg/m    Exam:  GENERAL: calm, pleasantly confused   ENT:  Mouth and posterior oropharynx normal, dry mucous membranes  EYES:  EOM, conjunctivae, lids, pupils and irises normal-glasses  RESP:  respiratory effort appears at baseline, LS clear   CV:  regular rate, irregular rhythm, no LE edema  ABDOMEN: soft nontender, BS present   M/S: mostly use of WC or 2WW. Edema +1 to right hand and digits chronic  SKIN:  no signs of open areas to viewed areas-various skin tags/moles  NEURO:   Cranial nerves 2-12 are normal tested and grossly at patient's baseline  PSYCH:  insight and judgement impaired, memory " impaired    Labs:   CBC RESULTS: Recent Labs   Lab Test 06/02/22  0735 05/05/22  0840   WBC 6.9 7.0   RBC 4.57 4.52   HGB 14.4 13.9   HCT 43.6 43.1   MCV 95 95   MCH 31.5 30.8   MCHC 33.0 32.3   RDW 13.5 13.6    256       Last Basic Metabolic Panel:  Recent Labs   Lab Test 06/02/22  0735 05/05/22  0840 08/28/20 0925 05/28/20  1300   NA  --  138  --  139   POTASSIUM  --  4.0  --  4.5   CHLORIDE  --  107  --  106   GISELA  --  8.8  --  8.7   CO2  --  26  --  29   BUN  --  26  --  28   CR 1.10 1.14   < > 1.22   GLC  --  120*  --  150*    < > = values in this interval not displayed.       Liver Function Studies -   Recent Labs   Lab Test 06/02/22  0735 05/05/22  0840 08/28/20 0925 05/28/20  1300   PROTTOTAL  --  6.1*  --  6.8   ALBUMIN 3.1* 3.2*   < > 3.4   BILITOTAL  --  0.4  --  0.5   ALKPHOS  --  58  --  66   AST 18 11   < > 23   ALT 21 24   < > 29    < > = values in this interval not displayed.       TSH   Date Value Ref Range Status   05/05/2022 1.46 0.40 - 4.00 mU/L Final   05/28/2020 1.63 0.40 - 4.00 mU/L Final   07/03/2019 1.72 0.40 - 4.00 mU/L Final       Lab Results   Component Value Date    A1C 5.6 09/28/2018    A1C 6.0 07/01/2015     ASSESSMENT/PLAN:  (F01.50) Vascular dementia without behavioral disturbance (H)  Comment: improved since family employed a PCA 3x weekly reduced to twice weekly  Plan:  -continue current plan of care    (I77.74) Vertebral artery dissection (H)  (I69.351) Hemiplegia and hemiparesis following cerebral infarction affecting right dominant side (H)  Comment: residual cognitive impairment now c/w vascular dementia  Plan:   -statin, anticoagulation and bp meds for secondary prevention.   -Follow up in Neurology clinic as scheduled (not seen recently)      (M05.741,  M05.742) Rheumatoid arthritis involving both hands with positive rheumatoid factor (H)  Comment: no current pain   Plan:   -continue leflunomide; follow up with Dr Maurer prn   -Acetaminophen scheduled bid  and prn  -CBC, LFTs every 3 months with DMARD    (I50.32) Chronic diastolic congestive heart failure (H)  (I48.0) Paroxysmal A-fib (H)  (I25.119) Coronary artery disease with angina pectoris, unspecified   vessel or lesion type, unspecified whether native or transplanted heart (H)  (I10) Hypertension goal BP (blood pressure) < 140/90   Comment: stable-Echo 9/2018 EF at 55-60%   Plan:   -statin, Eliquis, BB discontinue with lower HR and BP at previous visit   -Lisinopril at  5 mg po daily and follow  -follow BMP/CBC and LFTs periodically   -staff to obtain updated weights     (E04.1) Thyroid nodule  Comment: noted on imaging a right sided nodule measuring 2.4cm 9/2018  Plan:  -following TSH  -repeat imaging 7/2020 showed 2.2cm solid nodule in the inferior right lobe. Consider US guided fine needle biopsy. Reviewed with his daughter who declined.               Electronically signed by:  SHIRA Parra CNP

## 2022-07-13 NOTE — LETTER
Jose E Capps    1. Updated weight and entered into [facility]    SHIRA Parra CNP on 7/13/2022 at 4:56 PM

## 2022-07-14 VITALS
DIASTOLIC BLOOD PRESSURE: 62 MMHG | OXYGEN SATURATION: 95 % | BODY MASS INDEX: 32.2 KG/M2 | SYSTOLIC BLOOD PRESSURE: 112 MMHG | HEART RATE: 52 BPM | HEIGHT: 64 IN | WEIGHT: 188.6 LBS | RESPIRATION RATE: 16 BRPM | TEMPERATURE: 97.6 F

## 2022-07-18 DIAGNOSIS — R23.8 SKIN IRRITATION: ICD-10-CM

## 2022-08-31 ENCOUNTER — LAB REQUISITION (OUTPATIENT)
Dept: LAB | Facility: CLINIC | Age: 87
End: 2022-08-31
Payer: COMMERCIAL

## 2022-08-31 DIAGNOSIS — Z79.899 OTHER LONG TERM (CURRENT) DRUG THERAPY: ICD-10-CM

## 2022-09-01 DIAGNOSIS — E53.8 VITAMIN B12 DEFICIENCY (NON ANEMIC): ICD-10-CM

## 2022-09-01 DIAGNOSIS — M05.741 RHEUMATOID ARTHRITIS INVOLVING BOTH HANDS WITH POSITIVE RHEUMATOID FACTOR (H): ICD-10-CM

## 2022-09-01 DIAGNOSIS — M05.742 RHEUMATOID ARTHRITIS INVOLVING BOTH HANDS WITH POSITIVE RHEUMATOID FACTOR (H): ICD-10-CM

## 2022-09-01 LAB
ALBUMIN SERPL-MCNC: 3.7 G/DL (ref 3.4–5)
ALT SERPL W P-5'-P-CCNC: 28 U/L (ref 0–70)
AST SERPL W P-5'-P-CCNC: 20 U/L (ref 0–45)
BASOPHILS # BLD AUTO: 0.1 10E3/UL (ref 0–0.2)
BASOPHILS NFR BLD AUTO: 1 %
CREAT SERPL-MCNC: 1.07 MG/DL (ref 0.66–1.25)
EOSINOPHIL # BLD AUTO: 0.1 10E3/UL (ref 0–0.7)
EOSINOPHIL NFR BLD AUTO: 2 %
ERYTHROCYTE [DISTWIDTH] IN BLOOD BY AUTOMATED COUNT: 13.5 % (ref 10–15)
GFR SERPL CREATININE-BSD FRML MDRD: 66 ML/MIN/1.73M2
HCT VFR BLD AUTO: 49.6 % (ref 40–53)
HGB BLD-MCNC: 15.8 G/DL (ref 13.3–17.7)
IMM GRANULOCYTES # BLD: 0.1 10E3/UL
IMM GRANULOCYTES NFR BLD: 1 %
LYMPHOCYTES # BLD AUTO: 1.5 10E3/UL (ref 0.8–5.3)
LYMPHOCYTES NFR BLD AUTO: 18 %
MCH RBC QN AUTO: 31.5 PG (ref 26.5–33)
MCHC RBC AUTO-ENTMCNC: 31.9 G/DL (ref 31.5–36.5)
MCV RBC AUTO: 99 FL (ref 78–100)
MONOCYTES # BLD AUTO: 0.7 10E3/UL (ref 0–1.3)
MONOCYTES NFR BLD AUTO: 9 %
NEUTROPHILS # BLD AUTO: 6.1 10E3/UL (ref 1.6–8.3)
NEUTROPHILS NFR BLD AUTO: 69 %
NRBC # BLD AUTO: 0 10E3/UL
NRBC BLD AUTO-RTO: 0 /100
PLATELET # BLD AUTO: 265 10E3/UL (ref 150–450)
RBC # BLD AUTO: 5.01 10E6/UL (ref 4.4–5.9)
WBC # BLD AUTO: 8.6 10E3/UL (ref 4–11)

## 2022-09-01 PROCEDURE — 84450 TRANSFERASE (AST) (SGOT): CPT | Mod: ORL | Performed by: NURSE PRACTITIONER

## 2022-09-01 PROCEDURE — 82565 ASSAY OF CREATININE: CPT | Mod: ORL | Performed by: NURSE PRACTITIONER

## 2022-09-01 PROCEDURE — 82040 ASSAY OF SERUM ALBUMIN: CPT | Mod: ORL | Performed by: NURSE PRACTITIONER

## 2022-09-01 PROCEDURE — 36415 COLL VENOUS BLD VENIPUNCTURE: CPT | Mod: ORL | Performed by: NURSE PRACTITIONER

## 2022-09-01 PROCEDURE — 84460 ALANINE AMINO (ALT) (SGPT): CPT | Mod: ORL | Performed by: NURSE PRACTITIONER

## 2022-09-01 PROCEDURE — 85025 COMPLETE CBC W/AUTO DIFF WBC: CPT | Mod: ORL | Performed by: NURSE PRACTITIONER

## 2022-09-01 RX ORDER — LEFLUNOMIDE 10 MG/1
TABLET ORAL
Qty: 90 TABLET | Refills: 97 | Status: SHIPPED | OUTPATIENT
Start: 2022-09-01

## 2022-09-01 RX ORDER — MAGNESIUM 200 MG
TABLET ORAL
Qty: 90 TABLET | Refills: 97 | Status: SHIPPED | OUTPATIENT
Start: 2022-09-01

## 2022-09-03 ENCOUNTER — HEALTH MAINTENANCE LETTER (OUTPATIENT)
Age: 87
End: 2022-09-03

## 2022-09-29 DIAGNOSIS — I48.91 ATRIAL FIBRILLATION, UNSPECIFIED TYPE (H): ICD-10-CM

## 2022-09-29 DIAGNOSIS — E55.9 VITAMIN D DEFICIENCY: ICD-10-CM

## 2022-09-29 DIAGNOSIS — I10 HYPERTENSION GOAL BP (BLOOD PRESSURE) < 140/90: ICD-10-CM

## 2022-09-29 RX ORDER — APIXABAN 5 MG/1
TABLET, FILM COATED ORAL
Qty: 180 TABLET | Refills: 97 | Status: SHIPPED | OUTPATIENT
Start: 2022-09-29

## 2022-09-29 RX ORDER — MULTIVIT-MIN/FA/LYCOPEN/LUTEIN .4-300-25
TABLET ORAL
Qty: 90 TABLET | Refills: 97 | Status: SHIPPED | OUTPATIENT
Start: 2022-09-29

## 2022-09-29 RX ORDER — RESVER/WINE/BFL/GRPSD/PC/C/POM 200MG-60MG
CAPSULE ORAL
Qty: 90 TABLET | Refills: 97 | Status: SHIPPED | OUTPATIENT
Start: 2022-09-29

## 2022-10-05 ENCOUNTER — ASSISTED LIVING VISIT (OUTPATIENT)
Dept: GERIATRICS | Facility: CLINIC | Age: 87
End: 2022-10-05
Payer: COMMERCIAL

## 2022-10-05 DIAGNOSIS — K92.1 BLOOD IN STOOL: Primary | ICD-10-CM

## 2022-10-05 NOTE — LETTER
10/5/2022        RE: Jose E Boateng [facility]  [address]  [address]  [place] [address]        [facility]  [place] [facility] Record Number:  [id number]  Place of Service where encounter took place:  [facility] [[id number]]  Chief Complaint   Patient presents with     RECHECK     Routine       HPI:    Jose E Capps  is a 89 year old (9/28/1931), who is being seen today for an episodic care visit.  HPI information obtained from: facility chart records. Today's concern is:    Family request for visit with possible episode of blood in stool. He has no recall of this. STML and advanced dementia. He has clogged his toilet today and wandering if constipated and or external /internal hemorrhoids a contributor? He is on Eliquis. CBC last month was WNL. VSS.     Past Medical and Surgical History reviewed in Epic today.    MEDICATIONS:    Current Outpatient Medications   Medication Sig Dispense Refill     ACETAMINOPHEN EXTRA STRENGTH 500 MG tablet TAKE TWO TABLETS (1000MG) BY MOUTH THREE TIMES DAILY 168 tablet 97     ASPIRIN NOT PRESCRIBED (INTENTIONAL) Please choose reason not prescribed, below 0 each 0     atorvastatin (LIPITOR) 10 MG tablet TAKE 1 TABLET BY MOUTH ONCE DAILY 28 tablet 11     Leslie Protect (EUCERIN) external cream APPLY TOPICALLY TO BUTTOCKS TWICE DAILY;AND TWICE DAILY AS NEEDED 142 g 97     CYANOCOBALAMIN 1000 MCG SUBL sublingual tablet DISSOLVE ONE-HALF LOZENGE (500MCG) UNDER THE TONGUE TWICE DAILY 90 tablet 97     D-5000 125 MCG (5000 UT) tablet TAKE 1 TABLET BY MOUTH ONCE DAILY 90 tablet 97     ELIQUIS ANTICOAGULANT 5 MG tablet TAKE 1 TABLET BY MOUTH TWICE DAILY 180 tablet 97     leflunomide (ARAVA) 10 MG tablet TAKE 1 TABLET BY MOUTH ONCE DAILY 90 tablet 97     lisinopril (ZESTRIL) 10 MG tablet Take 0.5 tablets (5 mg) by mouth daily 28 tablet 97     loperamide (IMODIUM) 2 MG capsule TAKE 1 CAPSULE BY MOUTH FOUR TIMES DAILY AS NEEDED FOR  "DIARRHEA 30 capsule 10     memantine (NAMENDA) 10 MG tablet TAKE 1 TABLET BY MOUTH TWICE DAILY 56 tablet 97     Multiple Vitamins-Minerals (CEROVITE SENIOR) TABS TAKE 1 TABLET BY MOUTH ONCE DAILY 90 tablet 97     polyethylene glycol (MIRALAX) 17 GM/Dose powder MIX 17GM OF POWDER IN 8OZ OF WATER UNTIL COMPLETELY DISSOLVED. DRINK SOLUTION BY MOUTH ONCE DAILY AS NEEDED FOR CONSTIPATION 510 g 97     REVIEW OF SYSTEMS:  Unobtainable secondary to cognitive impairment.     Objective:  BP (!) 156/73   Pulse 76   Temp 98  F (36.7  C)   Resp 18   Ht 1.626 m (5' 4\")   Wt 85.6 kg (188 lb 12.8 oz)   SpO2 95%   BMI 32.41 kg/m    Exam:  GENERAL: calm, pleasantly confused   ENT:  Mouth and posterior oropharynx normal, dry mucous membranes  EYES:  EOM, conjunctivae, lids, pupils and irises normal-glasses  RESP:  respiratory effort appears at baseline, LS clear   CV:  regular rate, irregular rhythm, no LE edema  ABDOMEN: soft nontender, BS present   M/S: mostly use of WC or 2WW. Edema +1 to right hand and digits chronic  SKIN:  no signs of open areas to viewed areas-various skin tags/moles, no open areas on coccyx, anus   NEURO:   Cranial nerves 2-12 are normal tested and grossly at patient's baseline  PSYCH:  insight and judgement impaired, memory impaired    Labs:   CBC RESULTS: Recent Labs   Lab Test 09/01/22  0800 06/02/22  0735   WBC 8.6 6.9   RBC 5.01 4.57   HGB 15.8 14.4   HCT 49.6 43.6   MCV 99 95   MCH 31.5 31.5   MCHC 31.9 33.0   RDW 13.5 13.5    254       Last Basic Metabolic Panel:  Recent Labs   Lab Test 09/01/22  0800 06/02/22  0735 05/05/22  0840 08/28/20  0925 05/28/20  1300   NA  --   --  138  --  139   POTASSIUM  --   --  4.0  --  4.5   CHLORIDE  --   --  107  --  106   GISELA  --   --  8.8  --  8.7   CO2  --   --  26  --  29   BUN  --   --  26  --  28   CR 1.07 1.10 1.14   < > 1.22   GLC  --   --  120*  --  150*    < > = values in this interval not displayed.       Liver Function Studies -   Recent Labs "   Lab Test 09/01/22  0800 06/02/22  0735 05/05/22  0840 08/28/20  0925 05/28/20  1300   PROTTOTAL  --   --  6.1*  --  6.8   ALBUMIN 3.7 3.1* 3.2*   < > 3.4   BILITOTAL  --   --  0.4  --  0.5   ALKPHOS  --   --  58  --  66   AST 20 18 11   < > 23   ALT 28 21 24   < > 29    < > = values in this interval not displayed.       TSH   Date Value Ref Range Status   05/05/2022 1.46 0.40 - 4.00 mU/L Final   05/28/2020 1.63 0.40 - 4.00 mU/L Final   07/03/2019 1.72 0.40 - 4.00 mU/L Final       Lab Results   Component Value Date    A1C 5.6 09/28/2018    A1C 6.0 07/01/2015     ASSESSMENT/PLAN:  (K92.1) Blood in stool  (primary encounter diagnosis)  Comment: unclear?  Plan:   -gordon to buttocks twice daily  -consider CBC with s/s of additional episodes     Electronically signed by:  SHIRA Parra CNP                 Sincerely,        SHIRA Parra CNP

## 2022-10-05 NOTE — PROGRESS NOTES
[facility]  [facility] Record Number:  [id number]  Place of Service where encounter took place:  [facility] [[id number]]  Chief Complaint   Patient presents with     RECHECK     Routine       HPI:    Jose E Capps  is a 89 year old (9/28/1931), who is being seen today for an episodic care visit.  HPI information obtained from: facility chart records. Today's concern is:    Family request for visit with possible episode of blood in stool. He has no recall of this. STML and advanced dementia. He has clogged his toilet today and wandering if constipated and or external /internal hemorrhoids a contributor? He is on Eliquis. CBC last month was WNL. VSS.     Past Medical and Surgical History reviewed in Epic today.    MEDICATIONS:    Current Outpatient Medications   Medication Sig Dispense Refill     ACETAMINOPHEN EXTRA STRENGTH 500 MG tablet TAKE TWO TABLETS (1000MG) BY MOUTH THREE TIMES DAILY 168 tablet 97     ASPIRIN NOT PRESCRIBED (INTENTIONAL) Please choose reason not prescribed, below 0 each 0     atorvastatin (LIPITOR) 10 MG tablet TAKE 1 TABLET BY MOUTH ONCE DAILY 28 tablet 11     Leslie Protect (EUCERIN) external cream APPLY TOPICALLY TO BUTTOCKS TWICE DAILY;AND TWICE DAILY AS NEEDED 142 g 97     CYANOCOBALAMIN 1000 MCG SUBL sublingual tablet DISSOLVE ONE-HALF LOZENGE (500MCG) UNDER THE TONGUE TWICE DAILY 90 tablet 97     D-5000 125 MCG (5000 UT) tablet TAKE 1 TABLET BY MOUTH ONCE DAILY 90 tablet 97     ELIQUIS ANTICOAGULANT 5 MG tablet TAKE 1 TABLET BY MOUTH TWICE DAILY 180 tablet 97     leflunomide (ARAVA) 10 MG tablet TAKE 1 TABLET BY MOUTH ONCE DAILY 90 tablet 97     lisinopril (ZESTRIL) 10 MG tablet Take 0.5 tablets (5 mg) by mouth daily 28 tablet 97     loperamide (IMODIUM) 2 MG capsule TAKE 1 CAPSULE BY MOUTH FOUR TIMES DAILY AS NEEDED FOR DIARRHEA 30 capsule 10     memantine (NAMENDA) 10 MG tablet TAKE 1 TABLET BY MOUTH TWICE DAILY 56 tablet 97      "Multiple Vitamins-Minerals (CEROVITE SENIOR) TABS TAKE 1 TABLET BY MOUTH ONCE DAILY 90 tablet 97     polyethylene glycol (MIRALAX) 17 GM/Dose powder MIX 17GM OF POWDER IN 8OZ OF WATER UNTIL COMPLETELY DISSOLVED. DRINK SOLUTION BY MOUTH ONCE DAILY AS NEEDED FOR CONSTIPATION 510 g 97     REVIEW OF SYSTEMS:  Unobtainable secondary to cognitive impairment.     Objective:  BP (!) 156/73   Pulse 76   Temp 98  F (36.7  C)   Resp 18   Ht 1.626 m (5' 4\")   Wt 85.6 kg (188 lb 12.8 oz)   SpO2 95%   BMI 32.41 kg/m    Exam:  GENERAL: calm, pleasantly confused   ENT:  Mouth and posterior oropharynx normal, dry mucous membranes  EYES:  EOM, conjunctivae, lids, pupils and irises normal-glasses  RESP:  respiratory effort appears at baseline, LS clear   CV:  regular rate, irregular rhythm, no LE edema  ABDOMEN: soft nontender, BS present   M/S: mostly use of WC or 2WW. Edema +1 to right hand and digits chronic  SKIN:  no signs of open areas to viewed areas-various skin tags/moles, no open areas on coccyx, anus   NEURO:   Cranial nerves 2-12 are normal tested and grossly at patient's baseline  PSYCH:  insight and judgement impaired, memory impaired    Labs:   CBC RESULTS: Recent Labs   Lab Test 09/01/22  0800 06/02/22  0735   WBC 8.6 6.9   RBC 5.01 4.57   HGB 15.8 14.4   HCT 49.6 43.6   MCV 99 95   MCH 31.5 31.5   MCHC 31.9 33.0   RDW 13.5 13.5    254       Last Basic Metabolic Panel:  Recent Labs   Lab Test 09/01/22  0800 06/02/22  0735 05/05/22  0840 08/28/20  0925 05/28/20  1300   NA  --   --  138  --  139   POTASSIUM  --   --  4.0  --  4.5   CHLORIDE  --   --  107  --  106   GISELA  --   --  8.8  --  8.7   CO2  --   --  26  --  29   BUN  --   --  26  --  28   CR 1.07 1.10 1.14   < > 1.22   GLC  --   --  120*  --  150*    < > = values in this interval not displayed.       Liver Function Studies -   Recent Labs   Lab Test 09/01/22  0800 06/02/22  0735 05/05/22  0840 08/28/20  0925 05/28/20  1300   PROTTOTAL  --   --  6.1* "  --  6.8   ALBUMIN 3.7 3.1* 3.2*   < > 3.4   BILITOTAL  --   --  0.4  --  0.5   ALKPHOS  --   --  58  --  66   AST 20 18 11   < > 23   ALT 28 21 24   < > 29    < > = values in this interval not displayed.       TSH   Date Value Ref Range Status   05/05/2022 1.46 0.40 - 4.00 mU/L Final   05/28/2020 1.63 0.40 - 4.00 mU/L Final   07/03/2019 1.72 0.40 - 4.00 mU/L Final       Lab Results   Component Value Date    A1C 5.6 09/28/2018    A1C 6.0 07/01/2015     ASSESSMENT/PLAN:  (K92.1) Blood in stool  (primary encounter diagnosis)  Comment: unclear?  Plan:   -gordon to buttocks twice daily  -consider CBC with s/s of additional episodes     Electronically signed by:  SHIRA Parra CNP

## 2022-10-07 VITALS
HEART RATE: 76 BPM | WEIGHT: 188.8 LBS | RESPIRATION RATE: 18 BRPM | HEIGHT: 64 IN | SYSTOLIC BLOOD PRESSURE: 156 MMHG | TEMPERATURE: 98 F | OXYGEN SATURATION: 95 % | DIASTOLIC BLOOD PRESSURE: 73 MMHG | BODY MASS INDEX: 32.23 KG/M2

## 2022-10-27 DIAGNOSIS — E78.2 MIXED HYPERLIPIDEMIA: ICD-10-CM

## 2022-10-27 RX ORDER — ATORVASTATIN CALCIUM 10 MG/1
TABLET, FILM COATED ORAL
Qty: 90 TABLET | Refills: 97 | Status: SHIPPED | OUTPATIENT
Start: 2022-10-27

## 2022-11-30 ENCOUNTER — LAB REQUISITION (OUTPATIENT)
Dept: LAB | Facility: CLINIC | Age: 87
End: 2022-11-30
Payer: COMMERCIAL

## 2022-11-30 DIAGNOSIS — Z79.899 OTHER LONG TERM (CURRENT) DRUG THERAPY: ICD-10-CM

## 2022-12-07 ENCOUNTER — LAB REQUISITION (OUTPATIENT)
Dept: LAB | Facility: CLINIC | Age: 87
End: 2022-12-07
Payer: COMMERCIAL

## 2022-12-07 DIAGNOSIS — Z79.899 OTHER LONG TERM (CURRENT) DRUG THERAPY: ICD-10-CM

## 2022-12-08 LAB
ALT SERPL W P-5'-P-CCNC: 24 U/L (ref 0–70)
AST SERPL W P-5'-P-CCNC: 18 U/L (ref 0–45)
BASOPHILS # BLD AUTO: 0.1 10E3/UL (ref 0–0.2)
BASOPHILS NFR BLD AUTO: 1 %
CREAT SERPL-MCNC: 0.97 MG/DL (ref 0.66–1.25)
EOSINOPHIL # BLD AUTO: 0.3 10E3/UL (ref 0–0.7)
EOSINOPHIL NFR BLD AUTO: 3 %
ERYTHROCYTE [DISTWIDTH] IN BLOOD BY AUTOMATED COUNT: 13.4 % (ref 10–15)
GFR SERPL CREATININE-BSD FRML MDRD: 74 ML/MIN/1.73M2
HCT VFR BLD AUTO: 41.7 % (ref 40–53)
HGB BLD-MCNC: 13.6 G/DL (ref 13.3–17.7)
IMM GRANULOCYTES # BLD: 0.1 10E3/UL
IMM GRANULOCYTES NFR BLD: 1 %
LYMPHOCYTES # BLD AUTO: 1.5 10E3/UL (ref 0.8–5.3)
LYMPHOCYTES NFR BLD AUTO: 18 %
MCH RBC QN AUTO: 31.3 PG (ref 26.5–33)
MCHC RBC AUTO-ENTMCNC: 32.6 G/DL (ref 31.5–36.5)
MCV RBC AUTO: 96 FL (ref 78–100)
MONOCYTES # BLD AUTO: 1 10E3/UL (ref 0–1.3)
MONOCYTES NFR BLD AUTO: 11 %
NEUTROPHILS # BLD AUTO: 5.6 10E3/UL (ref 1.6–8.3)
NEUTROPHILS NFR BLD AUTO: 66 %
NRBC # BLD AUTO: 0 10E3/UL
NRBC BLD AUTO-RTO: 0 /100
PLATELET # BLD AUTO: 298 10E3/UL (ref 150–450)
RBC # BLD AUTO: 4.35 10E6/UL (ref 4.4–5.9)
WBC # BLD AUTO: 8.6 10E3/UL (ref 4–11)

## 2022-12-08 PROCEDURE — 84460 ALANINE AMINO (ALT) (SGPT): CPT | Mod: ORL | Performed by: NURSE PRACTITIONER

## 2022-12-08 PROCEDURE — 85025 COMPLETE CBC W/AUTO DIFF WBC: CPT | Mod: ORL | Performed by: NURSE PRACTITIONER

## 2022-12-08 PROCEDURE — 82565 ASSAY OF CREATININE: CPT | Mod: ORL | Performed by: NURSE PRACTITIONER

## 2022-12-08 PROCEDURE — 84450 TRANSFERASE (AST) (SGOT): CPT | Mod: ORL | Performed by: NURSE PRACTITIONER

## 2023-01-14 ENCOUNTER — LAB REQUISITION (OUTPATIENT)
Dept: LAB | Facility: CLINIC | Age: 88
End: 2023-01-14
Payer: COMMERCIAL

## 2023-01-14 DIAGNOSIS — I10 ESSENTIAL (PRIMARY) HYPERTENSION: ICD-10-CM

## 2023-01-14 DIAGNOSIS — E78.5 HYPERLIPIDEMIA, UNSPECIFIED: ICD-10-CM

## 2023-01-14 DIAGNOSIS — E03.9 HYPOTHYROIDISM, UNSPECIFIED: ICD-10-CM

## 2023-01-14 DIAGNOSIS — D64.9 ANEMIA, UNSPECIFIED: ICD-10-CM

## 2023-01-18 LAB
ALBUMIN SERPL BCG-MCNC: 3.9 G/DL (ref 3.5–5.2)
ALP SERPL-CCNC: ABNORMAL U/L
ALT SERPL W P-5'-P-CCNC: ABNORMAL U/L
ANION GAP SERPL CALCULATED.3IONS-SCNC: 19 MMOL/L (ref 7–15)
AST SERPL W P-5'-P-CCNC: ABNORMAL U/L
BILIRUB SERPL-MCNC: 0.7 MG/DL
BUN SERPL-MCNC: 16.5 MG/DL (ref 8–23)
CALCIUM SERPL-MCNC: 9.5 MG/DL (ref 8.2–9.6)
CHLORIDE SERPL-SCNC: 104 MMOL/L (ref 98–107)
CHOLEST SERPL-MCNC: 152 MG/DL
CREAT SERPL-MCNC: 0.97 MG/DL (ref 0.67–1.17)
DEPRECATED CALCIDIOL+CALCIFEROL SERPL-MC: 68 UG/L (ref 20–75)
DEPRECATED HCO3 PLAS-SCNC: 16 MMOL/L (ref 22–29)
ERYTHROCYTE [DISTWIDTH] IN BLOOD BY AUTOMATED COUNT: 14.2 % (ref 10–15)
GFR SERPL CREATININE-BSD FRML MDRD: 74 ML/MIN/1.73M2
GLUCOSE SERPL-MCNC: 90 MG/DL (ref 70–99)
HCT VFR BLD AUTO: 49.8 % (ref 40–53)
HDLC SERPL-MCNC: 31 MG/DL
HGB BLD-MCNC: 15.4 G/DL (ref 13.3–17.7)
LDLC SERPL CALC-MCNC: 72 MG/DL
MCH RBC QN AUTO: 31.6 PG (ref 26.5–33)
MCHC RBC AUTO-ENTMCNC: 30.9 G/DL (ref 31.5–36.5)
MCV RBC AUTO: 102 FL (ref 78–100)
NONHDLC SERPL-MCNC: 121 MG/DL
PLATELET # BLD AUTO: 290 10E3/UL (ref 150–450)
POTASSIUM SERPL-SCNC: 5.8 MMOL/L (ref 3.4–5.3)
PROT SERPL-MCNC: 6.7 G/DL (ref 6.4–8.3)
RBC # BLD AUTO: 4.87 10E6/UL (ref 4.4–5.9)
SODIUM SERPL-SCNC: 139 MMOL/L (ref 136–145)
TRIGL SERPL-MCNC: 247 MG/DL
TSH SERPL DL<=0.005 MIU/L-ACNC: 1.68 UIU/ML (ref 0.3–4.2)
VIT B12 SERPL-MCNC: 1793 PG/ML (ref 232–1245)
WBC # BLD AUTO: 9.1 10E3/UL (ref 4–11)

## 2023-01-18 PROCEDURE — P9604 ONE-WAY ALLOW PRORATED TRIP: HCPCS | Mod: ORL | Performed by: PHYSICIAN ASSISTANT

## 2023-01-18 PROCEDURE — 82607 VITAMIN B-12: CPT | Mod: ORL | Performed by: PHYSICIAN ASSISTANT

## 2023-01-18 PROCEDURE — 84155 ASSAY OF PROTEIN SERUM: CPT | Mod: ORL | Performed by: PHYSICIAN ASSISTANT

## 2023-01-18 PROCEDURE — 85027 COMPLETE CBC AUTOMATED: CPT | Mod: ORL | Performed by: PHYSICIAN ASSISTANT

## 2023-01-18 PROCEDURE — 36415 COLL VENOUS BLD VENIPUNCTURE: CPT | Mod: ORL | Performed by: PHYSICIAN ASSISTANT

## 2023-01-18 PROCEDURE — 80061 LIPID PANEL: CPT | Mod: ORL | Performed by: PHYSICIAN ASSISTANT

## 2023-01-18 PROCEDURE — 82306 VITAMIN D 25 HYDROXY: CPT | Mod: ORL | Performed by: PHYSICIAN ASSISTANT

## 2023-01-18 PROCEDURE — 84443 ASSAY THYROID STIM HORMONE: CPT | Mod: ORL | Performed by: PHYSICIAN ASSISTANT

## 2023-03-20 ENCOUNTER — LAB REQUISITION (OUTPATIENT)
Dept: LAB | Facility: CLINIC | Age: 88
End: 2023-03-20
Payer: COMMERCIAL

## 2023-03-20 DIAGNOSIS — D64.9 ANEMIA, UNSPECIFIED: ICD-10-CM

## 2023-03-20 DIAGNOSIS — M06.9 RHEUMATOID ARTHRITIS, UNSPECIFIED (H): ICD-10-CM

## 2023-03-22 LAB
ALBUMIN SERPL BCG-MCNC: 3.5 G/DL (ref 3.5–5.2)
ALP SERPL-CCNC: 68 U/L (ref 40–129)
ALT SERPL W P-5'-P-CCNC: 16 U/L (ref 10–50)
AST SERPL W P-5'-P-CCNC: 30 U/L (ref 10–50)
BILIRUB DIRECT SERPL-MCNC: <0.2 MG/DL (ref 0–0.3)
BILIRUB SERPL-MCNC: 0.4 MG/DL
ERYTHROCYTE [DISTWIDTH] IN BLOOD BY AUTOMATED COUNT: 13.7 % (ref 10–15)
HCT VFR BLD AUTO: 45.3 % (ref 40–53)
HGB BLD-MCNC: 13.8 G/DL (ref 13.3–17.7)
MCH RBC QN AUTO: 31.2 PG (ref 26.5–33)
MCHC RBC AUTO-ENTMCNC: 30.5 G/DL (ref 31.5–36.5)
MCV RBC AUTO: 102 FL (ref 78–100)
PLATELET # BLD AUTO: 253 10E3/UL (ref 150–450)
PROT SERPL-MCNC: 5.9 G/DL (ref 6.4–8.3)
RBC # BLD AUTO: 4.43 10E6/UL (ref 4.4–5.9)
WBC # BLD AUTO: 9.1 10E3/UL (ref 4–11)

## 2023-03-22 PROCEDURE — 36415 COLL VENOUS BLD VENIPUNCTURE: CPT | Mod: ORL | Performed by: PHYSICIAN ASSISTANT

## 2023-03-22 PROCEDURE — 80076 HEPATIC FUNCTION PANEL: CPT | Mod: ORL | Performed by: PHYSICIAN ASSISTANT

## 2023-03-22 PROCEDURE — P9604 ONE-WAY ALLOW PRORATED TRIP: HCPCS | Mod: ORL | Performed by: PHYSICIAN ASSISTANT

## 2023-03-22 PROCEDURE — 85027 COMPLETE CBC AUTOMATED: CPT | Mod: ORL | Performed by: PHYSICIAN ASSISTANT

## 2023-06-03 ENCOUNTER — HEALTH MAINTENANCE LETTER (OUTPATIENT)
Age: 88
End: 2023-06-03

## 2023-06-26 ENCOUNTER — LAB REQUISITION (OUTPATIENT)
Dept: LAB | Facility: CLINIC | Age: 88
End: 2023-06-26
Payer: COMMERCIAL

## 2023-06-26 DIAGNOSIS — D64.9 ANEMIA, UNSPECIFIED: ICD-10-CM

## 2023-06-26 DIAGNOSIS — M06.9 RHEUMATOID ARTHRITIS, UNSPECIFIED (H): ICD-10-CM

## 2023-06-28 LAB
ALBUMIN SERPL BCG-MCNC: 3.7 G/DL (ref 3.5–5.2)
ALP SERPL-CCNC: 79 U/L (ref 40–129)
ALT SERPL W P-5'-P-CCNC: 26 U/L (ref 0–70)
AST SERPL W P-5'-P-CCNC: 25 U/L (ref 0–45)
BILIRUB DIRECT SERPL-MCNC: <0.2 MG/DL (ref 0–0.3)
BILIRUB SERPL-MCNC: 0.5 MG/DL
ERYTHROCYTE [DISTWIDTH] IN BLOOD BY AUTOMATED COUNT: 14.4 % (ref 10–15)
HCT VFR BLD AUTO: 43.4 % (ref 40–53)
HGB BLD-MCNC: 14 G/DL (ref 13.3–17.7)
MCH RBC QN AUTO: 32 PG (ref 26.5–33)
MCHC RBC AUTO-ENTMCNC: 32.3 G/DL (ref 31.5–36.5)
MCV RBC AUTO: 99 FL (ref 78–100)
PLATELET # BLD AUTO: 274 10E3/UL (ref 150–450)
PROT SERPL-MCNC: 6 G/DL (ref 6.4–8.3)
RBC # BLD AUTO: 4.37 10E6/UL (ref 4.4–5.9)
WBC # BLD AUTO: 7.8 10E3/UL (ref 4–11)

## 2023-06-28 PROCEDURE — 85027 COMPLETE CBC AUTOMATED: CPT | Mod: ORL | Performed by: PHYSICIAN ASSISTANT

## 2023-06-28 PROCEDURE — P9604 ONE-WAY ALLOW PRORATED TRIP: HCPCS | Mod: ORL | Performed by: PHYSICIAN ASSISTANT

## 2023-06-28 PROCEDURE — 80076 HEPATIC FUNCTION PANEL: CPT | Mod: ORL | Performed by: PHYSICIAN ASSISTANT

## 2023-06-28 PROCEDURE — 36415 COLL VENOUS BLD VENIPUNCTURE: CPT | Mod: ORL | Performed by: PHYSICIAN ASSISTANT

## 2023-10-23 ENCOUNTER — LAB REQUISITION (OUTPATIENT)
Dept: LAB | Facility: CLINIC | Age: 88
End: 2023-10-23
Payer: COMMERCIAL

## 2023-10-23 DIAGNOSIS — D64.9 ANEMIA, UNSPECIFIED: ICD-10-CM

## 2023-10-23 DIAGNOSIS — E03.9 HYPOTHYROIDISM, UNSPECIFIED: ICD-10-CM

## 2023-10-23 DIAGNOSIS — I10 ESSENTIAL (PRIMARY) HYPERTENSION: ICD-10-CM

## 2023-10-25 LAB
ALBUMIN SERPL BCG-MCNC: 3.7 G/DL (ref 3.5–5.2)
ALP SERPL-CCNC: 84 U/L (ref 40–129)
ALT SERPL W P-5'-P-CCNC: 37 U/L (ref 0–70)
ANION GAP SERPL CALCULATED.3IONS-SCNC: 12 MMOL/L (ref 7–15)
AST SERPL W P-5'-P-CCNC: 39 U/L (ref 0–45)
BASOPHILS # BLD AUTO: 0.1 10E3/UL (ref 0–0.2)
BASOPHILS NFR BLD AUTO: 1 %
BILIRUB SERPL-MCNC: 0.5 MG/DL
BUN SERPL-MCNC: 19 MG/DL (ref 8–23)
CALCIUM SERPL-MCNC: 9 MG/DL (ref 8.2–9.6)
CHLORIDE SERPL-SCNC: 107 MMOL/L (ref 98–107)
CREAT SERPL-MCNC: 0.99 MG/DL (ref 0.67–1.17)
DEPRECATED HCO3 PLAS-SCNC: 23 MMOL/L (ref 22–29)
EGFRCR SERPLBLD CKD-EPI 2021: 71 ML/MIN/1.73M2
EOSINOPHIL # BLD AUTO: 0.2 10E3/UL (ref 0–0.7)
EOSINOPHIL NFR BLD AUTO: 3 %
ERYTHROCYTE [DISTWIDTH] IN BLOOD BY AUTOMATED COUNT: 14.1 % (ref 10–15)
GLUCOSE SERPL-MCNC: 102 MG/DL (ref 70–99)
HCT VFR BLD AUTO: 43.7 % (ref 40–53)
HGB BLD-MCNC: 14.1 G/DL (ref 13.3–17.7)
IMM GRANULOCYTES # BLD: 0 10E3/UL
IMM GRANULOCYTES NFR BLD: 1 %
LYMPHOCYTES # BLD AUTO: 1.7 10E3/UL (ref 0.8–5.3)
LYMPHOCYTES NFR BLD AUTO: 23 %
MCH RBC QN AUTO: 31.5 PG (ref 26.5–33)
MCHC RBC AUTO-ENTMCNC: 32.3 G/DL (ref 31.5–36.5)
MCV RBC AUTO: 98 FL (ref 78–100)
MONOCYTES # BLD AUTO: 0.8 10E3/UL (ref 0–1.3)
MONOCYTES NFR BLD AUTO: 11 %
NEUTROPHILS # BLD AUTO: 4.4 10E3/UL (ref 1.6–8.3)
NEUTROPHILS NFR BLD AUTO: 61 %
NRBC # BLD AUTO: 0 10E3/UL
NRBC BLD AUTO-RTO: 0 /100
PLATELET # BLD AUTO: 252 10E3/UL (ref 150–450)
POTASSIUM SERPL-SCNC: 4.2 MMOL/L (ref 3.4–5.3)
PROT SERPL-MCNC: 6 G/DL (ref 6.4–8.3)
RBC # BLD AUTO: 4.48 10E6/UL (ref 4.4–5.9)
SODIUM SERPL-SCNC: 142 MMOL/L (ref 135–145)
TSH SERPL DL<=0.005 MIU/L-ACNC: 1.91 UIU/ML (ref 0.3–4.2)
WBC # BLD AUTO: 7.2 10E3/UL (ref 4–11)

## 2023-10-25 PROCEDURE — 84443 ASSAY THYROID STIM HORMONE: CPT | Mod: ORL | Performed by: PHYSICIAN ASSISTANT

## 2023-10-25 PROCEDURE — 85025 COMPLETE CBC W/AUTO DIFF WBC: CPT | Mod: ORL | Performed by: PHYSICIAN ASSISTANT

## 2023-10-25 PROCEDURE — 80053 COMPREHEN METABOLIC PANEL: CPT | Mod: ORL | Performed by: PHYSICIAN ASSISTANT

## 2023-10-25 PROCEDURE — 36415 COLL VENOUS BLD VENIPUNCTURE: CPT | Mod: ORL | Performed by: PHYSICIAN ASSISTANT

## 2023-10-25 PROCEDURE — P9604 ONE-WAY ALLOW PRORATED TRIP: HCPCS | Mod: ORL | Performed by: PHYSICIAN ASSISTANT

## 2024-02-20 ENCOUNTER — LAB REQUISITION (OUTPATIENT)
Dept: LAB | Facility: CLINIC | Age: 89
End: 2024-02-20
Payer: COMMERCIAL

## 2024-02-20 DIAGNOSIS — I10 ESSENTIAL (PRIMARY) HYPERTENSION: ICD-10-CM

## 2024-02-20 DIAGNOSIS — D64.9 ANEMIA, UNSPECIFIED: ICD-10-CM

## 2024-02-20 DIAGNOSIS — E55.9 VITAMIN D DEFICIENCY, UNSPECIFIED: ICD-10-CM

## 2024-02-21 LAB
ALBUMIN SERPL BCG-MCNC: 4.1 G/DL (ref 3.5–5.2)
ALP SERPL-CCNC: 80 U/L (ref 40–150)
ALT SERPL W P-5'-P-CCNC: 14 U/L (ref 0–70)
ANION GAP SERPL CALCULATED.3IONS-SCNC: 10 MMOL/L (ref 7–15)
AST SERPL W P-5'-P-CCNC: 26 U/L (ref 0–45)
BILIRUB SERPL-MCNC: 0.5 MG/DL
BUN SERPL-MCNC: 18.8 MG/DL (ref 8–23)
CALCIUM SERPL-MCNC: 9.1 MG/DL (ref 8.2–9.6)
CHLORIDE SERPL-SCNC: 105 MMOL/L (ref 98–107)
CREAT SERPL-MCNC: 0.99 MG/DL (ref 0.67–1.17)
DEPRECATED HCO3 PLAS-SCNC: 28 MMOL/L (ref 22–29)
EGFRCR SERPLBLD CKD-EPI 2021: 71 ML/MIN/1.73M2
ERYTHROCYTE [DISTWIDTH] IN BLOOD BY AUTOMATED COUNT: 13.3 % (ref 10–15)
GLUCOSE SERPL-MCNC: 129 MG/DL (ref 70–99)
HCT VFR BLD AUTO: 46.2 % (ref 40–53)
HGB BLD-MCNC: 14.9 G/DL (ref 13.3–17.7)
MCH RBC QN AUTO: 31.5 PG (ref 26.5–33)
MCHC RBC AUTO-ENTMCNC: 32.3 G/DL (ref 31.5–36.5)
MCV RBC AUTO: 98 FL (ref 78–100)
PLATELET # BLD AUTO: 324 10E3/UL (ref 150–450)
POTASSIUM SERPL-SCNC: 4.2 MMOL/L (ref 3.4–5.3)
PROT SERPL-MCNC: 6.6 G/DL (ref 6.4–8.3)
RBC # BLD AUTO: 4.73 10E6/UL (ref 4.4–5.9)
SODIUM SERPL-SCNC: 143 MMOL/L (ref 135–145)
VIT D+METAB SERPL-MCNC: 80 NG/ML (ref 20–50)
WBC # BLD AUTO: 9.7 10E3/UL (ref 4–11)

## 2024-02-21 PROCEDURE — 85027 COMPLETE CBC AUTOMATED: CPT | Mod: ORL | Performed by: PHYSICIAN ASSISTANT

## 2024-02-21 PROCEDURE — P9604 ONE-WAY ALLOW PRORATED TRIP: HCPCS | Mod: ORL | Performed by: PHYSICIAN ASSISTANT

## 2024-02-21 PROCEDURE — 82306 VITAMIN D 25 HYDROXY: CPT | Mod: ORL | Performed by: PHYSICIAN ASSISTANT

## 2024-02-21 PROCEDURE — 80053 COMPREHEN METABOLIC PANEL: CPT | Mod: ORL | Performed by: PHYSICIAN ASSISTANT

## 2024-02-21 PROCEDURE — 36415 COLL VENOUS BLD VENIPUNCTURE: CPT | Mod: ORL | Performed by: PHYSICIAN ASSISTANT

## 2024-06-25 ENCOUNTER — LAB REQUISITION (OUTPATIENT)
Dept: LAB | Facility: CLINIC | Age: 89
End: 2024-06-25
Payer: COMMERCIAL

## 2024-06-25 DIAGNOSIS — D64.9 ANEMIA, UNSPECIFIED: ICD-10-CM

## 2024-06-25 DIAGNOSIS — I10 ESSENTIAL (PRIMARY) HYPERTENSION: ICD-10-CM

## 2024-06-26 LAB
ERYTHROCYTE [DISTWIDTH] IN BLOOD BY AUTOMATED COUNT: 13.2 % (ref 10–15)
HCT VFR BLD AUTO: 40.1 % (ref 40–53)
HGB BLD-MCNC: 12.2 G/DL (ref 13.3–17.7)
MCH RBC QN AUTO: 31.2 PG (ref 26.5–33)
MCHC RBC AUTO-ENTMCNC: 30.4 G/DL (ref 31.5–36.5)
MCV RBC AUTO: 103 FL (ref 78–100)
PLATELET # BLD AUTO: 191 10E3/UL (ref 150–450)
RBC # BLD AUTO: 3.91 10E6/UL (ref 4.4–5.9)
WBC # BLD AUTO: 7.4 10E3/UL (ref 4–11)

## 2024-06-26 PROCEDURE — 36415 COLL VENOUS BLD VENIPUNCTURE: CPT | Mod: ORL | Performed by: PHYSICIAN ASSISTANT

## 2024-06-26 PROCEDURE — 80053 COMPREHEN METABOLIC PANEL: CPT | Mod: ORL | Performed by: PHYSICIAN ASSISTANT

## 2024-06-26 PROCEDURE — P9603 ONE-WAY ALLOW PRORATED MILES: HCPCS | Mod: ORL | Performed by: PHYSICIAN ASSISTANT

## 2024-06-26 PROCEDURE — 85027 COMPLETE CBC AUTOMATED: CPT | Mod: ORL | Performed by: PHYSICIAN ASSISTANT

## 2024-06-27 LAB
ALBUMIN SERPL BCG-MCNC: 3.3 G/DL (ref 3.5–5.2)
ALP SERPL-CCNC: 128 U/L (ref 40–150)
ALT SERPL W P-5'-P-CCNC: 21 U/L (ref 0–70)
ANION GAP SERPL CALCULATED.3IONS-SCNC: 10 MMOL/L (ref 7–15)
AST SERPL W P-5'-P-CCNC: 29 U/L (ref 0–45)
BILIRUB SERPL-MCNC: 0.3 MG/DL
BUN SERPL-MCNC: 24.4 MG/DL (ref 8–23)
CALCIUM SERPL-MCNC: 8.6 MG/DL (ref 8.2–9.6)
CHLORIDE SERPL-SCNC: 105 MMOL/L (ref 98–107)
CREAT SERPL-MCNC: 0.94 MG/DL (ref 0.67–1.17)
DEPRECATED HCO3 PLAS-SCNC: 26 MMOL/L (ref 22–29)
EGFRCR SERPLBLD CKD-EPI 2021: 76 ML/MIN/1.73M2
GLUCOSE SERPL-MCNC: 83 MG/DL (ref 70–99)
POTASSIUM SERPL-SCNC: 3.9 MMOL/L (ref 3.4–5.3)
PROT SERPL-MCNC: 6 G/DL (ref 6.4–8.3)
SODIUM SERPL-SCNC: 141 MMOL/L (ref 135–145)

## 2024-07-06 ENCOUNTER — HEALTH MAINTENANCE LETTER (OUTPATIENT)
Age: 89
End: 2024-07-06

## 2024-09-22 ENCOUNTER — LAB REQUISITION (OUTPATIENT)
Dept: LAB | Facility: CLINIC | Age: 89
End: 2024-09-22
Payer: COMMERCIAL

## 2024-09-22 DIAGNOSIS — M06.9 RHEUMATOID ARTHRITIS, UNSPECIFIED (H): ICD-10-CM

## 2024-09-22 DIAGNOSIS — D64.9 ANEMIA, UNSPECIFIED: ICD-10-CM

## 2024-09-25 LAB
ALBUMIN SERPL BCG-MCNC: 4 G/DL (ref 3.5–5.2)
ALP SERPL-CCNC: 103 U/L (ref 40–150)
ALT SERPL W P-5'-P-CCNC: 39 U/L (ref 0–70)
ANION GAP SERPL CALCULATED.3IONS-SCNC: 12 MMOL/L (ref 7–15)
AST SERPL W P-5'-P-CCNC: 41 U/L (ref 0–45)
BILIRUB SERPL-MCNC: 0.5 MG/DL
BUN SERPL-MCNC: 15.4 MG/DL (ref 8–23)
CALCIUM SERPL-MCNC: 9.2 MG/DL (ref 8.8–10.4)
CHLORIDE SERPL-SCNC: 105 MMOL/L (ref 98–107)
CREAT SERPL-MCNC: 0.96 MG/DL (ref 0.67–1.17)
EGFRCR SERPLBLD CKD-EPI 2021: 74 ML/MIN/1.73M2
ERYTHROCYTE [DISTWIDTH] IN BLOOD BY AUTOMATED COUNT: 14 % (ref 10–15)
GLUCOSE SERPL-MCNC: 103 MG/DL (ref 70–99)
HCO3 SERPL-SCNC: 25 MMOL/L (ref 22–29)
HCT VFR BLD AUTO: 48.5 % (ref 40–53)
HGB BLD-MCNC: 15.1 G/DL (ref 13.3–17.7)
MCH RBC QN AUTO: 30.7 PG (ref 26.5–33)
MCHC RBC AUTO-ENTMCNC: 31.1 G/DL (ref 31.5–36.5)
MCV RBC AUTO: 99 FL (ref 78–100)
PLATELET # BLD AUTO: 349 10E3/UL (ref 150–450)
POTASSIUM SERPL-SCNC: 4.5 MMOL/L (ref 3.4–5.3)
PROT SERPL-MCNC: 6.8 G/DL (ref 6.4–8.3)
RBC # BLD AUTO: 4.92 10E6/UL (ref 4.4–5.9)
SODIUM SERPL-SCNC: 142 MMOL/L (ref 135–145)
WBC # BLD AUTO: 10.7 10E3/UL (ref 4–11)

## 2024-09-25 PROCEDURE — P9604 ONE-WAY ALLOW PRORATED TRIP: HCPCS | Mod: ORL | Performed by: PHYSICIAN ASSISTANT

## 2024-09-25 PROCEDURE — 36415 COLL VENOUS BLD VENIPUNCTURE: CPT | Mod: ORL | Performed by: PHYSICIAN ASSISTANT

## 2024-09-25 PROCEDURE — 85027 COMPLETE CBC AUTOMATED: CPT | Mod: ORL | Performed by: PHYSICIAN ASSISTANT

## 2024-09-25 PROCEDURE — 80053 COMPREHEN METABOLIC PANEL: CPT | Mod: ORL | Performed by: PHYSICIAN ASSISTANT

## 2024-11-13 NOTE — PROGRESS NOTES
[facility]  Chief Complaint   Patient presents with     Annual Comprehensive Exam Assisted Living     [facility] Record Number: [id number]  Place of Service where encounter took place: [facility] [[id number]]    HPI:    oJse E Capps is a 89 year old (9/28/1931), who is being seen today for an annual comprehensive visit. HPI information obtained from: facility chart records, patient report and [facility] Epic chart review. Today's concerns are:    Seen today for follow up to chronic conditions. Sitting at dining table playing solitaire. Uses rolling desk chairs in bathroom and dining table to help move around. Has 2WW also and does use around apartment.[place] but able to make needs known. Essential caregiver is PCA coming 3 times a week 4 hours. No acute concerns.     By chart review from Dr. Jimenez:  patient was hospitalized in October 2018 after he took a fall.   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   He went to TCU, regained mobility but has remained cognitively impaired.   [facility]    Pt has a long history of seronegative RA, treated with leflunomide per Dr Maurer.       ALLERGIES: Patient has no known allergies.  PAST MEDICAL HISTORY:  has a past medical history of BPH (benign prostatic hyperplasia), Colonic polyps, CVA (cerebral vascular accident) (H) (2009), Hyperlipidemia LDL goal <100, Hypertension, and Seronegative rheumatoid arthritis (H) (2011). He also has no past medical history of Asymptomatic human immunodeficiency virus (HIV) infection status (H), Blood in semen, Cerebral palsy (H), Complication of anesthesia, Congenital renal agenesis and dysgenesis, Epididymitis, bilateral,  Epididymitis, left, Epididymitis, right, Goiter, Gout, Hernia, abdominal, History of spinal cord injury, History of thrombophlebitis, Horseshoe kidney, Hydrocephalus (H), Malignant hyperthermia, Mumps, Orchitis, epididymitis, and epididymo-orchitis, with abscess, Palpitations, Parkinsons disease (H), Penile discharge, Prostate infection, Spider veins, Spina bifida (H), STD (sexually transmitted disease), Swelling of testicle, Tethered cord (H), or Tuberculosis.  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  IMMUNIZATIONS:  Immunization History   Administered Date(s) Administered     Influenza (High Dose) 3 valent vaccine 10/23/2012, 10/23/2015, 10/10/2016, 01/09/2018, 09/11/2018, 09/25/2019     Influenza (IIV3) PF 09/01/2011, 10/23/2012, 11/25/2013, 10/10/2016, 01/09/2018     Influenza, Quad, High Dose, Pf, 65yr + 09/23/2020     Pneumo Conj 13-V (2010&after) 01/09/2018     Pneumococcal 23 valent 09/01/2011     TDAP Vaccine (Adacel) 01/09/2018     Zoster vaccine recombinant adjuvanted (SHINGRIX) 10/30/2018     Above immunizations pulled from [facility]. [facility] and facility records also reconciled. Outstanding information sent to  to update [facility].  Future immunizations are not needed at this point as all recommended immunizations are up to date.     Current Outpatient Medications   Medication Sig Dispense Refill     ACETAMINOPHEN EXTRA STRENGTH 500 MG tablet TAKE TWO TABLETS (1000MG) BY MOUTH TWICE DAILY;MAY ALSO TAKE ONE TABLET EVERY 8 HOURS AS NEEDED FOR MILD PAIN *NOT TO EXCEED 4000MG APAP/24 HRS* 112 tablet 97     ASPIRIN NOT PRESCRIBED (INTENTIONAL) Please choose reason not prescribed, below 0 each 0     atorvastatin (LIPITOR) 10 MG tablet TAKE 1 TABLET BY MOUTH ONCE DAILY 28 tablet PRN     Calcium  Carb-Cholecalciferol (CALCIUM + D3) 600-200 MG-UNIT TABS TAKE 1 TABLET BY MOUTH ONCE DAILY 28 tablet PRN     cholecalciferol (VITAMIN D3) 5000 units (125 mcg) capsule Take 5,000 Units by mouth daily       cyanocobalamin (VITAMIN B-12) 1000 MCG SUBL sublingual tablet DISSOLVE ONE-HALF LOZENGE (500MCG) BY MOUTH TWICE DAILY 28 tablet PRN     cyanocobalamin (VITAMIN B-12) 500 MCG SUBL sublingual tablet Place 500 mcg under the tongue 2 times daily       D-5000 125 MCG (5000 UT) TABS TAKE 1 TABLET BY MOUTH ONCE DAILY 28 tablet PRN     ELIQUIS ANTICOAGULANT 5 MG tablet TAKE 1 TABLET BY MOUTH TWICE DAILY 56 tablet PRN     leflunomide (ARAVA) 10 MG tablet TAKE 1 TABLET BY MOUTH ONCE DAILY 31 tablet 97     lisinopril (ZESTRIL) 10 MG tablet TAKE ONE TABLET (10MG) BY MOUTH ONCE DAILY 28 tablet 97     loperamide (IMODIUM) 2 MG capsule TAKE 1 CAPSULE BY MOUTH FOUR TIMES DAILY AS NEEDED FOR DIARRHEA 30 capsule 11     memantine (NAMENDA) 10 MG tablet TAKE 1 TABLET BY MOUTH TWICE DAILY 56 tablet PRN     Multiple Vitamins-Minerals (CEROVITE SENIOR) TABS TAKE 1 TABLET BY MOUTH ONCE DAILY 28 tablet PRN     polyethylene glycol (MIRALAX/GLYCOLAX) powder MIX 17GM OF POWDER IN 8OZ OF WATER UNTIL COMPLETELY DISSOLVED. DRINK SOLUTION BY MOUTH ONCE DAILY AS NEEDED FOR CONSTIPATION 527 g 5     Case Management:  I have reviewed the Assisted Living care plan, current immunizations and preventive care/cancer screening. .Future cancer screening is not clinically indicated secondary to age/goals of care Patient's desire to return to the community is present, but is not able due to care needs . Current Level of Care is appropriate.    Advance Directive Discussion:    I reviewed the current advanced directives as reflected in EPIC, the POLST and the facility chart, and verified the congruency of orders. I contacted the first party daughter and left a message regarding the plan of Care. I did not due to cognitive impairment review the advance  "directives with the resident.     Team Discussion:  I communicated with the appropriate disciplines involved with the Plan of Care: Nursing    Patient's goal is pain control and comfort. Treat reversible conditions   Information reviewed:  Medications, vital signs, orders, and nursing notes.    ROS:  Limited secondary to cognitive impairment but today pt reports ok    Vitals:  /72   Pulse 69   Temp 96.9  F (36.1  C)   Resp 20   Ht 1.626 m (5' 4\")   Wt 80.7 kg (178 lb)   SpO2 95%   BMI 30.55 kg/m   Body mass index is 30.55 kg/m .  Exam:  GENERAL APPEARANCE:  alert and pleasant, forgetful. [place]  ENT:  Mouth and posterior oropharynx normal, dry mucous membranes  EYES:  EOM, conjunctivae, lids, pupils and irises normal-glasses  RESP:  respiratory effort appears at baseline, LS clear   CV:  Palpation and auscultation of heart done , no edema, regular rate, irregular rhythm, no LE edema  ABDOMEN: soft nontender   M/S: mostly use of WC or 2WW. Edema +1 to right hand and digits chronic  SKIN:  no signs of open areas to viewed arms, legs, dry flaky to scalp  NEURO:   Cranial nerves 2-12 are normal tested and grossly at patient's baseline  PSYCH:  insight and judgement impaired, memory impaired    Lab/Diagnostic data:   CBC RESULTS:   Recent Labs   Lab Test 12/02/20  1101 08/28/20  0925   WBC 7.9 6.7   RBC 4.66 4.58   HGB 14.8 14.2   HCT 45.2 44.0   MCV 97 96   MCH 31.8 31.0   MCHC 32.7 32.3   RDW 13.8 14.0    258       Last Basic Metabolic Panel:  Recent Labs   Lab Test 12/02/20  1101 08/28/20  0925 05/28/20  1300 07/03/19  0845 07/03/19  0845   NA  --   --  139  --  142   POTASSIUM  --   --  4.5  --  4.5   CHLORIDE  --   --  106  --  110*   GISELA  --   --  8.7  --  8.8   CO2  --   --  29  --  28   BUN  --   --  28  --  22   CR 1.20 1.41* 1.22   < > 1.07   GLC  --   --  150*  --  103*    < > = values in this interval not displayed.       Liver Function Studies -   Recent Labs   Lab Test 12/02/20  1101 " 08/28/20  0925 05/28/20  1300 07/03/19  0845 07/03/19  0845   PROTTOTAL  --   --  6.8  --  7.0   ALBUMIN 3.6 3.5 3.4   < > 3.6   BILITOTAL  --   --  0.5  --  0.5   ALKPHOS  --   --  66  --  73   AST 17 19 23   < > 15   ALT 27 41 29   < > 26    < > = values in this interval not displayed.       TSH   Date Value Ref Range Status   05/28/2020 1.63 0.40 - 4.00 mU/L Final   07/03/2019 1.72 0.40 - 4.00 mU/L Final       Lab Results   Component Value Date    A1C 5.6 09/28/2018    A1C 6.0 07/01/2015     ASSESSMENT/PLAN  (I50.32) Chronic diastolic congestive heart failure (H)  (I48.0) Paroxysmal A-fib (H)  (I25.119) Coronary artery disease with angina pectoris, unspecified   vessel or lesion type, unspecified whether native or transplanted heart (H)  (I10) Hypertension goal BP (blood pressure) < 140/90   Comment: stable-Echo 9/2018 EF at 55-60% Hypotension with past BPs and bradycardic  Plan:   -statin, Eliquis and BB discontinue with lower HR and BP  -Lisinopril 10 mg po daily   -follow BMP    (E04.1) Thyroid nodule  Comment: noted on imaging a right sided nodule measuring 2.4cm 9/2018  Plan:  -following TSH  -repeat imaging 7/2020 showed 2.2cm solid nodule in the inferior right lobe. Consider US guided fine needle biopsy. Reviewed with his daughter who declined.    (M05.741,  M05.742) Rheumatoid arthritis involving both hands with positive rheumatoid factor (H)  Comment: no current pain   Plan: continue leflunomide; follow up with Dr Maurer.     Acetaminophen scheduled bid and prn    (I77.74) Vertebral artery dissection (H)  (I63.50) Cerebrovascular accident involving posterior circulation (H)  Comment: residual cognitive impairment now c/w vascular dementia  Plan: statin, anticoagulation and bp meds for secondary prevention.   Follow up in Neurology clinic as scheduled.      (N18.31) Stage 3a chronic kidney disease  Comment: chronic   Plan:   -follow labs, renal dose medications     (I77.74) Vertebral artery dissection  SUBJECTIVE/ROS: Patient seen in the room. He continues to appear down and his mood is effected. He was noted to have worsening swallow today but is still participating in therapy. He denies chest pain, nausea, vomiting, abdominal pain, headache, or BLE pain.      HPI:  73 year old right handed [place]-speaking man with HTN, atrial fibrillation on Eliquis and Amiodarone, on Brilinta, former smoker x 34 years (quit 22months ago) discharged from  [facility] on 10/16 for left pulmonary nodule s/p Left lung lobectomy s/p chest tube presented to [facility] ED (10/17/24) with left sided weakness and left gaze preference. Daughter states patient last known normal at 11 am the last time she saw him well before she left to go to the store. She returned home at 1 pm and noted patient with left sided weakness and called EMS. Daughter confirmed she gave patient his Eliquis this morning. At baseline patient walks without assistive devices. On initial stroke evaluation, NIHSS-18 for patient awake with minimal verbal output right gaze preference not able to cross midline, left facial droop and LUE 0/5. LLE 1/5. CT with no ICH but noted dense right MCA. CTA head/neck with right ICA occlusion after the bifurcation extending to the supraclinoid ICA and right MCA, noted E/ICAD including L-ICA cavernous/supraclinoid segment mutlifocal stenosis, left VA mild to moderate stenosis. Patient not a candidate for TNK as on Eliquis and compliant. Patient underwent thrombectomy TICI 3. Patient admitted to MICU for close observation, required nicardipene drip. Repeat CTH with evolving Right MCA stroke without hemorrhage. Started ASA. Patient downgraded to Stroke Unit (10/19). A 1 c 6.1 (pre-OM), LDL 43 (wnl), TG 69 (wnl). TTE with mildly enlarged left atrium, mild concentric LVH, LV hyperdynamic EF >70%. [facility] CT surgery RAGINI Chacko from Dr. Mark Velez office [phone] reported that patient was on Eliquis and Brilinta prior to admission, admitted to [facility] 10/8 for left lower wedge x2 / lobectomy which required chest tube placement and was removed 10/16 and was told to hold blood thinners 3-7 days prior to admission. On discharge from [facility], EP was consulted as patient was in afib and recommend Amio load and resume Eliquis/Brilinta 10/16. Pathology resulted with adenocarcinoma but pending LN results for staging. No other HemOnc testing has been done. MRI brain with R-MCA territory infarct. Etiology of stroke cardio embolic due to Afib off AC due to recent surgery (10/2-10/16) vs hypercoagulability due to malignancy.        Vital Signs Last 24 Hrs  T(C): 36.5 (13 Nov 2024 07:40), Max: 36.7 (12 Nov 2024 20:06)  T(F): 97.7 (13 Nov 2024 07:40), Max: 98.1 (12 Nov 2024 20:06)  HR: 53 (13 Nov 2024 07:40) (53 - 78)  BP: 126/80 (13 Nov 2024 07:40) (121/77 - 131/79)  BP(mean): --  RR: 16 (13 Nov 2024 07:40) (16 - 16)  SpO2: 99% (13 Nov 2024 07:40) (95% - 99%)    Parameters below as of 13 Nov 2024 07:40  Patient On (Oxygen Delivery Method): room air            PHYSICAL EXAM  Constitutional - NAD, Comfortable in bed   HEENT - NCAT, EOMI  Neck - Supple, No limited ROM  Chest - Breathing comfortably in room air   Cardiovascular - RR - bradycardia improving   Extremities -  No calf tenderness   Neurologic Exam - patient alert, partially oriented to place (hospital in [place]), intermittently oriented to time. Left facial twitching. Language normal. Mild/moderate dysarthria. L facial droop. Moves all limbs against gravity. RUE 3/5 proximally, 1/5 distally, LUE 4/5 proximally, 5/5 distally. Left tactile inattention.         LABS:                          14.9   4.97  )-----------( 130      ( 11 Nov 2024 07:28 )             44.5     11-11    140  |  105  |  30[H]  ----------------------------<  102[H]  4.1   |  33[H]  |  1.23    Ca    10.2      11 Nov 2024 07:28  Mg     1.9     11-11    TPro  7.1  /  Alb  3.3  /  TBili  0.6  /  DBili  x   /  AST  65[H]  /  ALT  126[H]  /  AlkPhos  91  11-11    LIVER FUNCTIONS - ( 11 Nov 2024 07:28 )  Alb: 3.3 g/dL / Pro: 7.1 g/dL / ALK PHOS: 91 U/L / ALT: 126 U/L / AST: 65 U/L / GGT: x             MEDICATIONS  (STANDING):  aMIOdarone    Tablet 100 milliGRAM(s) Oral daily  apixaban 5 milliGRAM(s) Oral two times a day  aspirin  chewable 81 milliGRAM(s) Oral daily  levETIRAcetam 250 milliGRAM(s) Oral two times a day  melatonin 9 milliGRAM(s) Oral at bedtime  metoprolol tartrate 12.5 milliGRAM(s) Oral two times a day  mirtazapine 7.5 milliGRAM(s) Oral at bedtime  modafinil 50 milliGRAM(s) Oral <User Schedule>  pantoprazole    Tablet 40 milliGRAM(s) Oral before breakfast  rosuvastatin 20 milliGRAM(s) Oral at bedtime  senna 2 Tablet(s) Oral at bedtime    MEDICATIONS  (PRN):  acetaminophen     Tablet .. 650 milliGRAM(s) Oral every 6 hours PRN Temp greater or equal to 38C (100.4F), Mild Pain (1 - 3)  polyethylene glycol 3350 17 Gram(s) Oral at bedtime PRN Constipation        Function: IDT 11/11  Ambulating 250ft with no device CG 12 stairs with 2 hand rails min assist  Est dc 11/19 with 24hr supervision  (H)  (Z86.73) History of CVA (cerebrovascular accident)  Comment: now with dementia   Plan:   -follows with neurology  -B12, memantine, vitamin D            Electronically signed by:  SHIRA Parra CNP        SUBJECTIVE/ROS: Patient seen in the room. He continues to appear down and his mood is effected. He was noted to have worsening swallow today but is still participating in therapy. He denies chest pain, nausea, vomiting, abdominal pain, headache, or BLE pain.      HPI:  73 year old right handed [place]-speaking man with HTN, atrial fibrillation on Eliquis and Amiodarone, on Brilinta, former smoker x 34 years (quit 22months ago) discharged from  [facility] on 10/16 for left pulmonary nodule s/p Left lung lobectomy s/p chest tube presented to [facility] ED (10/17/24) with left sided weakness and left gaze preference. Daughter states patient last known normal at 11 am the last time she saw him well before she left to go to the store. She returned home at 1 pm and noted patient with left sided weakness and called EMS. Daughter confirmed she gave patient his Eliquis this morning. At baseline patient walks without assistive devices. On initial stroke evaluation, NIHSS-18 for patient awake with minimal verbal output right gaze preference not able to cross midline, left facial droop and LUE 0/5. LLE 1/5. CT with no ICH but noted dense right MCA. CTA head/neck with right ICA occlusion after the bifurcation extending to the supraclinoid ICA and right MCA, noted E/ICAD including L-ICA cavernous/supraclinoid segment mutlifocal stenosis, left VA mild to moderate stenosis. Patient not a candidate for TNK as on Eliquis and compliant. Patient underwent thrombectomy TICI 3. Patient admitted to MICU for close observation, required nicardipene drip. Repeat CTH with evolving Right MCA stroke without hemorrhage. Started ASA. Patient downgraded to Stroke Unit (10/19). A 1 c 6.1 (pre-OM), LDL 43 (wnl), TG 69 (wnl). TTE with mildly enlarged left atrium, mild concentric LVH, LV hyperdynamic EF >70%. [facility] CT surgery RAGINI Chacko from Dr. Mark Velez office [phone] reported that patient was on Eliquis and Brilinta prior to admission, admitted to [facility] 10/8 for left lower wedge x2 / lobectomy which required chest tube placement and was removed 10/16 and was told to hold blood thinners 3-7 days prior to admission. On discharge from [facility], EP was consulted as patient was in afib and recommend Amio load and resume Eliquis/Brilinta 10/16. Pathology resulted with adenocarcinoma but pending LN results for staging. No other HemOnc testing has been done. MRI brain with R-MCA territory infarct. Etiology of stroke cardio embolic due to Afib off AC due to recent surgery (10/2-10/16) vs hypercoagulability due to malignancy.      Vital Signs Last 24 Hrs  T(C): 36.5 (13 Nov 2024 07:40), Max: 36.7 (12 Nov 2024 20:06)  T(F): 97.7 (13 Nov 2024 07:40), Max: 98.1 (12 Nov 2024 20:06)  HR: 53 (13 Nov 2024 07:40) (53 - 78)  BP: 126/80 (13 Nov 2024 07:40) (121/77 - 131/79)  RR: 16 (13 Nov 2024 07:40) (16 - 16)  SpO2: 99% (13 Nov 2024 07:40) (95% - 99%)  Parameters below as of 13 Nov 2024 07:40  Patient On (Oxygen Delivery Method): room air      PHYSICAL EXAM  Constitutional - NAD, Comfortable in bed,   HEENT - NCAT, EOMI  Neck - Supple, No limited ROM  Chest - Breathing comfortably in room air   Cardiovascular - RR - bradycardia improving   Extremities -  No calf tenderness   Neurologic Exam - patient alert, partially oriented to place (hospital in [place]), intermittently oriented to time. Left facial twitching. Language normal. Mild/moderate dysarthria. L facial droop. Moves all limbs against gravity. RUE 3/5 proximally, 1/5 distally, LUE 4/5 proximally, 5/5 distally. Left tactile inattention.     Alert but fatigued  sleeping most of the time,\  Minimal verbal response, but able to follow commands appropriately  Antigravity all extremities   Mild facial deviation to right       LABS:                 14.9   4.97  )-----------( 130      ( 11 Nov 2024 07:28 )             44.5     11-11    140  |  105  |  30[H]  ----------------------------<  102[H]  4.1   |  33[H]  |  1.23    Ca    10.2      11 Nov 2024 07:28  Mg     1.9     11-11    TPro  7.1  /  Alb  3.3  /  TBili  0.6  /  DBili  x   /  AST  65[H]  /  ALT  126[H]  /  AlkPhos  91  11-11    LIVER FUNCTIONS - ( 11 Nov 2024 07:28 )  Alb: 3.3 g/dL / Pro: 7.1 g/dL / ALK PHOS: 91 U/L / ALT: 126 U/L / AST: 65 U/L / GGT: x             MEDICATIONS  (STANDING):  aMIOdarone    Tablet 100 milliGRAM(s) Oral daily  apixaban 5 milliGRAM(s) Oral two times a day  aspirin  chewable 81 milliGRAM(s) Oral daily  levETIRAcetam 250 milliGRAM(s) Oral two times a day  melatonin 9 milliGRAM(s) Oral at bedtime  metoprolol tartrate 12.5 milliGRAM(s) Oral two times a day  mirtazapine 7.5 milliGRAM(s) Oral at bedtime  modafinil 50 milliGRAM(s) Oral <User Schedule>  pantoprazole    Tablet 40 milliGRAM(s) Oral before breakfast  rosuvastatin 20 milliGRAM(s) Oral at bedtime  senna 2 Tablet(s) Oral at bedtime    MEDICATIONS  (PRN):  acetaminophen     Tablet .. 650 milliGRAM(s) Oral every 6 hours PRN Temp greater or equal to 38C (100.4F), Mild Pain (1 - 3)  polyethylene glycol 3350 17 Gram(s) Oral at bedtime PRN Constipation    Function: IDT 11/11  Ambulating 250ft with no device CG 12 stairs with 2 hand rails min assist  Est dc 11/19 with 24hr supervision

## (undated) RX ORDER — REGADENOSON 0.08 MG/ML
INJECTION, SOLUTION INTRAVENOUS
Status: DISPENSED
Start: 2018-01-11